# Patient Record
Sex: MALE | Race: WHITE | Employment: OTHER | URBAN - METROPOLITAN AREA
[De-identification: names, ages, dates, MRNs, and addresses within clinical notes are randomized per-mention and may not be internally consistent; named-entity substitution may affect disease eponyms.]

---

## 2020-08-25 ENCOUNTER — HOSPITAL ENCOUNTER (EMERGENCY)
Age: 75
Discharge: HOME | End: 2020-08-25
Attending: FAMILY MEDICINE
Payer: MEDICARE

## 2020-08-25 ENCOUNTER — APPOINTMENT (OUTPATIENT)
Dept: CT IMAGING | Age: 75
End: 2020-08-25
Payer: MEDICARE

## 2020-08-25 VITALS
DIASTOLIC BLOOD PRESSURE: 81 MMHG | BODY MASS INDEX: 29.77 KG/M2 | WEIGHT: 201 LBS | HEIGHT: 69 IN | TEMPERATURE: 98.4 F | HEART RATE: 62 BPM | RESPIRATION RATE: 18 BRPM | SYSTOLIC BLOOD PRESSURE: 159 MMHG | OXYGEN SATURATION: 96 %

## 2020-08-25 PROCEDURE — 71250 CT THORAX DX C-: CPT

## 2020-08-25 PROCEDURE — 99284 EMERGENCY DEPT VISIT MOD MDM: CPT

## 2020-08-25 PROCEDURE — 99283 EMERGENCY DEPT VISIT LOW MDM: CPT

## 2020-08-25 PROCEDURE — 93005 ELECTROCARDIOGRAM TRACING: CPT

## 2020-08-25 RX ORDER — DOXYCYCLINE HYCLATE 100 MG
100 TABLET ORAL 2 TIMES DAILY
Qty: 14 TABLET | Refills: 0 | Status: SHIPPED | OUTPATIENT
Start: 2020-08-25 | End: 2020-09-01

## 2020-08-25 RX ORDER — MECLIZINE HYDROCHLORIDE 25 MG/1
25 TABLET ORAL 3 TIMES DAILY PRN
Qty: 15 TABLET | Refills: 0 | Status: SHIPPED | OUTPATIENT
Start: 2020-08-25 | End: 2020-09-04

## 2020-08-25 ASSESSMENT — PAIN DESCRIPTION - DESCRIPTORS: DESCRIPTORS: DULL

## 2020-08-25 ASSESSMENT — PAIN DESCRIPTION - LOCATION: LOCATION: ABDOMEN;CHEST

## 2020-08-25 ASSESSMENT — PAIN SCALES - GENERAL: PAINLEVEL_OUTOF10: 4

## 2020-08-25 NOTE — ED PROVIDER NOTES
HPI:  8/25/20,   Time: 1:52 PM EDT         Zuly Acosta is a 70 y.o. male presenting to the ED for acute onset of vertiginous symptoms starting yesterday when he was getting off his lawn tractor. He started walking to the house and began having episodes of dizziness/the environment that spinning around him. It made him nauseous and he did have an emesis or 2. Today the symptoms are better. Received here for a second reason, few days ago he noticed swelling of the lower part of the of the chest wall, near where the ribs come into the sternum. Comes and goes. Talked to his physician today and he recommended getting a CAT scan. ROS:   Pertinent positives and negatives are stated within HPI, all other systems reviewed and are negative.  --------------------------------------------- PAST HISTORY ---------------------------------------------  Past Medical History:  has a past medical history of AAA (abdominal aortic aneurysm) ([facility] Aortic regurgitation, COPD (chronic obstructive pulmonary disease) ([facility] [address] Coronary atherosclerosis of native coronary artery, DJD (degenerative joint disease), HTN (hypertension), Hyperlipidemia, Mitral regurgitation, and TIA (transient ischemic attack). Past Surgical History:  has a past surgical history that includes shoulder surgery; hernia repair; back surgery; Appendectomy; Diagnostic Cardiac Cath Lab Procedure; Coronary artery bypass graft; Shoulder arthroscopy; Cardiac surgery; Cardiac catheterization (9/19/2013); Cholecystectomy; eye surgery (09/2017); and transesophageal echocardiogram (03/05/2018). Social History:  reports that he quit smoking about 17 years ago. His smoking use included cigarettes. He has a 40.00 pack-year smoking history. He has never used smokeless tobacco. He reports current alcohol use. He reports that he does not use drugs. Family History: family history includes Cancer in his father and mother.      The patients home medications have been reviewed. Allergies: Ranexa [ranolazine]    -------------------------------------------------- RESULTS -------------------------------------------------  All laboratory and radiology results have been personally reviewed by myself   LABS:  No results found for this visit on 08/25/20. RADIOLOGY:  Interpreted by Radiologist.  Ruslan Moreland   Final Result      1. 6 mm superficial (dermal-based) nodular density anterior to the   lower sternum, likely infectious/inflammatory in etiology. 2. The xiphoid process has a bifid appearance and protrudes   anteriorly. It is somewhat more prominent towards the left and might   account for the palpable abnormality. Clinical correlation is needed. 3. Ill-defined subsolid 1.3 cm right upper lobe opacity. Per the   recommendations of the Fleischner Society, follow-up CT of the chest   is recommended in 6-12 months. 4. Additional 4 mm right lower lobe nodule. Attention on follow-up   imaging. 5. Severe calcified coronary atherosclerosis. 6. Dilation of the ascending aorta to 4.3 cm.          ------------------------- NURSING NOTES AND VITALS REVIEWED ---------------------------   The nursing notes within the ED encounter and vital signs as below have been reviewed. BP (!) 159/81   Pulse 62   Temp 98.4 °F (36.9 °C)   Resp 18   Ht 5' 9\" (1.753 m)   Wt 201 lb (91.2 kg)   SpO2 96%   BMI 29.68 kg/m²   Oxygen Saturation Interpretation: Normal      ---------------------------------------------------PHYSICAL EXAM--------------------------------------    Constitutional/General: Alert and oriented x3, well appearing, non toxic in NAD  Head: NC/AT  Eyes: PERRL, EOMI  Mouth: Oropharynx clear, handling secretions, no trismus  Neck: Supple, full ROM, no meningeal signs  Pulmonary: Lungs clear to auscultation bilaterally, no wheezes, rales, or rhonchi. Not in respiratory distress  Cardiovascular:  Regular rate and rhythm, no murmurs, gallops, or rubs.  2+ distal pulses  Chest wall: There is mild that enlarged the area at the base of the sternum on the left side. It is nontender to palpation. There is no surrounding erythema or warmth to touch. Abdomen: Soft, non tender, non distended,   Extremities: Moves all extremities x 4. Warm and well perfused  Skin: warm and dry without rash  Neurologic: GCS 15,  Psych: Normal Affect      ------------------------------ ED COURSE/MEDICAL DECISION MAKING----------------------  Medications - No data to display      Medical Decision Making:    Straightforward  The patient has benign paroxysmal positional vertigo causing his sensation of dizziness and nausea. The etiology, pathophysiology and natural course of BPPV was discussed with the patient. The sensation with a large area in the chest wall, will be evaluated with a CT scan chest without contrast.  At the end of the shift, the CT scan results of chest were not available. Dr. Ha Hager will review the results and discussed them with the patient prior to discharge. Counseling: The emergency provider has spoken with the patient and discussed todays results, in addition to providing specific details for the plan of care and counseling regarding the diagnosis and prognosis. Questions are answered at this time and they are agreeable with the plan.      --------------------------------- IMPRESSION AND DISPOSITION ---------------------------------    IMPRESSION  1. Benign paroxysmal positional vertigo, unspecified laterality    2. Lung nodule    3.  Nodule of skin of abdomen        DISPOSITION  Disposition: Discharge to home  Patient condition is stable                 Iván Dunaway MD  08/26/20 0478

## 2020-08-27 ENCOUNTER — CARE COORDINATION (OUTPATIENT)
Dept: CARE COORDINATION | Age: 75
End: 2020-08-27

## 2020-08-28 LAB
EKG ATRIAL RATE: 64 BPM
EKG P AXIS: 8 DEGREES
EKG P-R INTERVAL: 224 MS
EKG Q-T INTERVAL: 432 MS
EKG QRS DURATION: 140 MS
EKG QTC CALCULATION (BAZETT): 445 MS
EKG R AXIS: -82 DEGREES
EKG T AXIS: 41 DEGREES
EKG VENTRICULAR RATE: 64 BPM

## 2020-09-03 ENCOUNTER — CARE COORDINATION (OUTPATIENT)
Dept: CARE COORDINATION | Age: 75
End: 2020-09-03

## 2020-09-03 NOTE — CARE COORDINATION
Patient contacted regarding COVID-19 risk and screening. Discussed COVID-19 related testing which was not done at this time. Test results were not done. Patient informed of results, if available? NA. Care Transition Nurse/ Ambulatory Care Manager contacted the patient by telephone to perform follow-up assessment. Verified name and  with patient as identifiers. Patient has following risk factors of: COPD. Symptoms reviewed with patient who verbalized the following symptoms: no new symptoms. Due to no new or worsening symptoms encounter was not routed to provider for escalation. Education provided regarding infection prevention, and signs and symptoms of COVID-19 and when to seek medical attention with patient who verbalized understanding. Discussed exposure protocols and quarantine from [address] you at higher risk for severe illness  and given an opportunity for questions and concerns. The patient agrees to contact the COVID-19 hotline [phone] or PCP office for questions related to their healthcare. CTN/ACM provided contact information for future reference. From CDC: Are you at higher risk for severe illness?  Wash your hands often.  Avoid close contact (6 feet, which is about two arm lengths) with people who are sick.  Put distance between yourself and other people if COVID-19 is spreading in your community.  Clean and disinfect frequently touched surfaces.  Avoid all cruise travel and non-essential air travel.  Call your healthcare professional if you have concerns about COVID-19 and your underlying condition or if you are sick. For more information on steps you can take to protect yourself, see CDC's How to [place] for follow-up call in 5-7 days based on severity of symptoms and risk factors.

## 2020-09-10 ENCOUNTER — CARE COORDINATION (OUTPATIENT)
Dept: CARE COORDINATION | Age: 75
End: 2020-09-10

## 2021-03-31 ENCOUNTER — APPOINTMENT (OUTPATIENT)
Dept: CT IMAGING | Age: 76
DRG: 246 | End: 2021-03-31
Payer: MEDICARE

## 2021-03-31 ENCOUNTER — HOSPITAL ENCOUNTER (INPATIENT)
Age: 76
LOS: 6 days | Discharge: HOME | DRG: 246 | End: 2021-04-06
Attending: EMERGENCY MEDICINE | Admitting: FAMILY MEDICINE
Payer: MEDICARE

## 2021-03-31 DIAGNOSIS — I25.10 ATHEROSCLEROSIS OF NATIVE CORONARY ARTERY OF NATIVE HEART WITHOUT ANGINA PECTORIS: ICD-10-CM

## 2021-03-31 DIAGNOSIS — R07.9 CHEST PAIN, UNSPECIFIED TYPE: Primary | ICD-10-CM

## 2021-03-31 DIAGNOSIS — I16.1 HYPERTENSIVE EMERGENCY: ICD-10-CM

## 2021-03-31 LAB
ALBUMIN SERPL-MCNC: 4.3 G/DL (ref 3.5–5.2)
ALP BLD-CCNC: 78 U/L (ref 40–129)
ALT SERPL-CCNC: 8 U/L (ref 0–40)
ANION GAP SERPL CALCULATED.3IONS-SCNC: 8 MMOL/L (ref 7–16)
AST SERPL-CCNC: 11 U/L (ref 0–39)
BASOPHILS ABSOLUTE: 0.08 E9/L (ref 0–0.2)
BASOPHILS RELATIVE PERCENT: 0.8 % (ref 0–2)
BILIRUB SERPL-MCNC: 0.2 MG/DL (ref 0–1.2)
BUN BLDV-MCNC: 17 MG/DL (ref 8–23)
CALCIUM SERPL-MCNC: 9.4 MG/DL (ref 8.6–10.2)
CHLORIDE BLD-SCNC: 105 MMOL/L (ref 98–107)
CO2: 27 MMOL/L (ref 22–29)
CREAT SERPL-MCNC: 1 MG/DL (ref 0.7–1.2)
EOSINOPHILS ABSOLUTE: 0.27 E9/L (ref 0.05–0.5)
EOSINOPHILS RELATIVE PERCENT: 2.8 % (ref 0–6)
GFR AFRICAN AMERICAN: >60
GFR NON-AFRICAN AMERICAN: >60 ML/MIN/1.73
GLUCOSE BLD-MCNC: 111 MG/DL (ref 74–99)
HCT VFR BLD CALC: 47.4 % (ref 37–54)
HEMOGLOBIN: 15.7 G/DL (ref 12.5–16.5)
IMMATURE GRANULOCYTES #: 0.03 E9/L
IMMATURE GRANULOCYTES %: 0.3 % (ref 0–5)
LYMPHOCYTES ABSOLUTE: 2.72 E9/L (ref 1.5–4)
LYMPHOCYTES RELATIVE PERCENT: 28.5 % (ref 20–42)
MAGNESIUM: 2 MG/DL (ref 1.6–2.6)
MCH RBC QN AUTO: 30.8 PG (ref 26–35)
MCHC RBC AUTO-ENTMCNC: 33.1 % (ref 32–34.5)
MCV RBC AUTO: 93.1 FL (ref 80–99.9)
MONOCYTES ABSOLUTE: 0.66 E9/L (ref 0.1–0.95)
MONOCYTES RELATIVE PERCENT: 6.9 % (ref 2–12)
NEUTROPHILS ABSOLUTE: 5.8 E9/L (ref 1.8–7.3)
NEUTROPHILS RELATIVE PERCENT: 60.7 % (ref 43–80)
PDW BLD-RTO: 13.7 FL (ref 11.5–15)
PLATELET # BLD: 274 E9/L (ref 130–450)
PMV BLD AUTO: 10.7 FL (ref 7–12)
POTASSIUM SERPL-SCNC: 4.7 MMOL/L (ref 3.5–5)
PRO-BNP: 441 PG/ML (ref 0–450)
RBC # BLD: 5.09 E12/L (ref 3.8–5.8)
SODIUM BLD-SCNC: 140 MMOL/L (ref 132–146)
TOTAL PROTEIN: 7.3 G/DL (ref 6.4–8.3)
TROPONIN: <0.01 NG/ML (ref 0–0.03)
WBC # BLD: 9.6 E9/L (ref 4.5–11.5)

## 2021-03-31 PROCEDURE — 74174 CTA ABD&PLVS W/CONTRAST: CPT

## 2021-03-31 PROCEDURE — 96372 THER/PROPH/DIAG INJ SC/IM: CPT

## 2021-03-31 PROCEDURE — 4A023N7 MEASUREMENT OF CARDIAC SAMPLING AND PRESSURE, LEFT HEART, PERCUTANEOUS APPROACH: ICD-10-PCS | Performed by: INTERNAL MEDICINE

## 2021-03-31 PROCEDURE — 85025 COMPLETE CBC W/AUTO DIFF WBC: CPT

## 2021-03-31 PROCEDURE — 027136Z DILATION OF CORONARY ARTERY, TWO ARTERIES WITH THREE DRUG-ELUTING INTRALUMINAL DEVICES, PERCUTANEOUS APPROACH: ICD-10-PCS | Performed by: INTERNAL MEDICINE

## 2021-03-31 PROCEDURE — B2131ZZ FLUOROSCOPY OF MULTIPLE CORONARY ARTERY BYPASS GRAFTS USING LOW OSMOLAR CONTRAST: ICD-10-PCS | Performed by: INTERNAL MEDICINE

## 2021-03-31 PROCEDURE — 6360000004 HC RX CONTRAST MEDICATION: Performed by: RADIOLOGY

## 2021-03-31 PROCEDURE — 96374 THER/PROPH/DIAG INJ IV PUSH: CPT

## 2021-03-31 PROCEDURE — 83735 ASSAY OF MAGNESIUM: CPT

## 2021-03-31 PROCEDURE — 93005 ELECTROCARDIOGRAM TRACING: CPT | Performed by: EMERGENCY MEDICINE

## 2021-03-31 PROCEDURE — 2060000000 HC ICU INTERMEDIATE R&B

## 2021-03-31 PROCEDURE — 80053 COMPREHEN METABOLIC PANEL: CPT

## 2021-03-31 PROCEDURE — 83880 ASSAY OF NATRIURETIC PEPTIDE: CPT

## 2021-03-31 PROCEDURE — B2111ZZ FLUOROSCOPY OF MULTIPLE CORONARY ARTERIES USING LOW OSMOLAR CONTRAST: ICD-10-PCS | Performed by: INTERNAL MEDICINE

## 2021-03-31 PROCEDURE — 36415 COLL VENOUS BLD VENIPUNCTURE: CPT

## 2021-03-31 PROCEDURE — 2500000003 HC RX 250 WO HCPCS: Performed by: EMERGENCY MEDICINE

## 2021-03-31 PROCEDURE — 6360000002 HC RX W HCPCS: Performed by: EMERGENCY MEDICINE

## 2021-03-31 PROCEDURE — 84484 ASSAY OF TROPONIN QUANT: CPT

## 2021-03-31 PROCEDURE — 71275 CT ANGIOGRAPHY CHEST: CPT

## 2021-03-31 PROCEDURE — 6370000000 HC RX 637 (ALT 250 FOR IP): Performed by: EMERGENCY MEDICINE

## 2021-03-31 PROCEDURE — 99285 EMERGENCY DEPT VISIT HI MDM: CPT

## 2021-03-31 RX ORDER — LABETALOL HYDROCHLORIDE 5 MG/ML
10 INJECTION, SOLUTION INTRAVENOUS ONCE
Status: COMPLETED | OUTPATIENT
Start: 2021-03-31 | End: 2021-03-31

## 2021-03-31 RX ORDER — NITROGLYCERIN 0.4 MG/1
0.4 TABLET SUBLINGUAL ONCE
Status: COMPLETED | OUTPATIENT
Start: 2021-03-31 | End: 2021-03-31

## 2021-03-31 RX ORDER — ASPIRIN 81 MG/1
243 TABLET, CHEWABLE ORAL ONCE
Status: COMPLETED | OUTPATIENT
Start: 2021-03-31 | End: 2021-03-31

## 2021-03-31 RX ADMIN — NITROGLYCERIN 0.4 MG: 0.4 TABLET, ORALLY DISINTEGRATING SUBLINGUAL at 18:36

## 2021-03-31 RX ADMIN — LABETALOL HYDROCHLORIDE 10 MG: 5 INJECTION INTRAVENOUS at 18:02

## 2021-03-31 RX ADMIN — IOPAMIDOL 100 ML: 755 INJECTION, SOLUTION INTRAVENOUS at 18:31

## 2021-03-31 RX ADMIN — ENOXAPARIN SODIUM 90 MG: 100 INJECTION SUBCUTANEOUS at 19:35

## 2021-03-31 RX ADMIN — NITROGLYCERIN 0.4 MG: 0.4 TABLET, ORALLY DISINTEGRATING SUBLINGUAL at 18:02

## 2021-03-31 RX ADMIN — ASPIRIN 81 MG CHEWABLE TABLET 243 MG: 81 TABLET CHEWABLE at 20:02

## 2021-03-31 ASSESSMENT — ENCOUNTER SYMPTOMS
SHORTNESS OF BREATH: 1
VOMITING: 0
TROUBLE SWALLOWING: 0
DIARRHEA: 0
RHINORRHEA: 0
BLOOD IN STOOL: 0
NAUSEA: 0
ABDOMINAL PAIN: 0
COLOR CHANGE: 0
COUGH: 0

## 2021-03-31 ASSESSMENT — PAIN SCALES - GENERAL
PAINLEVEL_OUTOF10: 0
PAINLEVEL_OUTOF10: 1

## 2021-03-31 ASSESSMENT — PAIN DESCRIPTION - LOCATION: LOCATION: CHEST;JAW

## 2021-03-31 ASSESSMENT — PAIN DESCRIPTION - ONSET: ONSET: SUDDEN

## 2021-03-31 ASSESSMENT — PAIN DESCRIPTION - ORIENTATION: ORIENTATION: LEFT;RIGHT

## 2021-03-31 ASSESSMENT — PAIN DESCRIPTION - FREQUENCY: FREQUENCY: CONTINUOUS

## 2021-03-31 ASSESSMENT — PAIN DESCRIPTION - PAIN TYPE: TYPE: ACUTE PAIN

## 2021-03-31 ASSESSMENT — PAIN DESCRIPTION - PROGRESSION: CLINICAL_PROGRESSION: NOT CHANGED

## 2021-03-31 NOTE — Clinical Note
Patient Class: Inpatient [101]   REQUIRED: Diagnosis: Chest pain [310451]   Estimated Length of Stay: Estimated stay of more than 2 midnights   Telemetry Bed Required?: Yes

## 2021-03-31 NOTE — ED PROVIDER NOTES
ED PROVIDER NOTE    Chief Complaint   Patient presents with    Chest Pain     began about 15-20 min agp, pain across entire chest and radiates to bilateral jaw area and left arm, some sweating per pt, sob also       HPI:  3/31/21,   Time: 5:49 PM EDT       Yasir Espinosa is a 78 y.o. male presenting to the ED for chest pain. Acute onset 20 minutes prior to arrival while walking to the car. Improved w/ rest. midsternal pressure radiating to bilateral jaws, no radiation to back. Associated shortness of breath and diaphoresis. No nausea/vomiting. No recent illness, fever, chills, cough. Hx of CAD s/p CABG, abdominal and thoracic aortic aneurysm. Compliant w/ meds, taking all antihypertensives. No drug/etoh use. Chart review: hx of HTN, HLD, CAD s/p CABG, aortic and mitral regurg, COPD, AAA, TAA    Review of Systems:     Review of Systems   Constitutional: Positive for diaphoresis. Negative for appetite change, chills and fever. HENT: Negative for congestion, rhinorrhea and trouble swallowing. Eyes: Negative for visual disturbance. Respiratory: Positive for shortness of breath. Negative for cough. Cardiovascular: Positive for chest pain. Negative for leg swelling. Gastrointestinal: Negative for abdominal pain, blood in stool, diarrhea, nausea and vomiting. Genitourinary: Negative for decreased urine volume, difficulty urinating, dysuria, frequency, hematuria and urgency. Musculoskeletal: Negative for myalgias, neck pain and neck stiffness. Skin: Negative for color change.    Neurological: Negative for dizziness, syncope, weakness, light-headedness, numbness and headaches.         --------------------------------------------- PAST HISTORY ---------------------------------------------  Past Medical History:   Past Medical History:   Diagnosis Date    AAA [facility]abdominal aortic aneurysm) ([facility])     4,5cm    Aortic regurgitation     mild to moderate    COPD (chronic obstructive pulmonary disease) ([facility])  Coronary atherosclerosis of native coronary artery     DJD (degenerative joint disease)     HTN (hypertension)     Hyperlipidemia     Mitral regurgitation     TIA (transient ischemic attack) 2013       Past Surgical History:   Past Surgical History:   Procedure Laterality Date    APPENDECTOMY      BACK SURGERY      CARDIAC CATHETERIZATION  2013    DR Magui Mcintosh CARDIAC SURGERY      stents    CHOLECYSTECTOMY      CORONARY ARTERY BYPASS GRAFT      DIAGNOSTIC CARDIAC CATH LAB PROCEDURE      EYE SURGERY  2017    HERNIA REPAIR      x4    SHOULDER ARTHROSCOPY      SHOULDER SURGERY      TRANSESOPHAGEAL ECHOCARDIOGRAM  2018    [facility] with Dr. Pam Jaimes       Social History:   Social History     Socioeconomic History    Marital status:      Spouse name: None    Number of children: None    Years of education: None    Highest education level: None   Occupational History    None   Social Needs    Financial resource strain: None    Food insecurity     Worry: None     Inability: None    Transportation needs     Medical: None     Non-medical: None   Tobacco Use    Smoking status: Former Smoker     Packs/day: 1.00     Years: 40.00     Pack years: 40.00     Types: Cigarettes     Quit date: 2002     Years since quittin.4    Smokeless tobacco: Never Used   Substance and Sexual Activity    Alcohol use: Yes     Comment: occassionally    Drug use: No    Sexual activity: None   Lifestyle    Physical activity     Days per week: None     Minutes per session: None    Stress: None   Relationships    Social connections     Talks on phone: None     Gets together: None     Attends Je[employer] witness service: None     Active member of club or organization: None     Attends meetings of clubs or organizations: None     Relationship status: None    Intimate partner violence     Fear of current or ex partner: None     Emotionally abused: None     Physically abused: None     Forced sexual activity: None   Other Topics Concern    None   Social History Narrative    None       Family History:   Family History   Problem Relation Age of Onset    Cancer Mother     Cancer Father        The patients home medications have been reviewed. Allergies: Allergies   Allergen Reactions    Ranexa [Ranolazine]      Shortness of breath, headache           ---------------------------------------------------PHYSICAL EXAM--------------------------------------    BP (!) 200/90   Pulse 66   Temp 97.7 °F (36.5 °C) (Temporal)   Resp 16   Ht 5' 9\" (1.753 m)   Wt 203 lb (92.1 kg)   SpO2 98%   BMI 29.98 kg/m²     Physical Exam  Vitals signs and nursing note reviewed. Constitutional:       General: He is not in acute distress. Appearance: He is not toxic-appearing. HENT:      Mouth/Throat:      Mouth: Mucous membranes are moist.   Eyes:      General: No scleral icterus. Extraocular Movements: Extraocular movements intact. Pupils: Pupils are equal, round, and reactive to light. Neck:      Musculoskeletal: Normal range of motion and neck supple. No neck rigidity or muscular tenderness. Comments: +JVD  Cardiovascular:      Rate and Rhythm: Normal rate and regular rhythm. Pulses: Normal pulses. Heart sounds: Normal heart sounds. No murmur. Pulmonary:      Effort: Pulmonary effort is normal. No respiratory distress. Breath sounds: Normal breath sounds. No wheezing or rales. Abdominal:      General: There is no distension. Palpations: Abdomen is soft. Tenderness: There is no abdominal tenderness. There is no guarding or rebound. Musculoskeletal: Normal range of motion. General: No swelling or tenderness. Comments: Radial, DP, and PT pulses 2+ bilaterally. Skin:     General: Skin is warm and dry. Neurological:      Mental Status: He is alert and oriented to person, place, and time.       Comments: Strength 5/5 and sensation grossly intact to light touch and equal bilaterally throughout all extremities            -------------------------------------------------- RESULTS -------------------------------------------------  I have personally reviewed all laboratory and imaging results for this patient. Results are listed below. LABS:  Labs Reviewed   COMPREHENSIVE METABOLIC PANEL - Abnormal; Notable for the following components:       Result Value    Glucose 111 (*)     All other components within normal limits   CBC WITH AUTO DIFFERENTIAL   MAGNESIUM   TROPONIN   BRAIN NATRIURETIC PEPTIDE   TROPONIN   TROPONIN       RADIOLOGY:  Interpreted personally and by Radiologist.  CTA CHEST W CONTRAST   Final Result   Aneurysmal dilatation of the ascending segment of the thoracic aorta   measuring approximately 4.5 cm, similar to the prior examination. No   evidence of aortic dissection. Cardiomegaly. Aorta iliac stent in place which is patent. No evidence of endoleak. Atherosclerotic plaque at the origin of the celiac axis and superior   mesenteric artery which remain patent. Sigmoid diverticulosis with no evidence of diverticulitis. Mildly enlarged prostate. The         CTA ABDOMEN PELVIS W CONTRAST   Final Result   Aneurysmal dilatation of the ascending segment of the thoracic aorta   measuring approximately 4.5 cm, similar to the prior examination. No   evidence of aortic dissection. Cardiomegaly. Aorta iliac stent in place which is patent. No evidence of endoleak. Atherosclerotic plaque at the origin of the celiac axis and superior   mesenteric artery which remain patent. Sigmoid diverticulosis with no evidence of diverticulitis. Mildly enlarged prostate. The             EKG:  This EKG is signed and interpreted by the EP.     Sinus bradycardia w/ 1st degree AV block, vent rate 56bpm, LBBB, no acute injury pattern, no clinically significant change compared w/ prior EKG      ------------------------- NURSING NOTES AND VITALS REVIEWED ---------------------------   The nursing notes within the ED encounter and vital signs as below have been reviewed by myself. BP (!) 200/90   Pulse 66   Temp 97.7 °F (36.5 °C) (Temporal)   Resp 16   Ht 5' 9\" (1.753 m)   Wt 203 lb (92.1 kg)   SpO2 98%   BMI 29.98 kg/m²   Oxygen Saturation Interpretation: Normal    The patients available past medical records and past encounters were reviewed. ------------------------------ ED COURSE/MEDICAL DECISION MAKING----------------------  Medications   nitroGLYCERIN (NITROSTAT) SL tablet 0.4 mg (0.4 mg Sublingual Given 3/31/21 1836)   labetalol (NORMODYNE;TRANDATE) injection 10 mg (10 mg Intravenous Given 3/31/21 1802)   iopamidol (ISOVUE-370) 76 % injection 100 mL (100 mLs Intravenous Given 3/31/21 1831)   enoxaparin (LOVENOX) injection 90 mg (90 mg Subcutaneous Given 3/31/21 1935)   aspirin chewable tablet 243 mg (243 mg Oral Given 3/31/21 2002)     Critical Care: Please note that the withdrawal or failure to initiate urgent interventions for this patient would likely result in a life threatening deterioration or permanent disability. Accordingly this patient received 30 minutes of critical care time, excluding separately billable procedures. Counseling: The emergency provider has spoken with the patient and discussed todays results, in addition to providing specific details for the plan of care and counseling regarding the diagnosis and prognosis. Questions are answered at this time and they are agreeable with the plan. ED Course/Medical Decision Makin y.o. male here with acute onset chest pain. On arrival markedly hypertensive w/ angina. CTA obtained emergently given hx of thoracic aortic aneurysm, negative for acute aortic pathology. EKG and initial troponin unrevealing. Chest pain improving w/ SL nitro, BP improving w/ IV labetalol.  Treating for ACS w/ lovenox, ASA, admitted in stable condition to [facility] intermediate care for further management. Discussed findings and expected course of care and patient/surrogate agreed with plan for admission for further evaluation and management.       --------------------------------- IMPRESSION AND DISPOSITION ---------------------------------    IMPRESSION  1. Chest pain, unspecified type    2. Hypertensive emergency        DISPOSITION  Disposition: Admit to telemetry  Patient condition is stable    NOTE: This report was transcribed using voice recognition software.  Every effort was made to ensure accuracy; however, inadvertent computerized transcription errors may be present    Jordon Llamas MD  Attending Emergency Physician          Jordon Llamas MD  03/31/21 6051

## 2021-03-31 NOTE — ED NOTES
Name: Yoshi Hernandez  : 1945  MRN: [id number]    Date: 3/31/2021    Benefits of immediately proceeding with Radiology exam outweigh the risks and therefore the following is being waived:      [] Pregnancy test    [] Protocol for Iodine allergy    [] MRI questionnaire    [x] BUN/Creatinine        Yuvonne Duane, MD Yuvonne Duane, MD  21 [phone]

## 2021-04-01 ENCOUNTER — APPOINTMENT (OUTPATIENT)
Dept: NUCLEAR MEDICINE | Age: 76
DRG: 246 | End: 2021-04-01
Payer: MEDICARE

## 2021-04-01 LAB
EKG ATRIAL RATE: 56 BPM
EKG P AXIS: 18 DEGREES
EKG P-R INTERVAL: 256 MS
EKG Q-T INTERVAL: 446 MS
EKG QRS DURATION: 140 MS
EKG QTC CALCULATION (BAZETT): 430 MS
EKG R AXIS: -69 DEGREES
EKG T AXIS: 109 DEGREES
EKG VENTRICULAR RATE: 56 BPM
LV EF: 50 %
LVEF MODALITY: NORMAL
TROPONIN: <0.01 NG/ML (ref 0–0.03)

## 2021-04-01 PROCEDURE — 6370000000 HC RX 637 (ALT 250 FOR IP): Performed by: FAMILY MEDICINE

## 2021-04-01 PROCEDURE — 99223 1ST HOSP IP/OBS HIGH 75: CPT | Performed by: INTERNAL MEDICINE

## 2021-04-01 PROCEDURE — 6360000002 HC RX W HCPCS: Performed by: NURSE PRACTITIONER

## 2021-04-01 PROCEDURE — APPSS45 APP SPLIT SHARED TIME 31-45 MINUTES: Performed by: NURSE PRACTITIONER

## 2021-04-01 PROCEDURE — 93017 CV STRESS TEST TRACING ONLY: CPT

## 2021-04-01 PROCEDURE — 6370000000 HC RX 637 (ALT 250 FOR IP): Performed by: NURSE PRACTITIONER

## 2021-04-01 PROCEDURE — 78452 HT MUSCLE IMAGE SPECT MULT: CPT | Performed by: INTERNAL MEDICINE

## 2021-04-01 PROCEDURE — 3430000000 HC RX DIAGNOSTIC RADIOPHARMACEUTICAL: Performed by: RADIOLOGY

## 2021-04-01 PROCEDURE — 2060000000 HC ICU INTERMEDIATE R&B

## 2021-04-01 PROCEDURE — 84484 ASSAY OF TROPONIN QUANT: CPT

## 2021-04-01 PROCEDURE — 93016 CV STRESS TEST SUPVJ ONLY: CPT | Performed by: INTERNAL MEDICINE

## 2021-04-01 PROCEDURE — A9500 TC99M SESTAMIBI: HCPCS | Performed by: RADIOLOGY

## 2021-04-01 PROCEDURE — 93018 CV STRESS TEST I&R ONLY: CPT | Performed by: INTERNAL MEDICINE

## 2021-04-01 PROCEDURE — 36415 COLL VENOUS BLD VENIPUNCTURE: CPT

## 2021-04-01 PROCEDURE — 78452 HT MUSCLE IMAGE SPECT MULT: CPT

## 2021-04-01 PROCEDURE — 6360000002 HC RX W HCPCS: Performed by: FAMILY MEDICINE

## 2021-04-01 RX ORDER — LANOLIN ALCOHOL/MO/W.PET/CERES
3 CREAM (GRAM) TOPICAL NIGHTLY PRN
Status: DISCONTINUED | OUTPATIENT
Start: 2021-04-01 | End: 2021-04-06 | Stop reason: HOSPADM

## 2021-04-01 RX ORDER — ISOSORBIDE MONONITRATE 30 MG/1
30 TABLET, EXTENDED RELEASE ORAL DAILY
Status: DISCONTINUED | OUTPATIENT
Start: 2021-04-01 | End: 2021-04-05

## 2021-04-01 RX ORDER — OMEPRAZOLE 20 MG/1
20 CAPSULE, DELAYED RELEASE ORAL EVERY MORNING
Status: DISCONTINUED | OUTPATIENT
Start: 2021-04-01 | End: 2021-04-01 | Stop reason: CLARIF

## 2021-04-01 RX ORDER — LISINOPRIL 5 MG/1
2.5 TABLET ORAL ONCE
Status: COMPLETED | OUTPATIENT
Start: 2021-04-01 | End: 2021-04-01

## 2021-04-01 RX ORDER — METOPROLOL SUCCINATE 25 MG/1
12.5 TABLET, EXTENDED RELEASE ORAL DAILY
Status: DISCONTINUED | OUTPATIENT
Start: 2021-04-01 | End: 2021-04-02

## 2021-04-01 RX ORDER — ASPIRIN 81 MG/1
81 TABLET ORAL DAILY
Status: DISCONTINUED | OUTPATIENT
Start: 2021-04-01 | End: 2021-04-06 | Stop reason: HOSPADM

## 2021-04-01 RX ORDER — ATORVASTATIN CALCIUM 10 MG/1
10 TABLET, FILM COATED ORAL DAILY
Status: DISCONTINUED | OUTPATIENT
Start: 2021-04-01 | End: 2021-04-06 | Stop reason: HOSPADM

## 2021-04-01 RX ORDER — LANOLIN ALCOHOL/MO/W.PET/CERES
500 CREAM (GRAM) TOPICAL NIGHTLY
Status: DISCONTINUED | OUTPATIENT
Start: 2021-04-01 | End: 2021-04-06 | Stop reason: HOSPADM

## 2021-04-01 RX ORDER — LISINOPRIL 5 MG/1
5 TABLET ORAL DAILY
Status: DISCONTINUED | OUTPATIENT
Start: 2021-04-02 | End: 2021-04-02

## 2021-04-01 RX ORDER — LISINOPRIL 5 MG/1
2.5 TABLET ORAL DAILY
Status: DISCONTINUED | OUTPATIENT
Start: 2021-04-01 | End: 2021-04-01

## 2021-04-01 RX ORDER — PANTOPRAZOLE SODIUM 40 MG/1
40 TABLET, DELAYED RELEASE ORAL
Status: DISCONTINUED | OUTPATIENT
Start: 2021-04-01 | End: 2021-04-06 | Stop reason: HOSPADM

## 2021-04-01 RX ADMIN — LISINOPRIL 2.5 MG: 5 TABLET ORAL at 14:01

## 2021-04-01 RX ADMIN — ASPIRIN 81 MG: 81 TABLET, COATED ORAL at 08:27

## 2021-04-01 RX ADMIN — Medication 35 MILLICURIE: at 14:52

## 2021-04-01 RX ADMIN — REGADENOSON 0.4 MG: 0.08 INJECTION, SOLUTION INTRAVENOUS at 14:50

## 2021-04-01 RX ADMIN — Medication 10 MILLICURIE: at 14:08

## 2021-04-01 RX ADMIN — ISOSORBIDE MONONITRATE 30 MG: 30 TABLET ORAL at 08:28

## 2021-04-01 RX ADMIN — Medication 3 MG: at 22:31

## 2021-04-01 RX ADMIN — ENOXAPARIN SODIUM 40 MG: 40 INJECTION SUBCUTANEOUS at 08:28

## 2021-04-01 RX ADMIN — PANTOPRAZOLE SODIUM 40 MG: 40 TABLET, DELAYED RELEASE ORAL at 08:27

## 2021-04-01 RX ADMIN — ATORVASTATIN CALCIUM 10 MG: 10 TABLET, FILM COATED ORAL at 08:27

## 2021-04-01 RX ADMIN — LISINOPRIL 2.5 MG: 5 TABLET ORAL at 08:27

## 2021-04-01 RX ADMIN — Medication 500 MG: at 20:15

## 2021-04-01 ASSESSMENT — PAIN SCALES - GENERAL: PAINLEVEL_OUTOF10: 0

## 2021-04-01 ASSESSMENT — ENCOUNTER SYMPTOMS
ABDOMINAL PAIN: 0
SHORTNESS OF BREATH: 0

## 2021-04-01 NOTE — H&P
HISTORY AND PHYSICAL             Date: 4/1/2021        Patient Name: Tennille Crain     YOB: 1945      Age:  78 y.o. Chief Complaint     Chief Complaint   Patient presents with    Chest Pain     began about 15-20 min agp, pain across entire chest and radiates to bilateral jaw area and left arm, some sweating per pt, sob also          History Obtained From   patient    History of Present Illness   Patient came to the ER with chest pain that radiated to his neck and shortness of breath with diaphoresis. He feels improved this morning. Past Medical History     Past Medical History:   Diagnosis Date    AAA (abdominal aortic aneurysm) ([facility])     4,5cm    Aortic regurgitation     mild to moderate    COPD (chronic obstructive pulmonary disease) ([facility])     Coronary atherosclerosis of native coronary artery     DJD (degenerative joint disease)     HTN (hypertension)     Hyperlipidemia     Mitral regurgitation     TIA (transient ischemic attack) 07/01/2013        Past Surgical History     Past Surgical History:   Procedure Laterality Date    APPENDECTOMY      BACK SURGERY      CARDIAC CATHETERIZATION  9/19/2013    [facility] CARDIAC SURGERY      stents    CHOLECYSTECTOMY      CORONARY ARTERY BYPASS GRAFT      DIAGNOSTIC CARDIAC CATH LAB PROCEDURE      EYE SURGERY  09/2017    HERNIA REPAIR      x4    SHOULDER ARTHROSCOPY      SHOULDER SURGERY      TRANSESOPHAGEAL ECHOCARDIOGRAM  03/05/2018    MADHURI with Dr. Michaud Pulling        Medications Prior to Admission     Prior to Admission medications    Medication Sig Start Date End Date Taking? Authorizing Provider   metoprolol succinate (TOPROL XL) 25 MG extended release tablet Take 0.5 tablets by mouth daily 2/27/18  Yes Yifan Gregorio, DO   lisinopril (PRINIVIL;ZESTRIL) 2.5 MG tablet Take 1 tablet by mouth daily. 10/14/13  Yes Yifan Gregorio, DO   aspirin 81 MG tablet Take 81 mg by mouth daily.    Yes Historical Provider, MD   Omeprazole Abdomen is flat. Bowel sounds are normal. There is no distension. Tenderness: There is no abdominal tenderness. Skin:     General: Skin is warm and dry. Capillary Refill: Capillary refill takes less than 2 seconds. Neurological:      General: No focal deficit present. Mental Status: He is alert and oriented to person, place, and time.    Psychiatric:         Mood and Affect: Mood normal.         Behavior: Behavior normal.         Labs      Recent Results (from the past 24 hour(s))   EKG 12 Lead    Collection Time: 03/31/21  5:18 PM   Result Value Ref Range    Ventricular Rate 56 BPM    Atrial Rate 56 BPM    P-R Interval 256 ms    QRS Duration 140 ms    Q-T Interval 446 ms    QTc Calculation (Bazett) 430 ms    P Axis 18 degrees    R Axis -69 degrees    T Axis 109 degrees   CBC Auto Differential    Collection Time: 03/31/21  5:49 PM   Result Value Ref Range    WBC 9.6 4.5 - 11.5 E9/L    RBC 5.09 3.80 - 5.80 E12/L    Hemoglobin 15.7 12.5 - 16.5 g/dL    Hematocrit 47.4 37.0 - 54.0 %    MCV 93.1 80.0 - 99.9 fL    MCH 30.8 26.0 - 35.0 pg    MCHC 33.1 32.0 - 34.5 %    RDW 13.7 11.5 - 15.0 fL    Platelets 332 903 - 166 E9/L    MPV 10.7 7.0 - 12.0 fL    Neutrophils % 60.7 43.0 - 80.0 %    Immature Granulocytes % 0.3 0.0 - 5.0 %    Lymphocytes % 28.5 20.0 - 42.0 %    Monocytes % 6.9 2.0 - 12.0 %    Eosinophils % 2.8 0.0 - 6.0 %    Basophils % 0.8 0.0 - 2.0 %    Neutrophils Absolute 5.80 1.80 - 7.30 E9/L    Immature Granulocytes # 0.03 E9/L    Lymphocytes Absolute 2.72 1.50 - 4.00 E9/L    Monocytes Absolute 0.66 0.10 - 0.95 E9/L    Eosinophils Absolute 0.27 0.05 - 0.50 E9/L    Basophils Absolute 0.08 0.00 - 0.20 E9/L   Comprehensive Metabolic Panel    Collection Time: 03/31/21  5:49 PM   Result Value Ref Range    Sodium 140 132 - 146 mmol/L    Potassium 4.7 3.5 - 5.0 mmol/L    Chloride 105 98 - 107 mmol/L    CO2 27 22 - 29 mmol/L    Anion Gap 8 7 - 16 mmol/L    Glucose 111 (H) 74 - 99 mg/dL    BUN 17 8 - 23 mg/dL    CREATININE 1.0 0.7 - 1.2 mg/dL    GFR Non-African American >60 >=60 mL/min/1.73    GFR African American >60     Calcium 9.4 8.6 - 10.2 mg/dL    Total Protein 7.3 6.4 - 8.3 g/dL    Albumin 4.3 3.5 - 5.2 g/dL    Total Bilirubin 0.2 0.0 - 1.2 mg/dL    Alkaline Phosphatase 78 40 - 129 U/L    ALT 8 0 - 40 U/L    AST 11 0 - 39 U/L   Magnesium    Collection Time: 03/31/21  5:49 PM   Result Value Ref Range    Magnesium 2.0 1.6 - 2.6 mg/dL   Troponin    Collection Time: 03/31/21  5:49 PM   Result Value Ref Range    Troponin <0.01 0.00 - 0.03 ng/mL   Brain Natriuretic Peptide    Collection Time: 03/31/21  5:49 PM   Result Value Ref Range    Pro- 0 - 450 pg/mL   Troponin    Collection Time: 04/01/21  2:14 AM   Result Value Ref Range    Troponin <0.01 0.00 - 0.03 ng/mL        Imaging/Diagnostics Last 24 Hours   Cta Chest W Contrast    Result Date: 3/31/2021  EXAMINATION: CTA OF THE CHEST; CTA OF THE ABDOMEN AND PELVIS WITH CONTRAST 3/31/2021 4:55 pm: TECHNIQUE: CTA of the chest was performed after the administration of intravenous contrast.  Multiplanar reformatted images are provided for review. MIP images are provided for review. Dose modulation, iterative reconstruction, and/or weight based adjustment of the mA/kV was utilized to reduce the radiation dose to as low as reasonably achievable.; CTA of the abdomen and pelvis was performed with the administration of intravenous contrast. Multiplanar reformatted images are provided for review. MIP images are provided for review. Dose modulation, iterative reconstruction, and/or weight based adjustment of the mA/kV was utilized to reduce the radiation dose to as low as reasonably achievable. COMPARISON: August 2020.  HISTORY: ORDERING SYSTEM PROVIDED HISTORY: r/o dissection TECHNOLOGIST PROVIDED HISTORY: Reason for exam:->r/o dissection Decision Support Exception->Emergency Medical Condition (MA) FINDINGS: CTA CHEST: Thoracic aorta and mediastinum: Aneurysmal dilatation of the ascending segment of the thoracic aorta which measures approximately 4.5 cm in diameter. This is similar to the prior examination. No evidence of dissection. Status post median sternotomy. No hilar or mediastinal adenopathy. No pericardial effusion. Cardiomegaly. Lungs/Pleura: The lungs are without acute process. No focal consolidation or pulmonary edema. No evidence of pleural effusion or pneumothorax. Images significantly degraded by respiratory motion artifact. Soft Tissues/Bones: No acute bone or soft tissue abnormality. CTA ABDOMEN: Abdominal aorta/Branches: There is an aorto iliac stent in place which is patent. There is no evidence of endoleak. No aortic dissection. Atherosclerotic plaque at the origin of the celiac axis and superior mesenteric artery which remain patent. Patent renal arteries bilaterally. Organs: Incidental hepatic cysts. The spleen, adrenals, pancreas are within normal limits. Status post cholecystectomy. Kidneys enhance homogeneously. GI/Bowel: No bowel obstruction or free intraperitoneal air. No evidence of colitis or diverticulitis. Sigmoid diverticulosis. Peritoneum/Retroperitoneum: No retroperitoneal adenopathy. Bones/Soft Tissues: No acute bony pathology. CTA PELVIS: Aorta/Iliacs: Patent aortoiliac stent. Patent internal and external iliac arteries bilaterally. Other: Urinary bladder within normal limits. Mildly enlarged prostate. Bones/Soft Tissues: No acute bony pathology. Aneurysmal dilatation of the ascending segment of the thoracic aorta measuring approximately 4.5 cm, similar to the prior examination. No evidence of aortic dissection. Cardiomegaly. Aorta iliac stent in place which is patent. No evidence of endoleak. Atherosclerotic plaque at the origin of the celiac axis and superior mesenteric artery which remain patent. Sigmoid diverticulosis with no evidence of diverticulitis. Mildly enlarged prostate.   The     Cta Abdomen Pelvis W

## 2021-04-01 NOTE — CONSULTS
Inpatient Cardiology Consultation      Reason for Consult: Chest pain    Consulting Physician: Dr. Carina Jo    Requesting Physician: Dr. Brandan Tse    Date of Consultation: 4/1/2021    HISTORY OF PRESENT ILLNESS:     This 72-year-old male is known to [facility] cardiology and is followed by Dr. Avinash Couch. He has not been seen by Dr. Carina Jo since December 2017. At his last visit with Dr. Avinash Couch, a cardiac MRI was done and a stress test. He was told both tests were good. He has a history of coronary artery disease with a CABG in 2007 and a PCI to the circumflex and LAD in 2008. He has moderate aortic regurgitation. He also has a history of aortoliac stents   And has not been following with his vascular surgeon, Dr. Tevin Felix. He continues to smoke cigars. He presented to the emergency room yesterday evening with chest pain. The pain occurred while he was walking out to help his wife carry in groceries around 5 PM.  It was a severe pain in the mid sternum that radiated to his jaw and it was the worst pain he has ever had. It is different from his usual indigestion type pain. His wife drove him to the emergency room. The pain lasted about 35 to 40 minutes. The pain was relieved in the emergency room when he was given nitroglycerin    Blood pressure was 200/90, 215/89 with a heart rate of 66 and he was afebrile with an O2 saturation of 98%. His bp was \"good\" at PCP one month ago. He has been taking all his meds. EKG was sinus bradycardia with first-degree AV block and a left bundle branch block.       Potassium was 4.7 with a BUN of 17 and a creatinine of 1 and a proBNP was 441, WBCs 9.6 with a hemoglobin of 15.7 and hematocrit of 47.4 and 2 troponins are negative    Chest x-ray was not done but a CTA of the chest with contrast showed an aneurysmal dilatation of the ascending segment of the thoracic aorta measuring 4.5 cm and no dissection in the aorta size with stable there was  aortic iliac stent in place which was patent and with no endoleak and atherosclerotic plaque at the origin of the celiac axis and superior mesenteric artery which were patent. He was treated with Therapeutic Lovenox and given aspirin and 10 mg of Trandate IV as well as sublingual nitroglycerin after the Trandate his heart rate dropped into the high 40s. His blood pressure remains elevated at 175/99. Second troponin is now negative    Past Medical History:    1. Coronary atherosclerosis of native coronary artery                                                 A.  Acute myocardial infarction (11/2/07). Cardiac catheterization (11/2/07). Distal left main 60-70%. LAD proximal 50-60%, mid 80-95%. Circumflex mid to distal 95% Ramus proximal 40-50% Right coronary artery PDA mid 95%. B.  CABG by Dr. Jim Dwyer (11/3/07). LIMA to the LAD. SVG to D2  SVG to OM1 SVG to PDA               C. Cardiac catheterization (7/22/08):  LAD 40%, mid 100%, RCA PDA occluded, distal 99% stenosis of bifurcation of PDA and posterolateral branch. SVG to PDA was patent but had no retrograde flow into the posterolateral branch. 4.0 x 16 mm Liberte stent to the distal right coronary artery to 0% residual stenosis. D. Cardiac catheterization, 8/22/08: 3.0 x 24 mm Vandergrift drug eluting stent to the mid to distal circumflex to 0% residual stenosis. Moderate (2+) aortic regurgitation     E. Left heart cath September 22, 2013 EF low normal     Left heart catheterization September 22, 2013, Dr. Carina Jo   1. Left main: 100% occlusion. 2.    Right coronary artery: Mid diffuse 30% stenosis. Dominant vessel. Distal stent widely patent. The PDA has an ostial 100% occlusion. The      right coronary artery is supplying collaterals to the circumflex      territory. 3.    SVG to the diagonal:  Widely patent with good distal runoff. There was      retrograde flow into the LAD, through an 80% diffuse stenosis.        4.    SVG to the PDA:  Widely patent with good distal runoff. 5.    SVG to the OM: Widely patent with good distal runoff. 6.    Subclavian:  Tortuous. The left carotid has an ostial 40% stenosis. 7.    LIMA to the LAD:  Very tortuous. Widely patent with good distal      runoff. After the anastomosis, the LAD is widely patent with good distal      runoff    2 VHD ( AR and MR)               Echo 2/22/2018 Dr. Evie Verma Moderately dilated left ventricle.  Normal left ventricle systolic function.  Mild to moderate mitral regurgitation.  Moderate aortic regurgitation. MADHURI March 5, 2018 torn chordae on mitral valve leaflet, EF 60% and mild mitral regurgitation and mild to moderate eccentric aortic regurgitation     3. HTN  4. COPD  5. DJD spine   6. HLD   7. AAA              A.  CT 3/4/2011 Ectatic but not aneurysmal ascending thoracic aorta measuring 38 mm in transverse dimension. Given the reproducibility of the technique this is essentially unchanged from the 40 mm measurement obtained on prior study of November 3, 2009. B. .MRA chest 9/320/2013  Aneurysm of the ascending thoracic aorta, the largest diameter is 3.8 cm. C.  Aorto iliac stent [facility] 2016 Dr. Walt Collet  8. TIA   9. Admission August 25, 2020 for benign paroxysmal positional vertigo   10. Shoulder surgery  11. Hernia repair  12. First degree AV block and nonspecific QRS widening        Medications Prior to admit:  Prior to Admission medications    Medication Sig Start Date End Date Taking? Authorizing Provider   metoprolol succinate (TOPROL XL) 25 MG extended release tablet Take 0.5 tablets by mouth daily 2/27/18  Yes Adalgisa Vazquez, DO   lisinopril (PRINIVIL;ZESTRIL) 2.5 MG tablet Take 1 tablet by mouth daily. 10/14/13  Yes Adalgisa Vazquez, DO   aspirin 81 MG tablet Take 81 mg by mouth daily. Yes Historical Provider, MD   Omeprazole (PRILOSEC PO) Take  by mouth daily as needed.    Yes Historical Provider, MD isosorbide mononitrate (IMDUR) 30 MG CR tablet Take 1 tablet by mouth daily. 9/19/13   Jazmin Madden,    niacin-simvastatin [facility]) 500-20 MG TB24 tablet Take 1 tablet by mouth daily. Historical Provider, MD       Current Medications:    Current Facility-Administered Medications: isosorbide mononitrate (IMDUR) extended release tablet 30 mg, 30 mg, Oral, Daily  aspirin EC tablet 81 mg, 81 mg, Oral, Daily  lisinopril (PRINIVIL;ZESTRIL) tablet 2.5 mg, 2.5 mg, Oral, Daily  metoprolol succinate (TOPROL XL) extended release tablet 12.5 mg, 12.5 mg, Oral, Daily  enoxaparin (LOVENOX) injection 40 mg, 40 mg, Subcutaneous, Daily  niacin extended release capsule 500 mg, 500 mg, Oral, Nightly **AND** atorvastatin (LIPITOR) tablet 10 mg, 10 mg, Oral, Daily  pantoprazole (PROTONIX) tablet 40 mg, 40 mg, Oral, QAM AC    Allergies:  Ranexa [ranolazine]    Social History: 40-pack-year history but quit smoking in 2002 but he continues to smoke cigars and denies alcohol or illicit drugs and is       Family History:   Family History   Problem Relation Age of Onset    Cancer Mother     Cancer Father        REVIEW OF SYSTEMS:     · Constitutional: Denies fatigue, fevers, chills or night sweats  · Eyes: Denies visual changes or drainage  · ENT: Denies headaches or hearing loss. No mouth sores or sore throat. No epistaxis   · Cardiovascular: Denies chest pain, pressure or palpitations. No lower extremity swelling. · Respiratory: Denies ALARCON, cough, orthopnea or PND. No hemoptysis   · Gastrointestinal: Denies hematemesis or anorexia. No hematochezia or melena    · Genitourinary: Denies urgency, dysuria or hematuria. · Musculoskeletal: Denies gait disturbance, weakness or joint complaints  · Integumentary: Denies rash, hives or pruritis   · Neurological: Denies dizziness, headaches or seizures. No numbness or tingling  · Psychiatric: Denies anxiety or depression. · Endocrine: Denies temperature intolerance.  No recent weight change. .  · Hematologic/Lymphatic: Denies abnormal bruising or bleeding. No swollen lymph nodes    PHYSICAL EXAM:   BP (!) 175/99   Pulse 52   Temp 98 °F (36.7 °C) (Temporal)   Resp 17   Ht 5' 9\" (1.753 m)   Wt 203 lb (92.1 kg)   SpO2 94%   BMI 29.98 kg/m²   CONST:  Well developed, well nourished who appears of stated age. Awake, alert and cooperative. No apparent distress. HEENT:   Head- Normocephalic, atraumatic   Eyes- Conjunctivae pink, anicteric  Throat- Oral mucosa pink and moist  Neck-  No stridor, trachea midline, no jugular venous distention. No carotid bruit. CHEST: Chest symmetrical and non-tender to palpation. No accessory muscle use or intercostal retractions  RESPIRATORY: Lung sounds - clear throughout fields   CARDIOVASCULAR:     Heart Inspection- shows no noted pulsations  Heart Palpation- no heaves or thrills; PMI is non-displaced   Heart Ausculation- Regular rate and rhythm, no murmur. No s3, s4 or rub   PV: No lower extremity edema. No varicosities. Pedal pulses palpable, no clubbing or cyanosis   ABDOMEN: Soft, non-tender to light palpation. Bowel sounds present. No palpable masses no organomegaly; no abdominal bruit  MS: Good muscle strength and tone. No atrophy or abnormal movements. : Deferred  SKIN: Warm and dry no statis dermatitis or ulcers   NEURO / PSYCH: Oriented to person, place and time. Speech clear and appropriate. Follows all commands. Pleasant affect     DATA:    ECG / Tele strips:NSR  Diagnostic:    No intake or output data in the 24 hours ending 04/01/21 0752    Labs:   CBC:   Recent Labs     03/31/21  1749   WBC 9.6   HGB 15.7   HCT 47.4        BMP:   Recent Labs     03/31/21  1749      K 4.7   CO2 27   BUN 17   CREATININE 1.0   LABGLOM >60   CALCIUM 9.4     Mag:   Recent Labs     03/31/21  1749   MG 2.0     Phos: No results for input(s): PHOS in the last 72 hours. TSH: No results for input(s): TSH in the last 72 hours.   HgA1c: No results found for: LABA1C  No results found for: EAG  proBNP:   Recent Labs     03/31/21  1749   PROBNP 441     PT/INR: No results for input(s): PROTIME, INR in the last 72 hours. APTT:No results for input(s): APTT in the last 72 hours. CARDIAC ENZYMES:  Recent Labs     03/31/21 1749 04/01/21  0214   TROPONINI <0.01 <0.01     FASTING LIPID PANEL:No results found for: CHOL, HDL, LDLDIRECT, LDLCALC, TRIG  LIVER PROFILE:  Recent Labs     03/31/21 1749   AST 11   ALT 8   LABALBU 4.3       Electronically signed by DANIELA Meeks CNP on 4/1/2021 at 7:52 AM      I have personally seen and evaluated the patient. I personally obtained the history and performed the physical exam.  I personally reviewed all of the above labs, history, review of systems, and data. All of the assessments and recommendations are from me. All of the above cardiac medical decisions are from me. Please see my additional contributions to the history, physical exam, assessment, and recommendations below. History of chief complaint:  He developed severe upper chest pain yesterday while walking to the car. This radiated up into his jaw. This lasted for 45 minutes until it was relieved with nitroglycerin. He has remained asymptomatic since. .    Review of systems:     Heart: as above   Lungs: as above   Eyes: denies changes in vision or discharge. Ears: denies changes in hearing or pain. Nose: denies epistaxis or masses   Throat: denies sore throat or trouble swallowing. Neuro: denies numbness, tingling, tremors. Skin: denies rashes or itching. : denies hematuria, dysuria   GI: denies vomiting, diarrhea   Psych: denies mood changed, anxiety, depression. Physical exam:  BP (!) 175/99   Pulse 52   Temp 98 °F (36.7 °C) (Temporal)   Resp 17   Ht 5' 9\" (1.753 m)   Wt 203 lb (92.1 kg)   SpO2 94%   BMI 29.98 kg/m²   Constitutional: A&O x3, communicates well, no acute distress. Eyes: extraocular muscles intact, PERRL. Normal lids & conjunctiva. No icterus. ENT: clear, no bleeding. No external masses. Lips normal formation. Neck: supple, full ROM, no JVD, no bruits, no lymphadenopathy. No masses. trachea midline. Heart: Since auscultation. Regular rate & rhythm, normal S1 & S2, I/VI (normal physiologic) systolic murmur, S4 gallop. No heave. Lungs: CTA. No accessory muscles. Mildly decreased air movement. Abd: soft, non-tender. Normal bowel sounds. Neuro: Full ROM X 4, EOMI, no tremors. EXT: No bilateral lower extremity edema  Skin: warm, dry, intact. Good turgor. Psych: A&O x 3, normal behavior, not anxious. Patient seen and examined. Chart, labs & data reviewed. A:  1. Concerning chest discomfort however, this persisted for 45 minutes with negative ECG and enzymes. 2. Coronary artery disease  3. Mild to moderate aortic regurgitation  4. Peripheral vascular disease  5. Hypertension  6. COPD  7. Hypercholesterolemia  8. TIA      Rec:  1. Lexiscan Cardiolite stress test.  2. His blood pressure is not well controlled this admission. I will increase the lisinopril to 5 mg. 3. Okay for discharge with cardiology if the stress test is negative. Electronically signed by Yayo Mabry DO on 4/1/2021 at 5:41 PM    Note: This report was completed using computerized voice recognition software. Every effort has been made to ensure accuracy, however; and invert and computerized transcription errors may be present.

## 2021-04-01 NOTE — PROCEDURES
Patient did not attend 1600 socialization group despite staff invitation to attend. Pharmacologic Nuclear Stress Test    Date: 4/1/2021    Indication: Chest pain, CAD s/p CABG    Description of procedure:    Protocol: Regadenoson stress SPECT myocardial perfusion imaging    Baseline EKG: SB 50 bpm, first degree AV block TX int 254 ms, LBBB QRSd 146 ms, left axis, prior anterior infarct  Baseline BP: 132/67 mmHg    0.4 mg of regadenoson was injected followed by radiotracer injection. Patient reported no chest pain. Stress EKG showed no evidence of ischemia, and no arrhythmias were noted. Impression:  1. No evidence of regadenoson induced ischemia on stress EKG. 2. Nuclear images to be reported separately.     Mercedes Meeks MD, [address] [facility]

## 2021-04-02 PROCEDURE — 2580000003 HC RX 258

## 2021-04-02 PROCEDURE — 6360000002 HC RX W HCPCS: Performed by: FAMILY MEDICINE

## 2021-04-02 PROCEDURE — 6370000000 HC RX 637 (ALT 250 FOR IP): Performed by: NURSE PRACTITIONER

## 2021-04-02 PROCEDURE — 2060000000 HC ICU INTERMEDIATE R&B

## 2021-04-02 PROCEDURE — 6370000000 HC RX 637 (ALT 250 FOR IP): Performed by: FAMILY MEDICINE

## 2021-04-02 RX ORDER — LISINOPRIL 10 MG/1
10 TABLET ORAL DAILY
Status: DISCONTINUED | OUTPATIENT
Start: 2021-04-03 | End: 2021-04-03

## 2021-04-02 RX ORDER — SODIUM CHLORIDE 0.9 % (FLUSH) 0.9 %
SYRINGE (ML) INJECTION
Status: COMPLETED
Start: 2021-04-02 | End: 2021-04-02

## 2021-04-02 RX ORDER — METOPROLOL SUCCINATE 25 MG/1
25 TABLET, EXTENDED RELEASE ORAL DAILY
Status: DISCONTINUED | OUTPATIENT
Start: 2021-04-03 | End: 2021-04-06 | Stop reason: HOSPADM

## 2021-04-02 RX ADMIN — ASPIRIN 81 MG: 81 TABLET, COATED ORAL at 08:39

## 2021-04-02 RX ADMIN — METOPROLOL SUCCINATE 12.5 MG: 25 TABLET, EXTENDED RELEASE ORAL at 08:40

## 2021-04-02 RX ADMIN — ISOSORBIDE MONONITRATE 30 MG: 30 TABLET ORAL at 08:39

## 2021-04-02 RX ADMIN — SODIUM CHLORIDE, PRESERVATIVE FREE 10 ML: 5 INJECTION INTRAVENOUS at 08:40

## 2021-04-02 RX ADMIN — ATORVASTATIN CALCIUM 10 MG: 10 TABLET, FILM COATED ORAL at 08:39

## 2021-04-02 RX ADMIN — PANTOPRAZOLE SODIUM 40 MG: 40 TABLET, DELAYED RELEASE ORAL at 05:07

## 2021-04-02 RX ADMIN — LISINOPRIL 5 MG: 5 TABLET ORAL at 08:39

## 2021-04-02 RX ADMIN — Medication 500 MG: at 20:54

## 2021-04-02 ASSESSMENT — ENCOUNTER SYMPTOMS
ABDOMINAL PAIN: 0
SHORTNESS OF BREATH: 0

## 2021-04-02 ASSESSMENT — PAIN SCALES - GENERAL
PAINLEVEL_OUTOF10: 0
PAINLEVEL_OUTOF10: 0

## 2021-04-02 NOTE — PROGRESS NOTES
Progress Note  Date:2021       [facility]:[id number]  Patient Name:Corky Ortiz     YOB: 1945     Age:76 y.o. Patient denies any further chest pain. Subjective    Subjective:  Symptoms:  Resolved. No shortness of breath or chest pain. Diet:  Adequate intake. Activity level: Normal.    Pain:  He reports no pain. Review of Systems   Constitutional: Negative for activity change and fever. HENT: Negative for congestion. Respiratory: Negative for shortness of breath. Cardiovascular: Negative for chest pain. Gastrointestinal: Negative for abdominal pain. Musculoskeletal: Negative for arthralgias. Neurological: Negative for dizziness. Hematological: Negative for adenopathy. Psychiatric/Behavioral: Negative for agitation. Objective         Vitals Last 24 Hours:  TEMPERATURE:  Temp  Av.7 °F (36.5 °C)  Min: 97.5 °F (36.4 °C)  Max: 98 °F (36.7 °C)  RESPIRATIONS RANGE: Resp  Av.7  Min: 17  Max: 18  PULSE OXIMETRY RANGE: SpO2  Av.3 %  Min: 94 %  Max: 95 %  PULSE RANGE: Pulse  Av  Min: 52  Max: 61  BLOOD PRESSURE RANGE: Systolic (09IZK), EPS:683 , Min:130 , CKE:417   ; Diastolic (93KPF), LAS:26, Min:62, Max:99    I/O (24Hr): Intake/Output Summary (Last 24 hours) at 2021 0736  Last data filed at 2021 1831  Gross per 24 hour   Intake 300 ml   Output 0 ml   Net 300 ml     Objective:  General Appearance:  Comfortable. Vital signs: (most recent): Blood pressure (!) 164/62, pulse 58, temperature 97.7 °F (36.5 °C), temperature source Temporal, resp. rate 18, height 5' 9\" (1.753 m), weight 203 lb (92.1 kg), SpO2 95 %. No fever. Lungs:  Normal effort and normal respiratory rate. Breath sounds clear to auscultation. Heart: Normal rate. Regular rhythm.   S1 normal and S2 normal.      Labs/Imaging/Diagnostics    Labs:  CBC:  Recent Labs     21  1749   WBC 9.6   RBC 5.09   HGB 15.7   HCT 47.4   MCV 93.1   RDW 13.7    significantly degraded by respiratory motion artifact. Soft Tissues/Bones: No acute bone or soft tissue abnormality. CTA ABDOMEN: Abdominal aorta/Branches: There is an aorto iliac stent in place which is patent. There is no evidence of endoleak. No aortic dissection. Atherosclerotic plaque at the origin of the celiac axis and superior mesenteric artery which remain patent. Patent renal arteries bilaterally. Organs: Incidental hepatic cysts. The spleen, adrenals, pancreas are within normal limits. Status post cholecystectomy. Kidneys enhance homogeneously. GI/Bowel: No bowel obstruction or free intraperitoneal air. No evidence of colitis or diverticulitis. Sigmoid diverticulosis. Peritoneum/Retroperitoneum: No retroperitoneal adenopathy. Bones/Soft Tissues: No acute bony pathology. CTA PELVIS: Aorta/Iliacs: Patent aortoiliac stent. Patent internal and external iliac arteries bilaterally. Other: Urinary bladder within normal limits. Mildly enlarged prostate. Bones/Soft Tissues: No acute bony pathology. Aneurysmal dilatation of the ascending segment of the thoracic aorta measuring approximately 4.5 cm, similar to the prior examination. No evidence of aortic dissection. Cardiomegaly. Aorta iliac stent in place which is patent. No evidence of endoleak. Atherosclerotic plaque at the origin of the celiac axis and superior mesenteric artery which remain patent. Sigmoid diverticulosis with no evidence of diverticulitis. Mildly enlarged prostate. The     Cta Abdomen Pelvis W Contrast    Result Date: 3/31/2021  EXAMINATION: CTA OF THE CHEST; CTA OF THE ABDOMEN AND PELVIS WITH CONTRAST 3/31/2021 4:55 pm: TECHNIQUE: CTA of the chest was performed after the administration of intravenous contrast.  Multiplanar reformatted images are provided for review. MIP images are provided for review.  Dose modulation, iterative reconstruction, and/or weight based adjustment of the mA/kV was utilized to reduce the radiation dose to as low as reasonably achievable.; CTA of the abdomen and pelvis was performed with the administration of intravenous contrast. Multiplanar reformatted images are provided for review. MIP images are provided for review. Dose modulation, iterative reconstruction, and/or weight based adjustment of the mA/kV was utilized to reduce the radiation dose to as low as reasonably achievable. COMPARISON: August 2020. HISTORY: ORDERING SYSTEM PROVIDED HISTORY: r/o dissection TECHNOLOGIST PROVIDED HISTORY: Reason for exam:->r/o dissection Decision Support Exception->Emergency Medical Condition (MA) FINDINGS: CTA CHEST: Thoracic aorta and mediastinum: Aneurysmal dilatation of the ascending segment of the thoracic aorta which measures approximately 4.5 cm in diameter. This is similar to the prior examination. No evidence of dissection. Status post median sternotomy. No hilar or mediastinal adenopathy. No pericardial effusion. Cardiomegaly. Lungs/Pleura: The lungs are without acute process. No focal consolidation or pulmonary edema. No evidence of pleural effusion or pneumothorax. Images significantly degraded by respiratory motion artifact. Soft Tissues/Bones: No acute bone or soft tissue abnormality. CTA ABDOMEN: Abdominal aorta/Branches: There is an aorto iliac stent in place which is patent. There is no evidence of endoleak. No aortic dissection. Atherosclerotic plaque at the origin of the celiac axis and superior mesenteric artery which remain patent. Patent renal arteries bilaterally. Organs: Incidental hepatic cysts. The spleen, adrenals, pancreas are within normal limits. Status post cholecystectomy. Kidneys enhance homogeneously. GI/Bowel: No bowel obstruction or free intraperitoneal air. No evidence of colitis or diverticulitis. Sigmoid diverticulosis. Peritoneum/Retroperitoneum: No retroperitoneal adenopathy. Bones/Soft Tissues: No acute bony pathology. CTA PELVIS: Aorta/Iliacs: Patent aortoiliac stent.   Patent internal and external iliac arteries bilaterally. Other: Urinary bladder within normal limits. Mildly enlarged prostate. Bones/Soft Tissues: No acute bony pathology. Aneurysmal dilatation of the ascending segment of the thoracic aorta measuring approximately 4.5 cm, similar to the prior examination. No evidence of aortic dissection. Cardiomegaly. Aorta iliac stent in place which is patent. No evidence of endoleak. Atherosclerotic plaque at the origin of the celiac axis and superior mesenteric artery which remain patent. Sigmoid diverticulosis with no evidence of diverticulitis. Mildly enlarged prostate. The     Nm Cardiac Stress Test Nuclear Imaging    Result Date: 4/1/2021  INDICATION: Chest pain, CAD with history of CABG PROTOCOL: Rest/stress single isotope SPECT myocardial perfusion imaging with pharmacologic stress and gated SPECT imaging utilizing regadenoson. Reconstruction and reorientation of SPECT images in the short axis, vertical and horizontal long axis. Gated SPECT wall motion study. Qualitative assessment of left ventricular ejection fraction. Low-dose computed tomography for attenuation correction. TECHNIQUE: Regadenoson dose: 0.4 mg Radiopharmaceutical stress dose: 35 mCi Tc-99m sestamibi Radiopharmaceutical rest dose: 10 mCi Tc-99m sestamibi FINDINGS: The technical quality of the study is adequate. Rotating planar images show mild vertical patient motion; no significant soft tissue attenuation. Low-dose CT images demonstrate prior sternotomy, and coronary artery and aortic arch calcifications. The heart appears enlarged on both rest and stress images. Post stress tomographic images demonstrate a moderate-sized area of mildly decreased uptake at the basal to mid inferior and mid inferolateral wall, which improves on the resting images. There is a small fixed defect at the apical lateral wall.  Gated images demonstrate abnormal septal motion consistent with patient's history of bypass surgery, as well as apical hypokinesis. Calculated left ventricular ejection fraction is 50%. End-diastolic volume 464 mL. TID ratio 0.99.     1. The myocardial perfusion is abnormal. 2. Reversible inferior and inferolateral defect suggestive of ischemia. 3. Small fixed defect at the apical lateral wall suggesting a small area of infarction. 4. Dilated ventricle with mildly reduced LV systolic function, EF 89% with apical hypokinesis and abnormal septal motion. 5. There is no transient ischemic dilation. 6. Intermediate risk myocardial perfusion study. Assessment//Plan           Hospital Problems           Last Modified POA    Chest pain 3/31/2021 Yes        Assessment:  (Chest pain 3/31/2021 Yes     HTN  Hyperlipidemia  AAA  COPD  Aortic regurgitation      ). Plan:   (Stress test abnormal  Await plans from Cardiology. ).        Electronically signed by Mary Ayers MD on 4/2/21 at 7:36 AM EDT

## 2021-04-03 LAB
ANION GAP SERPL CALCULATED.3IONS-SCNC: 12 MMOL/L (ref 7–16)
BASOPHILS ABSOLUTE: 0.06 E9/L (ref 0–0.2)
BASOPHILS RELATIVE PERCENT: 0.8 % (ref 0–2)
BUN BLDV-MCNC: 17 MG/DL (ref 8–23)
CALCIUM SERPL-MCNC: 9 MG/DL (ref 8.6–10.2)
CHLORIDE BLD-SCNC: 105 MMOL/L (ref 98–107)
CO2: 22 MMOL/L (ref 22–29)
CREAT SERPL-MCNC: 1 MG/DL (ref 0.7–1.2)
EOSINOPHILS ABSOLUTE: 0.22 E9/L (ref 0.05–0.5)
EOSINOPHILS RELATIVE PERCENT: 3 % (ref 0–6)
GFR AFRICAN AMERICAN: >60
GFR NON-AFRICAN AMERICAN: >60 ML/MIN/1.73
GLUCOSE BLD-MCNC: 106 MG/DL (ref 74–99)
HCT VFR BLD CALC: 44.1 % (ref 37–54)
HEMOGLOBIN: 14.1 G/DL (ref 12.5–16.5)
IMMATURE GRANULOCYTES #: 0.02 E9/L
IMMATURE GRANULOCYTES %: 0.3 % (ref 0–5)
LYMPHOCYTES ABSOLUTE: 1.84 E9/L (ref 1.5–4)
LYMPHOCYTES RELATIVE PERCENT: 25 % (ref 20–42)
MCH RBC QN AUTO: 30 PG (ref 26–35)
MCHC RBC AUTO-ENTMCNC: 32 % (ref 32–34.5)
MCV RBC AUTO: 93.8 FL (ref 80–99.9)
MONOCYTES ABSOLUTE: 0.63 E9/L (ref 0.1–0.95)
MONOCYTES RELATIVE PERCENT: 8.6 % (ref 2–12)
NEUTROPHILS ABSOLUTE: 4.58 E9/L (ref 1.8–7.3)
NEUTROPHILS RELATIVE PERCENT: 62.3 % (ref 43–80)
PDW BLD-RTO: 13.6 FL (ref 11.5–15)
PLATELET # BLD: 209 E9/L (ref 130–450)
PMV BLD AUTO: 10.9 FL (ref 7–12)
POTASSIUM REFLEX MAGNESIUM: 3.9 MMOL/L (ref 3.5–5)
RBC # BLD: 4.7 E12/L (ref 3.8–5.8)
SODIUM BLD-SCNC: 139 MMOL/L (ref 132–146)
WBC # BLD: 7.4 E9/L (ref 4.5–11.5)

## 2021-04-03 PROCEDURE — 80048 BASIC METABOLIC PNL TOTAL CA: CPT

## 2021-04-03 PROCEDURE — 6370000000 HC RX 637 (ALT 250 FOR IP): Performed by: INTERNAL MEDICINE

## 2021-04-03 PROCEDURE — 6370000000 HC RX 637 (ALT 250 FOR IP): Performed by: FAMILY MEDICINE

## 2021-04-03 PROCEDURE — 2060000000 HC ICU INTERMEDIATE R&B

## 2021-04-03 PROCEDURE — 85025 COMPLETE CBC W/AUTO DIFF WBC: CPT

## 2021-04-03 PROCEDURE — 36415 COLL VENOUS BLD VENIPUNCTURE: CPT

## 2021-04-03 RX ORDER — LISINOPRIL 20 MG/1
20 TABLET ORAL DAILY
Status: DISCONTINUED | OUTPATIENT
Start: 2021-04-04 | End: 2021-04-04

## 2021-04-03 RX ADMIN — ATORVASTATIN CALCIUM 10 MG: 10 TABLET, FILM COATED ORAL at 07:43

## 2021-04-03 RX ADMIN — ASPIRIN 81 MG: 81 TABLET, COATED ORAL at 07:21

## 2021-04-03 RX ADMIN — ISOSORBIDE MONONITRATE 30 MG: 30 TABLET ORAL at 07:43

## 2021-04-03 RX ADMIN — Medication 3 MG: at 00:51

## 2021-04-03 RX ADMIN — LISINOPRIL 10 MG: 10 TABLET ORAL at 07:43

## 2021-04-03 RX ADMIN — PANTOPRAZOLE SODIUM 40 MG: 40 TABLET, DELAYED RELEASE ORAL at 05:35

## 2021-04-03 RX ADMIN — Medication 500 MG: at 20:41

## 2021-04-03 ASSESSMENT — ENCOUNTER SYMPTOMS
SHORTNESS OF BREATH: 0
ABDOMINAL PAIN: 0

## 2021-04-03 NOTE — PROGRESS NOTES
93.1 93.8   RDW 13.7 13.6    209     CHEMISTRIES:  Recent Labs     03/31/21  1749 04/03/21  0529    139   K 4.7 3.9    105   CO2 27 22   BUN 17 17   CREATININE 1.0 1.0   GLUCOSE 111* 106*   MG 2.0  --      PT/INR:No results for input(s): PROTIME, INR in the last 72 hours. APTT:No results for input(s): APTT in the last 72 hours. LIVER PROFILE:  Recent Labs     03/31/21  1749   AST 11   ALT 8   BILITOT 0.2   ALKPHOS 78       Imaging Last 24 Hours:  Cta Chest W Contrast    Result Date: 3/31/2021  EXAMINATION: CTA OF THE CHEST; CTA OF THE ABDOMEN AND PELVIS WITH CONTRAST 3/31/2021 4:55 pm: TECHNIQUE: CTA of the chest was performed after the administration of intravenous contrast.  Multiplanar reformatted images are provided for review. MIP images are provided for review. Dose modulation, iterative reconstruction, and/or weight based adjustment of the mA/kV was utilized to reduce the radiation dose to as low as reasonably achievable.; CTA of the abdomen and pelvis was performed with the administration of intravenous contrast. Multiplanar reformatted images are provided for review. MIP images are provided for review. Dose modulation, iterative reconstruction, and/or weight based adjustment of the mA/kV was utilized to reduce the radiation dose to as low as reasonably achievable. COMPARISON: August 2020. HISTORY: ORDERING SYSTEM PROVIDED HISTORY: r/o dissection TECHNOLOGIST PROVIDED HISTORY: Reason for exam:->r/o dissection Decision Support Exception->Emergency Medical Condition (MA) FINDINGS: CTA CHEST: Thoracic aorta and mediastinum: Aneurysmal dilatation of the ascending segment of the thoracic aorta which measures approximately 4.5 cm in diameter. This is similar to the prior examination. No evidence of dissection. Status post median sternotomy. No hilar or mediastinal adenopathy. No pericardial effusion. Cardiomegaly. Lungs/Pleura: The lungs are without acute process.   No focal consolidation or pulmonary edema. No evidence of pleural effusion or pneumothorax. Images significantly degraded by respiratory motion artifact. Soft Tissues/Bones: No acute bone or soft tissue abnormality. CTA ABDOMEN: Abdominal aorta/Branches: There is an aorto iliac stent in place which is patent. There is no evidence of endoleak. No aortic dissection. Atherosclerotic plaque at the origin of the celiac axis and superior mesenteric artery which remain patent. Patent renal arteries bilaterally. Organs: Incidental hepatic cysts. The spleen, adrenals, pancreas are within normal limits. Status post cholecystectomy. Kidneys enhance homogeneously. GI/Bowel: No bowel obstruction or free intraperitoneal air. No evidence of colitis or diverticulitis. Sigmoid diverticulosis. Peritoneum/Retroperitoneum: No retroperitoneal adenopathy. Bones/Soft Tissues: No acute bony pathology. CTA PELVIS: Aorta/Iliacs: Patent aortoiliac stent. Patent internal and external iliac arteries bilaterally. Other: Urinary bladder within normal limits. Mildly enlarged prostate. Bones/Soft Tissues: No acute bony pathology. Aneurysmal dilatation of the ascending segment of the thoracic aorta measuring approximately 4.5 cm, similar to the prior examination. No evidence of aortic dissection. Cardiomegaly. Aorta iliac stent in place which is patent. No evidence of endoleak. Atherosclerotic plaque at the origin of the celiac axis and superior mesenteric artery which remain patent. Sigmoid diverticulosis with no evidence of diverticulitis. Mildly enlarged prostate. The     Cta Abdomen Pelvis W Contrast    Result Date: 3/31/2021  EXAMINATION: CTA OF THE CHEST; CTA OF THE ABDOMEN AND PELVIS WITH CONTRAST 3/31/2021 4:55 pm: TECHNIQUE: CTA of the chest was performed after the administration of intravenous contrast.  Multiplanar reformatted images are provided for review. MIP images are provided for review.  Dose modulation, iterative reconstruction, and/or weight bony pathology. CTA PELVIS: Aorta/Iliacs: Patent aortoiliac stent. Patent internal and external iliac arteries bilaterally. Other: Urinary bladder within normal limits. Mildly enlarged prostate. Bones/Soft Tissues: No acute bony pathology. Aneurysmal dilatation of the ascending segment of the thoracic aorta measuring approximately 4.5 cm, similar to the prior examination. No evidence of aortic dissection. Cardiomegaly. Aorta iliac stent in place which is patent. No evidence of endoleak. Atherosclerotic plaque at the origin of the celiac axis and superior mesenteric artery which remain patent. Sigmoid diverticulosis with no evidence of diverticulitis. Mildly enlarged prostate. The     Nm Cardiac Stress Test Nuclear Imaging    Result Date: 4/1/2021  INDICATION: Chest pain, CAD with history of CABG PROTOCOL: Rest/stress single isotope SPECT myocardial perfusion imaging with pharmacologic stress and gated SPECT imaging utilizing regadenoson. Reconstruction and reorientation of SPECT images in the short axis, vertical and horizontal long axis. Gated SPECT wall motion study. Qualitative assessment of left ventricular ejection fraction. Low-dose computed tomography for attenuation correction. TECHNIQUE: Regadenoson dose: 0.4 mg Radiopharmaceutical stress dose: 35 mCi Tc-99m sestamibi Radiopharmaceutical rest dose: 10 mCi Tc-99m sestamibi FINDINGS: The technical quality of the study is adequate. Rotating planar images show mild vertical patient motion; no significant soft tissue attenuation. Low-dose CT images demonstrate prior sternotomy, and coronary artery and aortic arch calcifications. The heart appears enlarged on both rest and stress images. Post stress tomographic images demonstrate a moderate-sized area of mildly decreased uptake at the basal to mid inferior and mid inferolateral wall, which improves on the resting images. There is a small fixed defect at the apical lateral wall.  Gated images demonstrate abnormal septal motion consistent with patient's history of bypass surgery, as well as apical hypokinesis. Calculated left ventricular ejection fraction is 50%. End-diastolic volume 989 mL. TID ratio 0.99.     1. The myocardial perfusion is abnormal. 2. Reversible inferior and inferolateral defect suggestive of ischemia. 3. Small fixed defect at the apical lateral wall suggesting a small area of infarction. 4. Dilated ventricle with mildly reduced LV systolic function, EF 73% with apical hypokinesis and abnormal septal motion. 5. There is no transient ischemic dilation. 6. Intermediate risk myocardial perfusion study. Assessment//Plan           Hospital Problems           Last Modified POA    Chest pain 3/31/2021 Yes        Assessment:  (Chest pain 3/31/2021 Yes     HTN  Hyperlipidemia  AAA  COPD  Aortic regurgitation      ). Plan:   (Stress test abnormal  Plan for cath today. Will follow. ).

## 2021-04-04 LAB
ANION GAP SERPL CALCULATED.3IONS-SCNC: 11 MMOL/L (ref 7–16)
BASOPHILS ABSOLUTE: 0.05 E9/L (ref 0–0.2)
BASOPHILS RELATIVE PERCENT: 0.7 % (ref 0–2)
BUN BLDV-MCNC: 19 MG/DL (ref 8–23)
CALCIUM SERPL-MCNC: 8.5 MG/DL (ref 8.6–10.2)
CHLORIDE BLD-SCNC: 107 MMOL/L (ref 98–107)
CO2: 21 MMOL/L (ref 22–29)
CREAT SERPL-MCNC: 0.9 MG/DL (ref 0.7–1.2)
EOSINOPHILS ABSOLUTE: 0.21 E9/L (ref 0.05–0.5)
EOSINOPHILS RELATIVE PERCENT: 2.9 % (ref 0–6)
GFR AFRICAN AMERICAN: >60
GFR NON-AFRICAN AMERICAN: >60 ML/MIN/1.73
GLUCOSE BLD-MCNC: 105 MG/DL (ref 74–99)
HCT VFR BLD CALC: 42.2 % (ref 37–54)
HEMOGLOBIN: 13.9 G/DL (ref 12.5–16.5)
IMMATURE GRANULOCYTES #: 0.03 E9/L
IMMATURE GRANULOCYTES %: 0.4 % (ref 0–5)
LYMPHOCYTES ABSOLUTE: 1.94 E9/L (ref 1.5–4)
LYMPHOCYTES RELATIVE PERCENT: 26.8 % (ref 20–42)
MCH RBC QN AUTO: 30.6 PG (ref 26–35)
MCHC RBC AUTO-ENTMCNC: 32.9 % (ref 32–34.5)
MCV RBC AUTO: 93 FL (ref 80–99.9)
MONOCYTES ABSOLUTE: 0.59 E9/L (ref 0.1–0.95)
MONOCYTES RELATIVE PERCENT: 8.1 % (ref 2–12)
NEUTROPHILS ABSOLUTE: 4.43 E9/L (ref 1.8–7.3)
NEUTROPHILS RELATIVE PERCENT: 61.1 % (ref 43–80)
PDW BLD-RTO: 13.4 FL (ref 11.5–15)
PLATELET # BLD: 195 E9/L (ref 130–450)
PMV BLD AUTO: 10.6 FL (ref 7–12)
POTASSIUM REFLEX MAGNESIUM: 3.8 MMOL/L (ref 3.5–5)
RBC # BLD: 4.54 E12/L (ref 3.8–5.8)
SODIUM BLD-SCNC: 139 MMOL/L (ref 132–146)
WBC # BLD: 7.3 E9/L (ref 4.5–11.5)

## 2021-04-04 PROCEDURE — 2060000000 HC ICU INTERMEDIATE R&B

## 2021-04-04 PROCEDURE — 36415 COLL VENOUS BLD VENIPUNCTURE: CPT

## 2021-04-04 PROCEDURE — 80048 BASIC METABOLIC PNL TOTAL CA: CPT

## 2021-04-04 PROCEDURE — 6370000000 HC RX 637 (ALT 250 FOR IP): Performed by: FAMILY MEDICINE

## 2021-04-04 PROCEDURE — 6370000000 HC RX 637 (ALT 250 FOR IP): Performed by: INTERNAL MEDICINE

## 2021-04-04 PROCEDURE — 85025 COMPLETE CBC W/AUTO DIFF WBC: CPT

## 2021-04-04 RX ORDER — LISINOPRIL 20 MG/1
20 TABLET ORAL 2 TIMES DAILY
Status: DISCONTINUED | OUTPATIENT
Start: 2021-04-04 | End: 2021-04-06 | Stop reason: HOSPADM

## 2021-04-04 RX ADMIN — Medication 3 MG: at 00:27

## 2021-04-04 RX ADMIN — ISOSORBIDE MONONITRATE 30 MG: 30 TABLET ORAL at 07:55

## 2021-04-04 RX ADMIN — ATORVASTATIN CALCIUM 10 MG: 10 TABLET, FILM COATED ORAL at 07:55

## 2021-04-04 RX ADMIN — ASPIRIN 81 MG: 81 TABLET, COATED ORAL at 07:55

## 2021-04-04 RX ADMIN — Medication 500 MG: at 20:36

## 2021-04-04 RX ADMIN — LISINOPRIL 20 MG: 20 TABLET ORAL at 07:55

## 2021-04-04 RX ADMIN — PANTOPRAZOLE SODIUM 40 MG: 40 TABLET, DELAYED RELEASE ORAL at 05:00

## 2021-04-04 RX ADMIN — LISINOPRIL 20 MG: 20 TABLET ORAL at 20:36

## 2021-04-04 ASSESSMENT — ENCOUNTER SYMPTOMS
ABDOMINAL PAIN: 0
SHORTNESS OF BREATH: 0

## 2021-04-04 ASSESSMENT — PAIN SCALES - GENERAL: PAINLEVEL_OUTOF10: 0

## 2021-04-04 NOTE — PROGRESS NOTES
Progress Note  Date:2021       BCZV:[id number]  Patient Name:Corky Ortiz     YOB: 1945     Age:76 y.o. Patient denies any chest pain at this time. Subjective    Subjective:  Symptoms:  Resolved. No shortness of breath or chest pain. Diet:  Adequate intake. Activity level: Normal.    Pain:  He reports no pain. Review of Systems   Constitutional: Negative for activity change and fever. HENT: Negative for congestion. Respiratory: Negative for shortness of breath. Cardiovascular: Negative for chest pain. Gastrointestinal: Negative for abdominal pain. Musculoskeletal: Negative for arthralgias. Neurological: Negative for dizziness. Hematological: Negative for adenopathy. Psychiatric/Behavioral: Negative for agitation. Objective         Vitals Last 24 Hours:  TEMPERATURE:  Temp  Av.7 °F (36.5 °C)  Min: 97.6 °F (36.4 °C)  Max: 97.7 °F (36.5 °C)  RESPIRATIONS RANGE: Resp  Avg: 15  Min: 14  Max: 16  PULSE OXIMETRY RANGE: SpO2  Av %  Min: 93 %  Max: 95 %  PULSE RANGE: Pulse  Av.5  Min: 52  Max: 59  BLOOD PRESSURE RANGE: Systolic (69CGA), EPY:040 , Min:156 , PVD:728   ; Diastolic (45YWI), SWX:89, Min:68, Max:74    I/O (24Hr): Intake/Output Summary (Last 24 hours) at 2021 0719  Last data filed at 2021 0527  Gross per 24 hour   Intake 750 ml   Output --   Net 750 ml     Objective:  General Appearance:  Comfortable. Vital signs: (most recent): Blood pressure (!) 157/74, pulse 52, temperature 97.7 °F (36.5 °C), temperature source Temporal, resp. rate 14, height 5' 9\" (1.753 m), weight 203 lb (92.1 kg), SpO2 95 %. No fever. Lungs:  Normal effort and normal respiratory rate. Breath sounds clear to auscultation. Heart: Normal rate. Regular rhythm.   S1 normal and S2 normal.      Labs/Imaging/Diagnostics    Labs:  CBC:  Recent Labs     21  0529 21  0446   WBC 7.4 7.3   RBC 4.70 4.54   HGB 14.1 13.9   HCT 44.1 42.2   MCV 93.8 93.0   RDW 13.6 13.4    195     CHEMISTRIES:  Recent Labs     04/03/21  0529 04/04/21  0446    139   K 3.9 3.8    107   CO2 22 21*   BUN 17 19   CREATININE 1.0 0.9   GLUCOSE 106* 105*     PT/INR:No results for input(s): PROTIME, INR in the last 72 hours. APTT:No results for input(s): APTT in the last 72 hours. LIVER PROFILE:  No results for input(s): AST, ALT, BILIDIR, BILITOT, ALKPHOS in the last 72 hours. Imaging Last 24 Hours:  Cta Chest W Contrast    Result Date: 3/31/2021  EXAMINATION: CTA OF THE CHEST; CTA OF THE ABDOMEN AND PELVIS WITH CONTRAST 3/31/2021 4:55 pm: TECHNIQUE: CTA of the chest was performed after the administration of intravenous contrast.  Multiplanar reformatted images are provided for review. MIP images are provided for review. Dose modulation, iterative reconstruction, and/or weight based adjustment of the mA/kV was utilized to reduce the radiation dose to as low as reasonably achievable.; CTA of the abdomen and pelvis was performed with the administration of intravenous contrast. Multiplanar reformatted images are provided for review. MIP images are provided for review. Dose modulation, iterative reconstruction, and/or weight based adjustment of the mA/kV was utilized to reduce the radiation dose to as low as reasonably achievable. COMPARISON: August 2020. HISTORY: ORDERING SYSTEM PROVIDED HISTORY: r/o dissection TECHNOLOGIST PROVIDED HISTORY: Reason for exam:->r/o dissection Decision Support Exception->Emergency Medical Condition (MA) FINDINGS: CTA CHEST: Thoracic aorta and mediastinum: Aneurysmal dilatation of the ascending segment of the thoracic aorta which measures approximately 4.5 cm in diameter. This is similar to the prior examination. No evidence of dissection. Status post median sternotomy. No hilar or mediastinal adenopathy. No pericardial effusion. Cardiomegaly. Lungs/Pleura: The lungs are without acute process.   No focal consolidation or pulmonary edema. No evidence of pleural effusion or pneumothorax. Images significantly degraded by respiratory motion artifact. Soft Tissues/Bones: No acute bone or soft tissue abnormality. CTA ABDOMEN: Abdominal aorta/Branches: There is an aorto iliac stent in place which is patent. There is no evidence of endoleak. No aortic dissection. Atherosclerotic plaque at the origin of the celiac axis and superior mesenteric artery which remain patent. Patent renal arteries bilaterally. Organs: Incidental hepatic cysts. The spleen, adrenals, pancreas are within normal limits. Status post cholecystectomy. Kidneys enhance homogeneously. GI/Bowel: No bowel obstruction or free intraperitoneal air. No evidence of colitis or diverticulitis. Sigmoid diverticulosis. Peritoneum/Retroperitoneum: No retroperitoneal adenopathy. Bones/Soft Tissues: No acute bony pathology. CTA PELVIS: Aorta/Iliacs: Patent aortoiliac stent. Patent internal and external iliac arteries bilaterally. Other: Urinary bladder within normal limits. Mildly enlarged prostate. Bones/Soft Tissues: No acute bony pathology. Aneurysmal dilatation of the ascending segment of the thoracic aorta measuring approximately 4.5 cm, similar to the prior examination. No evidence of aortic dissection. Cardiomegaly. Aorta iliac stent in place which is patent. No evidence of endoleak. Atherosclerotic plaque at the origin of the celiac axis and superior mesenteric artery which remain patent. Sigmoid diverticulosis with no evidence of diverticulitis. Mildly enlarged prostate. The     Cta Abdomen Pelvis W Contrast    Result Date: 3/31/2021  EXAMINATION: CTA OF THE CHEST; CTA OF THE ABDOMEN AND PELVIS WITH CONTRAST 3/31/2021 4:55 pm: TECHNIQUE: CTA of the chest was performed after the administration of intravenous contrast.  Multiplanar reformatted images are provided for review. MIP images are provided for review.  Dose modulation, iterative reconstruction, and/or weight based adjustment of the mA/kV was utilized to reduce the radiation dose to as low as reasonably achievable.; CTA of the abdomen and pelvis was performed with the administration of intravenous contrast. Multiplanar reformatted images are provided for review. MIP images are provided for review. Dose modulation, iterative reconstruction, and/or weight based adjustment of the mA/kV was utilized to reduce the radiation dose to as low as reasonably achievable. COMPARISON: August 2020. HISTORY: ORDERING SYSTEM PROVIDED HISTORY: r/o dissection TECHNOLOGIST PROVIDED HISTORY: Reason for exam:->r/o dissection Decision Support Exception->Emergency Medical Condition (MA) FINDINGS: CTA CHEST: Thoracic aorta and mediastinum: Aneurysmal dilatation of the ascending segment of the thoracic aorta which measures approximately 4.5 cm in diameter. This is similar to the prior examination. No evidence of dissection. Status post median sternotomy. No hilar or mediastinal adenopathy. No pericardial effusion. Cardiomegaly. Lungs/Pleura: The lungs are without acute process. No focal consolidation or pulmonary edema. No evidence of pleural effusion or pneumothorax. Images significantly degraded by respiratory motion artifact. Soft Tissues/Bones: No acute bone or soft tissue abnormality. CTA ABDOMEN: Abdominal aorta/Branches: There is an aorto iliac stent in place which is patent. There is no evidence of endoleak. No aortic dissection. Atherosclerotic plaque at the origin of the celiac axis and superior mesenteric artery which remain patent. Patent renal arteries bilaterally. Organs: Incidental hepatic cysts. The spleen, adrenals, pancreas are within normal limits. Status post cholecystectomy. Kidneys enhance homogeneously. GI/Bowel: No bowel obstruction or free intraperitoneal air. No evidence of colitis or diverticulitis. Sigmoid diverticulosis. Peritoneum/Retroperitoneum: No retroperitoneal adenopathy.  Bones/Soft Tissues: No acute bony pathology. CTA PELVIS: Aorta/Iliacs: Patent aortoiliac stent. Patent internal and external iliac arteries bilaterally. Other: Urinary bladder within normal limits. Mildly enlarged prostate. Bones/Soft Tissues: No acute bony pathology. Aneurysmal dilatation of the ascending segment of the thoracic aorta measuring approximately 4.5 cm, similar to the prior examination. No evidence of aortic dissection. Cardiomegaly. Aorta iliac stent in place which is patent. No evidence of endoleak. Atherosclerotic plaque at the origin of the celiac axis and superior mesenteric artery which remain patent. Sigmoid diverticulosis with no evidence of diverticulitis. Mildly enlarged prostate. The     Nm Cardiac Stress Test Nuclear Imaging    Result Date: 4/1/2021  INDICATION: Chest pain, CAD with history of CABG PROTOCOL: Rest/stress single isotope SPECT myocardial perfusion imaging with pharmacologic stress and gated SPECT imaging utilizing regadenoson. Reconstruction and reorientation of SPECT images in the short axis, vertical and horizontal long axis. Gated SPECT wall motion study. Qualitative assessment of left ventricular ejection fraction. Low-dose computed tomography for attenuation correction. TECHNIQUE: Regadenoson dose: 0.4 mg Radiopharmaceutical stress dose: 35 mCi Tc-99m sestamibi Radiopharmaceutical rest dose: 10 mCi Tc-99m sestamibi FINDINGS: The technical quality of the study is adequate. Rotating planar images show mild vertical patient motion; no significant soft tissue attenuation. Low-dose CT images demonstrate prior sternotomy, and coronary artery and aortic arch calcifications. The heart appears enlarged on both rest and stress images. Post stress tomographic images demonstrate a moderate-sized area of mildly decreased uptake at the basal to mid inferior and mid inferolateral wall, which improves on the resting images. There is a small fixed defect at the apical lateral wall.  Gated images demonstrate

## 2021-04-05 LAB
ABO/RH: NORMAL
ANION GAP SERPL CALCULATED.3IONS-SCNC: 4 MMOL/L (ref 7–16)
ANTIBODY SCREEN: NORMAL
BASOPHILS ABSOLUTE: 0.06 E9/L (ref 0–0.2)
BASOPHILS RELATIVE PERCENT: 0.8 % (ref 0–2)
BUN BLDV-MCNC: 20 MG/DL (ref 8–23)
CALCIUM SERPL-MCNC: 8.9 MG/DL (ref 8.6–10.2)
CHLORIDE BLD-SCNC: 105 MMOL/L (ref 98–107)
CO2: 23 MMOL/L (ref 22–29)
CREAT SERPL-MCNC: 0.9 MG/DL (ref 0.7–1.2)
EOSINOPHILS ABSOLUTE: 0.21 E9/L (ref 0.05–0.5)
EOSINOPHILS RELATIVE PERCENT: 2.9 % (ref 0–6)
GFR AFRICAN AMERICAN: >60
GFR NON-AFRICAN AMERICAN: >60 ML/MIN/1.73
GLUCOSE BLD-MCNC: 103 MG/DL (ref 74–99)
HCT VFR BLD CALC: 44.9 % (ref 37–54)
HEMOGLOBIN: 14.7 G/DL (ref 12.5–16.5)
IMMATURE GRANULOCYTES #: 0.03 E9/L
IMMATURE GRANULOCYTES %: 0.4 % (ref 0–5)
LYMPHOCYTES ABSOLUTE: 1.94 E9/L (ref 1.5–4)
LYMPHOCYTES RELATIVE PERCENT: 26.4 % (ref 20–42)
MCH RBC QN AUTO: 30.7 PG (ref 26–35)
MCHC RBC AUTO-ENTMCNC: 32.7 % (ref 32–34.5)
MCV RBC AUTO: 93.7 FL (ref 80–99.9)
MONOCYTES ABSOLUTE: 0.62 E9/L (ref 0.1–0.95)
MONOCYTES RELATIVE PERCENT: 8.4 % (ref 2–12)
NEUTROPHILS ABSOLUTE: 4.5 E9/L (ref 1.8–7.3)
NEUTROPHILS RELATIVE PERCENT: 61.1 % (ref 43–80)
PDW BLD-RTO: 13.4 FL (ref 11.5–15)
PLATELET # BLD: 198 E9/L (ref 130–450)
PMV BLD AUTO: 11.2 FL (ref 7–12)
POC ACT LR: 168 SECONDS
POC ACT LR: 255 SECONDS
POC ACT LR: 273 SECONDS
POC ACT LR: >400 SECONDS
POTASSIUM REFLEX MAGNESIUM: 3.8 MMOL/L (ref 3.5–5)
RBC # BLD: 4.79 E12/L (ref 3.8–5.8)
SODIUM BLD-SCNC: 132 MMOL/L (ref 132–146)
WBC # BLD: 7.4 E9/L (ref 4.5–11.5)

## 2021-04-05 PROCEDURE — 93455 CORONARY ART/GRFT ANGIO S&I: CPT

## 2021-04-05 PROCEDURE — 86922 COMPATIBILITY TEST ANTIGLOB: CPT

## 2021-04-05 PROCEDURE — 6370000000 HC RX 637 (ALT 250 FOR IP): Performed by: INTERNAL MEDICINE

## 2021-04-05 PROCEDURE — 6370000000 HC RX 637 (ALT 250 FOR IP): Performed by: FAMILY MEDICINE

## 2021-04-05 PROCEDURE — 36415 COLL VENOUS BLD VENIPUNCTURE: CPT

## 2021-04-05 PROCEDURE — 86850 RBC ANTIBODY SCREEN: CPT

## 2021-04-05 PROCEDURE — C1769 GUIDE WIRE: HCPCS

## 2021-04-05 PROCEDURE — 86901 BLOOD TYPING SEROLOGIC RH(D): CPT

## 2021-04-05 PROCEDURE — C1874 STENT, COATED/COV W/DEL SYS: HCPCS

## 2021-04-05 PROCEDURE — C1725 CATH, TRANSLUMIN NON-LASER: HCPCS

## 2021-04-05 PROCEDURE — 86900 BLOOD TYPING SEROLOGIC ABO: CPT

## 2021-04-05 PROCEDURE — 2500000003 HC RX 250 WO HCPCS

## 2021-04-05 PROCEDURE — C1887 CATHETER, GUIDING: HCPCS

## 2021-04-05 PROCEDURE — 86902 BLOOD TYPE ANTIGEN DONOR EA: CPT

## 2021-04-05 PROCEDURE — 93005 ELECTROCARDIOGRAM TRACING: CPT | Performed by: INTERNAL MEDICINE

## 2021-04-05 PROCEDURE — C1894 INTRO/SHEATH, NON-LASER: HCPCS

## 2021-04-05 PROCEDURE — C9604 PERC D-E COR REVASC T CABG S: HCPCS

## 2021-04-05 PROCEDURE — 2709999900 HC NON-CHARGEABLE SUPPLY

## 2021-04-05 PROCEDURE — 85025 COMPLETE CBC W/AUTO DIFF WBC: CPT

## 2021-04-05 PROCEDURE — 80048 BASIC METABOLIC PNL TOTAL CA: CPT

## 2021-04-05 PROCEDURE — 6360000002 HC RX W HCPCS

## 2021-04-05 PROCEDURE — 2140000000 HC CCU INTERMEDIATE R&B

## 2021-04-05 PROCEDURE — 93455 CORONARY ART/GRFT ANGIO S&I: CPT | Performed by: INTERNAL MEDICINE

## 2021-04-05 PROCEDURE — 6370000000 HC RX 637 (ALT 250 FOR IP)

## 2021-04-05 PROCEDURE — 92937 PRQ TRLUML REVSC CAB GRF 1: CPT | Performed by: INTERNAL MEDICINE

## 2021-04-05 PROCEDURE — 85347 COAGULATION TIME ACTIVATED: CPT

## 2021-04-05 PROCEDURE — 2580000003 HC RX 258: Performed by: INTERNAL MEDICINE

## 2021-04-05 RX ORDER — SODIUM CHLORIDE 0.9 % (FLUSH) 0.9 %
10 SYRINGE (ML) INJECTION EVERY 12 HOURS SCHEDULED
Status: DISCONTINUED | OUTPATIENT
Start: 2021-04-05 | End: 2021-04-06 | Stop reason: HOSPADM

## 2021-04-05 RX ORDER — ACETAMINOPHEN 325 MG/1
650 TABLET ORAL EVERY 4 HOURS PRN
Status: DISCONTINUED | OUTPATIENT
Start: 2021-04-05 | End: 2021-04-06 | Stop reason: HOSPADM

## 2021-04-05 RX ORDER — SODIUM CHLORIDE 0.9 % (FLUSH) 0.9 %
10 SYRINGE (ML) INJECTION PRN
Status: DISCONTINUED | OUTPATIENT
Start: 2021-04-05 | End: 2021-04-06 | Stop reason: HOSPADM

## 2021-04-05 RX ORDER — ASPIRIN 325 MG
325 TABLET ORAL ONCE
Status: COMPLETED | OUTPATIENT
Start: 2021-04-05 | End: 2021-04-05

## 2021-04-05 RX ORDER — SODIUM CHLORIDE 9 MG/ML
INJECTION, SOLUTION INTRAVENOUS CONTINUOUS
Status: ACTIVE | OUTPATIENT
Start: 2021-04-05 | End: 2021-04-06

## 2021-04-05 RX ADMIN — LISINOPRIL 20 MG: 20 TABLET ORAL at 22:45

## 2021-04-05 RX ADMIN — ATORVASTATIN CALCIUM 10 MG: 10 TABLET, FILM COATED ORAL at 22:44

## 2021-04-05 RX ADMIN — Medication 3 MG: at 00:13

## 2021-04-05 RX ADMIN — PANTOPRAZOLE SODIUM 40 MG: 40 TABLET, DELAYED RELEASE ORAL at 05:44

## 2021-04-05 RX ADMIN — METOPROLOL SUCCINATE 25 MG: 25 TABLET, EXTENDED RELEASE ORAL at 18:46

## 2021-04-05 RX ADMIN — Medication 500 MG: at 22:45

## 2021-04-05 RX ADMIN — ASPIRIN 325 MG: 325 TABLET ORAL at 07:40

## 2021-04-05 RX ADMIN — TICAGRELOR 90 MG: 90 TABLET ORAL at 23:04

## 2021-04-05 RX ADMIN — ACETAMINOPHEN 650 MG: 325 TABLET ORAL at 19:58

## 2021-04-05 RX ADMIN — SODIUM CHLORIDE, PRESERVATIVE FREE 10 ML: 5 INJECTION INTRAVENOUS at 22:45

## 2021-04-05 ASSESSMENT — ENCOUNTER SYMPTOMS
ABDOMINAL PAIN: 0
SHORTNESS OF BREATH: 0

## 2021-04-05 ASSESSMENT — PAIN DESCRIPTION - PAIN TYPE: TYPE: ACUTE PAIN

## 2021-04-05 ASSESSMENT — PAIN DESCRIPTION - ORIENTATION: ORIENTATION: ANTERIOR

## 2021-04-05 ASSESSMENT — PAIN SCALES - GENERAL
PAINLEVEL_OUTOF10: 0
PAINLEVEL_OUTOF10: 2
PAINLEVEL_OUTOF10: 0
PAINLEVEL_OUTOF10: 0

## 2021-04-05 ASSESSMENT — PAIN DESCRIPTION - LOCATION: LOCATION: HEAD

## 2021-04-05 ASSESSMENT — PAIN - FUNCTIONAL ASSESSMENT: PAIN_FUNCTIONAL_ASSESSMENT: ACTIVITIES ARE NOT PREVENTED

## 2021-04-05 NOTE — PROGRESS NOTES
14.7   HCT 44.1 42.2 44.9   MCV 93.8 93.0 93.7   RDW 13.6 13.4 13.4    195 198     CHEMISTRIES:  Recent Labs     04/03/21  0529 04/04/21  0446    139   K 3.9 3.8    107   CO2 22 21*   BUN 17 19   CREATININE 1.0 0.9   GLUCOSE 106* 105*     PT/INR:No results for input(s): PROTIME, INR in the last 72 hours. APTT:No results for input(s): APTT in the last 72 hours. LIVER PROFILE:  No results for input(s): AST, ALT, BILIDIR, BILITOT, ALKPHOS in the last 72 hours. Imaging Last 24 Hours:  Cta Chest W Contrast    Result Date: 3/31/2021  EXAMINATION: CTA OF THE CHEST; CTA OF THE ABDOMEN AND PELVIS WITH CONTRAST 3/31/2021 4:55 pm: TECHNIQUE: CTA of the chest was performed after the administration of intravenous contrast.  Multiplanar reformatted images are provided for review. MIP images are provided for review. Dose modulation, iterative reconstruction, and/or weight based adjustment of the mA/kV was utilized to reduce the radiation dose to as low as reasonably achievable.; CTA of the abdomen and pelvis was performed with the administration of intravenous contrast. Multiplanar reformatted images are provided for review. MIP images are provided for review. Dose modulation, iterative reconstruction, and/or weight based adjustment of the mA/kV was utilized to reduce the radiation dose to as low as reasonably achievable. COMPARISON: August 2020. HISTORY: ORDERING SYSTEM PROVIDED HISTORY: r/o dissection TECHNOLOGIST PROVIDED HISTORY: Reason for exam:->r/o dissection Decision Support Exception->Emergency Medical Condition (MA) FINDINGS: CTA CHEST: Thoracic aorta and mediastinum: Aneurysmal dilatation of the ascending segment of the thoracic aorta which measures approximately 4.5 cm in diameter. This is similar to the prior examination. No evidence of dissection. Status post median sternotomy. No hilar or mediastinal adenopathy. No pericardial effusion. Cardiomegaly. Lungs/Pleura:  The lungs are without acute process. No focal consolidation or pulmonary edema. No evidence of pleural effusion or pneumothorax. Images significantly degraded by respiratory motion artifact. Soft Tissues/Bones: No acute bone or soft tissue abnormality. CTA ABDOMEN: Abdominal aorta/Branches: There is an aorto iliac stent in place which is patent. There is no evidence of endoleak. No aortic dissection. Atherosclerotic plaque at the origin of the celiac axis and superior mesenteric artery which remain patent. Patent renal arteries bilaterally. Organs: Incidental hepatic cysts. The spleen, adrenals, pancreas are within normal limits. Status post cholecystectomy. Kidneys enhance homogeneously. GI/Bowel: No bowel obstruction or free intraperitoneal air. No evidence of colitis or diverticulitis. Sigmoid diverticulosis. Peritoneum/Retroperitoneum: No retroperitoneal adenopathy. Bones/Soft Tissues: No acute bony pathology. CTA PELVIS: Aorta/Iliacs: Patent aortoiliac stent. Patent internal and external iliac arteries bilaterally. Other: Urinary bladder within normal limits. Mildly enlarged prostate. Bones/Soft Tissues: No acute bony pathology. Aneurysmal dilatation of the ascending segment of the thoracic aorta measuring approximately 4.5 cm, similar to the prior examination. No evidence of aortic dissection. Cardiomegaly. Aorta iliac stent in place which is patent. No evidence of endoleak. Atherosclerotic plaque at the origin of the celiac axis and superior mesenteric artery which remain patent. Sigmoid diverticulosis with no evidence of diverticulitis. Mildly enlarged prostate. The     Cta Abdomen Pelvis W Contrast    Result Date: 3/31/2021  EXAMINATION: CTA OF THE CHEST; CTA OF THE ABDOMEN AND PELVIS WITH CONTRAST 3/31/2021 4:55 pm: TECHNIQUE: CTA of the chest was performed after the administration of intravenous contrast.  Multiplanar reformatted images are provided for review. MIP images are provided for review.  Dose modulation, iterative reconstruction, and/or weight based adjustment of the mA/kV was utilized to reduce the radiation dose to as low as reasonably achievable.; CTA of the abdomen and pelvis was performed with the administration of intravenous contrast. Multiplanar reformatted images are provided for review. MIP images are provided for review. Dose modulation, iterative reconstruction, and/or weight based adjustment of the mA/kV was utilized to reduce the radiation dose to as low as reasonably achievable. COMPARISON: August 2020. HISTORY: ORDERING SYSTEM PROVIDED HISTORY: r/o dissection TECHNOLOGIST PROVIDED HISTORY: Reason for exam:->r/o dissection Decision Support Exception->Emergency Medical Condition (MA) FINDINGS: CTA CHEST: Thoracic aorta and mediastinum: Aneurysmal dilatation of the ascending segment of the thoracic aorta which measures approximately 4.5 cm in diameter. This is similar to the prior examination. No evidence of dissection. Status post median sternotomy. No hilar or mediastinal adenopathy. No pericardial effusion. Cardiomegaly. Lungs/Pleura: The lungs are without acute process. No focal consolidation or pulmonary edema. No evidence of pleural effusion or pneumothorax. Images significantly degraded by respiratory motion artifact. Soft Tissues/Bones: No acute bone or soft tissue abnormality. CTA ABDOMEN: Abdominal aorta/Branches: There is an aorto iliac stent in place which is patent. There is no evidence of endoleak. No aortic dissection. Atherosclerotic plaque at the origin of the celiac axis and superior mesenteric artery which remain patent. Patent renal arteries bilaterally. Organs: Incidental hepatic cysts. The spleen, adrenals, pancreas are within normal limits. Status post cholecystectomy. Kidneys enhance homogeneously. GI/Bowel: No bowel obstruction or free intraperitoneal air. No evidence of colitis or diverticulitis. Sigmoid diverticulosis.  Peritoneum/Retroperitoneum: No retroperitoneal adenopathy. Bones/Soft Tissues: No acute bony pathology. CTA PELVIS: Aorta/Iliacs: Patent aortoiliac stent. Patent internal and external iliac arteries bilaterally. Other: Urinary bladder within normal limits. Mildly enlarged prostate. Bones/Soft Tissues: No acute bony pathology. Aneurysmal dilatation of the ascending segment of the thoracic aorta measuring approximately 4.5 cm, similar to the prior examination. No evidence of aortic dissection. Cardiomegaly. Aorta iliac stent in place which is patent. No evidence of endoleak. Atherosclerotic plaque at the origin of the celiac axis and superior mesenteric artery which remain patent. Sigmoid diverticulosis with no evidence of diverticulitis. Mildly enlarged prostate. The     Nm Cardiac Stress Test Nuclear Imaging    Result Date: 4/1/2021  INDICATION: Chest pain, CAD with history of CABG PROTOCOL: Rest/stress single isotope SPECT myocardial perfusion imaging with pharmacologic stress and gated SPECT imaging utilizing regadenoson. Reconstruction and reorientation of SPECT images in the short axis, vertical and horizontal long axis. Gated SPECT wall motion study. Qualitative assessment of left ventricular ejection fraction. Low-dose computed tomography for attenuation correction. TECHNIQUE: Regadenoson dose: 0.4 mg Radiopharmaceutical stress dose: 35 mCi Tc-99m sestamibi Radiopharmaceutical rest dose: 10 mCi Tc-99m sestamibi FINDINGS: The technical quality of the study is adequate. Rotating planar images show mild vertical patient motion; no significant soft tissue attenuation. Low-dose CT images demonstrate prior sternotomy, and coronary artery and aortic arch calcifications. The heart appears enlarged on both rest and stress images. Post stress tomographic images demonstrate a moderate-sized area of mildly decreased uptake at the basal to mid inferior and mid inferolateral wall, which improves on the resting images.  There is a small fixed defect at the apical lateral wall. Gated images demonstrate abnormal septal motion consistent with patient's history of bypass surgery, as well as apical hypokinesis. Calculated left ventricular ejection fraction is 50%. End-diastolic volume 852 mL. TID ratio 0.99.     1. The myocardial perfusion is abnormal. 2. Reversible inferior and inferolateral defect suggestive of ischemia. 3. Small fixed defect at the apical lateral wall suggesting a small area of infarction. 4. Dilated ventricle with mildly reduced LV systolic function, EF 73% with apical hypokinesis and abnormal septal motion. 5. There is no transient ischemic dilation. 6. Intermediate risk myocardial perfusion study. Assessment//Plan           Hospital Problems           Last Modified POA    Chest pain 3/31/2021 Yes        Assessment:  (Chest pain 3/31/2021 Yes     HTN  Hyperlipidemia  AAA  COPD  Aortic regurgitation      ). Plan:   (Stress test abnormal  Plan for cath today. Will follow. ).

## 2021-04-05 NOTE — PROGRESS NOTES
Dr Mercedes Richter and dr Frandy Lehman notified that patient is having visual problems with left eye.notified that he can see bilaterally but vision is spotty on left side,can only see certain things. ct head ordered.

## 2021-04-05 NOTE — PROCEDURES
[address]                  [address] [facility],  [facility]                            CARDIAC CATHETERIZATION    PATIENT NAME: Brian Choi                    :        1945  MED REC NO:   [id number]                            ROOM:       [id number]  ACCOUNT NO:   [de-identified]                           ADMIT DATE: 2021  PROVIDER:     Kesha Diggs MD    DATE OF PROCEDURE:  2021    LEFT HEART CATHETERIZATION AND PCI    INDICATION:  Chest pain, history of CABG with inferolateral ischemia on  Lexiscan. PROCEDURE NOTE:  The patient was brought to the cardiac cath lab in his  usual fasting state. Under sterile condition and under local anesthetic  and using conscious sedation, a 6-Macedonian sheath was inserted into the  right common femoral artery using a micropuncture needle. Then, I could  not advance a J-tip wire into the iliac arteries due to bifurcating  iliac stents. Then, a Wholey wire was used and was able to be advanced  to the ascending aorta. Then, a 5-Macedonian JL4, then JL5 diagnostic  catheters could not engage the left main coronary artery. A  nonselective injection into the left coronary cusp revealed occluded  left main coronary artery. This catheter was exchanged over a guidewire  to a 5-Macedonian JR4 diagnostic catheter which was able to engage the SVG  to the PDA and an angiogram was done. Then, this catheter engaged the  right coronary artery and an angiogram was done. The 5-Macedonian JR4  diagnostic catheter then was advanced into the left subclavian artery. However, I could not advance a J-tip wire or a Wholey wire over the  5-Macedonian JR4 diagnostic catheter or the 5-Macedonian LIMA catheter due to  very angulated left subclavian artery. After multiple attempts and  using a 6-Macedonian LIMA catheter, I was able to engage the LIMA and an angiogram  was done.   This catheter was exchanged over a guidewire to a 5-Macedonian  AL1 diagnostic catheter which was able to engage the SVG attached to the  OM branch and an angiogram was done. The same catheter was used to  engage the SVG attached to the diagonal branch and an angiogram was  done. FINDINGS:  HEMODYNAMICS:  Aortic pressure 172/72 mmHg. CORONARY ANATOMY:  LEFT MAIN:  It is chronically occluded. RCA:  It is a large, dominant artery giving rise to a conus branch, an  RV marginal branch, then a bifurcating PLV branch. There was 60% to 70%  proximal RCA stenosis. The RCA was heavily calcified in the mid  segment. There was 95% to 99% [id number] stenosis with patent stent  distally and MENDEL-2 flow to the PLV branch. LIMA to LAD was patent and was very angulated and tortuous at the mid  segment. There was good distal runoff and good anastomosis. SVG to the large OM revealed 80% discrete proximal stenosis and 60% -  70% eccentric discrete mid stenosis with MENDEL-3 flow distally. SVG to diagonal branch was patent. SVG to the PDA revealed 90% hazy proximal ring with MENDEL-3 flow  distally. CORONARY INTERVENTION NOTE:  A 6-Yemeni AR2 guide catheter was used to  engage the SVG attached to the PDA. The patient was already given a  bolus of heparin of 6000. Then, a BMW wire was advanced to the distal  SVG without difficulty. Then, 2.5 x 12 [place] Monorail balloon was  inflated at 12 atmospheres at the site of the stenosis. Then, a 2.5 x  12 Resolute [place] stent was deployed at 12 atmospheres at the site of the  stenosis with 0% residual stenosis and MENDEL-3 flow distally. This  catheter failed to engage the SVG attached to OM branch. It was  exchanged over a guidewire to a 6-Yemeni AL1, then 6-Yemeni AL2, then  6-Yemeni EBU 3.5 guide catheter which all failed to engage the SVG to  the OM. Then, another attempt using the 6-Yemeni AL1 was able to engage  the SVG non-coaxially and the BMW wire was able to be advanced to the  distal segment with little difficulty.   Then, a 2.5 x 12 [place]

## 2021-04-06 ENCOUNTER — APPOINTMENT (OUTPATIENT)
Dept: CT IMAGING | Age: 76
DRG: 246 | End: 2021-04-06
Payer: MEDICARE

## 2021-04-06 ENCOUNTER — APPOINTMENT (OUTPATIENT)
Dept: ULTRASOUND IMAGING | Age: 76
DRG: 246 | End: 2021-04-06
Payer: MEDICARE

## 2021-04-06 VITALS
HEIGHT: 69 IN | HEART RATE: 61 BPM | OXYGEN SATURATION: 94 % | DIASTOLIC BLOOD PRESSURE: 62 MMHG | RESPIRATION RATE: 16 BRPM | BODY MASS INDEX: 30.07 KG/M2 | WEIGHT: 203 LBS | TEMPERATURE: 98.7 F | SYSTOLIC BLOOD PRESSURE: 136 MMHG

## 2021-04-06 LAB
ANION GAP SERPL CALCULATED.3IONS-SCNC: 9 MMOL/L (ref 7–16)
BASOPHILS ABSOLUTE: 0.05 E9/L (ref 0–0.2)
BASOPHILS RELATIVE PERCENT: 0.5 % (ref 0–2)
BUN BLDV-MCNC: 16 MG/DL (ref 8–23)
CALCIUM SERPL-MCNC: 8.8 MG/DL (ref 8.6–10.2)
CHLORIDE BLD-SCNC: 105 MMOL/L (ref 98–107)
CO2: 23 MMOL/L (ref 22–29)
CREAT SERPL-MCNC: 0.9 MG/DL (ref 0.7–1.2)
EKG ATRIAL RATE: 62 BPM
EKG P-R INTERVAL: 176 MS
EKG Q-T INTERVAL: 444 MS
EKG QRS DURATION: 134 MS
EKG QTC CALCULATION (BAZETT): 450 MS
EKG R AXIS: -76 DEGREES
EKG T AXIS: -3 DEGREES
EKG VENTRICULAR RATE: 62 BPM
EOSINOPHILS ABSOLUTE: 0.14 E9/L (ref 0.05–0.5)
EOSINOPHILS RELATIVE PERCENT: 1.3 % (ref 0–6)
GFR AFRICAN AMERICAN: >60
GFR NON-AFRICAN AMERICAN: >60 ML/MIN/1.73
GLUCOSE BLD-MCNC: 113 MG/DL (ref 74–99)
HCT VFR BLD CALC: 41.9 % (ref 37–54)
HEMOGLOBIN: 13.7 G/DL (ref 12.5–16.5)
IMMATURE GRANULOCYTES #: 0.06 E9/L
IMMATURE GRANULOCYTES %: 0.5 % (ref 0–5)
LYMPHOCYTES ABSOLUTE: 1.31 E9/L (ref 1.5–4)
LYMPHOCYTES RELATIVE PERCENT: 12 % (ref 20–42)
MAGNESIUM: 2 MG/DL (ref 1.6–2.6)
MCH RBC QN AUTO: 30.3 PG (ref 26–35)
MCHC RBC AUTO-ENTMCNC: 32.7 % (ref 32–34.5)
MCV RBC AUTO: 92.7 FL (ref 80–99.9)
MONOCYTES ABSOLUTE: 0.95 E9/L (ref 0.1–0.95)
MONOCYTES RELATIVE PERCENT: 8.7 % (ref 2–12)
NEUTROPHILS ABSOLUTE: 8.41 E9/L (ref 1.8–7.3)
NEUTROPHILS RELATIVE PERCENT: 77 % (ref 43–80)
PDW BLD-RTO: 13.6 FL (ref 11.5–15)
PLATELET # BLD: 229 E9/L (ref 130–450)
PMV BLD AUTO: 11 FL (ref 7–12)
POTASSIUM REFLEX MAGNESIUM: 3.9 MMOL/L (ref 3.5–5)
POTASSIUM SERPL-SCNC: 3.9 MMOL/L (ref 3.5–5)
RBC # BLD: 4.52 E12/L (ref 3.8–5.8)
SODIUM BLD-SCNC: 137 MMOL/L (ref 132–146)
WBC # BLD: 10.9 E9/L (ref 4.5–11.5)

## 2021-04-06 PROCEDURE — 6370000000 HC RX 637 (ALT 250 FOR IP): Performed by: INTERNAL MEDICINE

## 2021-04-06 PROCEDURE — 6370000000 HC RX 637 (ALT 250 FOR IP): Performed by: FAMILY MEDICINE

## 2021-04-06 PROCEDURE — 70450 CT HEAD/BRAIN W/O DYE: CPT

## 2021-04-06 PROCEDURE — 80048 BASIC METABOLIC PNL TOTAL CA: CPT

## 2021-04-06 PROCEDURE — 2580000003 HC RX 258: Performed by: INTERNAL MEDICINE

## 2021-04-06 PROCEDURE — 36415 COLL VENOUS BLD VENIPUNCTURE: CPT

## 2021-04-06 PROCEDURE — 2700000000 HC OXYGEN THERAPY PER DAY

## 2021-04-06 PROCEDURE — 93880 EXTRACRANIAL BILAT STUDY: CPT

## 2021-04-06 PROCEDURE — 83735 ASSAY OF MAGNESIUM: CPT

## 2021-04-06 PROCEDURE — 99221 1ST HOSP IP/OBS SF/LOW 40: CPT | Performed by: PSYCHIATRY & NEUROLOGY

## 2021-04-06 PROCEDURE — 85025 COMPLETE CBC W/AUTO DIFF WBC: CPT

## 2021-04-06 PROCEDURE — 93880 EXTRACRANIAL BILAT STUDY: CPT | Performed by: RADIOLOGY

## 2021-04-06 PROCEDURE — 99233 SBSQ HOSP IP/OBS HIGH 50: CPT | Performed by: INTERNAL MEDICINE

## 2021-04-06 RX ORDER — CLOPIDOGREL BISULFATE 75 MG/1
300 TABLET ORAL ONCE
Status: DISCONTINUED | OUTPATIENT
Start: 2021-04-06 | End: 2021-04-06

## 2021-04-06 RX ORDER — PANTOPRAZOLE SODIUM 40 MG/1
40 TABLET, DELAYED RELEASE ORAL
Qty: 30 TABLET | Refills: 3 | Status: SHIPPED | OUTPATIENT
Start: 2021-04-07

## 2021-04-06 RX ORDER — ISOSORBIDE MONONITRATE 60 MG/1
60 TABLET, EXTENDED RELEASE ORAL DAILY
Status: DISCONTINUED | OUTPATIENT
Start: 2021-04-06 | End: 2021-04-06 | Stop reason: SDUPTHER

## 2021-04-06 RX ORDER — ISOSORBIDE MONONITRATE 60 MG/1
60 TABLET, EXTENDED RELEASE ORAL DAILY
Status: DISCONTINUED | OUTPATIENT
Start: 2021-04-06 | End: 2021-04-06 | Stop reason: HOSPADM

## 2021-04-06 RX ORDER — CLOPIDOGREL BISULFATE 75 MG/1
75 TABLET ORAL DAILY
Status: DISCONTINUED | OUTPATIENT
Start: 2021-04-07 | End: 2021-04-06 | Stop reason: HOSPADM

## 2021-04-06 RX ORDER — CLOPIDOGREL BISULFATE 75 MG/1
75 TABLET ORAL DAILY
Qty: 30 TABLET | Refills: 3 | Status: SHIPPED | OUTPATIENT
Start: 2021-04-07

## 2021-04-06 RX ORDER — LISINOPRIL 20 MG/1
20 TABLET ORAL 2 TIMES DAILY
Qty: 30 TABLET | Refills: 3 | Status: SHIPPED | OUTPATIENT
Start: 2021-04-06

## 2021-04-06 RX ORDER — CLOPIDOGREL BISULFATE 75 MG/1
75 TABLET ORAL DAILY
Status: DISCONTINUED | OUTPATIENT
Start: 2021-04-06 | End: 2021-04-06 | Stop reason: SDUPTHER

## 2021-04-06 RX ORDER — CLOPIDOGREL BISULFATE 75 MG/1
150 TABLET ORAL ONCE
Status: COMPLETED | OUTPATIENT
Start: 2021-04-06 | End: 2021-04-06

## 2021-04-06 RX ORDER — ISOSORBIDE MONONITRATE 60 MG/1
60 TABLET, EXTENDED RELEASE ORAL DAILY
Qty: 30 TABLET | Refills: 3 | Status: SHIPPED | OUTPATIENT
Start: 2021-04-07 | End: 2022-02-16

## 2021-04-06 RX ADMIN — SODIUM CHLORIDE, PRESERVATIVE FREE 10 ML: 5 INJECTION INTRAVENOUS at 10:47

## 2021-04-06 RX ADMIN — PANTOPRAZOLE SODIUM 40 MG: 40 TABLET, DELAYED RELEASE ORAL at 10:46

## 2021-04-06 RX ADMIN — ACETAMINOPHEN 650 MG: 325 TABLET ORAL at 04:34

## 2021-04-06 RX ADMIN — METOPROLOL SUCCINATE 25 MG: 25 TABLET, EXTENDED RELEASE ORAL at 10:46

## 2021-04-06 RX ADMIN — CLOPIDOGREL BISULFATE 150 MG: 75 TABLET ORAL at 10:46

## 2021-04-06 RX ADMIN — ASPIRIN 81 MG: 81 TABLET, COATED ORAL at 10:47

## 2021-04-06 RX ADMIN — ISOSORBIDE MONONITRATE 60 MG: 60 TABLET, EXTENDED RELEASE ORAL at 10:47

## 2021-04-06 RX ADMIN — LISINOPRIL 20 MG: 20 TABLET ORAL at 10:46

## 2021-04-06 ASSESSMENT — PAIN DESCRIPTION - PAIN TYPE: TYPE: ACUTE PAIN

## 2021-04-06 ASSESSMENT — ENCOUNTER SYMPTOMS
ABDOMINAL PAIN: 0
SHORTNESS OF BREATH: 0

## 2021-04-06 ASSESSMENT — PAIN SCALES - GENERAL
PAINLEVEL_OUTOF10: 4
PAINLEVEL_OUTOF10: 0

## 2021-04-06 NOTE — PATIENT CARE CONFERENCE
[facility] Quality Flow/Interdisciplinary Rounds Progress Note        Quality Flow Rounds held on April 6, 2021    Disciplines Attending:  Bedside Nurse, ,  and Nursing Unit Leadership    Alessandro Neumann was admitted on 3/31/2021  5:26 PM    Anticipated Discharge Date:  Expected Discharge Date: 04/02/21    Disposition:    Willard Score:  Willard Scale Score: 23    Readmission Risk              Risk of Unplanned Readmission:        10           Discussed patient goal for the day, patient clinical progression, and barriers to discharge.   The following Goal(s) of the Day/Commitment(s) have been identified:  Diagnostics - Report Results         Valorie Magallon  April 6, 2021

## 2021-04-06 NOTE — DISCHARGE SUMMARY
Discharge Summary    Date: 4/6/2021  Patient Name: Charla Del Valle YOB: 1945 Age: 78 y.o. Admit Date: 3/31/2021  Discharge Date: 4/6/2021  Discharge Condition: Stable    Admission Diagnosis  Chest pain (R07.9)     Discharge Diagnosis  Active Problems: Chest painResolved Problems: * No resolved hospital problems. [facility] 67 22 Stay  Narrative of Hospital Course:  Patient admitted for chest pain. Had multiple stents placed and cardiology will do further intervention, see notes, in a few weeks. Patient also had some blurred vision which resolved. CT head negative. Consultants:  IP CONSULT TO INTERNAL MEDICINEIP CONSULT TO CARDIOLOGYIP CONSULT TO CARDIAC REHABIP CONSULT TO NEUROLOGY    Surgeries/procedures Performed:       Treatments:           Discharge Plan/Disposition:  Home    Hospital/Incidental Findings Requiring Follow Up:    Patient Instructions:    Diet: Cardiac Diet    Activity:Activity as Tolerated  For number of days (if applicable): Other Instructions:    Provider Follow-Up:   No follow-ups on file. Significant Diagnostic Studies:    Recent Labs:  Admission on 03/31/2021No results displayed because visit has over 200 results. ------------    Radiology last 7 days:  Ct Head Wo ContrastResult Date: 4/6/20211. Edema in the right occipital lobe concerning for an acute/subacute infarction. 2. No mass effect or hemorrhage. Cta Chest W ContrastResult Date: 3/31/2021Aneurysmal dilatation of the ascending segment of the thoracic aorta measuring approximately 4.5 cm, similar to the prior examination. No evidence of aortic dissection. Cardiomegaly. Aorta iliac stent in place which is patent. No evidence of endoleak. Atherosclerotic plaque at the origin of the celiac axis and superior mesenteric artery which remain patent. Sigmoid diverticulosis with no evidence of diverticulitis. Mildly enlarged prostate.   The Cta Abdomen Pelvis W ContrastResult Date: 3/31/2021Aneurysmal dilatation of the ascending segment of the thoracic aorta measuring approximately 4.5 cm, similar to the prior examination. No evidence of aortic dissection. Cardiomegaly. Aorta iliac stent in place which is patent. No evidence of endoleak. Atherosclerotic plaque at the origin of the celiac axis and superior mesenteric artery which remain patent. Sigmoid diverticulosis with no evidence of diverticulitis. Mildly enlarged prostate. The Nm Cardiac Stress Test Nuclear ImagingResult Date: 4/1/20211. The myocardial perfusion is abnormal. 2. Reversible inferior and inferolateral defect suggestive of ischemia. 3. Small fixed defect at the apical lateral wall suggesting a small area of infarction. 4. Dilated ventricle with mildly reduced LV systolic function, EF 13% with apical hypokinesis and abnormal septal motion. 5. There is no transient ischemic dilation. 6. Intermediate risk myocardial perfusion study. Us Carotid Artery BilateralResult Date: 4/6/2021Atherosclerotic disease. No hemodynamically significant stenosis is identified Estimated stenosis by NASCET criteria in the proximal right carotid artery is between 0% and 49%. Estimated stenosis by NASCET criteria in the proximal left carotid artery is between 0% and 49%.       Pending Labs   Order Current Status  PREPARE RBC (CROSSMATCH) Preliminary result      Discharge Medications    Current Discharge Medication ListSTART taking these medicationsclopidogrel (PLAVIX) 75 MG tabletTake 1 tablet by mouth dailyQty: 30 tablet Refills: 3pantoprazole (PROTONIX) 40 MG tabletTake 1 tablet by mouth every morning (before breakfast)Qty: 30 tablet Refills: 3    Current Discharge Medication ListCONTINUE these medications which have CHANGEDisosorbide mononitrate (IMDUR) 60 MG extended release tabletTake 1 tablet by mouth dailyQty: 30 tablet Refills: 3lisinopril (PRINIVIL;ZESTRIL) 20 MG tabletTake 1 tablet by mouth 2 times dailyQty: 30 tablet Refills: 3    Current Discharge Medication ListCONTINUE

## 2021-04-06 NOTE — CARE COORDINATION
4/6/2021 - S/P PCI to SVG attached to PDA and SVG attached to OM with 3 stents. Cardiology following. Neurology consult for partial loss of vision post PCI. CT head and US carotid arteries done. Plan is for staged PCI of RCA after cleared by neurology. Discharge plan is to return home when medically stable. Wife will provide transportation home. SW/CM will follow.

## 2021-04-06 NOTE — CONSULTS
Met with patient and discussed that their physician has ordered a referral to our outpatient Phase II Cardiac Rehabilitation program. Reviewed the benefits of cardiac rehabilitation based on their diagnosis and personal risk factors. Patient demonstrates strong interest in Cardiac Rehabilitation at this time. Cardiac Rehabilitation brochure provided to patient/family. The Cardiac Rehabilitation Program has been provided the patient's referral information and pertinent patient details and history. [facility] [phone] for additional information or questions. Contact information for [address] [place], [place] [address] and other choices close to the patient's residence have been provided in the discharge instructions so that the patient may call and schedule an appointment when cleared by their physician.  Thank you for the referral.

## 2021-04-06 NOTE — PROGRESS NOTES
Reason for follow up: PCI to SVG attached to PDA and SVG attached to OM branch. Subjective: Patient had partial loss of his vision yesterday after his procedure. He was restless at night and felt short of breath after receiving Brilinta. He denies chest discomfort or dyspnea. Objective:    No distress. Scheduled Meds:   clopidogrel  300 mg Oral Once    [START ON 4/7/2021] clopidogrel  75 mg Oral Daily    isosorbide mononitrate  60 mg Oral Daily    isosorbide mononitrate  60 mg Oral Daily    clopidogrel  75 mg Oral Daily    sodium chloride flush  10 mL Intravenous 2 times per day    lisinopril  20 mg Oral BID    metoprolol succinate  25 mg Oral Daily    aspirin  81 mg Oral Daily    enoxaparin  40 mg Subcutaneous Daily    niacin  500 mg Oral Nightly    And    atorvastatin  10 mg Oral Daily    pantoprazole  40 mg Oral QAM AC       Continuous Infusions:   sodium chloride 50 mL/hr at 04/05/21 1930           Intake/Output Summary (Last 24 hours) at 4/6/2021 0818  Last data filed at 4/6/2021 0550  Gross per 24 hour   Intake 1016 ml   Output 950 ml   Net 66 ml       No data found. PE:   BP (!) 149/68   Pulse 53   Temp 97.6 °F (36.4 °C) (Temporal)   Resp 16   Ht 5' 9\" (1.753 m)   Wt 203 lb (92.1 kg)   SpO2 98%   BMI 29.98 kg/m²   CONST: Middle-age male who appears of stated age. Awake, alert, cooperative, no apparent distress. Throat: Oral mucosa pink and moist.  HEENT: Head- normocephalic, atraumatic. Neck:  no jugular venous distention. Left carotid bruit noted. LUNGS: Fair. CARDIOVASCULAR: Regular and bradycardic, no murmur, s3, s4 or rub noted. PV: No lower extremity edema. No bruising or hematoma over right groin. Pedal pulses palpable. ABDOMEN: Soft, non-tender to light palpation. Bowel sounds present. No palpable masses no hepatosplenomegaly or splenomegaly; no abdominal bruit / pulsation  MS: Good muscle strength and tone.  Not atrophy or abnormal movements. : deferred. SKIN: Warm and dry . NEURO / PSYCH: Oriented to person, place and time. Speech clear and appropriate. Follows all commands. Pleasant affect. Monitor: Sinus bradycardia at 55 bpm.      Lab Review       Recent Labs     04/04/21 0446 04/05/21 0458 04/06/21 0518   WBC 7.3 7.4 10.9   HGB 13.9 14.7 13.7   HCT 42.2 44.9 41.9    198 229       Recent Labs     04/04/21 0446 04/05/21 0458 04/06/21 0518    132 137   K 3.8 3.8 3.9  3.9    105 105   CO2 21* 23 23   BUN 19 20 16   CREATININE 0.9 0.9 0.9         Last 3 Troponin:    Lab Results   Component Value Date    TROPONINI <0.01 04/01/2021    TROPONINI <0.01 04/01/2021    TROPONINI <0.01 04/01/2021             Assessment:  -CAD: Status post PCI to SVG attached to PDA and SVG attached to OM using 3 drug-eluting stents. He has calcified and severe stenosis of his mid RCA. Stable with no angina.  -Partial loss of vision: Probably due to embolic event due to complex left heart catheterization and PCI. -Left carotid bruit. -Hypertension: Mildly elevated.  -Abdominal aortic aneurysm: Status post aortoiliac stents.  -History of TIA. -Hyperlipidemia.  -History of COPD. Plan:  -Switch Brilinta to Plavix. -Follow-up head CT scan of the brain.  -Obtain neurology consultation.  -Discontinue IV nitroglycerin and start Imdur 60 mg p.o. daily.  -Obtain a carotid ultrasound to rule out significant carotid stenosis. -Will need staged PCI of the RCA using atherectomy and stenting when cleared by neurology. Electronically signed by John Burgos MD on 4/6/2021 at 8:18 AM  [id number] [facility].

## 2021-04-06 NOTE — CONSULTS
Teri Coburn is a 78 y.o. male       Chief Complaint   Patient presents with    Chest Pain     began about 15-20 min agp, pain across entire chest and radiates to bilateral jaw area and left arm, some sweating per pt, sob also       HPI:  62-year-old man with history of hypertension hyperlipidemia presented with chest pain. He underwent cardiac cath with 3 stents placed. He was noted loss of vision in the left hemifield. CT scan was performed which showed area of early stroke of the right occipital lobe in the PCA distribution. Patient's vision is improved significantly has not quite represent back to normal.  He is also having some shortness of breath which was thought to be related to Brilinta and this was switched to aspirin. HPI  Prior to Visit Medications    Medication Sig Taking? Authorizing Provider   metoprolol succinate (TOPROL XL) 25 MG extended release tablet Take 0.5 tablets by mouth daily Yes Adalgisa Vazquez DO   lisinopril (PRINIVIL;ZESTRIL) 2.5 MG tablet Take 1 tablet by mouth daily. Yes Adalgisa Vazquez DO   aspirin 81 MG tablet Take 81 mg by mouth daily. Yes Historical Provider, MD   Omeprazole (PRILOSEC PO) Take  by mouth daily as needed. Yes Historical Provider, MD   isosorbide mononitrate (IMDUR) 30 MG CR tablet Take 1 tablet by mouth daily. Adalgisa Vazquez DO   niacin-simvastatin [facility]) 500-20 MG TB24 tablet Take 1 tablet by mouth daily.   Historical Provider, MD     Social History     Tobacco Use    Smoking status: Former Smoker     Packs/day: 1.00     Years: 40.00     Pack years: 40.00     Types: Cigarettes     Quit date: 2002     Years since quittin.4    Smokeless tobacco: Never Used   Substance Use Topics    Alcohol use: Yes     Comment: occassionally    Drug use: No     Family History   Problem Relation Age of Onset    Cancer Mother     Cancer Father      Past Surgical History:   Procedure Laterality Date    AORTA SURGERY      [facility] 2016    APPENDECTOMY      BACK SURGERY      CARDIAC CATHETERIZATION  09/19/2013    DR Rod Lopez CARDIAC SURGERY      stents    CHOLECYSTECTOMY      CORONARY ANGIOPLASTY WITH STENT PLACEMENT  04/05/2021    Dr. Junaid Carballo (3) stents. Nadyne Claudio Nadyne Claudio Resolute Ludwig-- SVG to PDA, SVG to prox. OM, SVG to mid SVG     CORONARY ARTERY BYPASS GRAFT      DIAGNOSTIC CARDIAC CATH LAB PROCEDURE      EYE SURGERY  09/2017    HERNIA REPAIR      x4    SHOULDER ARTHROSCOPY      SHOULDER SURGERY      TRANSESOPHAGEAL ECHOCARDIOGRAM  03/05/2018    [facility] with Dr. Isaura Martinez     Past Medical History:   Diagnosis Date    AAA (abdominal aortic aneurysm) (HCC)     4,5cm    Aortic regurgitation     mild to moderate    COPD (chronic obstructive pulmonary disease) (HCC)     Coronary atherosclerosis of native coronary artery     DJD (degenerative joint disease)     HTN (hypertension)     Hyperlipidemia     Mitral regurgitation     TIA (transient ischemic attack) 07/01/2013     Review of Systems  As stated above. Specifically no further chest pain. Objective:   BP (!) 179/77   Pulse 62   Temp 97.8 °F (36.6 °C)   Resp 16   Ht 5' 9\" (1.753 m)   Wt 203 lb (92.1 kg)   SpO2 95%   BMI 29.98 kg/m²     Physical Exam  HENT:      Head: Normocephalic and atraumatic. Mouth/Throat:      Mouth: Mucous membranes are dry. Eyes:      General: Lids are normal.      Extraocular Movements: Extraocular movements intact. Conjunctiva/sclera: Conjunctivae normal.      Pupils: Pupils are equal, round, and reactive to light. Neck:      Musculoskeletal: Neck supple. Cardiovascular:      Rate and Rhythm: Regular rhythm. Pulmonary:      Effort: Pulmonary effort is normal.   Abdominal:      Palpations: Abdomen is soft. Skin:     General: Skin is warm. Neurological:      General: No focal deficit present. Mental Status: He is alert.       Deep Tendon Reflexes: Strength normal.      Reflex Scores:       Tricep reflexes are 1+ on the normal.      Laboratory/Radiology:     CBC with Differential:    Lab Results   Component Value Date    WBC 10.9 04/06/2021    RBC 4.52 04/06/2021    HGB 13.7 04/06/2021    HCT 41.9 04/06/2021     04/06/2021    MCV 92.7 04/06/2021    MCH 30.3 04/06/2021    MCHC 32.7 04/06/2021    RDW 13.6 04/06/2021    LYMPHOPCT 12.0 04/06/2021    MONOPCT 8.7 04/06/2021    BASOPCT 0.5 04/06/2021    MONOSABS 0.95 04/06/2021    LYMPHSABS 1.31 04/06/2021    EOSABS 0.14 04/06/2021    BASOSABS 0.05 04/06/2021     CMP:    Lab Results   Component Value Date     04/06/2021    K 3.9 04/06/2021    K 3.9 04/06/2021     04/06/2021    CO2 23 04/06/2021    BUN 16 04/06/2021    CREATININE 0.9 04/06/2021    GFRAA >60 04/06/2021    LABGLOM >60 04/06/2021    GLUCOSE 113 04/06/2021    GLUCOSE 101 02/20/2011    PROT 7.3 03/31/2021    LABALBU 4.3 03/31/2021    LABALBU 4.5 02/20/2011    CALCIUM 8.8 04/06/2021    BILITOT 0.2 03/31/2021    ALKPHOS 78 03/31/2021    AST 11 03/31/2021    ALT 8 03/31/2021     Warfarin PT/INR:  No components found for: PTPATWAR, PTINRWAR    CT head:     Impression   1. Edema in the right occipital lobe concerning for an acute/subacute   infarction. 2. No mass effect or hemorrhage.             I independently reviewed the labs and imaging studies at today's appointment. 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 Ideally I would wait 1 to 2 weeks before additional angiograms/PCI. Okay for dual antiplatelet therapy.          Plan:         Kelli Veloz  11:52 AM  4/6/2021

## 2021-04-06 NOTE — PROGRESS NOTES
Progress Note  Date:2021       SW:[id number]  Patient Name:Corky Ortiz     YOB: 1945     Age:76 y.o. Patient denies any chest pain at this time. He had some blurred vision after his cath yesterday but says it has improved today. Subjective    Subjective:  Symptoms:  Resolved. No shortness of breath or chest pain. Diet:  Adequate intake. Activity level: Normal.    Pain:  He reports no pain. Review of Systems   Constitutional: Negative for activity change and fever. HENT: Negative for congestion. Respiratory: Negative for shortness of breath. Cardiovascular: Negative for chest pain. Gastrointestinal: Negative for abdominal pain. Musculoskeletal: Negative for arthralgias. Neurological: Negative for dizziness. Hematological: Negative for adenopathy. Psychiatric/Behavioral: Negative for agitation. Objective         Vitals Last 24 Hours:  TEMPERATURE:  Temp  Av.3 °F (36.3 °C)  Min: 96.6 °F (35.9 °C)  Max: 97.8 °F (36.6 °C)  RESPIRATIONS RANGE: Resp  Av  Min: 16  Max: 16  PULSE OXIMETRY RANGE: SpO2  Av %  Min: 96 %  Max: 98 %  PULSE RANGE: Pulse  Av.5  Min: 53  Max: 70  BLOOD PRESSURE RANGE: Systolic (10XYH), GPM:456 , Min:138 , LAURA:083   ; Diastolic (51TNQ), KGF:45, Min:68, Max:115    I/O (24Hr): Intake/Output Summary (Last 24 hours) at 2021 0738  Last data filed at 2021 0550  Gross per 24 hour   Intake 1016 ml   Output 950 ml   Net 66 ml     Objective:  General Appearance:  Comfortable. Vital signs: (most recent): Blood pressure (!) 149/68, pulse 53, temperature 97.6 °F (36.4 °C), temperature source Temporal, resp. rate 16, height 5' 9\" (1.753 m), weight 203 lb (92.1 kg), SpO2 98 %. No fever. Lungs:  Normal effort and normal respiratory rate. Breath sounds clear to auscultation. Heart: Normal rate. Regular rhythm.   S1 normal and S2 normal.      Labs/Imaging/Diagnostics    Labs:  CBC:  Recent Labs 04/04/21 0446 04/05/21 0458 04/06/21 0518   WBC 7.3 7.4 10.9   RBC 4.54 4.79 4.52   HGB 13.9 14.7 13.7   HCT 42.2 44.9 41.9   MCV 93.0 93.7 92.7   RDW 13.4 13.4 13.6    198 229     CHEMISTRIES:  Recent Labs     04/04/21 0446 04/05/21 0458 04/06/21 0518    132 137   K 3.8 3.8 3.9  3.9    105 105   CO2 21* 23 23   BUN 19 20 16   CREATININE 0.9 0.9 0.9   GLUCOSE 105* 103* 113*   MG  --   --  2.0     PT/INR:No results for input(s): PROTIME, INR in the last 72 hours. APTT:No results for input(s): APTT in the last 72 hours. LIVER PROFILE:  No results for input(s): AST, ALT, BILIDIR, BILITOT, ALKPHOS in the last 72 hours. Imaging Last 24 Hours:  Cta Chest W Contrast    Result Date: 3/31/2021  EXAMINATION: CTA OF THE CHEST; CTA OF THE ABDOMEN AND PELVIS WITH CONTRAST 3/31/2021 4:55 pm: TECHNIQUE: CTA of the chest was performed after the administration of intravenous contrast.  Multiplanar reformatted images are provided for review. MIP images are provided for review. Dose modulation, iterative reconstruction, and/or weight based adjustment of the mA/kV was utilized to reduce the radiation dose to as low as reasonably achievable.; CTA of the abdomen and pelvis was performed with the administration of intravenous contrast. Multiplanar reformatted images are provided for review. MIP images are provided for review. Dose modulation, iterative reconstruction, and/or weight based adjustment of the mA/kV was utilized to reduce the radiation dose to as low as reasonably achievable. COMPARISON: August 2020. HISTORY: ORDERING SYSTEM PROVIDED HISTORY: r/o dissection TECHNOLOGIST PROVIDED HISTORY: Reason for exam:->r/o dissection Decision Support Exception->Emergency Medical Condition (MA) FINDINGS: CTA CHEST: Thoracic aorta and mediastinum: Aneurysmal dilatation of the ascending segment of the thoracic aorta which measures approximately 4.5 cm in diameter. This is similar to the prior examination.   No evidence of dissection. Status post median sternotomy. No hilar or mediastinal adenopathy. No pericardial effusion. Cardiomegaly. Lungs/Pleura: The lungs are without acute process. No focal consolidation or pulmonary edema. No evidence of pleural effusion or pneumothorax. Images significantly degraded by respiratory motion artifact. Soft Tissues/Bones: No acute bone or soft tissue abnormality. CTA ABDOMEN: Abdominal aorta/Branches: There is an aorto iliac stent in place which is patent. There is no evidence of endoleak. No aortic dissection. Atherosclerotic plaque at the origin of the celiac axis and superior mesenteric artery which remain patent. Patent renal arteries bilaterally. Organs: Incidental hepatic cysts. The spleen, adrenals, pancreas are within normal limits. Status post cholecystectomy. Kidneys enhance homogeneously. GI/Bowel: No bowel obstruction or free intraperitoneal air. No evidence of colitis or diverticulitis. Sigmoid diverticulosis. Peritoneum/Retroperitoneum: No retroperitoneal adenopathy. Bones/Soft Tissues: No acute bony pathology. CTA PELVIS: Aorta/Iliacs: Patent aortoiliac stent. Patent internal and external iliac arteries bilaterally. Other: Urinary bladder within normal limits. Mildly enlarged prostate. Bones/Soft Tissues: No acute bony pathology. Aneurysmal dilatation of the ascending segment of the thoracic aorta measuring approximately 4.5 cm, similar to the prior examination. No evidence of aortic dissection. Cardiomegaly. Aorta iliac stent in place which is patent. No evidence of endoleak. Atherosclerotic plaque at the origin of the celiac axis and superior mesenteric artery which remain patent. Sigmoid diverticulosis with no evidence of diverticulitis. Mildly enlarged prostate.   The     Cta Abdomen Pelvis W Contrast    Result Date: 3/31/2021  EXAMINATION: CTA OF THE CHEST; CTA OF THE ABDOMEN AND PELVIS WITH CONTRAST 3/31/2021 4:55 pm: TECHNIQUE: CTA of the chest was performed after the administration of intravenous contrast.  Multiplanar reformatted images are provided for review. MIP images are provided for review. Dose modulation, iterative reconstruction, and/or weight based adjustment of the mA/kV was utilized to reduce the radiation dose to as low as reasonably achievable.; CTA of the abdomen and pelvis was performed with the administration of intravenous contrast. Multiplanar reformatted images are provided for review. MIP images are provided for review. Dose modulation, iterative reconstruction, and/or weight based adjustment of the mA/kV was utilized to reduce the radiation dose to as low as reasonably achievable. COMPARISON: August 2020. HISTORY: ORDERING SYSTEM PROVIDED HISTORY: r/o dissection TECHNOLOGIST PROVIDED HISTORY: Reason for exam:->r/o dissection Decision Support Exception->Emergency Medical Condition (MA) FINDINGS: CTA CHEST: Thoracic aorta and mediastinum: Aneurysmal dilatation of the ascending segment of the thoracic aorta which measures approximately 4.5 cm in diameter. This is similar to the prior examination. No evidence of dissection. Status post median sternotomy. No hilar or mediastinal adenopathy. No pericardial effusion. Cardiomegaly. Lungs/Pleura: The lungs are without acute process. No focal consolidation or pulmonary edema. No evidence of pleural effusion or pneumothorax. Images significantly degraded by respiratory motion artifact. Soft Tissues/Bones: No acute bone or soft tissue abnormality. CTA ABDOMEN: Abdominal aorta/Branches: There is an aorto iliac stent in place which is patent. There is no evidence of endoleak. No aortic dissection. Atherosclerotic plaque at the origin of the celiac axis and superior mesenteric artery which remain patent. Patent renal arteries bilaterally. Organs: Incidental hepatic cysts. The spleen, adrenals, pancreas are within normal limits. Status post cholecystectomy. Kidneys enhance homogeneously.

## 2021-04-09 LAB
BLOOD BANK DISPENSE STATUS: NORMAL
BLOOD BANK DISPENSE STATUS: NORMAL
BLOOD BANK PRODUCT CODE: NORMAL
BLOOD BANK PRODUCT CODE: NORMAL
BPU ID: NORMAL
BPU ID: NORMAL
DESCRIPTION BLOOD BANK: NORMAL
DESCRIPTION BLOOD BANK: NORMAL

## 2021-04-13 ENCOUNTER — TELEPHONE (OUTPATIENT)
Dept: CARDIAC CATH/INVASIVE PROCEDURES | Age: 76
End: 2021-04-13

## 2022-02-16 ENCOUNTER — OFFICE VISIT (OUTPATIENT)
Dept: CARDIOLOGY CLINIC | Age: 77
End: 2022-02-16
Payer: COMMERCIAL

## 2022-02-16 VITALS
BODY MASS INDEX: 34.13 KG/M2 | OXYGEN SATURATION: 94 % | HEIGHT: 69 IN | SYSTOLIC BLOOD PRESSURE: 134 MMHG | DIASTOLIC BLOOD PRESSURE: 66 MMHG | HEART RATE: 50 BPM | WEIGHT: 230.4 LBS | RESPIRATION RATE: 16 BRPM

## 2022-02-16 DIAGNOSIS — I25.10 CORONARY ARTERY DISEASE, UNSPECIFIED VESSEL OR LESION TYPE, UNSPECIFIED WHETHER ANGINA PRESENT, UNSPECIFIED WHETHER NATIVE OR TRANSPLANTED HEART: Primary | ICD-10-CM

## 2022-02-16 PROCEDURE — 99204 OFFICE O/P NEW MOD 45 MIN: CPT | Performed by: INTERNAL MEDICINE

## 2022-02-16 PROCEDURE — 93000 ELECTROCARDIOGRAM COMPLETE: CPT | Performed by: INTERNAL MEDICINE

## 2022-02-16 RX ORDER — ASPIRIN 81 MG
100 TABLET, DELAYED RELEASE (ENTERIC COATED) ORAL PRN
COMMUNITY

## 2022-02-16 RX ORDER — NITROGLYCERIN 0.4 MG/1
TABLET SUBLINGUAL
COMMUNITY
Start: 2021-12-15 | End: 2022-02-16

## 2022-02-16 RX ORDER — ROSUVASTATIN CALCIUM 40 MG/1
40 TABLET, COATED ORAL DAILY
COMMUNITY
Start: 2021-12-17

## 2022-02-16 RX ORDER — ISOSORBIDE MONONITRATE 30 MG/1
30 TABLET, EXTENDED RELEASE ORAL DAILY
COMMUNITY
Start: 2021-12-17

## 2022-02-16 RX ORDER — RANOLAZINE 500 MG/1
500 TABLET, EXTENDED RELEASE ORAL 2 TIMES DAILY
COMMUNITY
Start: 2022-02-07

## 2022-02-16 NOTE — PROGRESS NOTES
CHIEF COMPLAINT: CAD-CABG    HISTORY OF PRESENT ILLNESS: Patient is a 78 y.o. male seen at the request of Yordan Reynolds MD.      Presents to establish. No CP or SOB.      Past Medical History:    1. Coronary atherosclerosis of native coronary artery  A.  Acute myocardial infarction (11/2/07). Cardiac catheterization (11/2/07). Distal left main 60-70%. LAD proximal 50-60%, mid 80-95%. Circumflex mid to distal 95% Ramus proximal 40-50% Right coronary artery PDA mid 95%. B.  CABG by Dr. Viki Pryor (11/3/07). LIMA to the LAD. SVG to D2 SVG to OM1 SVG to PDA   C. Cardiac catheterization (7/22/08):  LAD 40%, mid 100%, RCA PDA occluded, distal 99% stenosis of bifurcation of PDA and posterolateral branch. SVG to PDA was patent but had no retrograde flow into the posterolateral branch. 4.0 x 16 mm Liberte stent to the distal right coronary artery to 0% residual stenosis. D. Cardiac catheterization, 8/22/08: 3.0 x 24 mm [place] drug eluting stent to the mid to distal circumflex to 0% residual stenosis. E.  Left heart cath September 22, 2013 EF low normal Left heart catheterization September 22, 2013, Dr. Pam Jaimes  1.  Left main: 100% occlusion  2.    Right coronary artery: Mid diffuse 30% stenosis. Dominant vessel. Distal stent widely patent. The PDA has an ostial 100% occlusion. The right coronary artery is supplying collaterals to the circumflex territory. 3.    SVG to the diagonal:  Widely patent with good distal runoff.  There was retrograde flow into the LAD, through an 80% diffuse stenosis. 4.    SVG to the PDA:  Widely patent with good distal runoff. 5.    SVG to the OM: Widely patent with good distal runoff. 6.    Subclavian: [facility] left carotid has an ostial 40% stenosis. 7.    LIMA to the LAD:  Very tortuous.  Widely patent with good distal runoff.  After the anastomosis, the LAD is widely patent with good distal runoff  2.  VHD ( AR and MR) Echo 2/22/2018 Dr. Pam Jaimes Moderately dilated left ventricle.  Normal left ventricle systolic function.  Mild to moderate mitral regurgitation.  Moderate aortic regurgitation. MADHURI March 5, 2018 torn chordae on mitral valve leaflet, EF 60% and mild mitral regurgitation and mild to moderate eccentric aortic regurgitation   3. HTN  4. COPD  5. DJD spine   6. HLD   7.  AAA  A.  CT 3/4/2011 Ectatic but not aneurysmal ascending thoracic aorta measuring 38 mm in transverse dimension. Given the reproducibility of the technique this is essentially unchanged from the 40 mm measurement obtained on prior study of November 3, 2009. B. . MRA chest 9/320/2013  Aneurysm of the ascending thoracic aorta, the largest diameter is 3.8 cm. C.  Aorto iliac stent [facility] 2016 Dr. Shanice Cline  8. TIA   9. Admission August 25, 2020 for benign paroxysmal positional vertigo   10. Shoulder surgery  11. Hernia repair  12.  First degree AV block and nonspecific QRS widening     Past Medical History:   Diagnosis Date    AAA (abdominal aortic aneurysm) (HCC)     4,5cm    Aortic regurgitation     mild to moderate    COPD (chronic obstructive pulmonary disease) (HCC)     Coronary atherosclerosis of native coronary artery     DJD (degenerative joint disease)     HTN (hypertension)     Hyperlipidemia     Mitral regurgitation     TIA (transient ischemic attack) 07/01/2013       Patient Active Problem List   Diagnosis    Coronary atherosclerosis of native coronary artery    COPD (chronic obstructive pulmonary disease) (HCC)    DJD spine    Trace mitral regurgitation    Aortic regurgitation    HTN (hypertension)    Chest pain       Allergies   Allergen Reactions    Ranexa [Ranolazine]      Shortness of breath, headache       Current Outpatient Medications   Medication Sig Dispense Refill    ranolazine (RANEXA) 500 MG extended release tablet Take 500 mg by mouth in the morning and at bedtime      metoprolol tartrate (LOPRESSOR) 25 MG tablet Take 25 mg by mouth daily      isosorbide mononitrate (IMDUR) 30 MG extended release tablet Take 30 mg by mouth daily      rosuvastatin (CRESTOR) 40 MG tablet Take 40 mg by mouth daily      Docusate Sodium 100 MG TABS Take 100 mg by mouth as needed       lisinopril (PRINIVIL;ZESTRIL) 20 MG tablet Take 1 tablet by mouth 2 times daily 30 tablet 3    clopidogrel (PLAVIX) 75 MG tablet Take 1 tablet by mouth daily 30 tablet 3    pantoprazole (PROTONIX) 40 MG tablet Take 1 tablet by mouth every morning (before breakfast) 30 tablet 3    aspirin 81 MG tablet Take 81 mg by mouth daily. No current facility-administered medications for this visit. Social History     Socioeconomic History    Marital status:      Spouse name: Not on file    Number of children: Not on file    Years of education: Not on file    Highest education level: Not on file   Occupational History    Not on file   Tobacco Use    Smoking status: Former Smoker     Packs/day: 1.00     Years: 40.00     Pack years: 40.00     Types: Cigarettes     Quit date: 2002     Years since quittin.3    Smokeless tobacco: Never Used   Vaping Use    Vaping Use: Never used   Substance and Sexual Activity    Alcohol use: Yes     Comment: occassionally    Drug use: No    Sexual activity: Not on file   Other Topics Concern    Not on file   Social History Narrative    Not on file     Social Determinants of Health     Financial Resource Strain:     Difficulty of Paying Living Expenses: Not on file   Food Insecurity:     Worried About [address] in the Last Year: Not on file    [address] in the Last Year: Not on file   Transportation Needs:     Lack of Transportation (Medical): Not on file    Lack of Transportation (Non-Medical):  Not on file   Physical Activity:     Days of Exercise per Week: Not on file    Minutes of Exercise per Session: Not on file   Stress:     Feeling of Stress : Not on file   Social Connections:     Frequency of Communication with Friends and Family: Not on file    Frequency of Social Gatherings with Friends and Family: Not on file    Attends Taoism Services: Not on file    Active Member of Clubs or Organizations: Not on file    Attends Club or Organization Meetings: Not on file    Marital Status: Not on file   Intimate Partner Violence:     Fear of Current or Ex-Partner: Not on file    Emotionally Abused: Not on file    Physically Abused: Not on file    Sexually Abused: Not on file   Housing Stability:     Unable to Pay for Housing in the Last Year: Not on file    Number of [place] in the Last Year: Not on file    Unstable Housing in the Last Year: Not on file       Family History   Problem Relation Age of Onset    Cancer Mother     Cancer Father        Review of Systems:  Heart: as above   Lungs: as above   Eyes: denies changes in vision or discharge. Ears: denies changes in hearing or pain. Nose: denies epistaxis or masses   Throat: denies sore throat or trouble swallowing. Neuro: denies numbness, tingling, tremors. Skin: denies rashes or itching. : denies hematuria, dysuria   GI: denies vomiting, diarrhea   Psych: denies mood changed, anxiety, depression. All other systems negative. Physical Exam   /66   Pulse 50   Resp 16   Ht 5' 9\" (1.753 m)   Wt 230 lb 6.4 oz (104.5 kg)   SpO2 94%   BMI 34.02 kg/m²   Constitutional: Oriented to person, place, and time. Well-developed and well-nourished. No distress. Head: Normocephalic and atraumatic. Eyes: EOM are normal. Pupils are equal, round, and reactive to light. Neck: Normal range of motion. Neck supple. No hepatojugular reflux and no JVD present. Carotid bruit is not present. No tracheal deviation present. No thyromegaly present. Cardiovascular: Normal rate, regular rhythm, normal heart sounds and intact distal pulses. Exam reveals no gallop and no friction rub. No murmur heard.   Pulmonary/Chest: Effort normal and moderate sized, moderate intensity reversible inferolateral defect. ASSESSMENT AND PLAN:  Patient Active Problem List   Diagnosis    Coronary atherosclerosis of native coronary artery    COPD (chronic obstructive pulmonary disease) (HCC)    DJD spine    Trace mitral regurgitation    Aortic regurgitation    HTN (hypertension)    Chest pain     1. CAD-CABG: CABG by Dr. Kd Mckeon (11/3/07). LIMA to the LAD. SVG to D2 SVG to OM1 SVG to PDA     Cath 9/22/2013 LM: 100% occlusion, RCA: Mid diffuse 30% stenosis. Dominant vessel. Distal stent widely patent. The PDA has an ostial 100% occlusion. RCA supplies collaterals to the circumflex territory. SVG to the diagonal:  Widely patent with good distal runoff.  There was retrograde flow into the LAD, through an 80% diffuse stenosis. SVG to the PDA:  Widely patent with good distal runoff. SVG to the OM: Widely patent with good distal runoff.  LIMA to the LAD:  Very tortuous.  Widely patent with good distal runoff.  After the anastomosis, the LAD is widely patent with good distal runoff. Stable symptomatically. ASA/statin/BB/imdur/ranexa. 2. VHD: Mild to mod MR/mod AI. 3. HTN: Observe. 4. COPD    5. Lipids: Statin. 6. AAA: Per vascular. Aorto iliac stent [facility] 2016 Dr. Nisha Cook    7. TIA: ASA/statin. Brandon Zavaleta D.O.   Cardiologist  Cardiology, [address] [facility]

## 2022-08-11 ENCOUNTER — OFFICE VISIT (OUTPATIENT)
Dept: CARDIOLOGY CLINIC | Age: 77
End: 2022-08-11
Payer: COMMERCIAL

## 2022-08-11 VITALS
BODY MASS INDEX: 34.6 KG/M2 | SYSTOLIC BLOOD PRESSURE: 110 MMHG | RESPIRATION RATE: 14 BRPM | WEIGHT: 233.6 LBS | HEIGHT: 69 IN | HEART RATE: 48 BPM | DIASTOLIC BLOOD PRESSURE: 80 MMHG

## 2022-08-11 DIAGNOSIS — I25.10 ATHEROSCLEROSIS OF NATIVE CORONARY ARTERY OF NATIVE HEART WITHOUT ANGINA PECTORIS: Primary | ICD-10-CM

## 2022-08-11 PROCEDURE — 99214 OFFICE O/P EST MOD 30 MIN: CPT | Performed by: INTERNAL MEDICINE

## 2022-08-11 PROCEDURE — 1123F ACP DISCUSS/DSCN MKR DOCD: CPT | Performed by: INTERNAL MEDICINE

## 2022-08-11 PROCEDURE — 93000 ELECTROCARDIOGRAM COMPLETE: CPT | Performed by: INTERNAL MEDICINE

## 2022-08-11 NOTE — PROGRESS NOTES
CHIEF COMPLAINT: CAD-CABG    HISTORY OF PRESENT ILLNESS: Patient is a 78 y.o. male seen at the request of Dick Lira MD.      No CP or SOB. Past Medical History:    1. Coronary atherosclerosis of native coronary artery  A. Acute myocardial infarction (11/2/07). Cardiac catheterization (11/2/07). Distal left main 60-70%. LAD proximal 50-60%, mid 80-95%. Circumflex mid to distal 95% Ramus proximal 40-50% Right coronary artery PDA mid 95%. B.  CABG by Dr. Cathie Edwards (11/3/07). LIMA to the LAD. SVG to D2 SVG to OM1 SVG to PDA   C. Cardiac catheterization (7/22/08):  LAD 40%, mid 100%, RCA PDA occluded, distal 99% stenosis of bifurcation of PDA and posterolateral branch. SVG to PDA was patent but had no retrograde flow into the posterolateral branch. 4.0 x 16 mm [place] stent to the distal right coronary artery to 0% residual stenosis. D. Cardiac catheterization, 8/22/08: 3.0 x 24 mm [place] drug eluting stent to the mid to distal circumflex to 0% residual stenosis. E.  Left heart cath September 22, 2013 EF low normal Left heart catheterization September 22, 2013, Dr. Devika Ruby  1. Left main: 100% occlusion  2. Right coronary artery: Mid diffuse 30% stenosis. Dominant vessel. Distal stent widely patent. The PDA has an ostial 100% occlusion. The right coronary artery is supplying collaterals to the circumflex territory. 3.    SVG to the diagonal:  Widely patent with good distal runoff. There was retrograde flow into the LAD, through an 80% diffuse stenosis. 4.    SVG to the PDA:  Widely patent with good distal runoff. 5.    SVG to the OM: Widely patent with good distal runoff. 6.    Subclavian:  Tortuous. The left carotid has an ostial 40% stenosis. 7.    LIMA to the LAD:  Very tortuous. Widely patent with good distal runoff. After the anastomosis, the LAD is widely patent with good distal runoff  2. VHD ( AR and MR) Echo 2/22/2018 Dr. Devika Ruby Moderately dilated left ventricle.   Normal left release tablet Take 30 mg by mouth daily      rosuvastatin (CRESTOR) 40 MG tablet Take 40 mg by mouth daily      Docusate Sodium 100 MG TABS Take 100 mg by mouth as needed       lisinopril (PRINIVIL;ZESTRIL) 20 MG tablet Take 1 tablet by mouth 2 times daily 30 tablet 3    clopidogrel (PLAVIX) 75 MG tablet Take 1 tablet by mouth daily 30 tablet 3    pantoprazole (PROTONIX) 40 MG tablet Take 1 tablet by mouth every morning (before breakfast) 30 tablet 3    aspirin 81 MG tablet Take 81 mg by mouth daily. No current facility-administered medications for this visit. Social History     Socioeconomic History    Marital status:      Spouse name: Not on file    Number of children: Not on file    Years of education: Not on file    Highest education level: Not on file   Occupational History    Not on file   Tobacco Use    Smoking status: Former     Packs/day: 1.00     Years: 40.00     Pack years: 40.00     Types: Cigarettes     Quit date: 2002     Years since quittin.7    Smokeless tobacco: Never   Vaping Use    Vaping Use: Never used   Substance and Sexual Activity    Alcohol use: Yes     Comment: occassionally    Drug use: No    Sexual activity: Not on file   Other Topics Concern    Not on file   Social History Narrative    Not on file     Social Determinants of Health     Financial Resource Strain: Not on file   Food Insecurity: Not on file   Transportation Needs: Not on file   Physical Activity: Not on file   Stress: Not on file   Social Connections: Not on file   Intimate Partner Violence: Not on file   Housing Stability: Not on file       Family History   Problem Relation Age of Onset    Cancer Mother     Cancer Father        Review of Systems:  Heart: as above   Lungs: as above   Eyes: denies changes in vision or discharge. Ears: denies changes in hearing or pain. Nose: denies epistaxis or masses   Throat: denies sore throat or trouble swallowing.    Neuro: denies numbness, tingling, tremors. Skin: denies rashes or itching. : denies hematuria, dysuria   GI: denies vomiting, diarrhea   Psych: denies mood changed, anxiety, depression. All other systems negative. Physical Exam   /80   Pulse (!) 48   Resp 14   Ht 5' 9\" (1.753 m)   Wt 233 lb 9.6 oz (106 kg)   BMI 34.50 kg/m²   Constitutional: Oriented to person, place, and time. Well-developed and well-nourished. No distress. Head: Normocephalic and atraumatic. Eyes: EOM are normal. Pupils are equal, round, and reactive to light. Neck: Normal range of motion. Neck supple. No hepatojugular reflux and no JVD present. Carotid bruit is not present. No tracheal deviation present. No thyromegaly present. Cardiovascular: Normal rate, regular rhythm, normal heart sounds and intact distal pulses. Exam reveals no gallop and no friction rub. No murmur heard. Pulmonary/Chest: Effort normal and breath sounds normal. No respiratory distress. No wheezes. No rales. No tenderness. Abdominal: Soft. Bowel sounds are normal. No distension and no mass. No tenderness. No rebound and no guarding. Musculoskeletal: Normal range of motion. No edema and no tenderness. Lymphadenopathy:   No cervical adenopathy. No groin adenopathy. Neurological: Alert and oriented to person, place, and time. Skin: Skin is warm and dry. No rash noted. Not diaphoretic. No erythema. Psychiatric: Normal mood and affect. Behavior is normal.     EKG personally reviewed 08/11/22:  normal sinus rhythm, nonspecific ST and T waves changes, 1st degree AV block, IVCD. CATH SUMMARY 4/5/2021:  CORONARY ANATOMY:  LEFT MAIN:  It is chronically occluded. RCA:  It is a large, dominant artery giving rise to a conus branch, an RV marginal branch, then a bifurcating PLV branch. There was 60% to 70% proximal RCA stenosis. The RCA was heavily calcified in the mid  segment.   There was 95% to 99% [id number] stenosis with patent stent distally and MENDEL-2 flow to the PLV branch. LIMA to LAD was patent and was very angulated and tortuous at the mid segment. There was good distal runoff and good anastomosis. SVG to the large OM revealed 80% discrete proximal stenosis and 60%-70% eccentric discrete mid stenosis with MENDEL-3 flow distally. SVG to diagonal branch was patent. SVG to the PDA revealed 90% hazy proximal ring with MENDEL-3 flow distally. IMPRESSION:  1. Severe triple-vessel CAD including  of the left main and 95% mid RCA. 2.  Severe SVG to OM, status post successful PCI using two drug-eluting stent. 3.  Severe SVG to PDA, status post successful PCI using drug-eluting stent. 4.  Patent LIMA to LAD. 5.  Patent SVG to diagonal branch. RECOMMENDATIONS:  1. DAPT. 2.  Aggressive medical therapy. 3.  Control blood pressure. 4.  Staged PCI of the mid RCA stenosis using atherectomy and stenting. Stress 6/25/2021 with a moderate sized, moderate intensity reversible inferolateral defect. ASSESSMENT AND PLAN:  Patient Active Problem List   Diagnosis    Coronary atherosclerosis of native coronary artery    COPD (chronic obstructive pulmonary disease) (HCC)    DJD spine    Trace mitral regurgitation    Aortic regurgitation    HTN (hypertension)    Chest pain     1. CAD-CABG: CABG by Dr. Royal Beckwith (11/3/07). LIMA to the LAD. SVG to D2 SVG to OM1 SVG to PDA     Cath 9/22/2013 LM: 100% occlusion, RCA: Mid diffuse 30% stenosis. Dominant vessel. Distal stent widely patent. The PDA has an ostial 100% occlusion. RCA supplies collaterals to the circumflex territory. SVG to the diagonal:  Widely patent with good distal runoff. There was retrograde flow into the LAD, through an 80% diffuse stenosis. SVG to the PDA:  Widely patent with good distal runoff. SVG to the OM: Widely patent with good distal runoff. LIMA to the LAD:  Very tortuous. Widely patent with good distal runoff. After the anastomosis, the LAD is widely patent with good distal runoff.     Stable symptomatically. ASA/statin/BB/imdur/ranexa. 2. VHD: Mild to mod MR/mod AI. 3. HTN: Observe. 4. COPD    5. Lipids: Statin. 6. AAA: Per vascular. Aorto iliac stent [facility] 2016 Dr. Ashly Fernandez    7. TIA: ASA/statin. Philip Ronquillo D.O.   Cardiologist  Cardiology, [address] [facility]

## 2022-08-15 ENCOUNTER — TELEPHONE (OUTPATIENT)
Dept: CARDIOLOGY CLINIC | Age: 77
End: 2022-08-15

## 2022-08-15 NOTE — TELEPHONE ENCOUNTER
Patient needs a cardiac clearance for cataract surgery to be scheduled with , patient was seen in office last week, please advise

## 2022-08-15 NOTE — TELEPHONE ENCOUNTER
Patient is an acceptable risk to proceed with cataract surgery. Jarrell Fontaine D.O.   Cardiologist  Cardiology, [facility]

## 2023-02-08 ENCOUNTER — OFFICE VISIT (OUTPATIENT)
Dept: CARDIOLOGY CLINIC | Age: 78
End: 2023-02-08
Payer: COMMERCIAL

## 2023-02-08 VITALS
WEIGHT: 231 LBS | SYSTOLIC BLOOD PRESSURE: 138 MMHG | HEART RATE: 47 BPM | BODY MASS INDEX: 34.21 KG/M2 | HEIGHT: 69 IN | DIASTOLIC BLOOD PRESSURE: 62 MMHG | RESPIRATION RATE: 12 BRPM

## 2023-02-08 DIAGNOSIS — I25.10 ATHEROSCLEROSIS OF NATIVE CORONARY ARTERY OF NATIVE HEART WITHOUT ANGINA PECTORIS: Primary | ICD-10-CM

## 2023-02-08 PROCEDURE — 1036F TOBACCO NON-USER: CPT | Performed by: INTERNAL MEDICINE

## 2023-02-08 PROCEDURE — 3075F SYST BP GE 130 - 139MM HG: CPT | Performed by: INTERNAL MEDICINE

## 2023-02-08 PROCEDURE — 99214 OFFICE O/P EST MOD 30 MIN: CPT | Performed by: INTERNAL MEDICINE

## 2023-02-08 PROCEDURE — G8417 CALC BMI ABV UP PARAM F/U: HCPCS | Performed by: INTERNAL MEDICINE

## 2023-02-08 PROCEDURE — 3017F COLORECTAL CA SCREEN DOC REV: CPT | Performed by: INTERNAL MEDICINE

## 2023-02-08 PROCEDURE — G8427 DOCREV CUR MEDS BY ELIG CLIN: HCPCS | Performed by: INTERNAL MEDICINE

## 2023-02-08 PROCEDURE — 93000 ELECTROCARDIOGRAM COMPLETE: CPT | Performed by: INTERNAL MEDICINE

## 2023-02-08 PROCEDURE — 1123F ACP DISCUSS/DSCN MKR DOCD: CPT | Performed by: INTERNAL MEDICINE

## 2023-02-08 PROCEDURE — 3078F DIAST BP <80 MM HG: CPT | Performed by: INTERNAL MEDICINE

## 2023-02-08 PROCEDURE — G8484 FLU IMMUNIZE NO ADMIN: HCPCS | Performed by: INTERNAL MEDICINE

## 2023-02-08 RX ORDER — NITROGLYCERIN 0.4 MG/1
TABLET SUBLINGUAL
COMMUNITY
Start: 2022-11-27

## 2023-02-08 NOTE — PATIENT INSTRUCTIONS
Reduce metoprolol to 12.5 mg twice a day. Call if BP running consistently over 140 mmHg on top number.

## 2023-02-08 NOTE — PROGRESS NOTES
CHIEF COMPLAINT: CAD-CABG    HISTORY OF PRESENT ILLNESS: Patient is a 78 y.o. male seen at the request of Mehdi Gong MD.      No CP or SOB. Past Medical History:    1. Coronary atherosclerosis of native coronary artery  A. Acute myocardial infarction (11/2/07). Cardiac catheterization (11/2/07). Distal left main 60-70%. LAD proximal 50-60%, mid 80-95%. Circumflex mid to distal 95% Ramus proximal 40-50% Right coronary artery PDA mid 95%. B.  CABG by Dr. Portia Dodson (11/3/07). LIMA to the LAD. SVG to D2 SVG to OM1 SVG to PDA   C. Cardiac catheterization (7/22/08):  LAD 40%, mid 100%, RCA PDA occluded, distal 99% stenosis of bifurcation of PDA and posterolateral branch. SVG to PDA was patent but had no retrograde flow into the posterolateral branch. 4.0 x 16 mm Liberte stent to the distal right coronary artery to 0% residual stenosis. D. Cardiac catheterization, 8/22/08: 3.0 x 24 mm [place] drug eluting stent to the mid to distal circumflex to 0% residual stenosis. E.  Left heart cath September 22, 2013 EF low normal Left heart catheterization September 22, 2013, Dr. Rogerio Pritchett  1. Left main: 100% occlusion  2. Right coronary artery: Mid diffuse 30% stenosis. Dominant vessel. Distal stent widely patent. The PDA has an ostial 100% occlusion. The right coronary artery is supplying collaterals to the circumflex territory. 3.    SVG to the diagonal:  Widely patent with good distal runoff. There was retrograde flow into the LAD, through an 80% diffuse stenosis. 4.    SVG to the PDA:  Widely patent with good distal runoff. 5.    SVG to the OM: Widely patent with good distal runoff. 6.    Subclavian:  Tortuous. The left carotid has an ostial 40% stenosis. 7.    LIMA to the LAD:  Very tortuous. Widely patent with good distal runoff. After the anastomosis, the LAD is widely patent with good distal runoff  2. VHD ( AR and MR) Echo 2/22/2018 Dr. Rogerio Pritchett Moderately dilated left ventricle.   Normal left ventricle systolic function. Mild to moderate mitral regurgitation. Moderate aortic regurgitation. MADHURI March 5, 2018 torn chordae on mitral valve leaflet, EF 60% and mild mitral regurgitation and mild to moderate eccentric aortic regurgitation   3. HTN  4. COPD  5. DJD spine   6. HLD   7. AAA  A.  CT 3/4/2011 Ectatic but not aneurysmal ascending thoracic aorta measuring 38 mm in transverse dimension. Given the reproducibility of the technique this is essentially unchanged from the 40 mm measurement obtained on prior study of November 3, 2009. B. . MRA chest 9/320/2013  Aneurysm of the ascending thoracic aorta, the largest diameter is 3.8 cm. C.  Aorto iliac stent [facility] 2016 Dr. Yesica Sawyer  8. TIA   9. Admission August 25, 2020 for benign paroxysmal positional vertigo   10. Shoulder surgery  11. Hernia repair  12.  First degree AV block and nonspecific QRS widening     Past Medical History:   Diagnosis Date    AAA (abdominal aortic aneurysm)     4,5cm    Aortic regurgitation     mild to moderate    COPD (chronic obstructive pulmonary disease) (HCC)     Coronary atherosclerosis of native coronary artery     DJD (degenerative joint disease)     HTN (hypertension)     Hyperlipidemia     Mitral regurgitation     TIA (transient ischemic attack) 07/01/2013       Patient Active Problem List   Diagnosis    Coronary atherosclerosis of native coronary artery    COPD (chronic obstructive pulmonary disease) (HCC)    DJD spine    Trace mitral regurgitation    Aortic regurgitation    HTN (hypertension)    Chest pain       Allergies   Allergen Reactions    Ranexa [Ranolazine]      Shortness of breath, headache       Current Outpatient Medications   Medication Sig Dispense Refill    nitroGLYCERIN (NITROSTAT) 0.4 MG SL tablet DISSOLVE 1 TABLET UNDER THE TONGUE AS NEEDED      metoprolol tartrate (LOPRESSOR) 25 MG tablet Take 25 mg by mouth daily      isosorbide mononitrate (IMDUR) 30 MG extended release tablet Take 30 mg by mouth daily      rosuvastatin (CRESTOR) 40 MG tablet Take 40 mg by mouth daily      Docusate Sodium 100 MG TABS Take 100 mg by mouth as needed       lisinopril (PRINIVIL;ZESTRIL) 20 MG tablet Take 1 tablet by mouth 2 times daily 30 tablet 3    clopidogrel (PLAVIX) 75 MG tablet Take 1 tablet by mouth daily 30 tablet 3    pantoprazole (PROTONIX) 40 MG tablet Take 1 tablet by mouth every morning (before breakfast) 30 tablet 3    aspirin 81 MG tablet Take 81 mg by mouth daily. ranolazine (RANEXA) 500 MG extended release tablet Take 500 mg by mouth in the morning and at bedtime       No current facility-administered medications for this visit. Social History     Socioeconomic History    Marital status:      Spouse name: Not on file    Number of children: Not on file    Years of education: Not on file    Highest education level: Not on file   Occupational History    Not on file   Tobacco Use    Smoking status: Former     Packs/day: 1.00     Years: 40.00     Pack years: 40.00     Types: Cigarettes     Quit date: 2002     Years since quittin.2    Smokeless tobacco: Never   Vaping Use    Vaping Use: Never used   Substance and Sexual Activity    Alcohol use: Yes     Comment: occassionally    Drug use: No    Sexual activity: Not on file   Other Topics Concern    Not on file   Social History Narrative    Not on file     Social Determinants of Health     Financial Resource Strain: Not on file   Food Insecurity: Not on file   Transportation Needs: Not on file   Physical Activity: Not on file   Stress: Not on file   Social Connections: Not on file   Intimate Partner Violence: Not on file   Housing Stability: Not on file       Family History   Problem Relation Age of Onset    Cancer Mother     Cancer Father      Review of Systems:  Heart: as above   Lungs: as above   Eyes: denies changes in vision or discharge. Ears: denies changes in hearing or pain.    Nose: denies epistaxis or masses Throat: denies sore throat or trouble swallowing. Neuro: denies numbness, tingling, tremors. Skin: denies rashes or itching. : denies hematuria, dysuria   GI: denies vomiting, diarrhea   Psych: denies mood changed, anxiety, depression. All other systems negative. Physical Exam   BP (!) 148/62   Pulse (!) 47   Resp 12   Ht 5' 9\" (1.753 m)   Wt 231 lb (104.8 kg)   BMI 34.11 kg/m²   Constitutional: Oriented to person, place, and time. Well-developed and well-nourished. No distress. Head: Normocephalic and atraumatic. Eyes: EOM are normal. Pupils are equal, round, and reactive to light. Neck: Normal range of motion. Neck supple. No hepatojugular reflux and no JVD present. Carotid bruit is not present. No tracheal deviation present. No thyromegaly present. Cardiovascular: Normal rate, regular rhythm, normal heart sounds and intact distal pulses. Exam reveals no gallop and no friction rub. No murmur heard. Pulmonary/Chest: Effort normal and breath sounds normal. No respiratory distress. No wheezes. No rales. No tenderness. Abdominal: Soft. Bowel sounds are normal. No distension and no mass. No tenderness. No rebound and no guarding. Musculoskeletal: Normal range of motion. No edema and no tenderness. Lymphadenopathy:   No cervical adenopathy. No groin adenopathy. Neurological: Alert and oriented to person, place, and time. Skin: Skin is warm and dry. No rash noted. Not diaphoretic. No erythema. Psychiatric: Normal mood and affect. Behavior is normal.     EKG personally reviewed 02/08/23: sinus bradycardia, nonspecific ST and T waves changes, 1st degree AV block, IVCD. Cath 9/22/2013 LM: 100% occlusion, RCA: Mid diffuse 30% stenosis. Dominant vessel. Distal stent widely patent. The PDA has an ostial 100% occlusion. RCA supplies collaterals to the circumflex territory. SVG to the diagonal:  Widely patent with good distal runoff.   There was retrograde flow into the LAD, through an 80% diffuse stenosis. SVG to the PDA:  Widely patent with good distal runoff. SVG to the OM: Widely patent with good distal runoff. LIMA to the LAD:  Very tortuous. Widely patent with good distal runoff. After the anastomosis, the LAD is widely patent with good distal runoff. MADHURI Summary 3/6/2018:   Ejection fraction is visually estimated at 60%. Normal left ventricle systolic function. Mild mitral regurgitation is present. Mild to moderate eccentric aortic     CATH SUMMARY 4/5/2021:  CORONARY ANATOMY:  LEFT MAIN:  It is chronically occluded. RCA:  It is a large, dominant artery giving rise to a conus branch, an RV marginal branch, then a bifurcating PLV branch. There was 60% to 70% proximal RCA stenosis. The RCA was heavily calcified in the mid  segment. There was 95% to 99% abv-cs-hahrey stenosis with patent stent distally and MENDEL-2 flow to the PLV branch. LIMA to LAD was patent and was very angulated and tortuous at the mid segment. There was good distal runoff and good anastomosis. SVG to the large OM revealed 80% discrete proximal stenosis and 60%-70% eccentric discrete mid stenosis with MENDEL-3 flow distally. SVG to diagonal branch was patent. SVG to the PDA revealed 90% hazy proximal ring with MENDEL-3 flow distally. IMPRESSION:  1. Severe triple-vessel CAD including  of the left main and 95% mid RCA. 2.  Severe SVG to OM, status post successful PCI using two drug-eluting stent. 3.  Severe SVG to PDA, status post successful PCI using drug-eluting stent. 4.  Patent LIMA to LAD. 5.  Patent SVG to diagonal branch. RECOMMENDATIONS:  1. DAPT. 2.  Aggressive medical therapy. 3.  Control blood pressure. 4.  Staged PCI of the mid RCA stenosis using atherectomy and stenting. Stress 6/25/2021 with a moderate sized, moderate intensity reversible inferolateral defect.      ASSESSMENT AND PLAN:  Patient Active Problem List   Diagnosis    Coronary atherosclerosis of native coronary artery COPD (chronic obstructive pulmonary disease) (HCC)    DJD spine    Trace mitral regurgitation    Aortic regurgitation    HTN (hypertension)    Chest pain     1. CAD-CABG:     CABG by Dr. Carmen Harmon (11/3/07). FRANC[place] to the LAD. SVG to D2 SVG to OM1 SVG to PDA     Cath from 4/5/2021 as above. Stable symptomatically. ASA/statin/BB(reduce due to bradycardia)/imdur. 2. VHD: MADHURI 3/6/2018 with mild to mod AI/mild MR. Observe. 3. HTN: Observe. 4. COPD    5. Lipids: Statin. 6. AAA: Per vascular. Aorto-iliac stent [facility] 2016 Dr. Serge Brown    7. TIA: ASA/statin. Ventura Dang D.O.   Cardiologist  Cardiology, [facility]

## 2023-02-10 ENCOUNTER — TELEPHONE (OUTPATIENT)
Dept: CARDIOLOGY CLINIC | Age: 78
End: 2023-02-10

## 2023-02-10 NOTE — TELEPHONE ENCOUNTER
Patient was seen on 2/8 and metoprolol was reduced to 12.5mg BID, patient called today stating that he has been taking 12.5mg daily for years, please advise

## 2023-05-17 ENCOUNTER — APPOINTMENT (OUTPATIENT)
Dept: CT IMAGING | Age: 78
DRG: 660 | End: 2023-05-17
Payer: MEDICARE

## 2023-05-17 ENCOUNTER — HOSPITAL ENCOUNTER (INPATIENT)
Age: 78
LOS: 4 days | Discharge: HOME | DRG: 660 | End: 2023-05-23
Attending: STUDENT IN AN ORGANIZED HEALTH CARE EDUCATION/TRAINING PROGRAM | Admitting: FAMILY MEDICINE
Payer: MEDICARE

## 2023-05-17 DIAGNOSIS — R52 PAIN: ICD-10-CM

## 2023-05-17 DIAGNOSIS — N20.1 URETERAL STONE: ICD-10-CM

## 2023-05-17 DIAGNOSIS — N20.1 LEFT URETERAL STONE: ICD-10-CM

## 2023-05-17 DIAGNOSIS — R10.9 LEFT FLANK PAIN: ICD-10-CM

## 2023-05-17 DIAGNOSIS — R07.9 ACUTE CHEST PAIN: Primary | ICD-10-CM

## 2023-05-17 LAB
ALBUMIN SERPL-MCNC: 4.2 G/DL (ref 3.5–5.2)
ALP SERPL-CCNC: 77 U/L (ref 40–129)
ALT SERPL-CCNC: 10 U/L (ref 0–40)
ANION GAP SERPL CALCULATED.3IONS-SCNC: 11 MMOL/L (ref 7–16)
AST SERPL-CCNC: 11 U/L (ref 0–39)
BACTERIA URNS QL MICRO: ABNORMAL /HPF
BASOPHILS # BLD: 0.06 E9/L (ref 0–0.2)
BASOPHILS NFR BLD: 0.7 % (ref 0–2)
BILIRUB SERPL-MCNC: 0.3 MG/DL (ref 0–1.2)
BILIRUB UR QL STRIP: NEGATIVE
BNP BLD-MCNC: 137 PG/ML (ref 0–450)
BUN SERPL-MCNC: 13 MG/DL (ref 6–23)
CALCIUM SERPL-MCNC: 9.4 MG/DL (ref 8.6–10.2)
CHLORIDE SERPL-SCNC: 106 MMOL/L (ref 98–107)
CLARITY UR: CLEAR
CO2 SERPL-SCNC: 24 MMOL/L (ref 22–29)
COLOR UR: YELLOW
CREAT SERPL-MCNC: 1.1 MG/DL (ref 0.7–1.2)
EKG ATRIAL RATE: 56 BPM
EKG P AXIS: 67 DEGREES
EKG P-R INTERVAL: 282 MS
EKG Q-T INTERVAL: 448 MS
EKG QRS DURATION: 146 MS
EKG QTC CALCULATION (BAZETT): 432 MS
EKG R AXIS: -77 DEGREES
EKG T AXIS: 42 DEGREES
EKG VENTRICULAR RATE: 56 BPM
EOSINOPHIL # BLD: 0.16 E9/L (ref 0.05–0.5)
EOSINOPHIL NFR BLD: 1.8 % (ref 0–6)
EPI CELLS #/AREA URNS HPF: ABNORMAL /HPF
ERYTHROCYTE [DISTWIDTH] IN BLOOD BY AUTOMATED COUNT: 14.1 FL (ref 11.5–15)
GLUCOSE SERPL-MCNC: 152 MG/DL (ref 74–99)
GLUCOSE UR STRIP-MCNC: NEGATIVE MG/DL
HCT VFR BLD AUTO: 43.1 % (ref 37–54)
HGB BLD-MCNC: 14.4 G/DL (ref 12.5–16.5)
HGB UR QL STRIP: ABNORMAL
IMM GRANULOCYTES # BLD: 0.03 E9/L
IMM GRANULOCYTES NFR BLD: 0.3 % (ref 0–5)
KETONES UR STRIP-MCNC: NEGATIVE MG/DL
LACTATE BLDV-SCNC: 1.7 MMOL/L (ref 0.5–2.2)
LEUKOCYTE ESTERASE UR QL STRIP: NEGATIVE
LIPASE: 25 U/L (ref 13–60)
LYMPHOCYTES # BLD: 1.18 E9/L (ref 1.5–4)
LYMPHOCYTES NFR BLD: 13.2 % (ref 20–42)
MAGNESIUM SERPL-MCNC: 2 MG/DL (ref 1.6–2.6)
MCH RBC QN AUTO: 30.8 PG (ref 26–35)
MCHC RBC AUTO-ENTMCNC: 33.4 % (ref 32–34.5)
MCV RBC AUTO: 92.3 FL (ref 80–99.9)
MONOCYTES # BLD: 0.7 E9/L (ref 0.1–0.95)
MONOCYTES NFR BLD: 7.8 % (ref 2–12)
NEUTROPHILS # BLD: 6.81 E9/L (ref 1.8–7.3)
NEUTS SEG NFR BLD: 76.2 % (ref 43–80)
NITRITE UR QL STRIP: NEGATIVE
PH UR STRIP: 5 [PH] (ref 5–9)
PLATELET # BLD AUTO: 234 E9/L (ref 130–450)
PMV BLD AUTO: 10.1 FL (ref 7–12)
POTASSIUM SERPL-SCNC: 4 MMOL/L (ref 3.5–5)
PROT SERPL-MCNC: 7.4 G/DL (ref 6.4–8.3)
PROT UR STRIP-MCNC: NEGATIVE MG/DL
RBC # BLD AUTO: 4.67 E12/L (ref 3.8–5.8)
RBC #/AREA URNS HPF: >20 /HPF (ref 0–2)
SODIUM SERPL-SCNC: 141 MMOL/L (ref 132–146)
SP GR UR STRIP: <=1.005 (ref 1–1.03)
TROPONIN, HIGH SENSITIVITY: 9 NG/L (ref 0–11)
TROPONIN, HIGH SENSITIVITY: 9 NG/L (ref 0–11)
UROBILINOGEN UR STRIP-ACNC: 0.2 E.U./DL
WBC # BLD: 8.9 E9/L (ref 4.5–11.5)
WBC #/AREA URNS HPF: ABNORMAL /HPF (ref 0–5)

## 2023-05-17 PROCEDURE — 74174 CTA ABD&PLVS W/CONTRAST: CPT

## 2023-05-17 PROCEDURE — 96361 HYDRATE IV INFUSION ADD-ON: CPT

## 2023-05-17 PROCEDURE — G0378 HOSPITAL OBSERVATION PER HR: HCPCS

## 2023-05-17 PROCEDURE — 81001 URINALYSIS AUTO W/SCOPE: CPT

## 2023-05-17 PROCEDURE — 85025 COMPLETE CBC W/AUTO DIFF WBC: CPT

## 2023-05-17 PROCEDURE — 93010 ELECTROCARDIOGRAM REPORT: CPT | Performed by: INTERNAL MEDICINE

## 2023-05-17 PROCEDURE — 99285 EMERGENCY DEPT VISIT HI MDM: CPT

## 2023-05-17 PROCEDURE — 6370000000 HC RX 637 (ALT 250 FOR IP): Performed by: STUDENT IN AN ORGANIZED HEALTH CARE EDUCATION/TRAINING PROGRAM

## 2023-05-17 PROCEDURE — 6360000004 HC RX CONTRAST MEDICATION: Performed by: RADIOLOGY

## 2023-05-17 PROCEDURE — 84484 ASSAY OF TROPONIN QUANT: CPT

## 2023-05-17 PROCEDURE — 6370000000 HC RX 637 (ALT 250 FOR IP)

## 2023-05-17 PROCEDURE — 83690 ASSAY OF LIPASE: CPT

## 2023-05-17 PROCEDURE — 80053 COMPREHEN METABOLIC PANEL: CPT

## 2023-05-17 PROCEDURE — 36415 COLL VENOUS BLD VENIPUNCTURE: CPT

## 2023-05-17 PROCEDURE — 6360000002 HC RX W HCPCS: Performed by: INTERNAL MEDICINE

## 2023-05-17 PROCEDURE — 71275 CT ANGIOGRAPHY CHEST: CPT

## 2023-05-17 PROCEDURE — 83605 ASSAY OF LACTIC ACID: CPT

## 2023-05-17 PROCEDURE — 93005 ELECTROCARDIOGRAM TRACING: CPT | Performed by: STUDENT IN AN ORGANIZED HEALTH CARE EDUCATION/TRAINING PROGRAM

## 2023-05-17 PROCEDURE — 2580000003 HC RX 258: Performed by: STUDENT IN AN ORGANIZED HEALTH CARE EDUCATION/TRAINING PROGRAM

## 2023-05-17 PROCEDURE — 96375 TX/PRO/DX INJ NEW DRUG ADDON: CPT

## 2023-05-17 PROCEDURE — 83880 ASSAY OF NATRIURETIC PEPTIDE: CPT

## 2023-05-17 PROCEDURE — 83735 ASSAY OF MAGNESIUM: CPT

## 2023-05-17 PROCEDURE — 2580000003 HC RX 258: Performed by: INTERNAL MEDICINE

## 2023-05-17 PROCEDURE — 96374 THER/PROPH/DIAG INJ IV PUSH: CPT

## 2023-05-17 PROCEDURE — 6360000002 HC RX W HCPCS: Performed by: STUDENT IN AN ORGANIZED HEALTH CARE EDUCATION/TRAINING PROGRAM

## 2023-05-17 PROCEDURE — 96372 THER/PROPH/DIAG INJ SC/IM: CPT

## 2023-05-17 PROCEDURE — 6370000000 HC RX 637 (ALT 250 FOR IP): Performed by: INTERNAL MEDICINE

## 2023-05-17 RX ORDER — LISINOPRIL 20 MG/1
20 TABLET ORAL 2 TIMES DAILY
Status: DISCONTINUED | OUTPATIENT
Start: 2023-05-17 | End: 2023-05-23 | Stop reason: HOSPADM

## 2023-05-17 RX ORDER — SODIUM CHLORIDE 0.9 % (FLUSH) 0.9 %
10 SYRINGE (ML) INJECTION PRN
Status: DISCONTINUED | OUTPATIENT
Start: 2023-05-17 | End: 2023-05-23 | Stop reason: HOSPADM

## 2023-05-17 RX ORDER — ASPIRIN 81 MG/1
81 TABLET, CHEWABLE ORAL DAILY
Status: DISCONTINUED | OUTPATIENT
Start: 2023-05-18 | End: 2023-05-23 | Stop reason: HOSPADM

## 2023-05-17 RX ORDER — SENNA PLUS 8.6 MG/1
1 TABLET ORAL DAILY PRN
Status: DISCONTINUED | OUTPATIENT
Start: 2023-05-17 | End: 2023-05-23 | Stop reason: HOSPADM

## 2023-05-17 RX ORDER — ONDANSETRON 2 MG/ML
4 INJECTION INTRAMUSCULAR; INTRAVENOUS ONCE
Status: COMPLETED | OUTPATIENT
Start: 2023-05-17 | End: 2023-05-17

## 2023-05-17 RX ORDER — HYDROCODONE BITARTRATE AND ACETAMINOPHEN 5; 325 MG/1; MG/1
1 TABLET ORAL ONCE
Status: COMPLETED | OUTPATIENT
Start: 2023-05-17 | End: 2023-05-17

## 2023-05-17 RX ORDER — SODIUM CHLORIDE 9 MG/ML
INJECTION, SOLUTION INTRAVENOUS PRN
Status: DISCONTINUED | OUTPATIENT
Start: 2023-05-17 | End: 2023-05-23 | Stop reason: HOSPADM

## 2023-05-17 RX ORDER — ACETAMINOPHEN 325 MG/1
650 TABLET ORAL EVERY 6 HOURS PRN
Status: DISCONTINUED | OUTPATIENT
Start: 2023-05-17 | End: 2023-05-23 | Stop reason: HOSPADM

## 2023-05-17 RX ORDER — POTASSIUM CHLORIDE 20 MEQ/1
40 TABLET, EXTENDED RELEASE ORAL PRN
Status: DISCONTINUED | OUTPATIENT
Start: 2023-05-17 | End: 2023-05-23 | Stop reason: HOSPADM

## 2023-05-17 RX ORDER — SODIUM CHLORIDE 0.9 % (FLUSH) 0.9 %
10 SYRINGE (ML) INJECTION EVERY 12 HOURS SCHEDULED
Status: DISCONTINUED | OUTPATIENT
Start: 2023-05-17 | End: 2023-05-23 | Stop reason: HOSPADM

## 2023-05-17 RX ORDER — ASPIRIN 81 MG/1
243 TABLET, CHEWABLE ORAL ONCE
Status: COMPLETED | OUTPATIENT
Start: 2023-05-17 | End: 2023-05-17

## 2023-05-17 RX ORDER — ISOSORBIDE MONONITRATE 30 MG/1
30 TABLET, EXTENDED RELEASE ORAL DAILY
Status: DISCONTINUED | OUTPATIENT
Start: 2023-05-18 | End: 2023-05-19

## 2023-05-17 RX ORDER — ACETAMINOPHEN 650 MG/1
650 SUPPOSITORY RECTAL EVERY 6 HOURS PRN
Status: DISCONTINUED | OUTPATIENT
Start: 2023-05-17 | End: 2023-05-23 | Stop reason: HOSPADM

## 2023-05-17 RX ORDER — POTASSIUM CHLORIDE 7.45 MG/ML
10 INJECTION INTRAVENOUS PRN
Status: DISCONTINUED | OUTPATIENT
Start: 2023-05-17 | End: 2023-05-23 | Stop reason: HOSPADM

## 2023-05-17 RX ORDER — NITROGLYCERIN 0.4 MG/1
0.4 TABLET SUBLINGUAL ONCE
Status: COMPLETED | OUTPATIENT
Start: 2023-05-17 | End: 2023-05-17

## 2023-05-17 RX ORDER — PANTOPRAZOLE SODIUM 40 MG/1
40 TABLET, DELAYED RELEASE ORAL
Status: DISCONTINUED | OUTPATIENT
Start: 2023-05-18 | End: 2023-05-23 | Stop reason: HOSPADM

## 2023-05-17 RX ORDER — TAMSULOSIN HYDROCHLORIDE 0.4 MG/1
0.4 CAPSULE ORAL DAILY
Status: DISCONTINUED | OUTPATIENT
Start: 2023-05-17 | End: 2023-05-23 | Stop reason: HOSPADM

## 2023-05-17 RX ORDER — CLOPIDOGREL BISULFATE 75 MG/1
75 TABLET ORAL DAILY
Status: DISCONTINUED | OUTPATIENT
Start: 2023-05-18 | End: 2023-05-23 | Stop reason: HOSPADM

## 2023-05-17 RX ORDER — MORPHINE SULFATE 4 MG/ML
4 INJECTION, SOLUTION INTRAMUSCULAR; INTRAVENOUS ONCE
Status: COMPLETED | OUTPATIENT
Start: 2023-05-17 | End: 2023-05-17

## 2023-05-17 RX ORDER — ASPIRIN 81 MG/1
TABLET, CHEWABLE ORAL
Status: COMPLETED
Start: 2023-05-17 | End: 2023-05-17

## 2023-05-17 RX ORDER — 0.9 % SODIUM CHLORIDE 0.9 %
1000 INTRAVENOUS SOLUTION INTRAVENOUS ONCE
Status: COMPLETED | OUTPATIENT
Start: 2023-05-17 | End: 2023-05-17

## 2023-05-17 RX ORDER — ROSUVASTATIN CALCIUM 20 MG/1
40 TABLET, COATED ORAL DAILY
Status: DISCONTINUED | OUTPATIENT
Start: 2023-05-17 | End: 2023-05-23 | Stop reason: HOSPADM

## 2023-05-17 RX ORDER — FENTANYL CITRATE 50 UG/ML
50 INJECTION, SOLUTION INTRAMUSCULAR; INTRAVENOUS ONCE
Status: COMPLETED | OUTPATIENT
Start: 2023-05-17 | End: 2023-05-17

## 2023-05-17 RX ORDER — ENOXAPARIN SODIUM 100 MG/ML
30 INJECTION SUBCUTANEOUS 2 TIMES DAILY
Status: DISCONTINUED | OUTPATIENT
Start: 2023-05-17 | End: 2023-05-23 | Stop reason: HOSPADM

## 2023-05-17 RX ADMIN — SODIUM CHLORIDE, PRESERVATIVE FREE 10 ML: 5 INJECTION INTRAVENOUS at 23:28

## 2023-05-17 RX ADMIN — SODIUM CHLORIDE 1000 ML: 9 INJECTION, SOLUTION INTRAVENOUS at 11:32

## 2023-05-17 RX ADMIN — ROSUVASTATIN CALCIUM 40 MG: 20 TABLET, FILM COATED ORAL at 20:21

## 2023-05-17 RX ADMIN — TAMSULOSIN HYDROCHLORIDE 0.4 MG: 0.4 CAPSULE ORAL at 16:43

## 2023-05-17 RX ADMIN — NITROGLYCERIN 0.4 MG: 0.4 TABLET, ORALLY DISINTEGRATING SUBLINGUAL at 13:11

## 2023-05-17 RX ADMIN — LISINOPRIL 20 MG: 20 TABLET ORAL at 20:21

## 2023-05-17 RX ADMIN — ASPIRIN 81 MG CHEWABLE TABLET 81 MG: 81 TABLET CHEWABLE at 18:05

## 2023-05-17 RX ADMIN — ENOXAPARIN SODIUM 30 MG: 100 INJECTION SUBCUTANEOUS at 23:28

## 2023-05-17 RX ADMIN — IOPAMIDOL 100 ML: 755 INJECTION, SOLUTION INTRAVENOUS at 12:33

## 2023-05-17 RX ADMIN — ASPIRIN 81 MG 243 MG: 81 TABLET ORAL at 13:10

## 2023-05-17 RX ADMIN — ONDANSETRON 4 MG: 2 INJECTION INTRAMUSCULAR; INTRAVENOUS at 11:32

## 2023-05-17 RX ADMIN — HYDROCODONE BITARTRATE AND ACETAMINOPHEN 1 TABLET: 5; 325 TABLET ORAL at 16:43

## 2023-05-17 RX ADMIN — FENTANYL CITRATE 50 MCG: 50 INJECTION, SOLUTION INTRAMUSCULAR; INTRAVENOUS at 12:38

## 2023-05-17 RX ADMIN — MORPHINE SULFATE 4 MG: 4 INJECTION, SOLUTION INTRAMUSCULAR; INTRAVENOUS at 18:14

## 2023-05-17 ASSESSMENT — PAIN SCALES - GENERAL
PAINLEVEL_OUTOF10: 7
PAINLEVEL_OUTOF10: 8
PAINLEVEL_OUTOF10: 3
PAINLEVEL_OUTOF10: 10
PAINLEVEL_OUTOF10: 0
PAINLEVEL_OUTOF10: 5
PAINLEVEL_OUTOF10: 6

## 2023-05-17 ASSESSMENT — PAIN DESCRIPTION - LOCATION: LOCATION: FLANK

## 2023-05-17 ASSESSMENT — PAIN DESCRIPTION - PAIN TYPE: TYPE: ACUTE PAIN

## 2023-05-17 ASSESSMENT — PAIN DESCRIPTION - DESCRIPTORS: DESCRIPTORS: PATIENT UNABLE TO DESCRIBE

## 2023-05-17 ASSESSMENT — ENCOUNTER SYMPTOMS
ABDOMINAL PAIN: 0
DIARRHEA: 0
SHORTNESS OF BREATH: 0
CONSTIPATION: 0
COUGH: 0
NAUSEA: 1
VOMITING: 0

## 2023-05-17 ASSESSMENT — PAIN - FUNCTIONAL ASSESSMENT: PAIN_FUNCTIONAL_ASSESSMENT: 0-10

## 2023-05-17 ASSESSMENT — PAIN DESCRIPTION - FREQUENCY: FREQUENCY: CONTINUOUS

## 2023-05-17 ASSESSMENT — PAIN DESCRIPTION - ONSET: ONSET: SUDDEN

## 2023-05-18 ENCOUNTER — ANESTHESIA EVENT (OUTPATIENT)
Dept: ONCOLOGY | Age: 78
DRG: 660 | End: 2023-05-18
Payer: MEDICARE

## 2023-05-18 ENCOUNTER — ANESTHESIA (OUTPATIENT)
Dept: ONCOLOGY | Age: 78
DRG: 660 | End: 2023-05-18
Payer: MEDICARE

## 2023-05-18 LAB
ALBUMIN SERPL-MCNC: 3.6 G/DL (ref 3.5–5.2)
ALP SERPL-CCNC: 65 U/L (ref 40–129)
ALT SERPL-CCNC: 9 U/L (ref 0–40)
ANION GAP SERPL CALCULATED.3IONS-SCNC: 14 MMOL/L (ref 7–16)
AST SERPL-CCNC: 13 U/L (ref 0–39)
BASOPHILS # BLD: 0.06 E9/L (ref 0–0.2)
BASOPHILS NFR BLD: 0.7 % (ref 0–2)
BILIRUB SERPL-MCNC: 0.5 MG/DL (ref 0–1.2)
BUN SERPL-MCNC: 13 MG/DL (ref 6–23)
CALCIUM SERPL-MCNC: 8.6 MG/DL (ref 8.6–10.2)
CHLORIDE SERPL-SCNC: 104 MMOL/L (ref 98–107)
CHOLESTEROL, TOTAL: 100 MG/DL (ref 0–199)
CO2 SERPL-SCNC: 21 MMOL/L (ref 22–29)
CREAT SERPL-MCNC: 1 MG/DL (ref 0.7–1.2)
EOSINOPHIL # BLD: 0.14 E9/L (ref 0.05–0.5)
EOSINOPHIL NFR BLD: 1.5 % (ref 0–6)
ERYTHROCYTE [DISTWIDTH] IN BLOOD BY AUTOMATED COUNT: 14 FL (ref 11.5–15)
GLUCOSE SERPL-MCNC: 115 MG/DL (ref 74–99)
HBA1C MFR BLD: 6 % (ref 4–5.6)
HCT VFR BLD AUTO: 42.5 % (ref 37–54)
HDLC SERPL-MCNC: 30 MG/DL
HGB BLD-MCNC: 13.9 G/DL (ref 12.5–16.5)
IMM GRANULOCYTES # BLD: 0.05 E9/L
IMM GRANULOCYTES NFR BLD: 0.6 % (ref 0–5)
LDLC SERPL CALC-MCNC: 44 MG/DL (ref 0–99)
LYMPHOCYTES # BLD: 1.16 E9/L (ref 1.5–4)
LYMPHOCYTES NFR BLD: 12.8 % (ref 20–42)
MCH RBC QN AUTO: 30.8 PG (ref 26–35)
MCHC RBC AUTO-ENTMCNC: 32.7 % (ref 32–34.5)
MCV RBC AUTO: 94.2 FL (ref 80–99.9)
MONOCYTES # BLD: 0.88 E9/L (ref 0.1–0.95)
MONOCYTES NFR BLD: 9.7 % (ref 2–12)
NEUTROPHILS # BLD: 6.76 E9/L (ref 1.8–7.3)
NEUTS SEG NFR BLD: 74.7 % (ref 43–80)
PLATELET # BLD AUTO: 192 E9/L (ref 130–450)
PMV BLD AUTO: 10.4 FL (ref 7–12)
POTASSIUM SERPL-SCNC: 3.8 MMOL/L (ref 3.5–5)
PROT SERPL-MCNC: 6.6 G/DL (ref 6.4–8.3)
RBC # BLD AUTO: 4.51 E12/L (ref 3.8–5.8)
SODIUM SERPL-SCNC: 139 MMOL/L (ref 132–146)
TRIGL SERPL-MCNC: 132 MG/DL (ref 0–149)
VLDLC SERPL CALC-MCNC: 26 MG/DL
WBC # BLD: 9.1 E9/L (ref 4.5–11.5)

## 2023-05-18 PROCEDURE — 85025 COMPLETE CBC W/AUTO DIFF WBC: CPT

## 2023-05-18 PROCEDURE — 80061 LIPID PANEL: CPT

## 2023-05-18 PROCEDURE — 36415 COLL VENOUS BLD VENIPUNCTURE: CPT

## 2023-05-18 PROCEDURE — 6370000000 HC RX 637 (ALT 250 FOR IP): Performed by: INTERNAL MEDICINE

## 2023-05-18 PROCEDURE — 2580000003 HC RX 258: Performed by: INTERNAL MEDICINE

## 2023-05-18 PROCEDURE — 80053 COMPREHEN METABOLIC PANEL: CPT

## 2023-05-18 PROCEDURE — 83036 HEMOGLOBIN GLYCOSYLATED A1C: CPT

## 2023-05-18 PROCEDURE — 6360000002 HC RX W HCPCS: Performed by: INTERNAL MEDICINE

## 2023-05-18 PROCEDURE — 2500000003 HC RX 250 WO HCPCS: Performed by: INTERNAL MEDICINE

## 2023-05-18 PROCEDURE — 6370000000 HC RX 637 (ALT 250 FOR IP): Performed by: STUDENT IN AN ORGANIZED HEALTH CARE EDUCATION/TRAINING PROGRAM

## 2023-05-18 PROCEDURE — 99222 1ST HOSP IP/OBS MODERATE 55: CPT | Performed by: INTERNAL MEDICINE

## 2023-05-18 PROCEDURE — 96376 TX/PRO/DX INJ SAME DRUG ADON: CPT

## 2023-05-18 PROCEDURE — APPSS60 APP SPLIT SHARED TIME 46-60 MINUTES: Performed by: NURSE PRACTITIONER

## 2023-05-18 PROCEDURE — G0378 HOSPITAL OBSERVATION PER HR: HCPCS

## 2023-05-18 PROCEDURE — 96372 THER/PROPH/DIAG INJ SC/IM: CPT

## 2023-05-18 PROCEDURE — 96375 TX/PRO/DX INJ NEW DRUG ADDON: CPT

## 2023-05-18 RX ORDER — SODIUM CHLORIDE 9 MG/ML
INJECTION, SOLUTION INTRAVENOUS CONTINUOUS
Status: DISCONTINUED | OUTPATIENT
Start: 2023-05-18 | End: 2023-05-23 | Stop reason: HOSPADM

## 2023-05-18 RX ORDER — MORPHINE SULFATE 4 MG/ML
4 INJECTION, SOLUTION INTRAMUSCULAR; INTRAVENOUS EVERY 4 HOURS PRN
Status: DISCONTINUED | OUTPATIENT
Start: 2023-05-18 | End: 2023-05-23 | Stop reason: HOSPADM

## 2023-05-18 RX ORDER — HYDROMORPHONE HYDROCHLORIDE 1 MG/ML
0.5 INJECTION, SOLUTION INTRAMUSCULAR; INTRAVENOUS; SUBCUTANEOUS ONCE
Status: COMPLETED | OUTPATIENT
Start: 2023-05-18 | End: 2023-05-18

## 2023-05-18 RX ORDER — MORPHINE SULFATE 2 MG/ML
1 INJECTION, SOLUTION INTRAMUSCULAR; INTRAVENOUS EVERY 4 HOURS PRN
Status: DISCONTINUED | OUTPATIENT
Start: 2023-05-18 | End: 2023-05-18

## 2023-05-18 RX ADMIN — ENOXAPARIN SODIUM 30 MG: 100 INJECTION SUBCUTANEOUS at 08:56

## 2023-05-18 RX ADMIN — SODIUM CHLORIDE, PRESERVATIVE FREE 10 ML: 5 INJECTION INTRAVENOUS at 20:27

## 2023-05-18 RX ADMIN — MORPHINE SULFATE 1 MG: 2 INJECTION, SOLUTION INTRAMUSCULAR; INTRAVENOUS at 03:31

## 2023-05-18 RX ADMIN — TAMSULOSIN HYDROCHLORIDE 0.4 MG: 0.4 CAPSULE ORAL at 08:56

## 2023-05-18 RX ADMIN — PANTOPRAZOLE SODIUM 40 MG: 40 TABLET, DELAYED RELEASE ORAL at 05:48

## 2023-05-18 RX ADMIN — MORPHINE SULFATE 4 MG: 4 INJECTION, SOLUTION INTRAMUSCULAR; INTRAVENOUS at 20:34

## 2023-05-18 RX ADMIN — LISINOPRIL 20 MG: 20 TABLET ORAL at 08:56

## 2023-05-18 RX ADMIN — ISOSORBIDE MONONITRATE 30 MG: 30 TABLET, EXTENDED RELEASE ORAL at 08:56

## 2023-05-18 RX ADMIN — ENOXAPARIN SODIUM 30 MG: 100 INJECTION SUBCUTANEOUS at 20:27

## 2023-05-18 RX ADMIN — SODIUM CHLORIDE: 9 INJECTION, SOLUTION INTRAVENOUS at 20:38

## 2023-05-18 RX ADMIN — METOPROLOL TARTRATE 12.5 MG: 25 TABLET, FILM COATED ORAL at 08:56

## 2023-05-18 RX ADMIN — MORPHINE SULFATE 4 MG: 4 INJECTION, SOLUTION INTRAMUSCULAR; INTRAVENOUS at 16:25

## 2023-05-18 RX ADMIN — ASPIRIN 81 MG CHEWABLE TABLET 81 MG: 81 TABLET CHEWABLE at 08:55

## 2023-05-18 RX ADMIN — SODIUM CHLORIDE: 9 INJECTION, SOLUTION INTRAVENOUS at 04:43

## 2023-05-18 RX ADMIN — HYDROMORPHONE HYDROCHLORIDE 0.5 MG: 1 INJECTION, SOLUTION INTRAMUSCULAR; INTRAVENOUS; SUBCUTANEOUS at 22:45

## 2023-05-18 RX ADMIN — CLOPIDOGREL BISULFATE 75 MG: 75 TABLET ORAL at 08:56

## 2023-05-18 RX ADMIN — MORPHINE SULFATE 4 MG: 4 INJECTION, SOLUTION INTRAMUSCULAR; INTRAVENOUS at 04:52

## 2023-05-18 RX ADMIN — LISINOPRIL 20 MG: 20 TABLET ORAL at 20:25

## 2023-05-18 RX ADMIN — ROSUVASTATIN CALCIUM 40 MG: 20 TABLET, FILM COATED ORAL at 20:25

## 2023-05-18 ASSESSMENT — ENCOUNTER SYMPTOMS
BACK PAIN: 1
CHEST TIGHTNESS: 0
NAUSEA: 0
DIARRHEA: 0
SHORTNESS OF BREATH: 0
WHEEZING: 0
ABDOMINAL PAIN: 0
PHOTOPHOBIA: 0
CONSTIPATION: 0
VOMITING: 0
BLOOD IN STOOL: 0
COUGH: 0

## 2023-05-18 ASSESSMENT — PAIN DESCRIPTION - ORIENTATION
ORIENTATION: LEFT
ORIENTATION: RIGHT

## 2023-05-18 ASSESSMENT — PAIN - FUNCTIONAL ASSESSMENT
PAIN_FUNCTIONAL_ASSESSMENT: PREVENTS OR INTERFERES SOME ACTIVE ACTIVITIES AND ADLS
PAIN_FUNCTIONAL_ASSESSMENT: PREVENTS OR INTERFERES SOME ACTIVE ACTIVITIES AND ADLS

## 2023-05-18 ASSESSMENT — PAIN DESCRIPTION - DESCRIPTORS
DESCRIPTORS: SHARP
DESCRIPTORS: SHARP;STABBING
DESCRIPTORS: BURNING;DISCOMFORT
DESCRIPTORS: BURNING;DISCOMFORT;SHARP;SORE

## 2023-05-18 ASSESSMENT — PAIN DESCRIPTION - LOCATION
LOCATION: BACK
LOCATION: FLANK

## 2023-05-18 ASSESSMENT — PAIN SCALES - GENERAL
PAINLEVEL_OUTOF10: 9
PAINLEVEL_OUTOF10: 8
PAINLEVEL_OUTOF10: 8
PAINLEVEL_OUTOF10: 2
PAINLEVEL_OUTOF10: 10
PAINLEVEL_OUTOF10: 9
PAINLEVEL_OUTOF10: 5

## 2023-05-18 ASSESSMENT — PAIN DESCRIPTION - PAIN TYPE: TYPE: ACUTE PAIN

## 2023-05-18 ASSESSMENT — PAIN DESCRIPTION - ONSET: ONSET: SUDDEN

## 2023-05-18 ASSESSMENT — PAIN SCALES - WONG BAKER: WONGBAKER_NUMERICALRESPONSE: 0

## 2023-05-18 ASSESSMENT — PAIN DESCRIPTION - FREQUENCY: FREQUENCY: CONTINUOUS

## 2023-05-19 ENCOUNTER — APPOINTMENT (OUTPATIENT)
Dept: NON INVASIVE DIAGNOSTICS | Age: 78
DRG: 660 | End: 2023-05-19
Payer: MEDICARE

## 2023-05-19 ENCOUNTER — APPOINTMENT (OUTPATIENT)
Dept: NUCLEAR MEDICINE | Age: 78
DRG: 660 | End: 2023-05-19
Payer: MEDICARE

## 2023-05-19 PROBLEM — R07.9 CHEST PAIN: Status: ACTIVE | Noted: 2023-05-19

## 2023-05-19 LAB
ALBUMIN SERPL-MCNC: 3.6 G/DL (ref 3.5–5.2)
ALP SERPL-CCNC: 68 U/L (ref 40–129)
ALT SERPL-CCNC: 8 U/L (ref 0–40)
ANION GAP SERPL CALCULATED.3IONS-SCNC: 15 MMOL/L (ref 7–16)
AST SERPL-CCNC: 12 U/L (ref 0–39)
BASOPHILS # BLD: 0.03 E9/L (ref 0–0.2)
BASOPHILS NFR BLD: 0.3 % (ref 0–2)
BILIRUB SERPL-MCNC: 0.4 MG/DL (ref 0–1.2)
BUN SERPL-MCNC: 14 MG/DL (ref 6–23)
CALCIUM SERPL-MCNC: 8.5 MG/DL (ref 8.6–10.2)
CHLORIDE SERPL-SCNC: 106 MMOL/L (ref 98–107)
CO2 SERPL-SCNC: 19 MMOL/L (ref 22–29)
CREAT SERPL-MCNC: 1.3 MG/DL (ref 0.7–1.2)
EOSINOPHIL # BLD: 0.11 E9/L (ref 0.05–0.5)
EOSINOPHIL NFR BLD: 1 % (ref 0–6)
ERYTHROCYTE [DISTWIDTH] IN BLOOD BY AUTOMATED COUNT: 13.8 FL (ref 11.5–15)
GLUCOSE SERPL-MCNC: 140 MG/DL (ref 74–99)
HCT VFR BLD AUTO: 41.7 % (ref 37–54)
HGB BLD-MCNC: 13.2 G/DL (ref 12.5–16.5)
IMM GRANULOCYTES # BLD: 0.04 E9/L
IMM GRANULOCYTES NFR BLD: 0.4 % (ref 0–5)
LV EF: 56 %
LV EF: 58 %
LVEF MODALITY: NORMAL
LVEF MODALITY: NORMAL
LYMPHOCYTES # BLD: 0.97 E9/L (ref 1.5–4)
LYMPHOCYTES NFR BLD: 8.6 % (ref 20–42)
MCH RBC QN AUTO: 30.1 PG (ref 26–35)
MCHC RBC AUTO-ENTMCNC: 31.7 % (ref 32–34.5)
MCV RBC AUTO: 95 FL (ref 80–99.9)
MONOCYTES # BLD: 1.03 E9/L (ref 0.1–0.95)
MONOCYTES NFR BLD: 9.2 % (ref 2–12)
NEUTROPHILS # BLD: 9.06 E9/L (ref 1.8–7.3)
NEUTS SEG NFR BLD: 80.5 % (ref 43–80)
PLATELET # BLD AUTO: 190 E9/L (ref 130–450)
PMV BLD AUTO: 10.4 FL (ref 7–12)
POTASSIUM SERPL-SCNC: 4.1 MMOL/L (ref 3.5–5)
PROT SERPL-MCNC: 6.9 G/DL (ref 6.4–8.3)
RBC # BLD AUTO: 4.39 E12/L (ref 3.8–5.8)
SODIUM SERPL-SCNC: 140 MMOL/L (ref 132–146)
TSH SERPL-MCNC: 3.32 UIU/ML (ref 0.27–4.2)
WBC # BLD: 11.2 E9/L (ref 4.5–11.5)

## 2023-05-19 PROCEDURE — 96376 TX/PRO/DX INJ SAME DRUG ADON: CPT

## 2023-05-19 PROCEDURE — 93306 TTE W/DOPPLER COMPLETE: CPT

## 2023-05-19 PROCEDURE — 6370000000 HC RX 637 (ALT 250 FOR IP): Performed by: INTERNAL MEDICINE

## 2023-05-19 PROCEDURE — 6360000002 HC RX W HCPCS: Performed by: NURSE PRACTITIONER

## 2023-05-19 PROCEDURE — 2500000003 HC RX 250 WO HCPCS: Performed by: INTERNAL MEDICINE

## 2023-05-19 PROCEDURE — 78452 HT MUSCLE IMAGE SPECT MULT: CPT

## 2023-05-19 PROCEDURE — 93017 CV STRESS TEST TRACING ONLY: CPT

## 2023-05-19 PROCEDURE — 6360000002 HC RX W HCPCS: Performed by: INTERNAL MEDICINE

## 2023-05-19 PROCEDURE — 6370000000 HC RX 637 (ALT 250 FOR IP): Performed by: NURSE PRACTITIONER

## 2023-05-19 PROCEDURE — 3430000000 HC RX DIAGNOSTIC RADIOPHARMACEUTICAL: Performed by: RADIOLOGY

## 2023-05-19 PROCEDURE — 85025 COMPLETE CBC W/AUTO DIFF WBC: CPT

## 2023-05-19 PROCEDURE — 2580000003 HC RX 258: Performed by: INTERNAL MEDICINE

## 2023-05-19 PROCEDURE — 99233 SBSQ HOSP IP/OBS HIGH 50: CPT | Performed by: INTERNAL MEDICINE

## 2023-05-19 PROCEDURE — 36415 COLL VENOUS BLD VENIPUNCTURE: CPT

## 2023-05-19 PROCEDURE — 80053 COMPREHEN METABOLIC PANEL: CPT

## 2023-05-19 PROCEDURE — 78452 HT MUSCLE IMAGE SPECT MULT: CPT | Performed by: INTERNAL MEDICINE

## 2023-05-19 PROCEDURE — A9500 TC99M SESTAMIBI: HCPCS | Performed by: RADIOLOGY

## 2023-05-19 PROCEDURE — 84443 ASSAY THYROID STIM HORMONE: CPT

## 2023-05-19 PROCEDURE — 6370000000 HC RX 637 (ALT 250 FOR IP): Performed by: STUDENT IN AN ORGANIZED HEALTH CARE EDUCATION/TRAINING PROGRAM

## 2023-05-19 PROCEDURE — 1200000000 HC SEMI PRIVATE

## 2023-05-19 PROCEDURE — 93018 CV STRESS TEST I&R ONLY: CPT | Performed by: INTERNAL MEDICINE

## 2023-05-19 PROCEDURE — 96372 THER/PROPH/DIAG INJ SC/IM: CPT

## 2023-05-19 PROCEDURE — 93016 CV STRESS TEST SUPVJ ONLY: CPT | Performed by: INTERNAL MEDICINE

## 2023-05-19 RX ORDER — HYDROMORPHONE HYDROCHLORIDE 1 MG/ML
0.5 INJECTION, SOLUTION INTRAMUSCULAR; INTRAVENOUS; SUBCUTANEOUS ONCE
Status: COMPLETED | OUTPATIENT
Start: 2023-05-19 | End: 2023-05-19

## 2023-05-19 RX ORDER — OXYCODONE HYDROCHLORIDE AND ACETAMINOPHEN 5; 325 MG/1; MG/1
1 TABLET ORAL EVERY 6 HOURS PRN
Status: DISCONTINUED | OUTPATIENT
Start: 2023-05-19 | End: 2023-05-23 | Stop reason: HOSPADM

## 2023-05-19 RX ORDER — ISOSORBIDE MONONITRATE 30 MG/1
60 TABLET, EXTENDED RELEASE ORAL DAILY
Status: DISCONTINUED | OUTPATIENT
Start: 2023-05-20 | End: 2023-05-23 | Stop reason: HOSPADM

## 2023-05-19 RX ORDER — TETRAKIS(2-METHOXYISOBUTYLISOCYANIDE)COPPER(I) TETRAFLUOROBORATE 1 MG/ML
30 INJECTION, POWDER, LYOPHILIZED, FOR SOLUTION INTRAVENOUS
Status: COMPLETED | OUTPATIENT
Start: 2023-05-19 | End: 2023-05-19

## 2023-05-19 RX ORDER — AMINOPHYLLINE DIHYDRATE 25 MG/ML
50 INJECTION, SOLUTION INTRAVENOUS ONCE
Status: COMPLETED | OUTPATIENT
Start: 2023-05-19 | End: 2023-05-19

## 2023-05-19 RX ORDER — HYDROMORPHONE HYDROCHLORIDE 1 MG/ML
1 INJECTION, SOLUTION INTRAMUSCULAR; INTRAVENOUS; SUBCUTANEOUS
Status: DISCONTINUED | OUTPATIENT
Start: 2023-05-19 | End: 2023-05-23 | Stop reason: HOSPADM

## 2023-05-19 RX ORDER — ONDANSETRON 2 MG/ML
4 INJECTION INTRAMUSCULAR; INTRAVENOUS ONCE
Status: COMPLETED | OUTPATIENT
Start: 2023-05-19 | End: 2023-05-19

## 2023-05-19 RX ORDER — TETRAKIS(2-METHOXYISOBUTYLISOCYANIDE)COPPER(I) TETRAFLUOROBORATE 1 MG/ML
11 INJECTION, POWDER, LYOPHILIZED, FOR SOLUTION INTRAVENOUS
Status: COMPLETED | OUTPATIENT
Start: 2023-05-19 | End: 2023-05-19

## 2023-05-19 RX ADMIN — CLOPIDOGREL BISULFATE 75 MG: 75 TABLET ORAL at 08:26

## 2023-05-19 RX ADMIN — PANTOPRAZOLE SODIUM 40 MG: 40 TABLET, DELAYED RELEASE ORAL at 05:18

## 2023-05-19 RX ADMIN — HYDROMORPHONE HYDROCHLORIDE 1 MG: 1 INJECTION, SOLUTION INTRAMUSCULAR; INTRAVENOUS; SUBCUTANEOUS at 10:29

## 2023-05-19 RX ADMIN — ASPIRIN 81 MG CHEWABLE TABLET 81 MG: 81 TABLET CHEWABLE at 08:25

## 2023-05-19 RX ADMIN — LISINOPRIL 20 MG: 20 TABLET ORAL at 08:25

## 2023-05-19 RX ADMIN — Medication 35 MILLICURIE: at 13:17

## 2023-05-19 RX ADMIN — REGADENOSON 0.4 MG: 0.08 INJECTION, SOLUTION INTRAVENOUS at 12:35

## 2023-05-19 RX ADMIN — ISOSORBIDE MONONITRATE 30 MG: 30 TABLET, EXTENDED RELEASE ORAL at 08:26

## 2023-05-19 RX ADMIN — MORPHINE SULFATE 4 MG: 4 INJECTION, SOLUTION INTRAMUSCULAR; INTRAVENOUS at 02:35

## 2023-05-19 RX ADMIN — MORPHINE SULFATE 4 MG: 4 INJECTION, SOLUTION INTRAMUSCULAR; INTRAVENOUS at 21:11

## 2023-05-19 RX ADMIN — HYDROMORPHONE HYDROCHLORIDE 0.5 MG: 1 INJECTION, SOLUTION INTRAMUSCULAR; INTRAVENOUS; SUBCUTANEOUS at 05:18

## 2023-05-19 RX ADMIN — ROSUVASTATIN CALCIUM 40 MG: 20 TABLET, FILM COATED ORAL at 20:57

## 2023-05-19 RX ADMIN — MORPHINE SULFATE 4 MG: 4 INJECTION, SOLUTION INTRAMUSCULAR; INTRAVENOUS at 06:41

## 2023-05-19 RX ADMIN — SODIUM CHLORIDE, PRESERVATIVE FREE 10 ML: 5 INJECTION INTRAVENOUS at 08:27

## 2023-05-19 RX ADMIN — MORPHINE SULFATE 4 MG: 4 INJECTION, SOLUTION INTRAMUSCULAR; INTRAVENOUS at 15:08

## 2023-05-19 RX ADMIN — ENOXAPARIN SODIUM 30 MG: 100 INJECTION SUBCUTANEOUS at 08:25

## 2023-05-19 RX ADMIN — ONDANSETRON 4 MG: 2 INJECTION INTRAMUSCULAR; INTRAVENOUS at 11:49

## 2023-05-19 RX ADMIN — Medication 11 MILLICURIE: at 10:48

## 2023-05-19 RX ADMIN — TAMSULOSIN HYDROCHLORIDE 0.4 MG: 0.4 CAPSULE ORAL at 08:26

## 2023-05-19 RX ADMIN — AMINOPHYLLINE 50 MG: 25 INJECTION, SOLUTION INTRAVENOUS at 12:37

## 2023-05-19 RX ADMIN — OXYCODONE HYDROCHLORIDE AND ACETAMINOPHEN 1 TABLET: 5; 325 TABLET ORAL at 17:52

## 2023-05-19 RX ADMIN — SODIUM CHLORIDE, PRESERVATIVE FREE 10 ML: 5 INJECTION INTRAVENOUS at 20:57

## 2023-05-19 RX ADMIN — METOPROLOL TARTRATE 25 MG: 25 TABLET, FILM COATED ORAL at 20:57

## 2023-05-19 ASSESSMENT — ENCOUNTER SYMPTOMS
NAUSEA: 0
VOMITING: 0
DIARRHEA: 0
SHORTNESS OF BREATH: 1
COUGH: 0

## 2023-05-19 ASSESSMENT — PAIN SCALES - GENERAL
PAINLEVEL_OUTOF10: 5
PAINLEVEL_OUTOF10: 10
PAINLEVEL_OUTOF10: 5
PAINLEVEL_OUTOF10: 10
PAINLEVEL_OUTOF10: 10
PAINLEVEL_OUTOF10: 7
PAINLEVEL_OUTOF10: 7
PAINLEVEL_OUTOF10: 10
PAINLEVEL_OUTOF10: 3
PAINLEVEL_OUTOF10: 10
PAINLEVEL_OUTOF10: 7

## 2023-05-19 ASSESSMENT — PAIN DESCRIPTION - DESCRIPTORS
DESCRIPTORS: SHARP;SORE;SHOOTING
DESCRIPTORS: SHARP;SORE;SHOOTING
DESCRIPTORS: BURNING;ACHING;DISCOMFORT
DESCRIPTORS: STABBING;SORE;SHARP
DESCRIPTORS: SHARP;POUNDING;PENETRATING

## 2023-05-19 ASSESSMENT — PAIN - FUNCTIONAL ASSESSMENT
PAIN_FUNCTIONAL_ASSESSMENT: PREVENTS OR INTERFERES SOME ACTIVE ACTIVITIES AND ADLS

## 2023-05-19 ASSESSMENT — PAIN DESCRIPTION - LOCATION
LOCATION: ABDOMEN;FLANK
LOCATION: FLANK
LOCATION: BACK
LOCATION: BACK

## 2023-05-19 ASSESSMENT — PAIN DESCRIPTION - ORIENTATION
ORIENTATION: LEFT

## 2023-05-19 ASSESSMENT — PAIN DESCRIPTION - PAIN TYPE: TYPE: ACUTE PAIN

## 2023-05-20 LAB
ALBUMIN SERPL-MCNC: 3.5 G/DL (ref 3.5–5.2)
ALP SERPL-CCNC: 62 U/L (ref 40–129)
ALT SERPL-CCNC: 7 U/L (ref 0–40)
ANION GAP SERPL CALCULATED.3IONS-SCNC: 10 MMOL/L (ref 7–16)
AST SERPL-CCNC: 10 U/L (ref 0–39)
BASOPHILS # BLD: 0.04 E9/L (ref 0–0.2)
BASOPHILS NFR BLD: 0.3 % (ref 0–2)
BILIRUB SERPL-MCNC: 0.7 MG/DL (ref 0–1.2)
BUN SERPL-MCNC: 14 MG/DL (ref 6–23)
CALCIUM SERPL-MCNC: 8.5 MG/DL (ref 8.6–10.2)
CHLORIDE SERPL-SCNC: 106 MMOL/L (ref 98–107)
CO2 SERPL-SCNC: 22 MMOL/L (ref 22–29)
CREAT SERPL-MCNC: 1.3 MG/DL (ref 0.7–1.2)
EOSINOPHIL # BLD: 0.17 E9/L (ref 0.05–0.5)
EOSINOPHIL NFR BLD: 1.3 % (ref 0–6)
ERYTHROCYTE [DISTWIDTH] IN BLOOD BY AUTOMATED COUNT: 13.9 FL (ref 11.5–15)
GLUCOSE SERPL-MCNC: 128 MG/DL (ref 74–99)
HCT VFR BLD AUTO: 38.3 % (ref 37–54)
HGB BLD-MCNC: 12.5 G/DL (ref 12.5–16.5)
IMM GRANULOCYTES # BLD: 0.06 E9/L
IMM GRANULOCYTES NFR BLD: 0.5 % (ref 0–5)
LYMPHOCYTES # BLD: 1.19 E9/L (ref 1.5–4)
LYMPHOCYTES NFR BLD: 9.2 % (ref 20–42)
MCH RBC QN AUTO: 30.6 PG (ref 26–35)
MCHC RBC AUTO-ENTMCNC: 32.6 % (ref 32–34.5)
MCV RBC AUTO: 93.9 FL (ref 80–99.9)
MONOCYTES # BLD: 1.37 E9/L (ref 0.1–0.95)
MONOCYTES NFR BLD: 10.6 % (ref 2–12)
NEUTROPHILS # BLD: 10.13 E9/L (ref 1.8–7.3)
NEUTS SEG NFR BLD: 78.1 % (ref 43–80)
PLATELET # BLD AUTO: 186 E9/L (ref 130–450)
PMV BLD AUTO: 10.5 FL (ref 7–12)
POTASSIUM SERPL-SCNC: 3.9 MMOL/L (ref 3.5–5)
PROT SERPL-MCNC: 6.6 G/DL (ref 6.4–8.3)
RBC # BLD AUTO: 4.08 E12/L (ref 3.8–5.8)
SODIUM SERPL-SCNC: 138 MMOL/L (ref 132–146)
WBC # BLD: 13 E9/L (ref 4.5–11.5)

## 2023-05-20 PROCEDURE — 85025 COMPLETE CBC W/AUTO DIFF WBC: CPT

## 2023-05-20 PROCEDURE — 6370000000 HC RX 637 (ALT 250 FOR IP): Performed by: INTERNAL MEDICINE

## 2023-05-20 PROCEDURE — 80053 COMPREHEN METABOLIC PANEL: CPT

## 2023-05-20 PROCEDURE — 6360000002 HC RX W HCPCS: Performed by: INTERNAL MEDICINE

## 2023-05-20 PROCEDURE — 36415 COLL VENOUS BLD VENIPUNCTURE: CPT

## 2023-05-20 PROCEDURE — 2580000003 HC RX 258: Performed by: INTERNAL MEDICINE

## 2023-05-20 PROCEDURE — 99233 SBSQ HOSP IP/OBS HIGH 50: CPT | Performed by: INTERNAL MEDICINE

## 2023-05-20 PROCEDURE — 1200000000 HC SEMI PRIVATE

## 2023-05-20 PROCEDURE — 6370000000 HC RX 637 (ALT 250 FOR IP): Performed by: STUDENT IN AN ORGANIZED HEALTH CARE EDUCATION/TRAINING PROGRAM

## 2023-05-20 RX ADMIN — MORPHINE SULFATE 4 MG: 4 INJECTION, SOLUTION INTRAMUSCULAR; INTRAVENOUS at 02:20

## 2023-05-20 RX ADMIN — ENOXAPARIN SODIUM 30 MG: 100 INJECTION SUBCUTANEOUS at 08:32

## 2023-05-20 RX ADMIN — MORPHINE SULFATE 4 MG: 4 INJECTION, SOLUTION INTRAMUSCULAR; INTRAVENOUS at 11:10

## 2023-05-20 RX ADMIN — ROSUVASTATIN CALCIUM 40 MG: 20 TABLET, FILM COATED ORAL at 21:16

## 2023-05-20 RX ADMIN — PANTOPRAZOLE SODIUM 40 MG: 40 TABLET, DELAYED RELEASE ORAL at 06:32

## 2023-05-20 RX ADMIN — METOPROLOL TARTRATE 25 MG: 25 TABLET, FILM COATED ORAL at 08:33

## 2023-05-20 RX ADMIN — TAMSULOSIN HYDROCHLORIDE 0.4 MG: 0.4 CAPSULE ORAL at 08:32

## 2023-05-20 RX ADMIN — METOPROLOL TARTRATE 25 MG: 25 TABLET, FILM COATED ORAL at 21:16

## 2023-05-20 RX ADMIN — OXYCODONE HYDROCHLORIDE AND ACETAMINOPHEN 1 TABLET: 5; 325 TABLET ORAL at 17:58

## 2023-05-20 RX ADMIN — LISINOPRIL 20 MG: 20 TABLET ORAL at 08:32

## 2023-05-20 RX ADMIN — OXYCODONE HYDROCHLORIDE AND ACETAMINOPHEN 1 TABLET: 5; 325 TABLET ORAL at 00:04

## 2023-05-20 RX ADMIN — LISINOPRIL 20 MG: 20 TABLET ORAL at 21:17

## 2023-05-20 RX ADMIN — SODIUM CHLORIDE, PRESERVATIVE FREE 10 ML: 5 INJECTION INTRAVENOUS at 21:17

## 2023-05-20 RX ADMIN — ISOSORBIDE MONONITRATE 60 MG: 30 TABLET, EXTENDED RELEASE ORAL at 08:33

## 2023-05-20 RX ADMIN — MORPHINE SULFATE 4 MG: 4 INJECTION, SOLUTION INTRAMUSCULAR; INTRAVENOUS at 18:57

## 2023-05-20 RX ADMIN — SODIUM CHLORIDE, PRESERVATIVE FREE 10 ML: 5 INJECTION INTRAVENOUS at 08:33

## 2023-05-20 RX ADMIN — CLOPIDOGREL BISULFATE 75 MG: 75 TABLET ORAL at 08:33

## 2023-05-20 RX ADMIN — OXYCODONE HYDROCHLORIDE AND ACETAMINOPHEN 1 TABLET: 5; 325 TABLET ORAL at 06:32

## 2023-05-20 RX ADMIN — ASPIRIN 81 MG CHEWABLE TABLET 81 MG: 81 TABLET CHEWABLE at 08:33

## 2023-05-20 ASSESSMENT — PAIN DESCRIPTION - LOCATION
LOCATION: FLANK
LOCATION: FLANK
LOCATION: BACK
LOCATION: FLANK

## 2023-05-20 ASSESSMENT — PAIN DESCRIPTION - DESCRIPTORS
DESCRIPTORS: SHARP;PRESSURE;SHOOTING
DESCRIPTORS: SQUEEZING;STABBING;SHARP
DESCRIPTORS: BURNING;ACHING;SHARP
DESCRIPTORS: ACHING;BURNING;STABBING
DESCRIPTORS: BURNING;ACHING;SHARP

## 2023-05-20 ASSESSMENT — PAIN DESCRIPTION - FREQUENCY
FREQUENCY: INTERMITTENT
FREQUENCY: INTERMITTENT

## 2023-05-20 ASSESSMENT — ENCOUNTER SYMPTOMS
SHORTNESS OF BREATH: 1
COUGH: 0
NAUSEA: 0
DIARRHEA: 0
VOMITING: 0

## 2023-05-20 ASSESSMENT — PAIN SCALES - GENERAL
PAINLEVEL_OUTOF10: 7
PAINLEVEL_OUTOF10: 0
PAINLEVEL_OUTOF10: 1
PAINLEVEL_OUTOF10: 8
PAINLEVEL_OUTOF10: 7
PAINLEVEL_OUTOF10: 2
PAINLEVEL_OUTOF10: 7
PAINLEVEL_OUTOF10: 9
PAINLEVEL_OUTOF10: 7

## 2023-05-20 ASSESSMENT — PAIN DESCRIPTION - ORIENTATION
ORIENTATION: LEFT

## 2023-05-20 ASSESSMENT — PAIN DESCRIPTION - PAIN TYPE
TYPE: ACUTE PAIN
TYPE: ACUTE PAIN

## 2023-05-20 ASSESSMENT — PAIN - FUNCTIONAL ASSESSMENT
PAIN_FUNCTIONAL_ASSESSMENT: PREVENTS OR INTERFERES SOME ACTIVE ACTIVITIES AND ADLS

## 2023-05-21 ENCOUNTER — PREP FOR PROCEDURE (OUTPATIENT)
Dept: UROLOGY | Age: 78
End: 2023-05-21

## 2023-05-21 ENCOUNTER — ANESTHESIA EVENT (OUTPATIENT)
Dept: OPERATING ROOM | Age: 78
DRG: 660 | End: 2023-05-21
Payer: MEDICARE

## 2023-05-21 PROCEDURE — 2580000003 HC RX 258: Performed by: INTERNAL MEDICINE

## 2023-05-21 PROCEDURE — 6370000000 HC RX 637 (ALT 250 FOR IP): Performed by: INTERNAL MEDICINE

## 2023-05-21 PROCEDURE — 6360000002 HC RX W HCPCS: Performed by: INTERNAL MEDICINE

## 2023-05-21 PROCEDURE — 1200000000 HC SEMI PRIVATE

## 2023-05-21 PROCEDURE — 99233 SBSQ HOSP IP/OBS HIGH 50: CPT | Performed by: INTERNAL MEDICINE

## 2023-05-21 PROCEDURE — 6370000000 HC RX 637 (ALT 250 FOR IP): Performed by: STUDENT IN AN ORGANIZED HEALTH CARE EDUCATION/TRAINING PROGRAM

## 2023-05-21 RX ORDER — MECLIZINE HCL 12.5 MG/1
12.5 TABLET ORAL 3 TIMES DAILY PRN
Status: DISCONTINUED | OUTPATIENT
Start: 2023-05-21 | End: 2023-05-23 | Stop reason: HOSPADM

## 2023-05-21 RX ORDER — ONDANSETRON 2 MG/ML
4 INJECTION INTRAMUSCULAR; INTRAVENOUS EVERY 6 HOURS PRN
Status: DISCONTINUED | OUTPATIENT
Start: 2023-05-21 | End: 2023-05-23 | Stop reason: HOSPADM

## 2023-05-21 RX ADMIN — TAMSULOSIN HYDROCHLORIDE 0.4 MG: 0.4 CAPSULE ORAL at 08:54

## 2023-05-21 RX ADMIN — SODIUM CHLORIDE, PRESERVATIVE FREE 10 ML: 5 INJECTION INTRAVENOUS at 15:21

## 2023-05-21 RX ADMIN — METOPROLOL TARTRATE 25 MG: 25 TABLET, FILM COATED ORAL at 21:13

## 2023-05-21 RX ADMIN — ASPIRIN 81 MG CHEWABLE TABLET 81 MG: 81 TABLET CHEWABLE at 08:54

## 2023-05-21 RX ADMIN — OXYCODONE HYDROCHLORIDE AND ACETAMINOPHEN 1 TABLET: 5; 325 TABLET ORAL at 01:46

## 2023-05-21 RX ADMIN — ROSUVASTATIN CALCIUM 40 MG: 20 TABLET, FILM COATED ORAL at 21:13

## 2023-05-21 RX ADMIN — CLOPIDOGREL BISULFATE 75 MG: 75 TABLET ORAL at 08:54

## 2023-05-21 RX ADMIN — ENOXAPARIN SODIUM 30 MG: 100 INJECTION SUBCUTANEOUS at 08:53

## 2023-05-21 RX ADMIN — LISINOPRIL 20 MG: 20 TABLET ORAL at 21:13

## 2023-05-21 RX ADMIN — CEFTRIAXONE SODIUM 1000 MG: 1 INJECTION, POWDER, FOR SOLUTION INTRAMUSCULAR; INTRAVENOUS at 06:59

## 2023-05-21 RX ADMIN — OXYCODONE HYDROCHLORIDE AND ACETAMINOPHEN 1 TABLET: 5; 325 TABLET ORAL at 21:23

## 2023-05-21 RX ADMIN — OXYCODONE HYDROCHLORIDE AND ACETAMINOPHEN 1 TABLET: 5; 325 TABLET ORAL at 09:04

## 2023-05-21 RX ADMIN — MORPHINE SULFATE 4 MG: 4 INJECTION, SOLUTION INTRAMUSCULAR; INTRAVENOUS at 00:22

## 2023-05-21 RX ADMIN — ISOSORBIDE MONONITRATE 60 MG: 30 TABLET, EXTENDED RELEASE ORAL at 08:54

## 2023-05-21 RX ADMIN — LISINOPRIL 20 MG: 20 TABLET ORAL at 08:54

## 2023-05-21 RX ADMIN — PANTOPRAZOLE SODIUM 40 MG: 40 TABLET, DELAYED RELEASE ORAL at 05:50

## 2023-05-21 RX ADMIN — MORPHINE SULFATE 4 MG: 4 INJECTION, SOLUTION INTRAMUSCULAR; INTRAVENOUS at 05:50

## 2023-05-21 RX ADMIN — METOPROLOL TARTRATE 25 MG: 25 TABLET, FILM COATED ORAL at 08:54

## 2023-05-21 RX ADMIN — OXYCODONE HYDROCHLORIDE AND ACETAMINOPHEN 1 TABLET: 5; 325 TABLET ORAL at 15:20

## 2023-05-21 ASSESSMENT — PAIN SCALES - GENERAL
PAINLEVEL_OUTOF10: 7
PAINLEVEL_OUTOF10: 7
PAINLEVEL_OUTOF10: 2
PAINLEVEL_OUTOF10: 2
PAINLEVEL_OUTOF10: 6
PAINLEVEL_OUTOF10: 7

## 2023-05-21 ASSESSMENT — ENCOUNTER SYMPTOMS
DIARRHEA: 0
VOMITING: 1
SHORTNESS OF BREATH: 0
COUGH: 0
NAUSEA: 1

## 2023-05-21 ASSESSMENT — PAIN DESCRIPTION - LOCATION
LOCATION: FLANK
LOCATION: BACK
LOCATION: FLANK
LOCATION: BACK
LOCATION: FLANK

## 2023-05-21 ASSESSMENT — PAIN DESCRIPTION - FREQUENCY
FREQUENCY: CONTINUOUS
FREQUENCY: CONTINUOUS

## 2023-05-21 ASSESSMENT — PAIN DESCRIPTION - DESCRIPTORS
DESCRIPTORS: BURNING;ACHING;STABBING
DESCRIPTORS: ACHING;BURNING;STABBING
DESCRIPTORS: BURNING;ACHING;DISCOMFORT
DESCRIPTORS: SHARP;SHOOTING;SORE

## 2023-05-21 ASSESSMENT — PAIN DESCRIPTION - ORIENTATION
ORIENTATION: LEFT

## 2023-05-21 ASSESSMENT — PAIN DESCRIPTION - ONSET
ONSET: ON-GOING
ONSET: ON-GOING

## 2023-05-21 ASSESSMENT — PAIN DESCRIPTION - PAIN TYPE: TYPE: ACUTE PAIN

## 2023-05-22 ENCOUNTER — APPOINTMENT (OUTPATIENT)
Dept: GENERAL RADIOLOGY | Age: 78
DRG: 660 | End: 2023-05-22
Payer: MEDICARE

## 2023-05-22 ENCOUNTER — ANESTHESIA (OUTPATIENT)
Dept: OPERATING ROOM | Age: 78
DRG: 660 | End: 2023-05-22
Payer: MEDICARE

## 2023-05-22 PROCEDURE — C1769 GUIDE WIRE: HCPCS | Performed by: UROLOGY

## 2023-05-22 PROCEDURE — 6370000000 HC RX 637 (ALT 250 FOR IP): Performed by: ANESTHESIOLOGY

## 2023-05-22 PROCEDURE — C1758 CATHETER, URETERAL: HCPCS | Performed by: UROLOGY

## 2023-05-22 PROCEDURE — 6370000000 HC RX 637 (ALT 250 FOR IP): Performed by: INTERNAL MEDICINE

## 2023-05-22 PROCEDURE — 6370000000 HC RX 637 (ALT 250 FOR IP): Performed by: UROLOGY

## 2023-05-22 PROCEDURE — 6360000002 HC RX W HCPCS

## 2023-05-22 PROCEDURE — 87088 URINE BACTERIA CULTURE: CPT

## 2023-05-22 PROCEDURE — 99233 SBSQ HOSP IP/OBS HIGH 50: CPT | Performed by: INTERNAL MEDICINE

## 2023-05-22 PROCEDURE — 3600000013 HC SURGERY LEVEL 3 ADDTL 15MIN: Performed by: UROLOGY

## 2023-05-22 PROCEDURE — 0TC78ZZ EXTIRPATION OF MATTER FROM LEFT URETER, VIA NATURAL OR ARTIFICIAL OPENING ENDOSCOPIC: ICD-10-PCS | Performed by: UROLOGY

## 2023-05-22 PROCEDURE — 7100000000 HC PACU RECOVERY - FIRST 15 MIN: Performed by: UROLOGY

## 2023-05-22 PROCEDURE — 3700000001 HC ADD 15 MINUTES (ANESTHESIA): Performed by: UROLOGY

## 2023-05-22 PROCEDURE — 3600000003 HC SURGERY LEVEL 3 BASE: Performed by: UROLOGY

## 2023-05-22 PROCEDURE — 74420 UROGRAPHY RTRGR +-KUB: CPT

## 2023-05-22 PROCEDURE — 6370000000 HC RX 637 (ALT 250 FOR IP)

## 2023-05-22 PROCEDURE — 2580000003 HC RX 258

## 2023-05-22 PROCEDURE — C2617 STENT, NON-COR, TEM W/O DEL: HCPCS | Performed by: UROLOGY

## 2023-05-22 PROCEDURE — 2580000003 HC RX 258: Performed by: INTERNAL MEDICINE

## 2023-05-22 PROCEDURE — 6360000002 HC RX W HCPCS: Performed by: INTERNAL MEDICINE

## 2023-05-22 PROCEDURE — 2709999900 HC NON-CHARGEABLE SUPPLY: Performed by: UROLOGY

## 2023-05-22 PROCEDURE — 7100000001 HC PACU RECOVERY - ADDTL 15 MIN: Performed by: UROLOGY

## 2023-05-22 PROCEDURE — 2720000010 HC SURG SUPPLY STERILE: Performed by: UROLOGY

## 2023-05-22 PROCEDURE — 6360000002 HC RX W HCPCS: Performed by: UROLOGY

## 2023-05-22 PROCEDURE — BT1F1ZZ FLUOROSCOPY OF LEFT KIDNEY, URETER AND BLADDER USING LOW OSMOLAR CONTRAST: ICD-10-PCS | Performed by: UROLOGY

## 2023-05-22 PROCEDURE — 1200000000 HC SEMI PRIVATE

## 2023-05-22 PROCEDURE — 94664 DEMO&/EVAL PT USE INHALER: CPT

## 2023-05-22 PROCEDURE — 3700000000 HC ANESTHESIA ATTENDED CARE: Performed by: UROLOGY

## 2023-05-22 PROCEDURE — 2580000003 HC RX 258: Performed by: UROLOGY

## 2023-05-22 PROCEDURE — 0T778DZ DILATION OF LEFT URETER WITH INTRALUMINAL DEVICE, VIA NATURAL OR ARTIFICIAL OPENING ENDOSCOPIC: ICD-10-PCS | Performed by: UROLOGY

## 2023-05-22 PROCEDURE — 6370000000 HC RX 637 (ALT 250 FOR IP): Performed by: STUDENT IN AN ORGANIZED HEALTH CARE EDUCATION/TRAINING PROGRAM

## 2023-05-22 DEVICE — URETERAL STENT
Type: IMPLANTABLE DEVICE | Site: URETER | Status: FUNCTIONAL
Brand: PERCUFLEX™

## 2023-05-22 RX ORDER — PROPOFOL 10 MG/ML
INJECTION, EMULSION INTRAVENOUS PRN
Status: DISCONTINUED | OUTPATIENT
Start: 2023-05-22 | End: 2023-05-22 | Stop reason: SDUPTHER

## 2023-05-22 RX ORDER — SODIUM CHLORIDE, SODIUM LACTATE, POTASSIUM CHLORIDE, CALCIUM CHLORIDE 600; 310; 30; 20 MG/100ML; MG/100ML; MG/100ML; MG/100ML
INJECTION, SOLUTION INTRAVENOUS CONTINUOUS
Status: DISCONTINUED | OUTPATIENT
Start: 2023-05-22 | End: 2023-05-23 | Stop reason: HOSPADM

## 2023-05-22 RX ORDER — LIDOCAINE HYDROCHLORIDE 20 MG/ML
INJECTION, SOLUTION INTRAVENOUS PRN
Status: DISCONTINUED | OUTPATIENT
Start: 2023-05-22 | End: 2023-05-22 | Stop reason: SDUPTHER

## 2023-05-22 RX ORDER — FENTANYL CITRATE 50 UG/ML
INJECTION, SOLUTION INTRAMUSCULAR; INTRAVENOUS PRN
Status: DISCONTINUED | OUTPATIENT
Start: 2023-05-22 | End: 2023-05-22 | Stop reason: SDUPTHER

## 2023-05-22 RX ORDER — ALBUTEROL SULFATE 90 UG/1
AEROSOL, METERED RESPIRATORY (INHALATION) PRN
Status: DISCONTINUED | OUTPATIENT
Start: 2023-05-22 | End: 2023-05-22 | Stop reason: SDUPTHER

## 2023-05-22 RX ORDER — SODIUM CHLORIDE 9 MG/ML
INJECTION, SOLUTION INTRAVENOUS CONTINUOUS PRN
Status: DISCONTINUED | OUTPATIENT
Start: 2023-05-22 | End: 2023-05-22 | Stop reason: SDUPTHER

## 2023-05-22 RX ORDER — IPRATROPIUM BROMIDE AND ALBUTEROL SULFATE 2.5; .5 MG/3ML; MG/3ML
1 SOLUTION RESPIRATORY (INHALATION) EVERY 4 HOURS PRN
Status: DISCONTINUED | OUTPATIENT
Start: 2023-05-22 | End: 2023-05-23 | Stop reason: HOSPADM

## 2023-05-22 RX ADMIN — SODIUM CHLORIDE, POTASSIUM CHLORIDE, SODIUM LACTATE AND CALCIUM CHLORIDE: 600; 310; 30; 20 INJECTION, SOLUTION INTRAVENOUS at 13:55

## 2023-05-22 RX ADMIN — PANTOPRAZOLE SODIUM 40 MG: 40 TABLET, DELAYED RELEASE ORAL at 05:48

## 2023-05-22 RX ADMIN — CEFTRIAXONE SODIUM 1000 MG: 1 INJECTION, POWDER, FOR SOLUTION INTRAMUSCULAR; INTRAVENOUS at 05:48

## 2023-05-22 RX ADMIN — ALBUTEROL SULFATE 3 PUFF: 90 AEROSOL, METERED RESPIRATORY (INHALATION) at 11:08

## 2023-05-22 RX ADMIN — LISINOPRIL 20 MG: 20 TABLET ORAL at 08:55

## 2023-05-22 RX ADMIN — METOPROLOL TARTRATE 25 MG: 25 TABLET, FILM COATED ORAL at 08:55

## 2023-05-22 RX ADMIN — OXYCODONE HYDROCHLORIDE AND ACETAMINOPHEN 1 TABLET: 5; 325 TABLET ORAL at 03:50

## 2023-05-22 RX ADMIN — LIDOCAINE HYDROCHLORIDE 20 MG: 20 INJECTION, SOLUTION INTRAVENOUS at 11:11

## 2023-05-22 RX ADMIN — LISINOPRIL 20 MG: 20 TABLET ORAL at 20:23

## 2023-05-22 RX ADMIN — ROSUVASTATIN CALCIUM 40 MG: 20 TABLET, FILM COATED ORAL at 20:23

## 2023-05-22 RX ADMIN — FENTANYL CITRATE 50 MCG: 50 INJECTION, SOLUTION INTRAMUSCULAR; INTRAVENOUS at 11:32

## 2023-05-22 RX ADMIN — TAMSULOSIN HYDROCHLORIDE 0.4 MG: 0.4 CAPSULE ORAL at 08:47

## 2023-05-22 RX ADMIN — ASPIRIN 81 MG CHEWABLE TABLET 81 MG: 81 TABLET CHEWABLE at 08:48

## 2023-05-22 RX ADMIN — ISOSORBIDE MONONITRATE 60 MG: 30 TABLET, EXTENDED RELEASE ORAL at 08:48

## 2023-05-22 RX ADMIN — PROPOFOL 80 MG: 10 INJECTION, EMULSION INTRAVENOUS at 11:11

## 2023-05-22 RX ADMIN — METOPROLOL TARTRATE 25 MG: 25 TABLET, FILM COATED ORAL at 20:23

## 2023-05-22 RX ADMIN — FENTANYL CITRATE 50 MCG: 50 INJECTION, SOLUTION INTRAMUSCULAR; INTRAVENOUS at 11:43

## 2023-05-22 RX ADMIN — PROPOFOL 50 MCG/KG/MIN: 10 INJECTION, EMULSION INTRAVENOUS at 11:12

## 2023-05-22 RX ADMIN — ENOXAPARIN SODIUM 30 MG: 100 INJECTION SUBCUTANEOUS at 20:23

## 2023-05-22 RX ADMIN — IPRATROPIUM BROMIDE AND ALBUTEROL SULFATE 1 AMPULE: .5; 3 SOLUTION RESPIRATORY (INHALATION) at 12:10

## 2023-05-22 RX ADMIN — SODIUM CHLORIDE: 9 INJECTION, SOLUTION INTRAVENOUS at 11:06

## 2023-05-22 ASSESSMENT — PAIN DESCRIPTION - LOCATION: LOCATION: FLANK

## 2023-05-22 ASSESSMENT — COPD QUESTIONNAIRES: CAT_SEVERITY: MODERATE

## 2023-05-22 ASSESSMENT — ENCOUNTER SYMPTOMS
ABDOMINAL PAIN: 0
SHORTNESS OF BREATH: 0

## 2023-05-22 ASSESSMENT — PAIN DESCRIPTION - DESCRIPTORS: DESCRIPTORS: BURNING;ACHING;STABBING

## 2023-05-22 ASSESSMENT — PAIN SCALES - GENERAL: PAINLEVEL_OUTOF10: 7

## 2023-05-22 NOTE — ANESTHESIA PRE PROCEDURE
Department of Anesthesiology  Preprocedure Note       Name:  Liam Howe   Age:  70 y.o.  :  1945                                          MRN:  [id number]         Date:  2023      Surgeon: Campos Mckinney):  Glenys Link MD    Procedure: Procedure(s):  CYSTOSCOPY RETROGRADE PYELOGRAM WITH LEFT STENT INSERTION    Medications prior to admission:   Prior to Admission medications    Medication Sig Start Date End Date Taking?  Authorizing Provider   nitroGLYCERIN (NITROSTAT) 0.4 MG SL tablet DISSOLVE 1 TABLET UNDER THE TONGUE AS NEEDED 22   Historical Provider, MD   metoprolol tartrate (LOPRESSOR) 25 MG tablet Take 0.5 tablets by mouth daily 23   Sherry Kang DO   isosorbide mononitrate (IMDUR) 30 MG extended release tablet Take 1 tablet by mouth daily 21   Historical Provider, MD   rosuvastatin (CRESTOR) 40 MG tablet Take 1 tablet by mouth daily 21   Historical Provider, MD   lisinopril (PRINIVIL;ZESTRIL) 20 MG tablet Take 1 tablet by mouth 2 times daily 21   Mukul Veliz MD   clopidogrel (PLAVIX) 75 MG tablet Take 1 tablet by mouth daily 21   Mukul Veliz MD   pantoprazole (PROTONIX) 40 MG tablet Take 1 tablet by mouth every morning (before breakfast) 21   Mukul Veliz MD   aspirin 81 MG tablet Take 1 tablet by mouth daily    Historical Provider, MD       Current medications:    Current Facility-Administered Medications   Medication Dose Route Frequency Provider Last Rate Last Admin    ipratropium 0.5 mg-albuterol 2.5 mg (DUONEB) nebulizer solution 1 ampule  1 ampule Inhalation Q4H PRN Maryanne Saucedo DO        lactated ringers IV soln infusion   IntraVENous Continuous Glenys Link MD        [facility]ron [facility] injection 4 mg  4 mg IntraVENous Q6H PRN Renetta Wodos DO        meclizine (ANTIVERT) tablet 12.5 mg  12.5 mg Oral TID PRN Renetta Woods, DO        cefTRIAXone (ROCEPHIN) 1,000 mg in sterile water 10 mL IV

## 2023-05-22 NOTE — BRIEF OP NOTE
Brief Postoperative Note      Patient: Letty Covert  YOB: 1945  MRN: [id number]    Date of Procedure: 5/22/2023    Pre-Op Diagnosis Codes:     * Ureteral stone [N20.1]    Post-Op Diagnosis: Same p;us bph       Procedure(s):  CYSTOSCOPY RETROGRADE PYELOGRAM WITH LEFT STENT INSERTION ureteroscopy laser litho way placement    Surgeon(s):  Hakan Roberts MD    Assistant:      Anesthesia: Monitor Anesthesia Care    Estimated Blood Loss (mL): Minimal    Complications: None    Specimens:   ID Type Source Tests Collected by Time Destination   1 : URINE FOR CULTURE Urine Urine, Cystoscopic CULTURE, URINE Hakan Roberts MD 5/22/2023 1122        Implants:  Implant Name Type Inv.  Item Serial No.  Lot No. LRB No. Used Action   [place] 6FR L24CM PERCFLX FIRM DUROMETER [facility] [employer] [id number]  [place] [place] 6FR L24CM PERCFLX FIRM DUROMETER [facility]- [id number] Left 1 Implanted         Drains:   Ureteral Drain/Stent 05/22/23 Left Ureter (Active)       Urinary Catheter 05/22/23 2 Way (Active)       Findings:       Electronically signed by Hakan Roberts MD on 5/22/2023 at 11:59 AM

## 2023-05-22 NOTE — CARE COORDINATION
05/22/23 Update CM Note: Patient npo for cysto/stents today after receiving cardiac clearance on Friday. He is on iv rocephin qd. Meclizine was added. Will follow.  Electronically signed by Carmencita Parisi RN CM on 5/22/2023 at 10:36 AM

## 2023-05-22 NOTE — ANESTHESIA POSTPROCEDURE EVALUATION
Department of Anesthesiology  Postprocedure Note    Patient: Jarred Mosley  MRN: [id number]  YOB: 1945  Date of evaluation: 5/22/2023      Procedure Summary     Date: 05/22/23 Room / Location: [place] [address] / [facility]    Anesthesia Start: 1106 Anesthesia Stop:     Procedure: CYSTOSCOPY RETROGRADE PYELOGRAM WITH LEFT STENT INSERTION (Left: Ureter) Diagnosis:       Ureteral stone      (left proximal obstructing ureteral stone)    Surgeons: Armon Landau, MD Responsible Provider: Giancarlo Rojas DO    Anesthesia Type: Not recorded ASA Status: Not recorded          Anesthesia Type: No value filed.     Horace Phase I:      Horace Phase II:        Anesthesia Post Evaluation    Patient location during evaluation: PACU  Patient participation: complete - patient participated  Level of consciousness: sleepy but conscious  Pain score: 0  Airway patency: patent  Nausea & Vomiting: no vomiting and no nausea  Complications: no  Cardiovascular status: hemodynamically stable  Respiratory status: acceptable  Hydration status: stable

## 2023-05-22 NOTE — ANESTHESIA PRE PROCEDURE
Department of Anesthesiology  Preprocedure Note       Name:  Rosalia Chavez   Age:  70 y.o.  :  1945                                          MRN:  [id number]         Date:  2023      Surgeon: Sharad Gómez):  Elda Ji MD    Procedure: Procedure(s):  CYSTOSCOPY RETROGRADE PYELOGRAM, LASER LITHOTRIPSY WITH LEFT STENT INSERTION AND GUADARRAMA PLACEMENT    Medications prior to admission:   Prior to Admission medications    Medication Sig Start Date End Date Taking?  Authorizing Provider   nitroGLYCERIN (NITROSTAT) 0.4 MG SL tablet DISSOLVE 1 TABLET UNDER THE TONGUE AS NEEDED 22   Historical Provider, MD   metoprolol tartrate (LOPRESSOR) 25 MG tablet Take 0.5 tablets by mouth daily 23   Candace Solomon DO   isosorbide mononitrate (IMDUR) 30 MG extended release tablet Take 1 tablet by mouth daily 21   Historical Provider, MD   rosuvastatin (CRESTOR) 40 MG tablet Take 1 tablet by mouth daily 21   Historical Provider, MD   lisinopril (PRINIVIL;ZESTRIL) 20 MG tablet Take 1 tablet by mouth 2 times daily 21   Hannah Cisneros MD   clopidogrel (PLAVIX) 75 MG tablet Take 1 tablet by mouth daily 21   Hannah Cisneros MD   pantoprazole (PROTONIX) 40 MG tablet Take 1 tablet by mouth every morning (before breakfast) 21   Hannah Cisneros MD   aspirin 81 MG tablet Take 1 tablet by mouth daily    Historical Provider, MD       Current medications:    Current Facility-Administered Medications   Medication Dose Route Frequency Provider Last Rate Last Admin    ipratropium 0.5 mg-albuterol 2.5 mg (DUONEB) nebulizer solution 1 ampule  1 ampule Inhalation Q4H PRN Jhony Mandujano,         lactated ringers IV soln infusion   IntraVENous Continuous Elda Ji MD        [facility]ansetron [facility] injection 4 mg  4 mg IntraVENous Q6H PRN Renetta Woods, DO        meclizine (ANTIVERT) tablet 12.5 mg  12.5 mg Oral TID PRN Renettaning Woods, DO        cefTRIAXone

## 2023-05-23 VITALS
RESPIRATION RATE: 16 BRPM | SYSTOLIC BLOOD PRESSURE: 152 MMHG | HEIGHT: 69 IN | WEIGHT: 225 LBS | OXYGEN SATURATION: 95 % | DIASTOLIC BLOOD PRESSURE: 52 MMHG | BODY MASS INDEX: 33.33 KG/M2 | HEART RATE: 60 BPM | TEMPERATURE: 97.7 F

## 2023-05-23 LAB
ANION GAP SERPL CALCULATED.3IONS-SCNC: 13 MMOL/L (ref 7–16)
BASOPHILS # BLD: 0.04 E9/L (ref 0–0.2)
BASOPHILS NFR BLD: 0.4 % (ref 0–2)
BUN SERPL-MCNC: 10 MG/DL (ref 6–23)
CALCIUM SERPL-MCNC: 8.6 MG/DL (ref 8.6–10.2)
CHLORIDE SERPL-SCNC: 101 MMOL/L (ref 98–107)
CO2 SERPL-SCNC: 20 MMOL/L (ref 22–29)
CREAT SERPL-MCNC: 1 MG/DL (ref 0.7–1.2)
EOSINOPHIL # BLD: 0.23 E9/L (ref 0.05–0.5)
EOSINOPHIL NFR BLD: 2.5 % (ref 0–6)
ERYTHROCYTE [DISTWIDTH] IN BLOOD BY AUTOMATED COUNT: 13.7 FL (ref 11.5–15)
GLUCOSE SERPL-MCNC: 166 MG/DL (ref 74–99)
HCT VFR BLD AUTO: 37.8 % (ref 37–54)
HGB BLD-MCNC: 12.3 G/DL (ref 12.5–16.5)
IMM GRANULOCYTES # BLD: 0.06 E9/L
IMM GRANULOCYTES NFR BLD: 0.7 % (ref 0–5)
LYMPHOCYTES # BLD: 1.09 E9/L (ref 1.5–4)
LYMPHOCYTES NFR BLD: 11.9 % (ref 20–42)
MAGNESIUM SERPL-MCNC: 1.8 MG/DL (ref 1.6–2.6)
MCH RBC QN AUTO: 30.1 PG (ref 26–35)
MCHC RBC AUTO-ENTMCNC: 32.5 % (ref 32–34.5)
MCV RBC AUTO: 92.6 FL (ref 80–99.9)
MONOCYTES # BLD: 0.86 E9/L (ref 0.1–0.95)
MONOCYTES NFR BLD: 9.4 % (ref 2–12)
NEUTROPHILS # BLD: 6.89 E9/L (ref 1.8–7.3)
NEUTS SEG NFR BLD: 75.1 % (ref 43–80)
PLATELET # BLD AUTO: 182 E9/L (ref 130–450)
PMV BLD AUTO: 11.1 FL (ref 7–12)
POTASSIUM SERPL-SCNC: 3.3 MMOL/L (ref 3.5–5)
RBC # BLD AUTO: 4.08 E12/L (ref 3.8–5.8)
SODIUM SERPL-SCNC: 134 MMOL/L (ref 132–146)
WBC # BLD: 9.2 E9/L (ref 4.5–11.5)

## 2023-05-23 PROCEDURE — 2580000003 HC RX 258: Performed by: UROLOGY

## 2023-05-23 PROCEDURE — 6370000000 HC RX 637 (ALT 250 FOR IP): Performed by: UROLOGY

## 2023-05-23 PROCEDURE — 6360000002 HC RX W HCPCS: Performed by: UROLOGY

## 2023-05-23 PROCEDURE — 83735 ASSAY OF MAGNESIUM: CPT

## 2023-05-23 PROCEDURE — 80048 BASIC METABOLIC PNL TOTAL CA: CPT

## 2023-05-23 PROCEDURE — 85025 COMPLETE CBC W/AUTO DIFF WBC: CPT

## 2023-05-23 PROCEDURE — 36415 COLL VENOUS BLD VENIPUNCTURE: CPT

## 2023-05-23 RX ORDER — SENNA PLUS 8.6 MG/1
1 TABLET ORAL DAILY PRN
Qty: 30 TABLET | Refills: 0 | Status: SHIPPED | OUTPATIENT
Start: 2023-05-23 | End: 2023-06-22

## 2023-05-23 RX ORDER — MECLIZINE HCL 12.5 MG/1
12.5 TABLET ORAL 3 TIMES DAILY PRN
Qty: 60 TABLET | Refills: 0 | Status: SHIPPED | OUTPATIENT
Start: 2023-05-23 | End: 2023-06-02

## 2023-05-23 RX ORDER — ISOSORBIDE MONONITRATE 60 MG/1
60 TABLET, EXTENDED RELEASE ORAL DAILY
Qty: 30 TABLET | Refills: 3 | Status: SHIPPED | OUTPATIENT
Start: 2023-05-23

## 2023-05-23 RX ORDER — TAMSULOSIN HYDROCHLORIDE 0.4 MG/1
0.4 CAPSULE ORAL DAILY
Qty: 30 CAPSULE | Refills: 3 | Status: SHIPPED | OUTPATIENT
Start: 2023-05-23

## 2023-05-23 RX ADMIN — CLOPIDOGREL BISULFATE 75 MG: 75 TABLET ORAL at 09:04

## 2023-05-23 RX ADMIN — ASPIRIN 81 MG CHEWABLE TABLET 81 MG: 81 TABLET CHEWABLE at 09:04

## 2023-05-23 RX ADMIN — TAMSULOSIN HYDROCHLORIDE 0.4 MG: 0.4 CAPSULE ORAL at 09:05

## 2023-05-23 RX ADMIN — CEFTRIAXONE SODIUM 1000 MG: 1 INJECTION, POWDER, FOR SOLUTION INTRAMUSCULAR; INTRAVENOUS at 05:39

## 2023-05-23 RX ADMIN — SODIUM CHLORIDE, PRESERVATIVE FREE 10 ML: 5 INJECTION INTRAVENOUS at 09:05

## 2023-05-23 RX ADMIN — PANTOPRAZOLE SODIUM 40 MG: 40 TABLET, DELAYED RELEASE ORAL at 05:39

## 2023-05-23 RX ADMIN — ISOSORBIDE MONONITRATE 60 MG: 30 TABLET, EXTENDED RELEASE ORAL at 09:04

## 2023-05-23 RX ADMIN — LISINOPRIL 20 MG: 20 TABLET ORAL at 09:05

## 2023-05-23 NOTE — PROGRESS NOTES
CLINICAL PHARMACY NOTE: MEDS TO BEDS    Total # of Prescriptions Filled: 5   The following medications were delivered to the patient:  Tamsulosin 0.4 mg  Metoprolol tartrate 25 mg  Isosorbide mononitrate 60 mg  Meclizine 12.5 mg  Senna 8.6  mg    Additional Documentation:   Delivered to pt
[facility] Cardiology Inpatient Progress Note    Patient is a 70 y.o. male of Joe Petty MD seen in hospital follow up. Chief complaint: Chest pain/CAD    HPI: No CP or SOB. Past Medical and Surgical History:    CAD  Acute myocardial infarction (11/2/07). Cardiac catheterization (11/2/07). Distal left main 60-70%. LAD proximal 50-60%, mid 80-95%. Circumflex mid to distal 95% Ramus proximal 40-50% Right coronary artery PDA mid 95%. CABG by Dr. Son Chang (11/3/07). LIMA to the LAD. SVG to D2 SVG to OM1 SVG to PDA   Cardiac catheterization (7/22/08):  LAD 40%, mid 100%, RCA PDA occluded, distal 99% stenosis of bifurcation of PDA and posterolateral branch. SVG to PDA was patent but had no retrograde flow into the posterolateral branch. 4.0 x 16 mm [place] stent to the distal right coronary artery to 0% residual stenosis. Cardiac catheterization, 8/22/08: 3.0 x 24 mm [place] drug eluting stent to the mid to distal circumflex to 0% residual stenosis. Left heart cath September 22, 2013 EF low normal Left heart catheterization September 22, 2013, Dr. Drew Duane. Left main: 100% occlusion. Right coronary artery: Mid diffuse 30% stenosis. Dominant vessel. Distal stent widely patent. The PDA has an ostial 100% occlusion. The right coronary artery is supplying collaterals to the circumflex territory. SVG to the diagonal:  Widely patent with good distal runoff. There was retrograde flow into the LAD, through an 80% diffuse stenosis. SVG to the PDA:  Widely patent with good distal runoff. SVG to the OM: Widely patent with good distal runoff. Subclavian:  Tortuous. The left carotid has an ostial 40% stenosis. LIMA to the LAD:  Very tortuous. Widely patent with good distal runoff. After the anastomosis, the LAD is widely patent with good distal runoff  Echo 2/22/2018 Dr. Drew Duane Moderately dilated left ventricle. Normal left ventricle systolic function. Mild to moderate mitral regurgitation.   Moderate aortic
Discharge instructions given to patient. All questions and concerns answered to patient's satisfaction. Belongings packed and sent with patient. Pt has leg bag and was educated. All questions and concerns answered.
Floor telepack/leads remained on patient while in line room
Patient wants to go home. Dr. Makayla Kim called. Urine bloody.    Irrigated without difficulty for a few small clots  Dr. Makayla Kim will see patient later and consider discharge
Progress Note  Date:2023       Room:OR POOL /NONE  Patient Name:Corky Krishnan     YOB: 1945     Age:78 y.o. Patient says he feels well today. Subjective    Subjective:  Symptoms:  Stable. No shortness of breath or chest pain. Diet:  NPO. Activity level: Normal.    Pain:  He reports no pain. Review of Systems   Constitutional:  Negative for activity change and fever. HENT:  Negative for congestion. Respiratory:  Negative for shortness of breath. Cardiovascular:  Negative for chest pain. Gastrointestinal:  Negative for abdominal pain. Neurological:  Negative for dizziness. Objective         Vitals Last 24 Hours:  TEMPERATURE:  Temp  Av.1 °F (36.7 °C)  Min: 97.5 °F (36.4 °C)  Max: 98.4 °F (36.9 °C)  RESPIRATIONS RANGE: Resp  Avg: 15  Min: 12  Max: 18  PULSE OXIMETRY RANGE: SpO2  Av.2 %  Min: 93 %  Max: 100 %  PULSE RANGE: Pulse  Av.9  Min: 57  Max: 85  BLOOD PRESSURE RANGE: Systolic (43RHW), WLL:848 , Min:136 , OVH:154   ; Diastolic (92QAW), KXH:27, Min:53, Max:79    I/O (24Hr): No intake or output data in the 24 hours ending 23 1042  Objective:  General Appearance:  Comfortable. Vital signs: (most recent): Blood pressure (!) 184/76, pulse 60, temperature 98.4 °F (36.9 °C), temperature source Oral, resp. rate 12, height 5' 9\" (1.753 m), weight 225 lb (102.1 kg), SpO2 100 %. No fever. Lungs:  Normal effort and normal respiratory rate. Breath sounds clear to auscultation. Heart: Normal rate. Regular rhythm. S1 normal and S2 normal.    Abdomen: There is generalized tenderness. Labs/Imaging/Diagnostics    Labs:  CBC:  Recent Labs     23  0552   WBC 13.0*   RBC 4.08   HGB 12.5   HCT 38.3   MCV 93.9   RDW 13.9        CHEMISTRIES:  Recent Labs     23  0552      K 3.9      CO2 22   BUN 14   CREATININE 1.3*   GLUCOSE 128*     PT/INR:No results for input(s): PROTIME, INR in the last 72 hours.   APTT:No results
lisinopril  20 mg Oral BID    pantoprazole  40 mg Oral QAM AC    rosuvastatin  40 mg Oral Daily     Continuous Infusions:   lactated ringers IV soln 125 mL/hr at 05/22/23 1355    sodium chloride 100 mL/hr at 05/18/23 2038    sodium chloride       PRN Meds:.ipratropium 0.5 mg-albuterol 2.5 mg, ondansetron, meclizine, HYDROmorphone, oxyCODONE-acetaminophen, morphine, perflutren lipid microspheres, sodium chloride flush, sodium chloride, potassium chloride **OR** potassium alternative oral replacement **OR** potassium chloride, senna, acetaminophen **OR** acetaminophen    ASSESSMENT:      Patient Active Problem List   Diagnosis    Coronary atherosclerosis of native coronary artery    COPD (chronic obstructive pulmonary disease) (HCC)    DJD spine    Trace mitral regurgitation    Aortic regurgitation    HTN (hypertension)    Acute chest pain    Chest pain       PLAN:  Home today with way  Will see in office tomorrow   Will need way and stent removal      Devyn Mukherjee MD, M.D.  5/23/2023  7:00 AM

## 2023-05-23 NOTE — CARE COORDINATION
05/23/23 Update CM Note: Patient is POD 1 cysto and cleared for discharge per urology for today. Dr Mark Sterling notified via perfect for discharge orders.  Electronically signed by Ana Rossi RN CM on 5/23/2023 at 10:24 AM

## 2023-05-24 LAB — BACTERIA UR CULT: NORMAL

## 2023-06-28 ENCOUNTER — OFFICE VISIT (OUTPATIENT)
Dept: CARDIOLOGY CLINIC | Age: 78
End: 2023-06-28
Payer: MEDICARE

## 2023-06-28 VITALS
WEIGHT: 227 LBS | BODY MASS INDEX: 33.62 KG/M2 | RESPIRATION RATE: 16 BRPM | SYSTOLIC BLOOD PRESSURE: 124 MMHG | DIASTOLIC BLOOD PRESSURE: 60 MMHG | HEART RATE: 60 BPM | HEIGHT: 69 IN

## 2023-06-28 DIAGNOSIS — I25.10 ATHEROSCLEROSIS OF NATIVE CORONARY ARTERY OF NATIVE HEART WITHOUT ANGINA PECTORIS: Primary | ICD-10-CM

## 2023-06-28 PROCEDURE — G8427 DOCREV CUR MEDS BY ELIG CLIN: HCPCS | Performed by: INTERNAL MEDICINE

## 2023-06-28 PROCEDURE — 1123F ACP DISCUSS/DSCN MKR DOCD: CPT | Performed by: INTERNAL MEDICINE

## 2023-06-28 PROCEDURE — 3078F DIAST BP <80 MM HG: CPT | Performed by: INTERNAL MEDICINE

## 2023-06-28 PROCEDURE — 1036F TOBACCO NON-USER: CPT | Performed by: INTERNAL MEDICINE

## 2023-06-28 PROCEDURE — G8417 CALC BMI ABV UP PARAM F/U: HCPCS | Performed by: INTERNAL MEDICINE

## 2023-06-28 PROCEDURE — 99214 OFFICE O/P EST MOD 30 MIN: CPT | Performed by: INTERNAL MEDICINE

## 2023-06-28 PROCEDURE — 3074F SYST BP LT 130 MM HG: CPT | Performed by: INTERNAL MEDICINE

## 2023-06-28 PROCEDURE — 93000 ELECTROCARDIOGRAM COMPLETE: CPT | Performed by: INTERNAL MEDICINE

## 2024-01-05 ENCOUNTER — OFFICE VISIT (OUTPATIENT)
Dept: CARDIOLOGY CLINIC | Age: 79
End: 2024-01-05
Payer: MEDICARE

## 2024-01-05 VITALS
HEART RATE: 52 BPM | HEIGHT: 69 IN | DIASTOLIC BLOOD PRESSURE: 70 MMHG | BODY MASS INDEX: 34.75 KG/M2 | SYSTOLIC BLOOD PRESSURE: 128 MMHG | RESPIRATION RATE: 18 BRPM | WEIGHT: 234.6 LBS

## 2024-01-05 DIAGNOSIS — I25.119 CORONARY ARTERY DISEASE WITH ANGINA PECTORIS, UNSPECIFIED VESSEL OR LESION TYPE, UNSPECIFIED WHETHER NATIVE OR TRANSPLANTED HEART (HCC): Primary | ICD-10-CM

## 2024-01-05 PROCEDURE — 1123F ACP DISCUSS/DSCN MKR DOCD: CPT | Performed by: INTERNAL MEDICINE

## 2024-01-05 PROCEDURE — G8427 DOCREV CUR MEDS BY ELIG CLIN: HCPCS | Performed by: INTERNAL MEDICINE

## 2024-01-05 PROCEDURE — G8417 CALC BMI ABV UP PARAM F/U: HCPCS | Performed by: INTERNAL MEDICINE

## 2024-01-05 PROCEDURE — 3074F SYST BP LT 130 MM HG: CPT | Performed by: INTERNAL MEDICINE

## 2024-01-05 PROCEDURE — 3078F DIAST BP <80 MM HG: CPT | Performed by: INTERNAL MEDICINE

## 2024-01-05 PROCEDURE — 1036F TOBACCO NON-USER: CPT | Performed by: INTERNAL MEDICINE

## 2024-01-05 PROCEDURE — G8484 FLU IMMUNIZE NO ADMIN: HCPCS | Performed by: INTERNAL MEDICINE

## 2024-01-05 PROCEDURE — 93000 ELECTROCARDIOGRAM COMPLETE: CPT | Performed by: INTERNAL MEDICINE

## 2024-01-05 PROCEDURE — 99214 OFFICE O/P EST MOD 30 MIN: CPT | Performed by: INTERNAL MEDICINE

## 2024-01-05 NOTE — PROGRESS NOTES
current facility-administered medications for this visit.     Review of systems:   Heart: as above   Lungs: as above   Eyes: denies changes in vision or discharge.   Ears: denies changes in hearing or pain.   Nose: denies epistaxis or masses   Throat: denies sore throat or trouble swallowing.   Neuro: denies numbness, tingling, tremors.   Skin: denies rashes or itching.   : denies hematuria, dysuria   GI: denies vomiting, diarrhea   Psych: denies mood changed, anxiety, depression.    Physical Exam   /70   Pulse 52   Resp 18   Ht 1.753 m (5' 9\")   Wt 106.4 kg (234 lb 9.6 oz)   BMI 34.64 kg/m²   Constitutional: Oriented to person, place, and time. No distress.  Well developed.  Head: Normocephalic and atraumatic.    Neck: Neck supple. No hepatojugular reflux. No JVD present. Carotid bruit is not present. No tracheal deviation present. No thyromegaly present.   Cardiovascular: Normal rate, regular rhythm, normal heart sounds.  intact distal pulses.  No gallop and no friction rub.  No murmur heard.  Pulmonary: Breath sounds normal. No respiratory distress. No wheezes. No rales.   Chest: Effort normal. No tenderness.  Abdominal: Soft. Bowel sounds are normal. No distension or mass. No tenderness, rebound or guarding.   Musculoskeletal: . No tenderness. No clubbing or cyanosis.  Extremitites: Intact distal pulses. No edema  Neurological: Alert and oriented to person, place, and time.   Skin: Skin is warm and dry. No rash noted. Not diaphoretic. No erythema.   Psychiatric: Normal mood and affect. Behavior is normal.   Lymphadenopathy: No cervical adenopathy. No groin adenopathy.    EKG: Sinus Bradycardia -First degree A-V block -occasional PAC    -Right sided conduction defect and right axis -possible right ventricular hypertrophy or posterior fascicular block -Nonspecific QRS widening. -Old anterior infarct.    Cardiac catheterization, 8/22/08: 3.0 x 24 mm Lulu drug eluting stent to the mid to distal

## 2024-10-02 ENCOUNTER — OFFICE VISIT (OUTPATIENT)
Dept: CARDIOLOGY CLINIC | Age: 79
End: 2024-10-02
Payer: MEDICARE

## 2024-10-02 VITALS
RESPIRATION RATE: 18 BRPM | HEART RATE: 53 BPM | SYSTOLIC BLOOD PRESSURE: 114 MMHG | DIASTOLIC BLOOD PRESSURE: 60 MMHG | BODY MASS INDEX: 34.07 KG/M2 | HEIGHT: 69 IN | WEIGHT: 230 LBS

## 2024-10-02 DIAGNOSIS — I25.119 CORONARY ARTERY DISEASE WITH ANGINA PECTORIS, UNSPECIFIED VESSEL OR LESION TYPE, UNSPECIFIED WHETHER NATIVE OR TRANSPLANTED HEART (HCC): Primary | ICD-10-CM

## 2024-10-02 PROCEDURE — 93000 ELECTROCARDIOGRAM COMPLETE: CPT | Performed by: INTERNAL MEDICINE

## 2024-10-02 PROCEDURE — 99214 OFFICE O/P EST MOD 30 MIN: CPT | Performed by: INTERNAL MEDICINE

## 2024-10-02 PROCEDURE — 1123F ACP DISCUSS/DSCN MKR DOCD: CPT | Performed by: INTERNAL MEDICINE

## 2024-10-02 PROCEDURE — 3074F SYST BP LT 130 MM HG: CPT | Performed by: INTERNAL MEDICINE

## 2024-10-02 PROCEDURE — 3078F DIAST BP <80 MM HG: CPT | Performed by: INTERNAL MEDICINE

## 2024-10-02 RX ORDER — RANOLAZINE 500 MG/1
500 TABLET, EXTENDED RELEASE ORAL 2 TIMES DAILY
COMMUNITY
Start: 2024-08-01

## 2024-10-02 NOTE — PROGRESS NOTES
4. COPD: Per primary service.     5. Lipids: Statin.      6. AAA: Per vascular. Aorto-iliac stent [facility] 2016 Dr. Ayala     7. Hx of TIA/CVA: ASA/plavix/statin.     8. Nephrolithiasis: Per .     Available external charts reviewed.   Available imaging and evaluations independently reviewed.   Interviewed and discussed patient with available family.    Silas Sewell D.O.  Cardiologist  Cardiology, [facility]

## 2025-01-01 ENCOUNTER — RESULTS FOLLOW-UP (OUTPATIENT)
Dept: NEUROLOGY | Age: 80
End: 2025-01-01

## 2025-04-04 ENCOUNTER — OFFICE VISIT (OUTPATIENT)
Dept: CARDIOLOGY CLINIC | Age: 80
End: 2025-04-04
Payer: MEDICARE

## 2025-04-04 VITALS
SYSTOLIC BLOOD PRESSURE: 126 MMHG | RESPIRATION RATE: 18 BRPM | HEIGHT: 69 IN | HEART RATE: 81 BPM | DIASTOLIC BLOOD PRESSURE: 72 MMHG | TEMPERATURE: 97.3 F | OXYGEN SATURATION: 93 % | BODY MASS INDEX: 32.88 KG/M2 | WEIGHT: 222 LBS

## 2025-04-04 DIAGNOSIS — I25.119 CORONARY ARTERY DISEASE WITH ANGINA PECTORIS, UNSPECIFIED VESSEL OR LESION TYPE, UNSPECIFIED WHETHER NATIVE OR TRANSPLANTED HEART: Primary | ICD-10-CM

## 2025-04-04 PROCEDURE — 93000 ELECTROCARDIOGRAM COMPLETE: CPT | Performed by: INTERNAL MEDICINE

## 2025-04-04 PROCEDURE — 1123F ACP DISCUSS/DSCN MKR DOCD: CPT | Performed by: INTERNAL MEDICINE

## 2025-04-04 PROCEDURE — G8417 CALC BMI ABV UP PARAM F/U: HCPCS | Performed by: INTERNAL MEDICINE

## 2025-04-04 PROCEDURE — 99214 OFFICE O/P EST MOD 30 MIN: CPT | Performed by: INTERNAL MEDICINE

## 2025-04-04 PROCEDURE — 1036F TOBACCO NON-USER: CPT | Performed by: INTERNAL MEDICINE

## 2025-04-04 PROCEDURE — 1159F MED LIST DOCD IN RCRD: CPT | Performed by: INTERNAL MEDICINE

## 2025-04-04 PROCEDURE — 3074F SYST BP LT 130 MM HG: CPT | Performed by: INTERNAL MEDICINE

## 2025-04-04 PROCEDURE — G8427 DOCREV CUR MEDS BY ELIG CLIN: HCPCS | Performed by: INTERNAL MEDICINE

## 2025-04-04 PROCEDURE — 3078F DIAST BP <80 MM HG: CPT | Performed by: INTERNAL MEDICINE

## 2025-04-04 NOTE — PROGRESS NOTES
March 5, 2018 torn chordae on mitral valve leaflet, EF 60% and mild mitral regurgitation and mild to moderate eccentric aortic regurgitation   Lexiscan MPS 04/01/2021: The myocardial perfusion is abnormal. Reversible inferior and inferolateral defect suggestive of ischemia. Small fixed defect at the apical lateral wall suggesting a small area of infarction. Dilated ventricle with mildly reduced LV systolic function, EF 50% with apical hypokinesis and abnormal septal motion. There is no transient ischemic dilation. Intermediate risk myocardial perfusion study.  Cardiac Catheterization 04/05/2021 (Dr. Del Rio): Severe triple-vessel CAD including  of the left main and 95% mid RCA. Severe SVG to OM, status post successful PCI using two drug-eluting stent. Severe SVG to PDA, status post successful PCI using drug-eluting stent. Patent LIMA to LAD. Patent SVG to diagonal branch--> staged PCI of the mid RCA using atherectomy and stenting recommended.  CVA s/p PCI 04/05/2021 (right occipital lobe in the PCA distribution)--> delayed staged PCI  CT Head WO contrast 04/06/2021:FINDINGS:   BRAIN/VENTRICLES: There is hypodensity in the right occipital lobe concerning for an acute/subacute infarction. No hemorrhage, mass-effect or midline shift is seen.  The remainder of the brain is notable for minimal volume loss   and mild chronic microvascular ischemic changes, both of which are age-appropriate.  No hydrocephalus or extra-axial fluid is seen. ORBITS: The visualized portion of the orbits demonstrate no acute abnormality. SINUSES: The visualized paranasal sinuses and mastoid air cells demonstrate   no acute abnormality. SOFT TISSUES/SKULL:  No acute abnormality of the visualized skull or soft  tissues.   Lexiscan MPS 06/25/2021 (Dr. Soliz): Stress images show a small to moderate-sized area of moderate photopenia inferior laterally.  Resting images show moderate resolution of this defect.  Gated analysis demonstrates mild

## 2025-04-17 ENCOUNTER — APPOINTMENT (OUTPATIENT)
Dept: GENERAL RADIOLOGY | Age: 80
DRG: 181 | End: 2025-04-17
Payer: MEDICARE

## 2025-04-17 ENCOUNTER — APPOINTMENT (OUTPATIENT)
Dept: CT IMAGING | Age: 80
DRG: 181 | End: 2025-04-17
Payer: MEDICARE

## 2025-04-17 ENCOUNTER — HOSPITAL ENCOUNTER (INPATIENT)
Age: 80
LOS: 7 days | Discharge: HOME | DRG: 181 | End: 2025-04-24
Attending: EMERGENCY MEDICINE | Admitting: STUDENT IN AN ORGANIZED HEALTH CARE EDUCATION/TRAINING PROGRAM
Payer: MEDICARE

## 2025-04-17 DIAGNOSIS — R93.89 ABNORMAL CT OF THE CHEST: ICD-10-CM

## 2025-04-17 DIAGNOSIS — I63.9 ACUTE ISCHEMIC STROKE (HCC): ICD-10-CM

## 2025-04-17 DIAGNOSIS — M89.8X9 LYTIC LESION OF BONE ON X-RAY: Primary | ICD-10-CM

## 2025-04-17 DIAGNOSIS — M54.42 ACUTE MIDLINE LOW BACK PAIN WITH LEFT-SIDED SCIATICA: ICD-10-CM

## 2025-04-17 DIAGNOSIS — R93.7 ABNORMAL COMPUTED TOMOGRAPHY OF LUMBAR SPINE: ICD-10-CM

## 2025-04-17 DIAGNOSIS — M84.48XA PATHOLOGICAL FRACTURE OF LUMBAR VERTEBRA, INITIAL ENCOUNTER: ICD-10-CM

## 2025-04-17 DIAGNOSIS — R91.8 LUNG MASS: ICD-10-CM

## 2025-04-17 DIAGNOSIS — I63.442 CEREBROVASCULAR ACCIDENT (CVA) DUE TO EMBOLISM OF LEFT CEREBELLAR ARTERY (HCC): ICD-10-CM

## 2025-04-17 DIAGNOSIS — I63.9 CEREBROVASCULAR ACCIDENT (CVA), UNSPECIFIED MECHANISM (HCC): ICD-10-CM

## 2025-04-17 DIAGNOSIS — R93.5 ABNORMAL CT OF THE ABDOMEN: ICD-10-CM

## 2025-04-17 LAB
ALBUMIN SERPL-MCNC: 4.3 G/DL (ref 3.5–5.2)
ALP SERPL-CCNC: 79 U/L (ref 40–129)
ALT SERPL-CCNC: 15 U/L (ref 0–40)
ANION GAP SERPL CALCULATED.3IONS-SCNC: 11 MMOL/L (ref 7–16)
AST SERPL-CCNC: 16 U/L (ref 0–39)
BASOPHILS # BLD: 0.04 K/UL (ref 0–0.2)
BASOPHILS NFR BLD: 0 % (ref 0–2)
BILIRUB SERPL-MCNC: 0.5 MG/DL (ref 0–1.2)
BUN SERPL-MCNC: 15 MG/DL (ref 6–23)
CALCIUM SERPL-MCNC: 9.6 MG/DL (ref 8.6–10.2)
CHLORIDE SERPL-SCNC: 103 MMOL/L (ref 98–107)
CO2 SERPL-SCNC: 24 MMOL/L (ref 22–29)
CREAT SERPL-MCNC: 0.7 MG/DL (ref 0.7–1.2)
EOSINOPHIL # BLD: 0.04 K/UL (ref 0.05–0.5)
EOSINOPHILS RELATIVE PERCENT: 0 % (ref 0–6)
ERYTHROCYTE [DISTWIDTH] IN BLOOD BY AUTOMATED COUNT: 15.3 % (ref 11.5–15)
GFR, ESTIMATED: >90 ML/MIN/1.73M2
GLUCOSE SERPL-MCNC: 144 MG/DL (ref 74–99)
HCT VFR BLD AUTO: 45.7 % (ref 37–54)
HGB BLD-MCNC: 15.2 G/DL (ref 12.5–16.5)
IMM GRANULOCYTES # BLD AUTO: 0.05 K/UL (ref 0–0.58)
IMM GRANULOCYTES NFR BLD: 1 % (ref 0–5)
LYMPHOCYTES NFR BLD: 1.47 K/UL (ref 1.5–4)
LYMPHOCYTES RELATIVE PERCENT: 14 % (ref 20–42)
MCH RBC QN AUTO: 29.3 PG (ref 26–35)
MCHC RBC AUTO-ENTMCNC: 33.3 G/DL (ref 32–34.5)
MCV RBC AUTO: 88.1 FL (ref 80–99.9)
MONOCYTES NFR BLD: 0.69 K/UL (ref 0.1–0.95)
MONOCYTES NFR BLD: 6 % (ref 2–12)
NEUTROPHILS NFR BLD: 79 % (ref 43–80)
NEUTS SEG NFR BLD: 8.44 K/UL (ref 1.8–7.3)
PLATELET # BLD AUTO: 270 K/UL (ref 130–450)
PMV BLD AUTO: 9.9 FL (ref 7–12)
POTASSIUM SERPL-SCNC: 4 MMOL/L (ref 3.5–5)
PROT SERPL-MCNC: 8 G/DL (ref 6.4–8.3)
RBC # BLD AUTO: 5.19 M/UL (ref 3.8–5.8)
SODIUM SERPL-SCNC: 138 MMOL/L (ref 132–146)
TROPONIN I SERPL HS-MCNC: <6 NG/L (ref 0–22)
WBC OTHER # BLD: 10.7 K/UL (ref 4.5–11.5)

## 2025-04-17 PROCEDURE — 80053 COMPREHEN METABOLIC PANEL: CPT

## 2025-04-17 PROCEDURE — 6360000002 HC RX W HCPCS: Performed by: EMERGENCY MEDICINE

## 2025-04-17 PROCEDURE — 6360000004 HC RX CONTRAST MEDICATION: Performed by: RADIOLOGY

## 2025-04-17 PROCEDURE — 72131 CT LUMBAR SPINE W/O DYE: CPT

## 2025-04-17 PROCEDURE — 6360000002 HC RX W HCPCS: Performed by: STUDENT IN AN ORGANIZED HEALTH CARE EDUCATION/TRAINING PROGRAM

## 2025-04-17 PROCEDURE — 71275 CT ANGIOGRAPHY CHEST: CPT

## 2025-04-17 PROCEDURE — 72128 CT CHEST SPINE W/O DYE: CPT

## 2025-04-17 PROCEDURE — 2140000000 HC CCU INTERMEDIATE R&B

## 2025-04-17 PROCEDURE — 2500000003 HC RX 250 WO HCPCS: Performed by: STUDENT IN AN ORGANIZED HEALTH CARE EDUCATION/TRAINING PROGRAM

## 2025-04-17 PROCEDURE — 6370000000 HC RX 637 (ALT 250 FOR IP): Performed by: STUDENT IN AN ORGANIZED HEALTH CARE EDUCATION/TRAINING PROGRAM

## 2025-04-17 PROCEDURE — 96376 TX/PRO/DX INJ SAME DRUG ADON: CPT

## 2025-04-17 PROCEDURE — 93005 ELECTROCARDIOGRAM TRACING: CPT | Performed by: NURSE PRACTITIONER

## 2025-04-17 PROCEDURE — 85025 COMPLETE CBC W/AUTO DIFF WBC: CPT

## 2025-04-17 PROCEDURE — 74176 CT ABD & PELVIS W/O CONTRAST: CPT

## 2025-04-17 PROCEDURE — 71046 X-RAY EXAM CHEST 2 VIEWS: CPT

## 2025-04-17 PROCEDURE — 96374 THER/PROPH/DIAG INJ IV PUSH: CPT

## 2025-04-17 PROCEDURE — 96375 TX/PRO/DX INJ NEW DRUG ADDON: CPT

## 2025-04-17 PROCEDURE — 6370000000 HC RX 637 (ALT 250 FOR IP): Performed by: EMERGENCY MEDICINE

## 2025-04-17 PROCEDURE — 99285 EMERGENCY DEPT VISIT HI MDM: CPT

## 2025-04-17 PROCEDURE — 84484 ASSAY OF TROPONIN QUANT: CPT

## 2025-04-17 RX ORDER — MORPHINE SULFATE 2 MG/ML
2 INJECTION, SOLUTION INTRAMUSCULAR; INTRAVENOUS EVERY 4 HOURS PRN
Refills: 0 | Status: DISCONTINUED | OUTPATIENT
Start: 2025-04-17 | End: 2025-04-22

## 2025-04-17 RX ORDER — SODIUM CHLORIDE 0.9 % (FLUSH) 0.9 %
5-40 SYRINGE (ML) INJECTION PRN
Status: DISCONTINUED | OUTPATIENT
Start: 2025-04-17 | End: 2025-04-24 | Stop reason: HOSPADM

## 2025-04-17 RX ORDER — ONDANSETRON 2 MG/ML
4 INJECTION INTRAMUSCULAR; INTRAVENOUS EVERY 6 HOURS PRN
Status: DISCONTINUED | OUTPATIENT
Start: 2025-04-17 | End: 2025-04-24 | Stop reason: HOSPADM

## 2025-04-17 RX ORDER — ORPHENADRINE CITRATE 30 MG/ML
60 INJECTION INTRAMUSCULAR; INTRAVENOUS ONCE
Status: COMPLETED | OUTPATIENT
Start: 2025-04-17 | End: 2025-04-17

## 2025-04-17 RX ORDER — ASPIRIN 81 MG/1
81 TABLET, CHEWABLE ORAL DAILY
Status: DISCONTINUED | OUTPATIENT
Start: 2025-04-18 | End: 2025-04-24 | Stop reason: HOSPADM

## 2025-04-17 RX ORDER — CLOPIDOGREL BISULFATE 75 MG/1
75 TABLET ORAL DAILY
Status: DISCONTINUED | OUTPATIENT
Start: 2025-04-18 | End: 2025-04-24 | Stop reason: HOSPADM

## 2025-04-17 RX ORDER — RANOLAZINE 500 MG/1
500 TABLET, EXTENDED RELEASE ORAL 2 TIMES DAILY
Status: DISCONTINUED | OUTPATIENT
Start: 2025-04-17 | End: 2025-04-24 | Stop reason: HOSPADM

## 2025-04-17 RX ORDER — POLYETHYLENE GLYCOL 3350 17 G/17G
17 POWDER, FOR SOLUTION ORAL DAILY PRN
Status: DISCONTINUED | OUTPATIENT
Start: 2025-04-17 | End: 2025-04-18

## 2025-04-17 RX ORDER — ISOSORBIDE MONONITRATE 30 MG/1
60 TABLET, EXTENDED RELEASE ORAL DAILY
Status: DISCONTINUED | OUTPATIENT
Start: 2025-04-18 | End: 2025-04-24 | Stop reason: HOSPADM

## 2025-04-17 RX ORDER — LISINOPRIL 20 MG/1
20 TABLET ORAL 2 TIMES DAILY
Status: DISCONTINUED | OUTPATIENT
Start: 2025-04-17 | End: 2025-04-24 | Stop reason: HOSPADM

## 2025-04-17 RX ORDER — OXYCODONE AND ACETAMINOPHEN 5; 325 MG/1; MG/1
1 TABLET ORAL
Refills: 0 | Status: COMPLETED | OUTPATIENT
Start: 2025-04-17 | End: 2025-04-17

## 2025-04-17 RX ORDER — MAGNESIUM SULFATE IN WATER 40 MG/ML
2000 INJECTION, SOLUTION INTRAVENOUS PRN
Status: DISCONTINUED | OUTPATIENT
Start: 2025-04-17 | End: 2025-04-24 | Stop reason: HOSPADM

## 2025-04-17 RX ORDER — ACETAMINOPHEN 325 MG/1
650 TABLET ORAL EVERY 6 HOURS PRN
Status: DISCONTINUED | OUTPATIENT
Start: 2025-04-17 | End: 2025-04-24 | Stop reason: HOSPADM

## 2025-04-17 RX ORDER — ENOXAPARIN SODIUM 100 MG/ML
40 INJECTION SUBCUTANEOUS DAILY
Status: DISCONTINUED | OUTPATIENT
Start: 2025-04-17 | End: 2025-04-24 | Stop reason: HOSPADM

## 2025-04-17 RX ORDER — POTASSIUM CHLORIDE 7.45 MG/ML
10 INJECTION INTRAVENOUS PRN
Status: DISCONTINUED | OUTPATIENT
Start: 2025-04-17 | End: 2025-04-24 | Stop reason: HOSPADM

## 2025-04-17 RX ORDER — IOPAMIDOL 755 MG/ML
75 INJECTION, SOLUTION INTRAVASCULAR
Status: COMPLETED | OUTPATIENT
Start: 2025-04-17 | End: 2025-04-17

## 2025-04-17 RX ORDER — ONDANSETRON 4 MG/1
4 TABLET, ORALLY DISINTEGRATING ORAL EVERY 8 HOURS PRN
Status: DISCONTINUED | OUTPATIENT
Start: 2025-04-17 | End: 2025-04-17

## 2025-04-17 RX ORDER — ONDANSETRON 2 MG/ML
4 INJECTION INTRAMUSCULAR; INTRAVENOUS EVERY 6 HOURS PRN
Status: DISCONTINUED | OUTPATIENT
Start: 2025-04-17 | End: 2025-04-17

## 2025-04-17 RX ORDER — SODIUM CHLORIDE 0.9 % (FLUSH) 0.9 %
5-40 SYRINGE (ML) INJECTION EVERY 12 HOURS SCHEDULED
Status: DISCONTINUED | OUTPATIENT
Start: 2025-04-17 | End: 2025-04-24 | Stop reason: HOSPADM

## 2025-04-17 RX ORDER — FENTANYL CITRATE 50 UG/ML
50 INJECTION, SOLUTION INTRAMUSCULAR; INTRAVENOUS ONCE
Status: COMPLETED | OUTPATIENT
Start: 2025-04-17 | End: 2025-04-17

## 2025-04-17 RX ORDER — SODIUM CHLORIDE 9 MG/ML
INJECTION, SOLUTION INTRAVENOUS PRN
Status: DISCONTINUED | OUTPATIENT
Start: 2025-04-17 | End: 2025-04-24 | Stop reason: HOSPADM

## 2025-04-17 RX ORDER — FENTANYL CITRATE 50 UG/ML
50 INJECTION, SOLUTION INTRAMUSCULAR; INTRAVENOUS
Status: COMPLETED | OUTPATIENT
Start: 2025-04-17 | End: 2025-04-18

## 2025-04-17 RX ORDER — ONDANSETRON 2 MG/ML
4 INJECTION INTRAMUSCULAR; INTRAVENOUS ONCE
Status: COMPLETED | OUTPATIENT
Start: 2025-04-17 | End: 2025-04-17

## 2025-04-17 RX ORDER — POTASSIUM CHLORIDE 1500 MG/1
40 TABLET, EXTENDED RELEASE ORAL PRN
Status: DISCONTINUED | OUTPATIENT
Start: 2025-04-17 | End: 2025-04-24 | Stop reason: HOSPADM

## 2025-04-17 RX ORDER — ACETAMINOPHEN 650 MG/1
650 SUPPOSITORY RECTAL EVERY 6 HOURS PRN
Status: DISCONTINUED | OUTPATIENT
Start: 2025-04-17 | End: 2025-04-24 | Stop reason: HOSPADM

## 2025-04-17 RX ORDER — KETOROLAC TROMETHAMINE 30 MG/ML
15 INJECTION, SOLUTION INTRAMUSCULAR; INTRAVENOUS ONCE
Status: COMPLETED | OUTPATIENT
Start: 2025-04-17 | End: 2025-04-17

## 2025-04-17 RX ORDER — ROSUVASTATIN CALCIUM 20 MG/1
40 TABLET, COATED ORAL DAILY
Status: DISCONTINUED | OUTPATIENT
Start: 2025-04-18 | End: 2025-04-24 | Stop reason: HOSPADM

## 2025-04-17 RX ORDER — PANTOPRAZOLE SODIUM 40 MG/1
40 TABLET, DELAYED RELEASE ORAL
Status: DISCONTINUED | OUTPATIENT
Start: 2025-04-18 | End: 2025-04-24 | Stop reason: HOSPADM

## 2025-04-17 RX ADMIN — SODIUM CHLORIDE, PRESERVATIVE FREE 10 ML: 5 INJECTION INTRAVENOUS at 22:39

## 2025-04-17 RX ADMIN — FENTANYL CITRATE 50 MCG: 50 INJECTION INTRAMUSCULAR; INTRAVENOUS at 19:34

## 2025-04-17 RX ADMIN — OXYCODONE AND ACETAMINOPHEN 1 TABLET: 5; 325 TABLET ORAL at 16:30

## 2025-04-17 RX ADMIN — ORPHENADRINE CITRATE 60 MG: 60 INJECTION INTRAMUSCULAR; INTRAVENOUS at 16:30

## 2025-04-17 RX ADMIN — ONDANSETRON 4 MG: 2 INJECTION, SOLUTION INTRAMUSCULAR; INTRAVENOUS at 16:30

## 2025-04-17 RX ADMIN — RANOLAZINE 500 MG: 500 TABLET, FILM COATED, EXTENDED RELEASE ORAL at 22:37

## 2025-04-17 RX ADMIN — KETOROLAC TROMETHAMINE 15 MG: 30 INJECTION, SOLUTION INTRAMUSCULAR at 16:30

## 2025-04-17 RX ADMIN — IOPAMIDOL 75 ML: 755 INJECTION, SOLUTION INTRAVENOUS at 16:16

## 2025-04-17 RX ADMIN — FENTANYL CITRATE 50 MCG: 50 INJECTION INTRAMUSCULAR; INTRAVENOUS at 17:30

## 2025-04-17 RX ADMIN — LISINOPRIL 20 MG: 20 TABLET ORAL at 22:37

## 2025-04-17 RX ADMIN — MORPHINE SULFATE 2 MG: 2 INJECTION, SOLUTION INTRAMUSCULAR; INTRAVENOUS at 22:37

## 2025-04-17 RX ADMIN — FENTANYL CITRATE 50 MCG: 50 INJECTION INTRAMUSCULAR; INTRAVENOUS at 21:07

## 2025-04-17 ASSESSMENT — PAIN SCALES - GENERAL
PAINLEVEL_OUTOF10: 7
PAINLEVEL_OUTOF10: 10
PAINLEVEL_OUTOF10: 9
PAINLEVEL_OUTOF10: 10
PAINLEVEL_OUTOF10: 10
PAINLEVEL_OUTOF10: 7
PAINLEVEL_OUTOF10: 10
PAINLEVEL_OUTOF10: 6

## 2025-04-17 ASSESSMENT — PAIN DESCRIPTION - LOCATION
LOCATION: BACK;LEG
LOCATION: BACK;LEG
LOCATION: BACK
LOCATION: LEG

## 2025-04-17 ASSESSMENT — PAIN - FUNCTIONAL ASSESSMENT
PAIN_FUNCTIONAL_ASSESSMENT: ACTIVITIES ARE NOT PREVENTED
PAIN_FUNCTIONAL_ASSESSMENT: 0-10

## 2025-04-17 ASSESSMENT — PAIN DESCRIPTION - ORIENTATION
ORIENTATION: LEFT

## 2025-04-17 ASSESSMENT — PAIN DESCRIPTION - DESCRIPTORS
DESCRIPTORS: ACHING;SHOOTING;SHARP
DESCRIPTORS: ACHING

## 2025-04-17 ASSESSMENT — PAIN DESCRIPTION - ONSET: ONSET: ON-GOING

## 2025-04-17 ASSESSMENT — PAIN DESCRIPTION - PAIN TYPE: TYPE: ACUTE PAIN

## 2025-04-17 ASSESSMENT — PAIN DESCRIPTION - FREQUENCY: FREQUENCY: CONTINUOUS

## 2025-04-17 NOTE — CARE COORDINATION
Internal Medicine On-call Care Coordination Note    I was called by the ED physician because they recommended admission for this patient and we cover their PCP.  The history as I understand it after discussion with the ED physician is as follows:    Had concerns of cough. Saw PCP who placed him on abx. Having cough still and after coughing had back pain. Due to this presented to [place] ED  CTA Pulm: There is a new masslike opacity with irregular margins located in the right upper lobe extending to level of the right hilum.  This lesion measures approximately 6.9 x 5.9 cm  CT Lumbar: New lytic lesion is seen involving the body of L2 (counting from lumbar spine cephalad.  This lesion is at level of L1 when counting from the thoracic spine caudally). Heterogeneous mass is associated with the deep anterior right hip musculature measuring 3.7 x 3.4 cm which may indicate a soft tissue hematoma   Fentanyl improved his back pain  Decision for transfer to [facility] and admission    I placed admission orders.  Including:    General admission orders  Home meds ordered  Pulmonary consulted  NGSY consulted  Heme/Onc consulted  PRN pain control    Either Dr. Smith, Dr. Mcbride, or our coverage will see the patient tomorrow for H&P.    DVT prophylaxis: Lovenox  Code Status: FULL    Electronically signed by Vinayak Smith MD on 4/17/2025 at 6:17 PM

## 2025-04-17 NOTE — DISCHARGE INSTRUCTIONS
CTA CHEST W CONTRAST   Final Result   1. New mass is seen in the medial right upper lobe extending to right hilum   and encasing right hilar vasculature and bronchi suggestive of neoplasm   measuring approximately 6.9 x 5.9 cm.   2. Enlarged mediastinal lymph nodes are present notable in right paratracheal   location.   3. Partial visualization of a lytic lesion involving the visualized body of   L1.   4. No evidence of pulmonary arterial embolism.         CT LUMBAR SPINE WO CONTRAST   Final Result   1. No acute fracture or malalignment.   2. Moderate multilevel degenerative changes.         CT THORACIC SPINE WO CONTRAST   Final Result   1. No acute fracture or subluxation.   2. Moderate multilevel degenerative changes.   3. 5.5 cm x 2.5 cm x 6.5 cm spiculated right upper lobe mass.  Findings   highly worrisome for malignancy.         CT ABDOMEN PELVIS WO CONTRAST Additional Contrast? None   Final Result   1. New lytic lesion is seen involving the body of L2 (counting from lumbar   spine cephalad.  This lesion is at level of L1 when counting from the   thoracic spine caudally).   2. Stable fusiform infrarenal abdominal aortic 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   3. Diverticulosis.   4. Nonobstructing 1 mm left renal calculus.   5. Heterogeneous mass is associated with the deep anterior right hip   musculature measuring 3.7 x 3.4 cm which may indicate a soft tissue hematoma   (axial image 185, series: 2).  Follow-up recommended.         XR CHEST (2 VW)   Final Result   Right suprahilar opacity may represent pneumonia in the appropriate clinical   setting. Follow-up to resolution is recommended.

## 2025-04-17 NOTE — ED PROVIDER NOTES
[facility] EMERGENCY DEPARTMENT  EMERGENCY DEPARTMENT ENCOUNTER        Pt Name: Corky Lawler  MRN: [id number]  Birthdate 1945  Date of evaluation: 4/17/2025  Provider: Timmy Reynolds DO  PCP: Aurelio Hamilton MD  Note Started: 4:09 PM EDT 4/17/25    CHIEF COMPLAINT       Chief Complaint   Patient presents with    Back Pain     Approx one week ago was coughing and when coughed experienced pain low back pain with radiation to anterior thigh; cough resolved but now pain getting worse, unable to sleep       HISTORY OF PRESENT ILLNESS: 1 or more Elements   History From: Patient, EMR     Limitations to history : None    Corky Lawler is a 80 y.o. male who presents back pain left leg pain.  He reports about a week ago he had episode of coughing, did see his PCP, was put on antibiotics as well as steroids which he has recently completed.  Reports cough is improved however during this coughing episode he had sudden onset of lower thoracic upper lumbar back pain.  States occasionally feels pain rating to the upper left thigh.  Denies bowel bladder continence saddle paresthesias denies any chest pain or shortness of breath.  Has been taking Tylenol ibuprofen without relief.  Did follow-up with his PCP, was given tramadol which he states made him feel very nauseous.  Reports last Tylenol intake was about 7 hours ago    Nursing Notes were all reviewed and agreed with or any disagreements were addressed in the HPI.      REVIEW OF EXTERNAL NOTE :       PCP office note from today patient called and complaining of vomiting with tramadol and back pain    PCP office note from 4/16/2025, was seen for acute left-sided low back pain with left-sided sciatica.    PCP office note from 4/10/2025, was seen in follow-up for pneumonia.      Review of recent fill record shows Medrol pack started on 4/3/2025, all TRAM started on 4/16/2025, Augmentin was started on 4/3/2025  REVIEW OF SYSTEMS :        reviewed and agreed with or any disagreements were addressed in the HPI.      REVIEW OF EXTERNAL NOTE :      PCP office note from today patient called and complaining of vomiting with tramadol and back pain    PCP office note from 4/16/2025, was seen for acute left-sided low back pain with left-sided sciatica.    PCP office note from 4/10/2025, was seen in follow-up for pneumonia.      Review of recent fill record shows Medrol pack started on 4/3/2025, all TRAM started on 4/16/2025, Augmentin was started on 4/3/2025    The patient was placed on the cardiac monitor for continuous monitoring of rhythm and vitals. CBC was ordered as part of my assessment for possible infection, anemia or thrombocytopenia. CMP to assess electrolytes, kidney function, liver function or any metabolic derangements. Urinalysis to evaluate for a UTI. Troponin as a marker for myocardial ischemia or heart strain. CT abdomen for, but without limitation to, ureterolithiasis, nephrolithiasis, constipation, hollow organ perforation, small bowel obstruction, bowel ischemia, pneumoperitoneum, diverticulitis, cholecystitis, appendicitis, intra-abdominal abscess, or malignancy. CT chest for, but without limitation to, pulmonary embolism, effusions, pneumonia, dissection or acute bony fractures. CT thoracic and lumbar spine for any fractures or displacements due to history of presenting symptoms.      Pain poorly controlled after first round of medications, after fentanyl he stated improvement, abnormalities concerning for newly found neoplastic disease with lytic lesions.  Given radicular symptoms offered and patient did elect for admission.  Spoke with medicine patient will be admitted.    Amount and/or Complexity of Data Reviewed  Independent Historian: spouse  Labs: ordered. Decision-making details documented in ED Course.  Radiology: ordered. Decision-making details documented in ED Course.    Risk  Prescription drug management.

## 2025-04-18 PROBLEM — M84.48XA PATHOLOGIC LUMBAR VERTEBRAL FRACTURE: Status: ACTIVE | Noted: 2025-04-18

## 2025-04-18 PROBLEM — R91.8 LUNG MASS: Status: ACTIVE | Noted: 2025-04-18

## 2025-04-18 PROBLEM — M89.8X9 LYTIC LESION OF BONE ON X-RAY: Status: ACTIVE | Noted: 2025-04-18

## 2025-04-18 PROCEDURE — 99233 SBSQ HOSP IP/OBS HIGH 50: CPT | Performed by: INTERNAL MEDICINE

## 2025-04-18 PROCEDURE — 6360000002 HC RX W HCPCS: Performed by: STUDENT IN AN ORGANIZED HEALTH CARE EDUCATION/TRAINING PROGRAM

## 2025-04-18 PROCEDURE — 2500000003 HC RX 250 WO HCPCS: Performed by: STUDENT IN AN ORGANIZED HEALTH CARE EDUCATION/TRAINING PROGRAM

## 2025-04-18 PROCEDURE — 6360000002 HC RX W HCPCS: Performed by: EMERGENCY MEDICINE

## 2025-04-18 PROCEDURE — 99222 1ST HOSP IP/OBS MODERATE 55: CPT | Performed by: NEUROLOGICAL SURGERY

## 2025-04-18 PROCEDURE — 6370000000 HC RX 637 (ALT 250 FOR IP): Performed by: STUDENT IN AN ORGANIZED HEALTH CARE EDUCATION/TRAINING PROGRAM

## 2025-04-18 PROCEDURE — 2140000000 HC CCU INTERMEDIATE R&B

## 2025-04-18 RX ORDER — MORPHINE SULFATE 2 MG/ML
2 INJECTION, SOLUTION INTRAMUSCULAR; INTRAVENOUS EVERY 4 HOURS PRN
Status: DISCONTINUED | OUTPATIENT
Start: 2025-04-18 | End: 2025-04-18 | Stop reason: SDUPTHER

## 2025-04-18 RX ORDER — HYDROCODONE BITARTRATE AND ACETAMINOPHEN 5; 325 MG/1; MG/1
1 TABLET ORAL EVERY 6 HOURS PRN
Status: DISCONTINUED | OUTPATIENT
Start: 2025-04-18 | End: 2025-04-21

## 2025-04-18 RX ORDER — POLYETHYLENE GLYCOL 3350 17 G/17G
17 POWDER, FOR SOLUTION ORAL DAILY
Status: DISCONTINUED | OUTPATIENT
Start: 2025-04-18 | End: 2025-04-24 | Stop reason: HOSPADM

## 2025-04-18 RX ADMIN — LISINOPRIL 20 MG: 20 TABLET ORAL at 20:44

## 2025-04-18 RX ADMIN — HYDROCODONE BITARTRATE AND ACETAMINOPHEN 1 TABLET: 5; 325 TABLET ORAL at 10:58

## 2025-04-18 RX ADMIN — FENTANYL CITRATE 50 MCG: 50 INJECTION INTRAMUSCULAR; INTRAVENOUS at 01:30

## 2025-04-18 RX ADMIN — FENTANYL CITRATE 50 MCG: 50 INJECTION INTRAMUSCULAR; INTRAVENOUS at 00:24

## 2025-04-18 RX ADMIN — ASPIRIN 81 MG CHEWABLE TABLET 81 MG: 81 TABLET CHEWABLE at 09:03

## 2025-04-18 RX ADMIN — POLYETHYLENE GLYCOL 3350 17 G: 17 POWDER, FOR SOLUTION ORAL at 10:59

## 2025-04-18 RX ADMIN — SODIUM CHLORIDE, PRESERVATIVE FREE 10 ML: 5 INJECTION INTRAVENOUS at 09:03

## 2025-04-18 RX ADMIN — MORPHINE SULFATE 2 MG: 2 INJECTION, SOLUTION INTRAMUSCULAR; INTRAVENOUS at 05:35

## 2025-04-18 RX ADMIN — ROSUVASTATIN CALCIUM 40 MG: 20 TABLET, FILM COATED ORAL at 09:03

## 2025-04-18 RX ADMIN — ONDANSETRON 4 MG: 2 INJECTION, SOLUTION INTRAMUSCULAR; INTRAVENOUS at 12:35

## 2025-04-18 RX ADMIN — MORPHINE SULFATE 2 MG: 2 INJECTION, SOLUTION INTRAMUSCULAR; INTRAVENOUS at 22:30

## 2025-04-18 RX ADMIN — RANOLAZINE 500 MG: 500 TABLET, FILM COATED, EXTENDED RELEASE ORAL at 09:03

## 2025-04-18 RX ADMIN — CLOPIDOGREL BISULFATE 75 MG: 75 TABLET, FILM COATED ORAL at 09:03

## 2025-04-18 RX ADMIN — ISOSORBIDE MONONITRATE 60 MG: 30 TABLET, EXTENDED RELEASE ORAL at 09:03

## 2025-04-18 RX ADMIN — PANTOPRAZOLE SODIUM 40 MG: 40 TABLET, DELAYED RELEASE ORAL at 05:35

## 2025-04-18 RX ADMIN — ENOXAPARIN SODIUM 40 MG: 100 INJECTION SUBCUTANEOUS at 09:03

## 2025-04-18 RX ADMIN — RANOLAZINE 500 MG: 500 TABLET, FILM COATED, EXTENDED RELEASE ORAL at 20:44

## 2025-04-18 RX ADMIN — SODIUM CHLORIDE, PRESERVATIVE FREE 10 ML: 5 INJECTION INTRAVENOUS at 20:46

## 2025-04-18 RX ADMIN — LISINOPRIL 20 MG: 20 TABLET ORAL at 09:03

## 2025-04-18 ASSESSMENT — PAIN DESCRIPTION - LOCATION
LOCATION: LEG
LOCATION: LEG
LOCATION: BACK;LEG
LOCATION: LEG
LOCATION: ABDOMEN;BACK

## 2025-04-18 ASSESSMENT — PAIN SCALES - GENERAL
PAINLEVEL_OUTOF10: 4
PAINLEVEL_OUTOF10: 0
PAINLEVEL_OUTOF10: 0
PAINLEVEL_OUTOF10: 4
PAINLEVEL_OUTOF10: 4
PAINLEVEL_OUTOF10: 8
PAINLEVEL_OUTOF10: 6
PAINLEVEL_OUTOF10: 4
PAINLEVEL_OUTOF10: 4
PAINLEVEL_OUTOF10: 6
PAINLEVEL_OUTOF10: 6
PAINLEVEL_OUTOF10: 7
PAINLEVEL_OUTOF10: 4
PAINLEVEL_OUTOF10: 6

## 2025-04-18 ASSESSMENT — PAIN DESCRIPTION - ORIENTATION
ORIENTATION: LEFT
ORIENTATION: LEFT
ORIENTATION: LEFT;LOWER
ORIENTATION: RIGHT;LEFT

## 2025-04-18 ASSESSMENT — PAIN DESCRIPTION - DESCRIPTORS
DESCRIPTORS: TIGHTNESS;SHARP;THROBBING
DESCRIPTORS: ACHING;JABBING;STABBING
DESCRIPTORS: ACHING;DISCOMFORT;DULL;SORE

## 2025-04-18 ASSESSMENT — PAIN - FUNCTIONAL ASSESSMENT: PAIN_FUNCTIONAL_ASSESSMENT: ACTIVITIES ARE NOT PREVENTED

## 2025-04-18 NOTE — PROGRESS NOTES
[employer] contacted via telephone for Brace order. All necessary documents faxed.        Deloris Cantu RN

## 2025-04-18 NOTE — PROGRESS NOTES
Physical Therapy  Physical Therapy Missed Visit    Name: Corky Lawler  : 1945  MRN: [id number]  Room #: 6416/6416-B    Date of Service: 2025    Attempted PT evaluation. Await pt's TLSO brace to attempt OOB activity per NS instruction. Will attempt again once brace arrives, schedule permitting.    Obdulio Snyder, PT, DPT  [id number]

## 2025-04-18 NOTE — H&P
Internal Medicine History & Physical     Name: Corky Lawler  : 1945  Chief Complaint: Back Pain (Approx one week ago was coughing and when coughed experienced pain low back pain with radiation to anterior thigh; cough resolved but now pain getting worse, unable to sleep)  Primary Care Physician: Aurelio Hamilton MD  Admission date: 2025  Date of service: 2025     History of Present Illness  Corky is a 80 y.o. year old male with a PMH of who presented with a chief complaint of back pain and Had concerns of cough. Saw PCP who placed him on abx. Having cough still and after coughing had back pain.     Due to this presented to [place] ED  CTA Pulm: There is a new masslike opacity with irregular margins located in the right upper lobe extending to level of the right hilum.  This lesion measures approximately 6.9 x 5.9 cm  CT Lumbar: New lytic lesion is seen involving the body of L2 (counting from lumbar spine cephalad.  This lesion is at level of L1 when counting from the thoracic spine caudally). Heterogeneous mass is associated with the deep anterior right hip musculature measuring 3.7 x 3.4 cm which may indicate a soft tissue hematoma      Due to these concerns patient admitted for further management          Past Medical History:   Diagnosis Date    AAA (abdominal aortic aneurysm)     4,5cm    Aortic regurgitation     mild to moderate    COPD (chronic obstructive pulmonary disease) (HCC)     Coronary atherosclerosis of native coronary artery     DJD (degenerative joint disease)     HTN (hypertension)     Hyperlipidemia     Mitral regurgitation     TIA (transient ischemic attack) 2013       Past Surgical History:   Procedure Laterality Date    AORTA SURGERY      Aort[facility]iliac stent at [facility] 2016    APPENDECTOMY      BACK SURGERY      BLADDER SURGERY Left 2023    CYSTOSCOPY RETROGRADE PYELOGRAM, LASER LITHOTRIPSY WITH LEFT STENT INSERTION AND GUADARRAMA PLACEMENT performed by  posterolateral branch. SVG to PDA was patent but had no retrograde flow into the posterolateral branch.  4.0 x 16 mm Liberte stent to the distal right coronary artery to 0% residual stenosis.   Cardiac catheterization, 8/22/08: 3.0 x 24 mm Mcclellan drug eluting stent to the mid to distal circumflex to 0% residual stenosis.    Moderate (2+) aortic regurgitation      COPD (chronic obstructive pulmonary disease) (HCC)     DJD spine     Trace mitral regurgitation        Plan  Left thigh pain and back pain  CTA Pulm: There is a new masslike opacity with irregular margins located in the right upper lobe extending to level of the right hilum.  This lesion measures approximately 6.9 x 5.9 cm  CT Lumbar: New lytic lesion is seen involving the body of L2 (counting from lumbar spine cephalad.  This lesion is at level of L1 when counting from the thoracic spine caudally). Heterogeneous mass is associated with the deep anterior right hip musculature measuring 3.7 x 3.4 cm which may indicate a soft tissue hematoma   NGSY consulted  MRI thoracic and lumbar spine with and without contrast  NM bone scan  Soft TLSO brace  Heme/Onc consulted  PRN pain control    Right Lung mass  CTA Pulm: There is a new masslike opacity with irregular margins located in the right upper lobe extending to level of the right hilum.  This lesion measures approximately 6.9 x 5.9 cm  Pulmonary on board  Plan for EBUS on 4/23/25  Heme/Onc consulted    History of CAD s/p CABG by Dr. Luna on 11/3/2007-Ranexa 500 mg twice daily, Imdur 60 mg daily, Crestor 40 mg daily, Plavix 75 mg daily, ASA 81 mg daily    History of HTN-lisinopril 20 mg twice daily  History of GERD-Protonix 40 mg daily    PT/OT  Consults neurosurgery, pulmonary, heme-onc  DVT prophylaxis Lovenox  Code Status full  Discharge plan: TBD pending clinical improvement     Vinayak Smith MD  Internal medicine   4/18/2025  9:30 AM    I can be reached through [employer].    NOTE:  This report was

## 2025-04-18 NOTE — ACP (ADVANCE CARE PLANNING)
Advance Care Planning   Healthcare Decision Maker:    Primary Decision Maker: Mirlande Lawler - Spouse - [phone]    Secondary Decision Maker: Guillermo Lawler P - Child - [phone]    Click here to complete Healthcare Decision Makers including selection of the Healthcare Decision Maker Relationship (ie \"Primary\").  Today we documented Decision Maker(s) consistent with Legal Next of Kin hierarchy.       Electronically signed by Viviana Morales RN on 4/18/2025 at 12:46 PM

## 2025-04-18 NOTE — CONSULTS
[facility]  Department of Internal Medicine  Division of Pulmonary, Critical Care and Sleep Medicine  Consult Note      Ermias Zhou, APRN-CNP  Blanche Dunham, APRN-CNP        Patient: Corky Lawler  MRN: [id number]  : 1945    Encounter Time: 8:37 AM     Date of Admission: 2025  3:14 PM    Primary Care Physician: Aurelio Hamilton MD    Reason for Consultation: New spiculated mass in RUL 6.9 X 5.9 cm concerning for CA     HISTORY OF PRESENT ILLNESS : Corky Lawler 80 y.o. male was seen in consultation regarding the above chief complaint with past medical history noted for COPD, CAD s/p CABG, AAA s/p aorto-iliac stent, HTN, HLD presents to the ER on 2025 for persistent cough with back pain.  In the ER, CTA chest was negative for PE but revealed a new RUL mass measuring 6.9 x 5.9 cm.  CT scan of lumbar spine showed an L2 pathologic lesion.  Neurosurgery was consulted.       Patient seen and examined.  He was seen on room air, in no acute distress.  He is currently without acute respiratory complaints.  His brother is present at bedside.  He reports a persistent cough for the last few weeks.  He was treated with antibiotics and medrol dose bartolome as outpatient with some improvement.  He was also prescribed tramadol for the back pain for which he did not tolerate causing nausea and vomiting.  Cough is somewhat improved but reports persistent back pain that radiates to left upper thigh.  He reports a history of COPD but does not follow with pulmonary and is not on current inhaled regimen.  Remote smoking history with exposure to asbestos, served in the  during WWII.  He has had a 20lb weight loss over the last month but he states he was dieting.  He denies current chest pain, tightness, night sweats or hemoptysis.  perform the procedure  DuoNebs as needed  Incentive spirometry  Further evaluation as per neurosurgery and oncology  Continue pain management  Thank you for the consult we will continue to follow    Joyce Rae DO     1:09 PM

## 2025-04-18 NOTE — DISCHARGE INSTR - COC
Continuity of Care Form    Patient Name: Corky Lawler   :  1945  MRN:  [id number]    Admit date:  2025  Discharge date:  2025    Code Status Order: Full Code   Advance Directives:     Admitting Physician:  No admitting provider for patient encounter.  PCP: Aurelio Hamilton MD    Discharging Nurse: Irma Lange MSN, RN  Discharging Hospital Unit/Room#:   Discharging Unit Phone Number: [phone]    Emergency Contact:   Extended Emergency Contact Information  Primary Emergency Contact: Mirlande Lawler  Address: [address] [place] [address] [id number] [place] [address]  Home Phone: [phone]  Mobile Phone: [phone]  Relation: Spouse  Preferred language: English   needed? No  Secondary Emergency Contact: LawlerGuillermo baker  Address: [address] [place], [place] [id number] [facility]  Home Phone: [phone]  Relation: Child    Past Surgical History:  Past Surgical History:   Procedure Laterality Date    AORTA SURGERY      Aort[facility]iliac stent at [facility] 2016    APPENDECTOMY      BACK SURGERY      BLADDER SURGERY Left 2023    CYSTOSCOPY RETROGRADE PYELOGRAM, LASER LITHOTRIPSY WITH LEFT STENT INSERTION AND GUADARRAMA PLACEMENT performed by Corky Giang MD at [facility] OR    CARDIAC CATHETERIZATION  2013    DR Hernandez    CARDIAC SURGERY      stents    CHOLECYSTECTOMY      CORONARY ANGIOPLASTY WITH STENT PLACEMENT  2021    Dr. Del Rio (3) stents...Resolute Ludwig-- SVG to PDA, SVG to prox.OM, SVG to mid SVG     CORONARY ARTERY BYPASS GRAFT      DIAGNOSTIC CARDIAC CATH LAB PROCEDURE      EYE SURGERY  2017    HERNIA REPAIR      x4    SHOULDER ARTHROSCOPY      SHOULDER SURGERY      TRANSESOPHAGEAL ECHOCARDIOGRAM  2018    MADHURI with Dr. Hernandez       Immunization History:     There is no immunization history on file for this patient.    Active Problems:  Patient Active Problem List   Diagnosis Code    Coronary atherosclerosis of

## 2025-04-18 NOTE — PLAN OF CARE
Problem: Discharge Planning  Goal: Discharge to home or other facility with appropriate resources  Outcome: Progressing  Flowsheets  Taken 4/17/2025 2215  Discharge to home or other facility with appropriate resources:   Identify barriers to discharge with patient and caregiver   Arrange for needed discharge resources and transportation as appropriate   Identify discharge learning needs (meds, wound care, etc)  Taken 4/17/2025 2212  Discharge to home or other facility with appropriate resources:   Identify barriers to discharge with patient and caregiver   Arrange for needed discharge resources and transportation as appropriate   Identify discharge learning needs (meds, wound care, etc)     Problem: Pain  Goal: Verbalizes/displays adequate comfort level or baseline comfort level  Outcome: Progressing

## 2025-04-18 NOTE — PLAN OF CARE
Problem: Discharge Planning  Goal: Discharge to home or other facility with appropriate resources  4/18/2025 1324 by Deloris Cantu RN  Outcome: Progressing  4/17/2025 2325 by Deyanira Grant RN  Outcome: Progressing  Flowsheets  Taken 4/17/2025 2215  Discharge to home or other facility with appropriate resources:   Identify barriers to discharge with patient and caregiver   Arrange for needed discharge resources and transportation as appropriate   Identify discharge learning needs (meds, wound care, etc)  Taken 4/17/2025 2212  Discharge to home or other facility with appropriate resources:   Identify barriers to discharge with patient and caregiver   Arrange for needed discharge resources and transportation as appropriate   Identify discharge learning needs (meds, wound care, etc)     Problem: Pain  Goal: Verbalizes/displays adequate comfort level or baseline comfort level  4/18/2025 1324 by Deloris Cantu RN  Outcome: Progressing  4/17/2025 2325 by Deyanira Grant RN  Outcome: Progressing  Flowsheets (Taken 4/17/2025 2237)  Verbalizes/displays adequate comfort level or baseline comfort level:   Encourage patient to monitor pain and request assistance   Assess pain using appropriate pain scale     Problem: Safety - Adult  Goal: Free from fall injury  Outcome: Progressing

## 2025-04-18 NOTE — CARE COORDINATION
4/18:  Transition of care:  Pt presented to the ER for left thigh pain from home.  Pt is on room air at 96%, IV Morphine PRN & SQ Lovenox.  Pulmonary, NS & Oncology are consulted.  TLSO brace ordered.  MRI of thoracic & Lumbar needed.  Cm spoke with pt bedside to discuss CM role & dc planning.  Pt's PCP is Dr Hamilton & uses [facility] in [place].  Pt lives with his wife in a house with 2 steps to enter.  The bed/bathroom are on the 2nd floor with 13 steps to enter.  PTA pt was independent with no DME.  Pt has no SNF.  Pt has hx of HHC but can't recall the name.  Pt's dc plan is home & his family/wife can transport.  Pt declined any HHC needs.  Sw/CM will continue to follow.  Electronically signed by Viviana Morales RN on 4/18/2025 at 12:43 PM    Case Management Assessment  Initial Evaluation    Date/Time of Evaluation: 4/18/2025 12:45 PM  Assessment Completed by: Viviana Morales RN    If patient is discharged prior to next notation, then this note serves as note for discharge by case management.    Patient Name: Corky Lawler                   YOB: 1945  Diagnosis: Lung mass [R91.8]  Abnormal CT of the chest [R93.89]  Abnormal CT of the abdomen [R93.5]  Lytic lesion of bone on x-ray [M89.8X9]  Abnormal computed tomography of lumbar spine [R93.7]  Acute midline low back pain with left-sided sciatica [M54.42]                   Date / Time: 4/17/2025  3:14 PM    Patient Admission Status: Inpatient   Readmission Risk (Low < 19, Mod (19-27), High > 27): Readmission Risk Score: 6.4    Current PCP: Aurelio Hamilton MD  PCP verified by CM? Yes    Chart Reviewed: Yes      History Provided by: Patient  Patient Orientation: Alert and Oriented, Person, Place, Situation, Self    Patient Cognition: Alert    Hospitalization in the last 30 days (Readmission):  No    If yes, Readmission Assessment in CM Navigator will be completed.    Advance Directives:      Code Status: Full Code   Patient's Primary Decision  Maker is:        Discharge Planning:    Patient lives with: Spouse/Significant Other, Children Type of Home: House  Primary Care Giver: Self  Patient Support Systems include: Spouse/Significant Other, Children   Current Financial resources:    Current community resources:    Current services prior to admission: None            Current DME:              Type of Home Care services:  None    ADLS  Prior functional level: Independent in ADLs/IADLs  Current functional level: Independent in ADLs/IADLs    PT AM-PAC:   /24  OT AM-PAC:   /24    Family can provide assistance at DC: Yes  Would you like Case Management to discuss the discharge plan with any other family members/significant others, and if so, who? No  Plans to Return to Present Housing: Yes  Other Identified Issues/Barriers to RETURNING to current housing:   Potential Assistance needed at discharge: N/A            Potential DME:    Patient expects to discharge to: House  Plan for transportation at discharge:      Financial    Payor: [facility] MEDICARE / Plan: [facility] MEDICARE COMPLETE / Product Type: *No Product type* /     Does insurance require precert for SNF: Yes    Potential assistance Purchasing Medications:    Meds-to-Beds request:        [id number] - [place], [place] - [address] [facility] [phone] - F [phone]  [address] [place] [address]  Phone: [phone] Fax: [phone]    [facility] [place] [id number] [address] [phone] - F [phone]  [address] [address]  Phone: [phone] Fax: [phone]      Notes:    Factors facilitating achievement of predicted outcomes: Family support    Barriers to discharge:     Additional Case Management Notes:     The Plan for Transition of Care is related to the following treatment goals of Lung mass [R91.8]  Abnormal CT of the chest [R93.89]  Abnormal CT of the abdomen [R93.5]  Lytic lesion of bone on x-ray

## 2025-04-18 NOTE — PROGRESS NOTES
Comprehensive Nutrition Assessment    Type and Reason for Visit:  Positive nutrition screen    Nutrition Recommendations/Plan:   Continue current diet  Start ONS to promote oral intake  Will monitor     Malnutrition Assessment:  Malnutrition Status:  At risk for malnutrition (04/18/25 1446)    Context:  Acute Illness     Findings of the 6 clinical characteristics of malnutrition:  Energy Intake:  Mild decrease in energy intake  Weight Loss:  Unable to assess (d/t lack of wt hx per EMR)     Body Fat Loss:  Unable to assess     Muscle Mass Loss:  Unable to assess    Fluid Accumulation:  No fluid accumulation     Strength:  Not Performed    Nutrition Assessment:    pt adm d/t back/L-thigh pain w/ lung mass & likely mets to spine; PMhx of COPD, CAD, HTN, TIA; will start ONS to promote oral intake; will monitor.    Nutrition Related Findings:    A&Ox4; poor dentition; abd WNL; no edema; MANOJ I/O Wound Type: None       Current Nutrition Intake & Therapies:    Average Meal Intake: Unable to assess (d/t no intakes documented per EMR)  Average Supplements Intake: None Ordered  ADULT DIET; Regular  ADULT ORAL NUTRITION SUPPLEMENT; Breakfast, Lunch; [place] High Calorie/High Protein Oral Supplement    Anthropometric Measures:  Height: 175.3 cm (5' 9.02\")  Ideal Body Weight (IBW): 160 lbs (73 kg)    Admission Body Weight: 99.2 kg (218 lb 11.1 oz) (4/18-BS)  Current Body Weight: 99.2 kg (218 lb 11.1 oz) (4/18-BS), 136.7 % IBW. Weight Source: Bed scale  Current BMI (kg/m2): 32.3  Usual Body Weight: 102 kg (224 lb 13.9 oz) (3/3/25-office visit wt; lack of other wt hx to assess per EMR)     % Weight Change (Calculated): -2.7  Weight Adjustment For: No Adjustment                 BMI Categories: Obese Class 1 (BMI 30.0-34.9)    Estimated Daily Nutrient Needs:  Energy Requirements Based On: Formula  Weight Used for Energy Requirements: Current  Energy (kcal/day): MSJ x 1.2-1.3 SF= 7346-2738  Weight Used for Protein Requirements:  Ideal  Protein (g/day): 1.5-1.8g/ahjBRD=833-445h  Method Used for Fluid Requirements: 1 ml/kcal  Fluid (ml/day): 2741-1864    Nutrition Diagnosis:   Inadequate oral intake related to inadequate protein-energy intake as evidenced by poor intake prior to admission, weight loss    Nutrition Interventions:   Food and/or Nutrient Delivery: Continue Current Diet, Start Oral Nutrition Supplement (Ensure BID)  Nutrition Education/Counseling: Education/Counseling not indicated  Coordination of Nutrition Care: Continue to monitor while inpatient       Goals:  Goals: PO intake 75% or greater  Type of Goal: New goal  Previous Goal Met: New Goal    Nutrition Monitoring and Evaluation:   Behavioral-Environmental Outcomes: None Identified  Food/Nutrient Intake Outcomes: Food and Nutrient Intake, Supplement Intake  Physical Signs/Symptoms Outcomes: Biochemical Data, Nutrition Focused Physical Findings, Skin, Chewing or Swallowing, Weight, GI Status, Fluid Status or Edema    Discharge Planning:    Too soon to determine     Prema Kearney RD, LD  Contact: [phone]

## 2025-04-18 NOTE — CONSULTS
[facility]              [address] [place] [address]                              CONSULTATION      PATIENT NAME: BINA TANG              : 1945  MED REC NO: [id number]                        ROOM: 6416  ACCOUNT NO: [id number]                       ADMIT DATE: 2025  PROVIDER: Valeria Cottrell MD      CONSULT DATE: 2025    REASON FOR CONSULT:  L2 pathologic fracture.    HISTORY OF PRESENT ILLNESS:  The patient is a 80-year-old gentleman who presented to the emergency room yesterday with approximately 2-week history of left thigh pain.  The pain got so bad over the last 2 days that he was unable to ambulate.  He stated that the pain was described as a burning pain, rated as a 9/10.  It was made worse with activity and better with rest.  The pain has subsided some and is currently about 7/10.  He denies any new numbness, tingling, or weakness or loss of control of bowel or bladder function.  Part of his workup included CT scan of his lumbar spine that showed an L2 pathologic lesion and he also was found to have a lung mass.  Neurosurgery Service consulted for the pathologic lesion in the spine.    PAST MEDICAL HISTORY:  Positive for abdominal aortic aneurysm, aortic regurgitation, COPD, coronary artery disease, hypertension, hyperlipidemia, mitral regurgitation, and TIA.    PAST SURGICAL HISTORY:  Positive for aortoiliac stent placement, appendectomy, back surgery, bladder surgery, cardiac catheterization, cardiac stent placement, cholecystectomy, hernia repair, and shoulder arthroscopy.    FAMILY HISTORY:  Positive for cancer in his mother and father.    SOCIAL HISTORY:  He is an ex-smoker and does consume alcoholic beverages socially.    ALLERGIES:  TRAMADOL.      MEDICATIONS:  Home medications include aspirin and Plavix.    REVIEW OF SYSTEMS:  HEENT:  Negative for headache, double vision, or blurry vision.  CARDIOVASCULAR:  Negative for

## 2025-04-18 NOTE — PROGRESS NOTES
Occupational Therapy  Received OT order, Reviewed Chart.  NS note indicated Pt has an L2 pathologic fracture.  Recommended a Bone Scan and an MRI of the thoracic and lumbar spine.  Ordered a soft TLSO brace.  No Brace at b/s at this time.  Will hold OT assessment for pt's safety.  Thank you for this referral. Simran Crenshaw, [facility], OTR/L  # [id number]

## 2025-04-18 NOTE — PROGRESS NOTES
4 Eyes Skin Assessment     NAME:  Corky Lawler  YOB: 1945  MEDICAL RECORD NUMBER:  [id number]    The patient is being assessed for  Admission    I agree that at least one RN has performed a thorough Head to Toe Skin Assessment on the patient. ALL assessment sites listed below have been assessed.      Areas assessed by both nurses:    Head, Face, Ears, Shoulders, Back, Chest, Arms, Elbows, Hands, Sacrum. Buttock, Coccyx, Ischium, and Legs. Feet and Heels        Does the Patient have a Wound? No noted wound(s)       Willard Prevention initiated by RN: Yes  Wound Care Orders initiated by RN: No    Pressure Injury (Stage 3,4, Unstageable, DTI, NWPT, and Complex wounds) if present, place Wound referral order by RN under : No    New Ostomies, if present place, Ostomy referral order under : No     Nurse 1 eSignature: Electronically signed by Deyanira Grant RN on 4/17/25 at 11:27 PM EDT    **SHARE this note so that the co-signing nurse can place an eSignature**    Nurse 2 eSignature: Electronically signed by Abeba Garcia RN on 4/17/25 at 11:28 PM EDT

## 2025-04-19 LAB
ANION GAP SERPL CALCULATED.3IONS-SCNC: 11 MMOL/L (ref 7–16)
BASOPHILS # BLD: 0.05 K/UL (ref 0–0.2)
BASOPHILS NFR BLD: 1 % (ref 0–2)
BUN SERPL-MCNC: 24 MG/DL (ref 8–23)
CALCIUM SERPL-MCNC: 9.1 MG/DL (ref 8.8–10.2)
CHLORIDE SERPL-SCNC: 103 MMOL/L (ref 98–107)
CO2 SERPL-SCNC: 22 MMOL/L (ref 22–29)
CREAT SERPL-MCNC: 0.9 MG/DL (ref 0.7–1.2)
EOSINOPHIL # BLD: 0.22 K/UL (ref 0.05–0.5)
EOSINOPHILS RELATIVE PERCENT: 2 % (ref 0–6)
ERYTHROCYTE [DISTWIDTH] IN BLOOD BY AUTOMATED COUNT: 15.4 % (ref 11.5–15)
GFR, ESTIMATED: 89 ML/MIN/1.73M2
GLUCOSE SERPL-MCNC: 137 MG/DL (ref 74–99)
HCT VFR BLD AUTO: 38.8 % (ref 37–54)
HGB BLD-MCNC: 12.9 G/DL (ref 12.5–16.5)
IMM GRANULOCYTES # BLD AUTO: 0.03 K/UL (ref 0–0.58)
IMM GRANULOCYTES NFR BLD: 0 % (ref 0–5)
LYMPHOCYTES NFR BLD: 1.57 K/UL (ref 1.5–4)
LYMPHOCYTES RELATIVE PERCENT: 16 % (ref 20–42)
MCH RBC QN AUTO: 29.7 PG (ref 26–35)
MCHC RBC AUTO-ENTMCNC: 33.2 G/DL (ref 32–34.5)
MCV RBC AUTO: 89.4 FL (ref 80–99.9)
MONOCYTES NFR BLD: 0.84 K/UL (ref 0.1–0.95)
MONOCYTES NFR BLD: 9 % (ref 2–12)
NEUTROPHILS NFR BLD: 72 % (ref 43–80)
NEUTS SEG NFR BLD: 6.92 K/UL (ref 1.8–7.3)
PLATELET # BLD AUTO: 217 K/UL (ref 130–450)
PMV BLD AUTO: 10.1 FL (ref 7–12)
POTASSIUM SERPL-SCNC: 4 MMOL/L (ref 3.5–5.1)
RBC # BLD AUTO: 4.34 M/UL (ref 3.8–5.8)
SODIUM SERPL-SCNC: 136 MMOL/L (ref 136–145)
WBC OTHER # BLD: 9.6 K/UL (ref 4.5–11.5)

## 2025-04-19 PROCEDURE — 97535 SELF CARE MNGMENT TRAINING: CPT

## 2025-04-19 PROCEDURE — 97530 THERAPEUTIC ACTIVITIES: CPT

## 2025-04-19 PROCEDURE — 99233 SBSQ HOSP IP/OBS HIGH 50: CPT | Performed by: INTERNAL MEDICINE

## 2025-04-19 PROCEDURE — 6370000000 HC RX 637 (ALT 250 FOR IP): Performed by: STUDENT IN AN ORGANIZED HEALTH CARE EDUCATION/TRAINING PROGRAM

## 2025-04-19 PROCEDURE — 97161 PT EVAL LOW COMPLEX 20 MIN: CPT

## 2025-04-19 PROCEDURE — 97165 OT EVAL LOW COMPLEX 30 MIN: CPT

## 2025-04-19 PROCEDURE — 6360000002 HC RX W HCPCS: Performed by: STUDENT IN AN ORGANIZED HEALTH CARE EDUCATION/TRAINING PROGRAM

## 2025-04-19 PROCEDURE — 2500000003 HC RX 250 WO HCPCS: Performed by: STUDENT IN AN ORGANIZED HEALTH CARE EDUCATION/TRAINING PROGRAM

## 2025-04-19 PROCEDURE — 85025 COMPLETE CBC W/AUTO DIFF WBC: CPT

## 2025-04-19 PROCEDURE — 80048 BASIC METABOLIC PNL TOTAL CA: CPT

## 2025-04-19 PROCEDURE — 6370000000 HC RX 637 (ALT 250 FOR IP): Performed by: NURSE PRACTITIONER

## 2025-04-19 PROCEDURE — 2140000000 HC CCU INTERMEDIATE R&B

## 2025-04-19 PROCEDURE — 36415 COLL VENOUS BLD VENIPUNCTURE: CPT

## 2025-04-19 RX ADMIN — LISINOPRIL 20 MG: 20 TABLET ORAL at 08:44

## 2025-04-19 RX ADMIN — PANTOPRAZOLE SODIUM 40 MG: 40 TABLET, DELAYED RELEASE ORAL at 05:38

## 2025-04-19 RX ADMIN — ROSUVASTATIN CALCIUM 40 MG: 20 TABLET, FILM COATED ORAL at 08:44

## 2025-04-19 RX ADMIN — Medication 3 MG: at 21:46

## 2025-04-19 RX ADMIN — MORPHINE SULFATE 2 MG: 2 INJECTION, SOLUTION INTRAMUSCULAR; INTRAVENOUS at 04:50

## 2025-04-19 RX ADMIN — CLOPIDOGREL BISULFATE 75 MG: 75 TABLET, FILM COATED ORAL at 08:44

## 2025-04-19 RX ADMIN — LISINOPRIL 20 MG: 20 TABLET ORAL at 20:45

## 2025-04-19 RX ADMIN — MORPHINE SULFATE 2 MG: 2 INJECTION, SOLUTION INTRAMUSCULAR; INTRAVENOUS at 16:09

## 2025-04-19 RX ADMIN — ENOXAPARIN SODIUM 40 MG: 100 INJECTION SUBCUTANEOUS at 08:44

## 2025-04-19 RX ADMIN — POLYETHYLENE GLYCOL 3350 17 G: 17 POWDER, FOR SOLUTION ORAL at 08:44

## 2025-04-19 RX ADMIN — RANOLAZINE 500 MG: 500 TABLET, FILM COATED, EXTENDED RELEASE ORAL at 08:44

## 2025-04-19 RX ADMIN — SODIUM CHLORIDE, PRESERVATIVE FREE 10 ML: 5 INJECTION INTRAVENOUS at 20:54

## 2025-04-19 RX ADMIN — SODIUM CHLORIDE, PRESERVATIVE FREE 10 ML: 5 INJECTION INTRAVENOUS at 08:44

## 2025-04-19 RX ADMIN — MORPHINE SULFATE 2 MG: 2 INJECTION, SOLUTION INTRAMUSCULAR; INTRAVENOUS at 09:34

## 2025-04-19 RX ADMIN — RANOLAZINE 500 MG: 500 TABLET, FILM COATED, EXTENDED RELEASE ORAL at 20:45

## 2025-04-19 RX ADMIN — ISOSORBIDE MONONITRATE 60 MG: 30 TABLET, EXTENDED RELEASE ORAL at 08:43

## 2025-04-19 RX ADMIN — ASPIRIN 81 MG CHEWABLE TABLET 81 MG: 81 TABLET CHEWABLE at 08:43

## 2025-04-19 RX ADMIN — MORPHINE SULFATE 2 MG: 2 INJECTION, SOLUTION INTRAMUSCULAR; INTRAVENOUS at 20:49

## 2025-04-19 ASSESSMENT — PAIN - FUNCTIONAL ASSESSMENT
PAIN_FUNCTIONAL_ASSESSMENT: ACTIVITIES ARE NOT PREVENTED
PAIN_FUNCTIONAL_ASSESSMENT: ACTIVITIES ARE NOT PREVENTED

## 2025-04-19 ASSESSMENT — PAIN DESCRIPTION - DESCRIPTORS
DESCRIPTORS: ACHING;SQUEEZING;THROBBING
DESCRIPTORS: TIGHTNESS;SORE;DISCOMFORT
DESCRIPTORS: BURNING;CRAMPING;SHOOTING

## 2025-04-19 ASSESSMENT — PAIN DESCRIPTION - LOCATION
LOCATION: LEG

## 2025-04-19 ASSESSMENT — PAIN SCALES - GENERAL
PAINLEVEL_OUTOF10: 5
PAINLEVEL_OUTOF10: 2
PAINLEVEL_OUTOF10: 6
PAINLEVEL_OUTOF10: 8
PAINLEVEL_OUTOF10: 4
PAINLEVEL_OUTOF10: 2
PAINLEVEL_OUTOF10: 8

## 2025-04-19 ASSESSMENT — PAIN DESCRIPTION - ORIENTATION
ORIENTATION: LEFT
ORIENTATION: LEFT;INNER;UPPER
ORIENTATION: LEFT

## 2025-04-19 NOTE — PROGRESS NOTES
[facility]  Department of Pulmonary, Critical Care and Sleep Medicine  [facility]  Department of Internal Medicine  Progress Note    Ermias SUAREZ        Patients Primary Pulmonologist is: [facility]    SUBJECTIVE:  Patient seen and examined. Currently without any respiratory complaints. Continues to have left thigh pain, states the morphine helps. Felt very dizzy with the percocet.        OBJECTIVE:  Vitals:    04/19/25 0504 04/19/25 0520 04/19/25 0827 04/19/25 0934   BP:   (!) 169/94    Pulse:   57    Resp:  18 14 16   Temp:   97.8 °F (36.6 °C)    TempSrc:   Temporal    SpO2:   96%    Weight: 99.4 kg (219 lb 1.6 oz)      Height:         Constitutional: Alert,     EENT: EOMI SAMPSON. MMM. No icterus. No thrush.     Neck:  Trachea was midline.   Respiratory: CTA bilaterally, no use of accessory muscles  Cardiovascular: Regular, No murmur. No rubs.      Pulses:  Equal bilaterally.    Abdomen: Soft without organomegaly. No rebound, rigidity.  No guarding.  Lymphatic: No lymphadenopathy.  Musculoskeletal: Without weakness or gross deficits  Extremities:  No lower extremity edema. Reflexes appear adequate.   Skin:  Warm and dry.  No skin rashes.   Neurological/Psychiatric: No acute psychosis. Cranial nerves are intact.        DATA:    Monitor Strips:  Reviewed & discusses with technical team. No changes noted.    RADIOLOGY:  No new imaging studies      CBC with Differential:    Lab Results   Component Value Date/Time    WBC 9.6 04/19/2025 05:20 AM    RBC 4.34 04/19/2025 05:20 AM    HGB 12.9 04/19/2025 05:20 AM    HCT 38.8 04/19/2025 05:20 AM     04/19/2025 05:20 AM    MCV 89.4 04/19/2025 05:20 AM    MCH 29.7 04/19/2025 05:20 AM    MCHC 33.2 04/19/2025 05:20 AM    RDW 15.4 04/19/2025 05:20 AM    LYMPHOPCT 16 04/19/2025 05:20 AM     MONOPCT 9 04/19/2025 05:20 AM    EOSPCT 2 04/19/2025 05:20 AM    BASOPCT 1 04/19/2025 05:20 AM    MONOSABS 0.84 04/19/2025 05:20 AM    LYMPHSABS 1.57 04/19/2025 05:20 AM    EOSABS 0.22 04/19/2025 05:20 AM    BASOSABS 0.05 04/19/2025 05:20 AM     CMP:    Lab Results   Component Value Date/Time     04/19/2025 05:20 AM    K 4.0 04/19/2025 05:20 AM    K 3.3 05/23/2023 07:42 AM     04/19/2025 05:20 AM    CO2 22 04/19/2025 05:20 AM    BUN 24 04/19/2025 05:20 AM    CREATININE 0.9 04/19/2025 05:20 AM    GFRAA >60 04/06/2021 05:18 AM    LABGLOM 89 04/19/2025 05:20 AM    LABGLOM >60 05/23/2023 07:42 AM    GLUCOSE 137 04/19/2025 05:20 AM    GLUCOSE 101 02/20/2011 10:30 AM    CALCIUM 9.1 04/19/2025 05:20 AM    BILITOT 0.5 04/17/2025 02:15 PM    ALKPHOS 79 04/17/2025 02:15 PM    AST 16 04/17/2025 02:15 PM    ALT 15 04/17/2025 02:15 PM       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  Significant mediastinal adenopathy  COPD-states this was diagnosed on a work physical does not take any inhalers  Prior tobacco use  Remote asbestos exposure     Plan for EBUS on Wednesday at 8 AM this has been scheduled and  will perform the procedure  DuoNebs as needed  Incentive spirometry  Further evaluation as per neurosurgery and oncology  Continue pain management  Thank you for the consult we will continue to follow    Joyce Rae DO

## 2025-04-19 NOTE — PROGRESS NOTES
Physical Therapy  Physical Therapy Initial Assessment     Name: Corky Lawler  : 1945  MRN: [id number]      Date of Service: 2025    Evaluating PT:  Arleen Marx, PT, DPT, [id number]    Room #:  6416/6416-B  Diagnosis:  Lung mass [R91.8]  Abnormal CT of the chest [R93.89]  Abnormal CT of the abdomen [R93.5]  Lytic lesion of bone on x-ray [M89.8X9]  Abnormal computed tomography of lumbar spine [R93.7]  Acute midline low back pain with left-sided sciatica [M54.42]  PMHx/PSHx:    Past Medical History:   Diagnosis Date    AAA (abdominal aortic aneurysm)     4,5cm    Aortic regurgitation     mild to moderate    COPD (chronic obstructive pulmonary disease) (HCC)     Coronary atherosclerosis of native coronary artery     DJD (degenerative joint disease)     HTN (hypertension)     Hyperlipidemia     Mitral regurgitation     TIA (transient ischemic attack) 2013      Past Surgical History:   Procedure Laterality Date    AORTA SURGERY      Aortoiliac stent at [facility] 2016    APPENDECTOMY      BACK SURGERY      BLADDER SURGERY Left 2023    CYSTOSCOPY RETROGRADE PYELOGRAM, LASER LITHOTRIPSY WITH LEFT STENT INSERTION AND GUADARRAMA PLACEMENT performed by Corky Giang MD at [facility] OR    CARDIAC CATHETERIZATION  2013    DR Hernandez    CARDIAC SURGERY      stents    CHOLECYSTECTOMY      CORONARY ANGIOPLASTY WITH STENT PLACEMENT  2021    Dr. Del Rio (3) stents...Resolute Ludwig-- SVG to PDA, SVG to prox.OM, SVG to mid SVG     CORONARY ARTERY BYPASS GRAFT      DIAGNOSTIC CARDIAC CATH LAB PROCEDURE      EYE SURGERY  2017    HERNIA REPAIR      x4    SHOULDER ARTHROSCOPY      SHOULDER SURGERY      TRANSESOPHAGEAL ECHOCARDIOGRAM  2018    MADHURI with Dr. Hernandez      Procedure/Surgery:  none this admission   Precautions:  Fall risk, TLSO, Spinal Precautions, h/o visual deficit from prior CVA  Equipment Needs:  TBD    SUBJECTIVE:    Pt lives with his wife in a 2 story home with 2 stairs to

## 2025-04-19 NOTE — PLAN OF CARE
Problem: Discharge Planning  Goal: Discharge to home or other facility with appropriate resources  4/18/2025 2222 by Rosie Lew RN  Outcome: Progressing  4/18/2025 1324 by Deloris Cantu, RN  Outcome: Progressing     Problem: Pain  Goal: Verbalizes/displays adequate comfort level or baseline comfort level  4/18/2025 2222 by Rosie Lew RN  Outcome: Progressing  4/18/2025 1324 by Deloris Cantu, RN  Outcome: Progressing     Problem: Safety - Adult  Goal: Free from fall injury  4/18/2025 2222 by Rosie Lew RN  Outcome: Progressing  4/18/2025 1324 by Deloris Cantu, RN  Outcome: Progressing     Problem: Nutrition Deficit:  Goal: Optimize nutritional status  Outcome: Progressing

## 2025-04-19 NOTE — PROGRESS NOTES
Occupational Therapy  OCCUPATIONAL THERAPY INITIAL EVALUATION    [facility]  [address] [place], [place]      Date:2025                                                Patient Name: Corky Lawler  MRN: [id number]  : 1945  Room: [address] [place] [address]    Evaluating OT: Jesse Bradley OTR/L #[id number]     Referring Provider:   Vinayak Smith MD       Specific Provider Orders/Date: OT eval and treat 25    Diagnosis: Lung mass [R91.8]  Abnormal CT of the chest [R93.89]  Abnormal CT of the abdomen [R93.5]  Lytic lesion of bone on x-ray [M89.8X9]  Abnormal computed tomography of lumbar spine [R93.7]  Acute midline low back pain with left-sided sciatica [M54.42]   Pt admitted to hospital with back pain; + lung mass and L2 pathological fx      Pertinent Medical History:  has a past medical history of AAA (abdominal aortic aneurysm), Aortic regurgitation, COPD (chronic obstructive pulmonary disease) (HCC), Coronary atherosclerosis of native coronary artery, DJD (degenerative joint disease), HTN (hypertension), Hyperlipidemia, Mitral regurgitation, and TIA (transient ischemic attack).       Precautions:  Fall Risk, TLSO, spinal precautions    Assessment of current deficits    [x] Functional mobility  [x]ADLs  [x] Strength               []Cognition    [x] Functional transfers   [x] IADLs         [x] Safety Awareness   [x]Endurance    [] Fine Coordination              [x] Balance      [x] Vision/perception   []Sensation     []Gross Motor Coordination  [] ROM  [] Delirium                   [] Motor Control     OT PLAN OF CARE   OT POC based on physician orders, patient diagnosis and results of clinical assessment    Frequency/Duration 1-5 days/wk for 2 weeks PRN   Specific OT Treatment Interventions to include:   * Instruction/training on adapted ADL techniques and AE recommendations to increase functional independence within precautions       * Training on energy  conservation strategies, correct breathing pattern and techniques to improve independence/tolerance for self-care routine  * Functional transfer/mobility training/DME recommendations for increased independence, safety, and fall prevention  * Patient/Family education to increase follow through with safety techniques and functional independence  * Recommendation of environmental modifications for increased safety with functional transfers/mobility and ADLs  * Visual-perceptual training to improve environmental scanning, visual attention/focus, and oculomotor skills for increased safety/independence with functional transfers/mobility and ADLs  * Therapeutic exercise to improve motor endurance, ROM, and functional strength for ADLs/functional transfers  * Therapeutic activities to facilitate/challenge dynamic balance, stand tolerance for increased safety and independence with ADLs  * Therapeutic activities to facilitate gross/fine motor skills for increased independence with ADLs  * Neuro-muscular re-education: facilitation of righting/equilibrium reactions, midline orientation, scapular stability/mobility, normalization of muscle tone, and facilitation of volitional active controled movement      Recommended Adaptive Equipment:  TBD     Home Living: Pt lives with wife in a 2 story home with 2 DORIS and 1 hand rail. 1st floor set-up   Bathroom setup: walk-in shower with shower chair   Equipment owned: w/w    Prior Level of Function: Independent  with ADLs , Independent  with IADLs; ambulated without AD   Driving: yes   Occupation: retired    Pain Level: Pt denies pain this session    Cognition: A&O: 4/4; Follows 2 step directions   Memory: good   Sequencing: good   Problem solving: good   Judgement/safety: fair+      Functional Assessment:  AM-PAC Daily Activity Raw Score: 16/24   Initial Eval Status  Date: 4/19/2025   Treatment Status  Date: STGs = LTGs  Time frame: 10-14 days   Feeding Independent      Grooming Stand by

## 2025-04-19 NOTE — PROGRESS NOTES
Internal Medicine Progress Note    Patient's name: Corky Lawler  : 1945  Chief complaints (on day of admission): Back Pain (Approx one week ago was coughing and when coughed experienced pain low back pain with radiation to anterior thigh; cough resolved but now pain getting worse, unable to sleep)  Admission date: 2025  Date of service: 2025   Room: [address]  Primary care physician: Aurelio Hamilton MD  Reason for visit: Follow-up for back pain    Subjective  Corky was seen and examined at bedside   No new concerns overnight  Awaiting MRI    Review of Systems  There are no new complaints of chest pain, shortness of breath, abdominal pain, nausea, vomiting, diarrhea, constipation unless otherwise mentioned above.     Hospital Medications  Current Facility-Administered Medications   Medication Dose Route Frequency Provider Last Rate Last Admin    HYDROcodone-acetaminophen (NORCO) 5-325 MG per tablet 1 tablet  1 tablet Oral Q6H PRN Vinayak Smith MD   1 tablet at 25 1058    polyethylene glycol (GLYCOLAX) packet 17 g  17 g Oral Daily Vinayak Smith MD   17 g at 25 0844    sodium chloride flush 0.9 % injection 5-40 mL  5-40 mL IntraVENous 2 times per day Vinayak Smith MD   10 mL at 25 0844    sodium chloride flush 0.9 % injection 5-40 mL  5-40 mL IntraVENous PRN Vinayak Smith MD        0.9 % sodium chloride infusion   IntraVENous PRN Vinayak Smith MD        potassium chloride (KLOR-CON M) extended release tablet 40 mEq  40 mEq Oral PRN Vinayak Smith MD        Or    potassium bicarb-citric acid (EFFER-K) effervescent tablet 40 mEq  40 mEq Oral PRN Vinayak Smith MD        Or    potassium chloride 10 mEq/100 mL IVPB (Peripheral Line)  10 mEq IntraVENous PRN Vinayak Smith MD        magnesium sulfate 2000 mg in 50 mL IVPB premix  2,000 mg IntraVENous PRN Vinayak Smith MD        enoxaparin (LOVENOX) injection 40 mg  40 mg SubCUTAneous Daily Vinayak Smith MD   40 mg at 25 0844

## 2025-04-20 ENCOUNTER — APPOINTMENT (OUTPATIENT)
Dept: GENERAL RADIOLOGY | Age: 80
DRG: 181 | End: 2025-04-20
Payer: MEDICARE

## 2025-04-20 LAB
ANION GAP SERPL CALCULATED.3IONS-SCNC: 10 MMOL/L (ref 7–16)
BASOPHILS # BLD: 0.03 K/UL (ref 0–0.2)
BASOPHILS NFR BLD: 0 % (ref 0–2)
BUN SERPL-MCNC: 22 MG/DL (ref 8–23)
CALCIUM SERPL-MCNC: 9.1 MG/DL (ref 8.8–10.2)
CHLORIDE SERPL-SCNC: 103 MMOL/L (ref 98–107)
CO2 SERPL-SCNC: 23 MMOL/L (ref 22–29)
CREAT SERPL-MCNC: 0.8 MG/DL (ref 0.7–1.2)
EKG ATRIAL RATE: 67 BPM
EKG P AXIS: 117 DEGREES
EKG P-R INTERVAL: 244 MS
EKG Q-T INTERVAL: 434 MS
EKG QRS DURATION: 152 MS
EKG QTC CALCULATION (BAZETT): 458 MS
EKG R AXIS: -85 DEGREES
EKG T AXIS: 72 DEGREES
EKG VENTRICULAR RATE: 67 BPM
EOSINOPHIL # BLD: 0.26 K/UL (ref 0.05–0.5)
EOSINOPHILS RELATIVE PERCENT: 3 % (ref 0–6)
ERYTHROCYTE [DISTWIDTH] IN BLOOD BY AUTOMATED COUNT: 15.3 % (ref 11.5–15)
GFR, ESTIMATED: >90 ML/MIN/1.73M2
GLUCOSE SERPL-MCNC: 136 MG/DL (ref 74–99)
HCT VFR BLD AUTO: 39.3 % (ref 37–54)
HGB BLD-MCNC: 12.7 G/DL (ref 12.5–16.5)
IMM GRANULOCYTES # BLD AUTO: 0.04 K/UL (ref 0–0.58)
IMM GRANULOCYTES NFR BLD: 1 % (ref 0–5)
LYMPHOCYTES NFR BLD: 1.21 K/UL (ref 1.5–4)
LYMPHOCYTES RELATIVE PERCENT: 14 % (ref 20–42)
MCH RBC QN AUTO: 29.2 PG (ref 26–35)
MCHC RBC AUTO-ENTMCNC: 32.3 G/DL (ref 32–34.5)
MCV RBC AUTO: 90.3 FL (ref 80–99.9)
MONOCYTES NFR BLD: 0.74 K/UL (ref 0.1–0.95)
MONOCYTES NFR BLD: 8 % (ref 2–12)
NEUTROPHILS NFR BLD: 74 % (ref 43–80)
NEUTS SEG NFR BLD: 6.52 K/UL (ref 1.8–7.3)
PLATELET # BLD AUTO: 210 K/UL (ref 130–450)
PMV BLD AUTO: 10.4 FL (ref 7–12)
POTASSIUM SERPL-SCNC: 4.1 MMOL/L (ref 3.5–5.1)
RBC # BLD AUTO: 4.35 M/UL (ref 3.8–5.8)
SODIUM SERPL-SCNC: 137 MMOL/L (ref 136–145)
WBC OTHER # BLD: 8.8 K/UL (ref 4.5–11.5)

## 2025-04-20 PROCEDURE — 2500000003 HC RX 250 WO HCPCS: Performed by: STUDENT IN AN ORGANIZED HEALTH CARE EDUCATION/TRAINING PROGRAM

## 2025-04-20 PROCEDURE — 80048 BASIC METABOLIC PNL TOTAL CA: CPT

## 2025-04-20 PROCEDURE — 73552 X-RAY EXAM OF FEMUR 2/>: CPT

## 2025-04-20 PROCEDURE — 2140000000 HC CCU INTERMEDIATE R&B

## 2025-04-20 PROCEDURE — 6360000002 HC RX W HCPCS: Performed by: STUDENT IN AN ORGANIZED HEALTH CARE EDUCATION/TRAINING PROGRAM

## 2025-04-20 PROCEDURE — 85025 COMPLETE CBC W/AUTO DIFF WBC: CPT

## 2025-04-20 PROCEDURE — 36415 COLL VENOUS BLD VENIPUNCTURE: CPT

## 2025-04-20 PROCEDURE — 6370000000 HC RX 637 (ALT 250 FOR IP): Performed by: STUDENT IN AN ORGANIZED HEALTH CARE EDUCATION/TRAINING PROGRAM

## 2025-04-20 PROCEDURE — 6370000000 HC RX 637 (ALT 250 FOR IP): Performed by: NURSE PRACTITIONER

## 2025-04-20 PROCEDURE — 93010 ELECTROCARDIOGRAM REPORT: CPT | Performed by: INTERNAL MEDICINE

## 2025-04-20 RX ORDER — MORPHINE SULFATE 2 MG/ML
2 INJECTION, SOLUTION INTRAMUSCULAR; INTRAVENOUS ONCE
Status: COMPLETED | OUTPATIENT
Start: 2025-04-20 | End: 2025-04-20

## 2025-04-20 RX ADMIN — SODIUM CHLORIDE, PRESERVATIVE FREE 10 ML: 5 INJECTION INTRAVENOUS at 21:06

## 2025-04-20 RX ADMIN — MORPHINE SULFATE 2 MG: 2 INJECTION, SOLUTION INTRAMUSCULAR; INTRAVENOUS at 09:36

## 2025-04-20 RX ADMIN — MORPHINE SULFATE 2 MG: 2 INJECTION, SOLUTION INTRAMUSCULAR; INTRAVENOUS at 23:42

## 2025-04-20 RX ADMIN — ISOSORBIDE MONONITRATE 60 MG: 30 TABLET, EXTENDED RELEASE ORAL at 08:38

## 2025-04-20 RX ADMIN — MORPHINE SULFATE 2 MG: 2 INJECTION, SOLUTION INTRAMUSCULAR; INTRAVENOUS at 16:10

## 2025-04-20 RX ADMIN — Medication 3 MG: at 21:05

## 2025-04-20 RX ADMIN — MORPHINE SULFATE 2 MG: 2 INJECTION, SOLUTION INTRAMUSCULAR; INTRAVENOUS at 21:05

## 2025-04-20 RX ADMIN — LISINOPRIL 20 MG: 20 TABLET ORAL at 08:38

## 2025-04-20 RX ADMIN — SODIUM CHLORIDE, PRESERVATIVE FREE 10 ML: 5 INJECTION INTRAVENOUS at 08:39

## 2025-04-20 RX ADMIN — POLYETHYLENE GLYCOL 3350 17 G: 17 POWDER, FOR SOLUTION ORAL at 08:39

## 2025-04-20 RX ADMIN — LISINOPRIL 20 MG: 20 TABLET ORAL at 21:05

## 2025-04-20 RX ADMIN — RANOLAZINE 500 MG: 500 TABLET, FILM COATED, EXTENDED RELEASE ORAL at 08:38

## 2025-04-20 RX ADMIN — MORPHINE SULFATE 2 MG: 2 INJECTION, SOLUTION INTRAMUSCULAR; INTRAVENOUS at 03:07

## 2025-04-20 RX ADMIN — RANOLAZINE 500 MG: 500 TABLET, FILM COATED, EXTENDED RELEASE ORAL at 21:05

## 2025-04-20 RX ADMIN — ASPIRIN 81 MG CHEWABLE TABLET 81 MG: 81 TABLET CHEWABLE at 08:38

## 2025-04-20 RX ADMIN — MORPHINE SULFATE 2 MG: 2 INJECTION, SOLUTION INTRAMUSCULAR; INTRAVENOUS at 07:18

## 2025-04-20 RX ADMIN — MORPHINE SULFATE 2 MG: 2 INJECTION, SOLUTION INTRAMUSCULAR; INTRAVENOUS at 12:02

## 2025-04-20 RX ADMIN — PANTOPRAZOLE SODIUM 40 MG: 40 TABLET, DELAYED RELEASE ORAL at 05:29

## 2025-04-20 RX ADMIN — ENOXAPARIN SODIUM 40 MG: 100 INJECTION SUBCUTANEOUS at 08:38

## 2025-04-20 RX ADMIN — ROSUVASTATIN CALCIUM 40 MG: 20 TABLET, FILM COATED ORAL at 08:38

## 2025-04-20 RX ADMIN — CLOPIDOGREL BISULFATE 75 MG: 75 TABLET, FILM COATED ORAL at 08:38

## 2025-04-20 ASSESSMENT — PAIN SCALES - GENERAL
PAINLEVEL_OUTOF10: 0
PAINLEVEL_OUTOF10: 0
PAINLEVEL_OUTOF10: 8
PAINLEVEL_OUTOF10: 6
PAINLEVEL_OUTOF10: 4
PAINLEVEL_OUTOF10: 7
PAINLEVEL_OUTOF10: 0
PAINLEVEL_OUTOF10: 2
PAINLEVEL_OUTOF10: 9
PAINLEVEL_OUTOF10: 6
PAINLEVEL_OUTOF10: 3
PAINLEVEL_OUTOF10: 4

## 2025-04-20 ASSESSMENT — PAIN DESCRIPTION - DESCRIPTORS
DESCRIPTORS: ACHING;DISCOMFORT
DESCRIPTORS: TIGHTNESS;STABBING;SHOOTING
DESCRIPTORS: ACHING;DISCOMFORT;NAGGING
DESCRIPTORS: ACHING;DISCOMFORT
DESCRIPTORS: ACHING;DISCOMFORT;NAGGING
DESCRIPTORS: ACHING;DISCOMFORT;NAGGING

## 2025-04-20 ASSESSMENT — PAIN DESCRIPTION - LOCATION
LOCATION: LEG
LOCATION: BACK;LEG
LOCATION: LEG
LOCATION: LEG

## 2025-04-20 ASSESSMENT — PAIN DESCRIPTION - ORIENTATION
ORIENTATION: LEFT

## 2025-04-20 ASSESSMENT — PAIN SCALES - WONG BAKER
WONGBAKER_NUMERICALRESPONSE: NO HURT

## 2025-04-20 ASSESSMENT — PAIN - FUNCTIONAL ASSESSMENT
PAIN_FUNCTIONAL_ASSESSMENT: ACTIVITIES ARE NOT PREVENTED

## 2025-04-20 NOTE — CONSULTS
[facility]  Hematology/Oncology  Consult      Patient Name: Corky Lawler  YOB: 1945  PCP: Aurelio Hamilton MD   Referring Provider:      Reason for Consultation:   Chief Complaint   Patient presents with    Back Pain     Approx one week ago was coughing and when coughed experienced pain low back pain with radiation to anterior thigh; cough resolved but now pain getting worse, unable to sleep        History of Present Illness:  80-year-old man former smoker with past medical history relevant for hypertension, hyperlipidemia, TIA, coronary artery disease and COPD hospitalized with complaints of worsening low back pain radiating to lower extremities with pain in left anterior thigh and he also reports some cough.  No fever or chills.  CMP unremarkable.  CBC with normal WBC count with hemoglobin of 12.7 and normal platelet count.  CT scan of abdomen and pelvis showed a stable infrarenal AAA but there was a new lytic lesion involving the body of L2 and L1.  There was a soft tissue mass in the right hip measuring 3.7 cm suspicious for soft tissue hematoma.  CT of thoracic spine showed no fracture or subluxation but there was a large 6.5 cm spiculated right upper lobe mass consistent with malignancy.  CTA chest showed no evidence of pulmonary embolism.  A new mass was noted in the medial right upper lobe extending to the right hilum measuring 6.9 x 5.9 cm with associated mediastinal and right paratracheal lymphadenopathy and lytic lesion involving L1, L2.  Bone scan as well as MRI of thoracic and lumbar spine ordered and pending.  Seen by pulmonary with plans for EBUS scheduled on 4/23/2025      Diagnostic Data:     Past Medical History:   Diagnosis Date    AAA (abdominal aortic aneurysm)     4,5cm    Aortic regurgitation     mild to moderate    COPD (chronic obstructive pulmonary disease) (HCC)     Coronary atherosclerosis of native coronary artery     DJD (degenerative joint disease)     HTN  times daily 8/1/24  Yes Provider, MD Ronaldo   isosorbide mononitrate (IMDUR) 60 MG extended release tablet Take 1 tablet by mouth daily 5/23/23  Yes Jesse Sewell MD   nitroGLYCERIN (NITROSTAT) 0.4 MG SL tablet DISSOLVE 1 TABLET UNDER THE TONGUE AS NEEDED 11/27/22  Yes ProviderRonaldo MD   rosuvastatin (CRESTOR) 40 MG tablet Take 1 tablet by mouth daily 12/17/21  Yes Provider, MD Ronaldo   lisinopril (PRINIVIL;ZESTRIL) 20 MG tablet Take 1 tablet by mouth 2 times daily 4/6/21  Yes Jesse Sewell MD   clopidogrel (PLAVIX) 75 MG tablet Take 1 tablet by mouth daily 4/7/21  Yes Jesse Sewell MD   pantoprazole (PROTONIX) 40 MG tablet Take 1 tablet by mouth every morning (before breakfast) 4/7/21  Yes Jesse Sewell MD   aspirin 81 MG tablet Take 1 tablet by mouth daily   Yes Provider, MD Ronaldo       Allergies  Allergies   Allergen Reactions    Tramadol Nausea And Vomiting       Review of Systems:    Refer to HPI      Objective  BP (!) 153/81   Pulse 51   Temp 98.1 °F (36.7 °C) (Oral)   Resp 18   Ht 1.753 m (5' 9.02\")   Wt 99.4 kg (219 lb 1.6 oz)   SpO2 91%   BMI 32.34 kg/m²     Physical Exam:     General: AAO to person, place, time, in no acute distress,   Head and neck : PERRLA, EOMI . Sclera non icteric.  Oropharynx : Clear  Neck: no JVD,  no adenopathy,  Heart: Regular rate and regular rhythm  Lungs: Clear to auscultation   Extremities: No edema,no cyanosis,   Abdomen: Soft, non-tender;no masses, no organomegaly  Skin:  No rash.  Neurologic:Cranial nerves grossly intact. No focal motor or sensory deficits .    Recent Laboratory Data-   Lab Results   Component Value Date    WBC 8.8 04/20/2025    HGB 12.7 04/20/2025    HCT 39.3 04/20/2025    MCV 90.3 04/20/2025     04/20/2025    LYMPHOPCT 14 (L) 04/20/2025    RBC 4.35 04/20/2025    MCH 29.2 04/20/2025    MCHC 32.3 04/20/2025    RDW 15.3 (H) 04/20/2025    NEUTOPHILPCT 74 04/20/2025    MONOPCT 8

## 2025-04-20 NOTE — PLAN OF CARE
Problem: Discharge Planning  Goal: Discharge to home or other facility with appropriate resources  4/20/2025 0859 by Bret Blum RN  Outcome: Progressing  4/19/2025 2317 by Rosie Lew RN  Outcome: Progressing     Problem: Pain  Goal: Verbalizes/displays adequate comfort level or baseline comfort level  4/20/2025 0859 by Bret Blum RN  Outcome: Progressing  4/19/2025 2317 by Rosie Lew RN  Outcome: Progressing     Problem: Safety - Adult  Goal: Free from fall injury  4/20/2025 0859 by Bret Blum RN  Outcome: Progressing  4/19/2025 2317 by Rosie Lew RN  Outcome: Progressing     Problem: Nutrition Deficit:  Goal: Optimize nutritional status  4/20/2025 0859 by Bret Blum RN  Outcome: Progressing  4/19/2025 2317 by Rosie Lew, RN  Outcome: Progressing

## 2025-04-20 NOTE — PLAN OF CARE
Problem: Discharge Planning  Goal: Discharge to home or other facility with appropriate resources  4/19/2025 2317 by Rosie Lew RN  Outcome: Progressing  4/19/2025 1436 by Deloris Cantu RN  Outcome: Progressing     Problem: Pain  Goal: Verbalizes/displays adequate comfort level or baseline comfort level  4/19/2025 2317 by Rosie Lew RN  Outcome: Progressing  4/19/2025 1436 by Deloris Cantu RN  Outcome: Progressing     Problem: Safety - Adult  Goal: Free from fall injury  4/19/2025 2317 by Rosie Lew RN  Outcome: Progressing  4/19/2025 1436 by Deloris Cantu RN  Outcome: Progressing     Problem: Nutrition Deficit:  Goal: Optimize nutritional status  4/19/2025 2317 by Rosie Lew RN  Outcome: Progressing  4/19/2025 1436 by Deloris Cantu, RN  Outcome: Progressing

## 2025-04-20 NOTE — PROGRESS NOTES
Internal Medicine Progress Note    Patient's name: Corky Lawler  : 1945  Chief complaints (on day of admission): Back Pain (Approx one week ago was coughing and when coughed experienced pain low back pain with radiation to anterior thigh; cough resolved but now pain getting worse, unable to sleep)  Admission date: 2025  Date of service: 2025   Room: [address]  Primary care physician: Aurelio Hamilton MD  Reason for visit: Follow-up for back pain    Subjective  Corky was seen and examined at bedside   No new concerns overnight  Awaiting MRI    Review of Systems  There are no new complaints of chest pain, shortness of breath, abdominal pain, nausea, vomiting, diarrhea, constipation unless otherwise mentioned above.     Hospital Medications  Current Facility-Administered Medications   Medication Dose Route Frequency Provider Last Rate Last Admin    melatonin tablet 3 mg  3 mg Oral Nightly PRN Mimi Freeman APRN - CNP   3 mg at 25 2786    HYDROcodone-acetaminophen (NORCO) 5-325 MG per tablet 1 tablet  1 tablet Oral Q6H PRN Vinayak Smith MD   1 tablet at 25 1058    polyethylene glycol (GLYCOLAX) packet 17 g  17 g Oral Daily Vinayak Smith MD   17 g at 25 0839    sodium chloride flush 0.9 % injection 5-40 mL  5-40 mL IntraVENous 2 times per day Vinayak Smith MD   10 mL at 25 0839    sodium chloride flush 0.9 % injection 5-40 mL  5-40 mL IntraVENous PRN Vinayak Smith MD        0.9 % sodium chloride infusion   IntraVENous PRN Vinayak Smith MD        potassium chloride (KLOR-CON M) extended release tablet 40 mEq  40 mEq Oral PRN Vinayak Smith MD        Or    potassium bicarb-citric acid (EFFER-K) effervescent tablet 40 mEq  40 mEq Oral PRN Vinayak Smith MD        Or    potassium chloride 10 mEq/100 mL IVPB (Peripheral Line)  10 mEq IntraVENous PRN Vinayak Smith MD        magnesium sulfate 2000 mg in 50 mL IVPB premix  2,000 mg IntraVENous PRVinayak Hairston MD         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   3. Diverticulosis.   4. Nonobstructing 1 mm left renal calculus.   5. Heterogeneous mass is associated with the deep anterior right hip   musculature measuring 3.7 x 3.4 cm which may indicate a soft tissue hematoma   (axial image 185, series: 2).  Follow-up recommended.         XR CHEST (2 VW)   Final Result   Right suprahilar opacity may represent pneumonia in the appropriate clinical   setting. Follow-up to resolution is recommended.         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  This lesion measures approximately 6.9 x 5.9 cm  CT Lumbar: New lytic lesion is seen involving the body of L2 (counting from lumbar spine cephalad.  This lesion is at level of L1 when counting from the thoracic spine caudally). Heterogeneous mass is associated with the deep anterior right hip musculature measuring 3.7 x 3.4 cm which may indicate a soft tissue hematoma   NGSY consulted  MRI thoracic and lumbar spine with and without contrast  NM bone scan  Soft TLSO brace  Heme/Onc consulted  PRN pain control    Right Lung mass  CTA Pulm: There is a new masslike opacity with irregular margins located in the right upper lobe extending to level of the right hilum.  This lesion measures approximately 6.9 x 5.9 cm  Pulmonary on board  Plan for EBUS on 4/23/25  Heme/Onc consulted    History of CAD s/p CABG by Dr. Luna on 11/3/2007-Ranexa 500 mg twice daily, Imdur 60 mg daily, Crestor 40 mg daily, Plavix 75 mg daily, ASA

## 2025-04-21 ENCOUNTER — APPOINTMENT (OUTPATIENT)
Dept: MRI IMAGING | Age: 80
DRG: 181 | End: 2025-04-21
Payer: MEDICARE

## 2025-04-21 ENCOUNTER — APPOINTMENT (OUTPATIENT)
Dept: NUCLEAR MEDICINE | Age: 80
DRG: 181 | End: 2025-04-21
Payer: MEDICARE

## 2025-04-21 LAB
ANION GAP SERPL CALCULATED.3IONS-SCNC: 10 MMOL/L (ref 7–16)
BASOPHILS # BLD: 0.04 K/UL (ref 0–0.2)
BASOPHILS NFR BLD: 1 % (ref 0–2)
BUN SERPL-MCNC: 18 MG/DL (ref 8–23)
CALCIUM SERPL-MCNC: 8.8 MG/DL (ref 8.8–10.2)
CHLORIDE SERPL-SCNC: 103 MMOL/L (ref 98–107)
CO2 SERPL-SCNC: 24 MMOL/L (ref 22–29)
CREAT SERPL-MCNC: 0.8 MG/DL (ref 0.7–1.2)
EOSINOPHIL # BLD: 0.38 K/UL (ref 0.05–0.5)
EOSINOPHILS RELATIVE PERCENT: 5 % (ref 0–6)
ERYTHROCYTE [DISTWIDTH] IN BLOOD BY AUTOMATED COUNT: 15.4 % (ref 11.5–15)
GFR, ESTIMATED: >90 ML/MIN/1.73M2
GLUCOSE SERPL-MCNC: 139 MG/DL (ref 74–99)
HCT VFR BLD AUTO: 38.6 % (ref 37–54)
HGB BLD-MCNC: 12.6 G/DL (ref 12.5–16.5)
IMM GRANULOCYTES # BLD AUTO: 0.04 K/UL (ref 0–0.58)
IMM GRANULOCYTES NFR BLD: 1 % (ref 0–5)
LYMPHOCYTES NFR BLD: 1.26 K/UL (ref 1.5–4)
LYMPHOCYTES RELATIVE PERCENT: 15 % (ref 20–42)
MCH RBC QN AUTO: 29.6 PG (ref 26–35)
MCHC RBC AUTO-ENTMCNC: 32.6 G/DL (ref 32–34.5)
MCV RBC AUTO: 90.8 FL (ref 80–99.9)
MONOCYTES NFR BLD: 0.76 K/UL (ref 0.1–0.95)
MONOCYTES NFR BLD: 9 % (ref 2–12)
NEUTROPHILS NFR BLD: 71 % (ref 43–80)
NEUTS SEG NFR BLD: 5.96 K/UL (ref 1.8–7.3)
PLATELET # BLD AUTO: 211 K/UL (ref 130–450)
PMV BLD AUTO: 10.3 FL (ref 7–12)
POTASSIUM SERPL-SCNC: 3.8 MMOL/L (ref 3.5–5.1)
RBC # BLD AUTO: 4.25 M/UL (ref 3.8–5.8)
SODIUM SERPL-SCNC: 137 MMOL/L (ref 136–145)
WBC OTHER # BLD: 8.4 K/UL (ref 4.5–11.5)

## 2025-04-21 PROCEDURE — 2500000003 HC RX 250 WO HCPCS: Performed by: STUDENT IN AN ORGANIZED HEALTH CARE EDUCATION/TRAINING PROGRAM

## 2025-04-21 PROCEDURE — 2140000000 HC CCU INTERMEDIATE R&B

## 2025-04-21 PROCEDURE — 80048 BASIC METABOLIC PNL TOTAL CA: CPT

## 2025-04-21 PROCEDURE — 97530 THERAPEUTIC ACTIVITIES: CPT

## 2025-04-21 PROCEDURE — 6360000002 HC RX W HCPCS: Performed by: STUDENT IN AN ORGANIZED HEALTH CARE EDUCATION/TRAINING PROGRAM

## 2025-04-21 PROCEDURE — A9503 TC99M MEDRONATE: HCPCS | Performed by: RADIOLOGY

## 2025-04-21 PROCEDURE — 36415 COLL VENOUS BLD VENIPUNCTURE: CPT

## 2025-04-21 PROCEDURE — 6370000000 HC RX 637 (ALT 250 FOR IP): Performed by: NURSE PRACTITIONER

## 2025-04-21 PROCEDURE — 97535 SELF CARE MNGMENT TRAINING: CPT

## 2025-04-21 PROCEDURE — 6370000000 HC RX 637 (ALT 250 FOR IP): Performed by: STUDENT IN AN ORGANIZED HEALTH CARE EDUCATION/TRAINING PROGRAM

## 2025-04-21 PROCEDURE — 3430000000 HC RX DIAGNOSTIC RADIOPHARMACEUTICAL: Performed by: RADIOLOGY

## 2025-04-21 PROCEDURE — 6360000002 HC RX W HCPCS: Performed by: EMERGENCY MEDICINE

## 2025-04-21 PROCEDURE — 78306 BONE IMAGING WHOLE BODY: CPT | Performed by: STUDENT IN AN ORGANIZED HEALTH CARE EDUCATION/TRAINING PROGRAM

## 2025-04-21 PROCEDURE — 85025 COMPLETE CBC W/AUTO DIFF WBC: CPT

## 2025-04-21 PROCEDURE — 72146 MRI CHEST SPINE W/O DYE: CPT

## 2025-04-21 PROCEDURE — 72148 MRI LUMBAR SPINE W/O DYE: CPT

## 2025-04-21 RX ORDER — TC 99M MEDRONATE 20 MG/10ML
24 INJECTION, POWDER, LYOPHILIZED, FOR SOLUTION INTRAVENOUS
Status: COMPLETED | OUTPATIENT
Start: 2025-04-21 | End: 2025-04-21

## 2025-04-21 RX ORDER — OXYCODONE HYDROCHLORIDE 10 MG/1
10 TABLET ORAL EVERY 6 HOURS PRN
Refills: 0 | Status: DISCONTINUED | OUTPATIENT
Start: 2025-04-21 | End: 2025-04-22

## 2025-04-21 RX ORDER — LACTULOSE 10 G/15ML
30 SOLUTION ORAL ONCE
Status: COMPLETED | OUTPATIENT
Start: 2025-04-21 | End: 2025-04-21

## 2025-04-21 RX ADMIN — RANOLAZINE 500 MG: 500 TABLET, FILM COATED, EXTENDED RELEASE ORAL at 19:44

## 2025-04-21 RX ADMIN — RANOLAZINE 500 MG: 500 TABLET, FILM COATED, EXTENDED RELEASE ORAL at 08:28

## 2025-04-21 RX ADMIN — ISOSORBIDE MONONITRATE 60 MG: 30 TABLET, EXTENDED RELEASE ORAL at 08:28

## 2025-04-21 RX ADMIN — MORPHINE SULFATE 2 MG: 2 INJECTION, SOLUTION INTRAMUSCULAR; INTRAVENOUS at 04:17

## 2025-04-21 RX ADMIN — MORPHINE SULFATE 2 MG: 2 INJECTION, SOLUTION INTRAMUSCULAR; INTRAVENOUS at 15:50

## 2025-04-21 RX ADMIN — MORPHINE SULFATE 2 MG: 2 INJECTION, SOLUTION INTRAMUSCULAR; INTRAVENOUS at 19:44

## 2025-04-21 RX ADMIN — Medication 3 MG: at 23:47

## 2025-04-21 RX ADMIN — MORPHINE SULFATE 2 MG: 2 INJECTION, SOLUTION INTRAMUSCULAR; INTRAVENOUS at 08:27

## 2025-04-21 RX ADMIN — SODIUM CHLORIDE, PRESERVATIVE FREE 10 ML: 5 INJECTION INTRAVENOUS at 15:50

## 2025-04-21 RX ADMIN — ROSUVASTATIN CALCIUM 40 MG: 20 TABLET, FILM COATED ORAL at 08:28

## 2025-04-21 RX ADMIN — LISINOPRIL 20 MG: 20 TABLET ORAL at 19:44

## 2025-04-21 RX ADMIN — ASPIRIN 81 MG CHEWABLE TABLET 81 MG: 81 TABLET CHEWABLE at 08:28

## 2025-04-21 RX ADMIN — OXYCODONE HYDROCHLORIDE 10 MG: 10 TABLET ORAL at 12:24

## 2025-04-21 RX ADMIN — LACTULOSE 30 G: 20 SOLUTION ORAL at 12:24

## 2025-04-21 RX ADMIN — ACETAMINOPHEN 650 MG: 325 TABLET ORAL at 20:56

## 2025-04-21 RX ADMIN — MORPHINE SULFATE 2 MG: 2 INJECTION, SOLUTION INTRAMUSCULAR; INTRAVENOUS at 23:47

## 2025-04-21 RX ADMIN — OXYCODONE HYDROCHLORIDE 10 MG: 10 TABLET ORAL at 20:56

## 2025-04-21 RX ADMIN — TC 99M MEDRONATE 24 MILLICURIE: 20 INJECTION, POWDER, LYOPHILIZED, FOR SOLUTION INTRAVENOUS at 10:33

## 2025-04-21 RX ADMIN — SODIUM CHLORIDE, PRESERVATIVE FREE 10 ML: 5 INJECTION INTRAVENOUS at 19:44

## 2025-04-21 RX ADMIN — ENOXAPARIN SODIUM 40 MG: 100 INJECTION SUBCUTANEOUS at 08:28

## 2025-04-21 RX ADMIN — CLOPIDOGREL BISULFATE 75 MG: 75 TABLET, FILM COATED ORAL at 08:28

## 2025-04-21 RX ADMIN — ONDANSETRON 4 MG: 2 INJECTION, SOLUTION INTRAMUSCULAR; INTRAVENOUS at 15:57

## 2025-04-21 RX ADMIN — LISINOPRIL 20 MG: 20 TABLET ORAL at 08:50

## 2025-04-21 RX ADMIN — SODIUM CHLORIDE, PRESERVATIVE FREE 10 ML: 5 INJECTION INTRAVENOUS at 08:29

## 2025-04-21 RX ADMIN — PANTOPRAZOLE SODIUM 40 MG: 40 TABLET, DELAYED RELEASE ORAL at 04:43

## 2025-04-21 RX ADMIN — POLYETHYLENE GLYCOL 3350 17 G: 17 POWDER, FOR SOLUTION ORAL at 08:29

## 2025-04-21 ASSESSMENT — PAIN DESCRIPTION - ORIENTATION
ORIENTATION: LEFT

## 2025-04-21 ASSESSMENT — PAIN DESCRIPTION - DESCRIPTORS
DESCRIPTORS: ACHING
DESCRIPTORS: ACHING;CRAMPING;DISCOMFORT
DESCRIPTORS: ACHING;DISCOMFORT;SORE
DESCRIPTORS: ACHING;DISCOMFORT;DULL
DESCRIPTORS: ACHING;DISCOMFORT;SORE
DESCRIPTORS: ACHING;DISCOMFORT;DULL
DESCRIPTORS: ACHING;CRAMPING;DISCOMFORT

## 2025-04-21 ASSESSMENT — PAIN SCALES - GENERAL
PAINLEVEL_OUTOF10: 0
PAINLEVEL_OUTOF10: 8
PAINLEVEL_OUTOF10: 6
PAINLEVEL_OUTOF10: 9
PAINLEVEL_OUTOF10: 9
PAINLEVEL_OUTOF10: 0
PAINLEVEL_OUTOF10: 0
PAINLEVEL_OUTOF10: 10
PAINLEVEL_OUTOF10: 8
PAINLEVEL_OUTOF10: 0
PAINLEVEL_OUTOF10: 9
PAINLEVEL_OUTOF10: 5
PAINLEVEL_OUTOF10: 9
PAINLEVEL_OUTOF10: 4

## 2025-04-21 ASSESSMENT — PAIN DESCRIPTION - LOCATION
LOCATION: HIP
LOCATION: LEG

## 2025-04-21 ASSESSMENT — PAIN - FUNCTIONAL ASSESSMENT
PAIN_FUNCTIONAL_ASSESSMENT: ACTIVITIES ARE NOT PREVENTED

## 2025-04-21 ASSESSMENT — PAIN SCALES - WONG BAKER
WONGBAKER_NUMERICALRESPONSE: NO HURT
WONGBAKER_NUMERICALRESPONSE: NO HURT

## 2025-04-21 NOTE — PROGRESS NOTES
[facility]  Department of Pulmonary, Critical Care and Sleep Medicine  [facility]  Department of Internal Medicine  Progress Note    Ermias SUAREZ        Patients Primary Pulmonologist is: [facility]    SUBJECTIVE:    We are following Corky for RUL lung mass. Patient seen and examined. Currently without any respiratory complaints. Awake, alert and oriented.     OBJECTIVE:  Vitals:    04/21/25 0857 04/21/25 1157 04/21/25 1224 04/21/25 1254   BP:  (!) 165/76     Pulse:  51     Resp: 18 19 18 18   Temp:  97.5 °F (36.4 °C)     TempSrc:  Temporal     SpO2:  94%     Weight:       Height:         Constitutional: Alert,  NAD  EENT: EOMI SAMPSON. MMM. No icterus. No thrush.     Neck:  Trachea was midline.   Respiratory: CTA bilaterally, no use of accessory muscles  Cardiovascular: Regular, No murmur. No rubs.      Pulses:  Equal bilaterally.    Abdomen: Soft without organomegaly. No rebound, rigidity.  No guarding.  Lymphatic: No lymphadenopathy.  Musculoskeletal: Without weakness or gross deficits  Extremities:  No lower extremity edema. Reflexes appear adequate.   Skin:  Warm and dry.  No skin rashes.   Neurological/Psychiatric: No acute psychosis. Cranial nerves are intact.        DATA:    Monitor Strips:  Reviewed & discusses with technical team. No changes noted.    RADIOLOGY:  No new imaging studies      CBC with Differential:    Lab Results   Component Value Date/Time    WBC 8.4 04/21/2025 05:01 AM    RBC 4.25 04/21/2025 05:01 AM    HGB 12.6 04/21/2025 05:01 AM    HCT 38.6 04/21/2025 05:01 AM     04/21/2025 05:01 AM    MCV 90.8 04/21/2025 05:01 AM    MCH 29.6 04/21/2025 05:01 AM    MCHC 32.6 04/21/2025 05:01 AM    RDW 15.4 04/21/2025 05:01 AM    LYMPHOPCT 15 04/21/2025 05:01 AM    MONOPCT 9 04/21/2025 05:01 AM    EOSPCT 5

## 2025-04-21 NOTE — PLAN OF CARE
Problem: Discharge Planning  Goal: Discharge to home or other facility with appropriate resources  4/21/2025 0814 by Mala Stearns RN  Outcome: Progressing  4/21/2025 0613 by Kina Childs RN  Outcome: Progressing     Problem: Pain  Goal: Verbalizes/displays adequate comfort level or baseline comfort level  4/21/2025 0814 by Mala Stearns RN  Outcome: Progressing  4/21/2025 0613 by Kina Childs RN  Outcome: Progressing     Problem: Safety - Adult  Goal: Free from fall injury  4/21/2025 0814 by Mala Stearns RN  Outcome: Progressing  4/21/2025 0613 by Kina Childs RN  Outcome: Progressing     Problem: Nutrition Deficit:  Goal: Optimize nutritional status  4/21/2025 0814 by Mala Stearns RN  Outcome: Progressing  4/21/2025 0613 by Kina Childs RN  Outcome: Progressing      EOAE (evoked otoacoustic emission)

## 2025-04-21 NOTE — PLAN OF CARE
Problem: Pain  Goal: Verbalizes/displays adequate comfort level or baseline comfort level  4/21/2025 1950 by Lea Martin, RN  Outcome: Progressing  4/21/2025 0814 by Mala Stearns, RN  Outcome: Progressing  4/21/2025 0613 by Kina Childs, RN  Outcome: Progressing  Flowsheets (Taken 4/20/2025 2105)  Verbalizes/displays adequate comfort level or baseline comfort level: Encourage patient to monitor pain and request assistance

## 2025-04-21 NOTE — PROGRESS NOTES
Occupational Therapy  OT BEDSIDE TREATMENT NOTE   [facility]  [address], [place]      Date:2025  Patient Name: Corky Lawler  MRN: [id number]  : 1945  Room: [address] [place] [address]     Evaluating OT: Jesse Bradley OTR/L #[id number]      Referring Provider:   Vinayak Smith MD        Specific Provider Orders/Date: OT eval and treat 25     Diagnosis: Lung mass [R91.8]  Abnormal CT of the chest [R93.89]  Abnormal CT of the abdomen [R93.5]  Lytic lesion of bone on x-ray [M89.8X9]  Abnormal computed tomography of lumbar spine [R93.7]  Acute midline low back pain with left-sided sciatica [M54.42]   Pt admitted to hospital with back pain; + lung mass and L2 pathological fx       Pertinent Medical History:  has a past medical history of AAA (abdominal aortic aneurysm), Aortic regurgitation, COPD (chronic obstructive pulmonary disease) (HCC), Coronary atherosclerosis of native coronary artery, DJD (degenerative joint disease), HTN (hypertension), Hyperlipidemia, Mitral regurgitation, and TIA (transient ischemic attack).         Precautions:  Fall Risk, TLSO, spinal precautions     Assessment of current deficits    [x] Functional mobility          [x]ADLs           [x] Strength                  []Cognition    [x] Functional transfers        [x] IADLs         [x] Safety Awareness   [x]Endurance    [] Fine Coordination                        [x] Balance      [x] Vision/perception   []Sensation      []Gross Motor Coordination            [] ROM           [] Delirium                   [] Motor Control      OT PLAN OF CARE   OT POC based on physician orders, patient diagnosis and results of clinical assessment     Frequency/Duration 1-5 days/wk for 2 weeks PRN   Specific OT Treatment Interventions to include:   * Instruction/training on adapted ADL techniques and AE recommendations to increase functional independence within precautions       * Training on energy

## 2025-04-21 NOTE — CARE COORDINATION
Social Work/ Case Management Transition of Care Planning (Annalisa Madrid, JULIAW [phone]):     Per report and chart review Pt is on room air. /70. Pending NM bone scan whole body, MRI thoracis spine, MRI Lumbar spine. Oncology, pulmonology and Neurosurgery following. Pt schedule for EBUS 4/23. Pt has TLSO at bedside. PT 16/24, OT 16/24. Pt plans to discharge home with his wife who will transport him. Pt declined any HHC needs. SW/CM to follow.  Annalisa Madrid, MIRZA  4/21/2025

## 2025-04-21 NOTE — PROGRESS NOTES
Physical Therapy  Treatment Note    Name: Corky Lawler  : 1945  MRN: [id number]      Date of Service: 2025    Evaluating PT:  Arleen Marx, PT, DPT, [id number]    Room #:  6416/6416-B  Diagnosis:  Lung mass [R91.8]  Abnormal CT of the chest [R93.89]  Abnormal CT of the abdomen [R93.5]  Lytic lesion of bone on x-ray [M89.8X9]  Abnormal computed tomography of lumbar spine [R93.7]  Acute midline low back pain with left-sided sciatica [M54.42]  PMHx/PSHx:    Past Medical History:   Diagnosis Date    AAA (abdominal aortic aneurysm)     4,5cm    Aortic regurgitation     mild to moderate    COPD (chronic obstructive pulmonary disease) (HCC)     Coronary atherosclerosis of native coronary artery     DJD (degenerative joint disease)     HTN (hypertension)     Hyperlipidemia     Mitral regurgitation     TIA (transient ischemic attack) 2013      Past Surgical History:   Procedure Laterality Date    AORTA SURGERY      Aortoiliac stent at [facility] 2016    APPENDECTOMY      BACK SURGERY      BLADDER SURGERY Left 2023    CYSTOSCOPY RETROGRADE PYELOGRAM, LASER LITHOTRIPSY WITH LEFT STENT INSERTION AND GUADARRAMA PLACEMENT performed by Corky Giang MD at [facility]    CARDIAC CATHETERIZATION  2013    DR Hernandez    CARDIAC SURGERY      stents    CHOLECYSTECTOMY      CORONARY ANGIOPLASTY WITH STENT PLACEMENT  2021    Dr. Del Rio (3) stents...Resolute Ludwig-- SVG to PDA, SVG to prox.OM, SVG to mid SVG     CORONARY ARTERY BYPASS GRAFT      DIAGNOSTIC CARDIAC CATH LAB PROCEDURE      EYE SURGERY  2017    HERNIA REPAIR      x4    SHOULDER ARTHROSCOPY      SHOULDER SURGERY      TRANSESOPHAGEAL ECHOCARDIOGRAM  2018    MADHURI with Dr. Hernandez      Procedure/Surgery:  none this admission   Precautions:  Fall risk, TLSO, Spinal Precautions, h/o visual deficit from prior CVA  Equipment Needs:  TBD    SUBJECTIVE:    Pt lives with his wife in a 2 story home with 2 stairs to enter and 1 rail.  Bed is  entering, pt agreeable to participate. Pt assisted with donning TLSO. Pt instructed to transfer to EOB, completing transfer with no physical assistance. Pt sitting upright with good sitting balance. Pt with c/o dizziness with position change, provided time to allow symptoms to subside. Pt then cued for hand placement and instructed to stand from EOB. Pt standing with good balance with no AD. Pt instructed to ambulate to tolerance. Pt ambulating with fair sandy and balance, cueing provided for safe hallway negotiation. Pt demonstrated good tolerance to ambulation bout. Pt was assisted back to bedside and was transferred to bedside chair. Pt positioned for comfort with all needs met and call bell in reach prior to exiting.    Treatment:  Patient practiced and was instructed in the following treatment:    Bed mobility training - pt given verbal and tactile cues to facilitate proper sequencing and safety during rolling and supine>sit as well as provided with stand by assistance to complete task   STS and pivot transfer training - pt educated on proper hand and foot placement, safety and sequencing, and use of verbal and tactile cues to safely complete sit<>stand and pivot transfers with stand by assistance to complete task safely   Gait training- pt was given verbal and tactile cues to facilitate pt safety during ambulation as well as provided with hands on assistance.    PLAN:    Patient is making good progress towards established goals.  Will continue with current POC.      Time in  0912  Time out  0935    Total Treatment Time  23 minutes     CPT codes:  [] Gait training 10010 -- minutes  [] Manual therapy 19777 -- minutes  [x] Therapeutic activities 17055 23 minutes  [] Therapeutic exercises 88787 -- minutes  [] Neuromuscular reeducation 88791 -- minutes    Obdulio Snyder, PT, DPT  [id number]

## 2025-04-21 NOTE — PROGRESS NOTES
Internal Medicine Progress Note    Patient's name: Corky Lawler  : 1945  Chief complaints (on day of admission): Back Pain (Approx one week ago was coughing and when coughed experienced pain low back pain with radiation to anterior thigh; cough resolved but now pain getting worse, unable to sleep)  Admission date: 2025  Date of service: 2025   Room: [address]  Primary care physician: Aurelio Hamilton MD  Reason for visit: Follow-up for back pain    Subjective  Corky was seen and examined at bedside     Adjust pain regimen   Want him on oral pain meds   Wean the IV as tolerated     Review of Systems  There are no new complaints of chest pain, shortness of breath, abdominal pain, nausea, vomiting, diarrhea, constipation unless otherwise mentioned above.     Hospital Medications  Current Facility-Administered Medications   Medication Dose Route Frequency Provider Last Rate Last Admin    melatonin tablet 3 mg  3 mg Oral Nightly PRN Mimi Freeman, APRN - CNP   3 mg at 25    HYDROcodone-acetaminophen (NORCO) 5-325 MG per tablet 1 tablet  1 tablet Oral Q6H PRN Vinayak Smith MD   1 tablet at 25 1058    polyethylene glycol (GLYCOLAX) packet 17 g  17 g Oral Daily Vinayak Smith MD   17 g at 25 0839    sodium chloride flush 0.9 % injection 5-40 mL  5-40 mL IntraVENous 2 times per day Vinayak Smith MD   10 mL at 25    sodium chloride flush 0.9 % injection 5-40 mL  5-40 mL IntraVENous PRN Vinayak Smith MD        0.9 % sodium chloride infusion   IntraVENous PRN Vinayak Smith MD        potassium chloride (KLOR-CON M) extended release tablet 40 mEq  40 mEq Oral PRN Vinayak Smith MD        Or    potassium bicarb-citric acid (EFFER-K) effervescent tablet 40 mEq  40 mEq Oral PRN Vinayak Smith MD        Or    potassium chloride 10 mEq/100 mL IVPB (Peripheral Line)  10 mEq IntraVENous PRN Vinayak Smith MD        magnesium sulfate 2000 mg in 50 mL IVPB premix  2,000 mg

## 2025-04-22 ENCOUNTER — APPOINTMENT (OUTPATIENT)
Dept: MRI IMAGING | Age: 80
DRG: 181 | End: 2025-04-22
Payer: MEDICARE

## 2025-04-22 LAB
ANION GAP SERPL CALCULATED.3IONS-SCNC: 10 MMOL/L (ref 7–16)
BASOPHILS # BLD: 0.05 K/UL (ref 0–0.2)
BASOPHILS NFR BLD: 1 % (ref 0–2)
BUN SERPL-MCNC: 14 MG/DL (ref 8–23)
CALCIUM SERPL-MCNC: 9 MG/DL (ref 8.8–10.2)
CHLORIDE SERPL-SCNC: 103 MMOL/L (ref 98–107)
CO2 SERPL-SCNC: 24 MMOL/L (ref 22–29)
CREAT SERPL-MCNC: 0.7 MG/DL (ref 0.7–1.2)
EOSINOPHIL # BLD: 0.54 K/UL (ref 0.05–0.5)
EOSINOPHILS RELATIVE PERCENT: 6 % (ref 0–6)
ERYTHROCYTE [DISTWIDTH] IN BLOOD BY AUTOMATED COUNT: 15.3 % (ref 11.5–15)
GFR, ESTIMATED: >90 ML/MIN/1.73M2
GLUCOSE SERPL-MCNC: 133 MG/DL (ref 74–99)
HCT VFR BLD AUTO: 40.3 % (ref 37–54)
HGB BLD-MCNC: 13 G/DL (ref 12.5–16.5)
IMM GRANULOCYTES # BLD AUTO: 0.03 K/UL (ref 0–0.58)
IMM GRANULOCYTES NFR BLD: 0 % (ref 0–5)
LYMPHOCYTES NFR BLD: 1.33 K/UL (ref 1.5–4)
LYMPHOCYTES RELATIVE PERCENT: 16 % (ref 20–42)
MCH RBC QN AUTO: 29.3 PG (ref 26–35)
MCHC RBC AUTO-ENTMCNC: 32.3 G/DL (ref 32–34.5)
MCV RBC AUTO: 90.8 FL (ref 80–99.9)
MONOCYTES NFR BLD: 0.77 K/UL (ref 0.1–0.95)
MONOCYTES NFR BLD: 9 % (ref 2–12)
NEUTROPHILS NFR BLD: 68 % (ref 43–80)
NEUTS SEG NFR BLD: 5.69 K/UL (ref 1.8–7.3)
PLATELET # BLD AUTO: 206 K/UL (ref 130–450)
PMV BLD AUTO: 10.6 FL (ref 7–12)
POTASSIUM SERPL-SCNC: 3.9 MMOL/L (ref 3.5–5.1)
RBC # BLD AUTO: 4.44 M/UL (ref 3.8–5.8)
SODIUM SERPL-SCNC: 138 MMOL/L (ref 136–145)
WBC OTHER # BLD: 8.4 K/UL (ref 4.5–11.5)

## 2025-04-22 PROCEDURE — 6360000002 HC RX W HCPCS: Performed by: EMERGENCY MEDICINE

## 2025-04-22 PROCEDURE — 6370000000 HC RX 637 (ALT 250 FOR IP): Performed by: INTERNAL MEDICINE

## 2025-04-22 PROCEDURE — 6360000002 HC RX W HCPCS: Performed by: STUDENT IN AN ORGANIZED HEALTH CARE EDUCATION/TRAINING PROGRAM

## 2025-04-22 PROCEDURE — 36415 COLL VENOUS BLD VENIPUNCTURE: CPT

## 2025-04-22 PROCEDURE — 80048 BASIC METABOLIC PNL TOTAL CA: CPT

## 2025-04-22 PROCEDURE — 99232 SBSQ HOSP IP/OBS MODERATE 35: CPT | Performed by: INTERNAL MEDICINE

## 2025-04-22 PROCEDURE — 70551 MRI BRAIN STEM W/O DYE: CPT

## 2025-04-22 PROCEDURE — 6370000000 HC RX 637 (ALT 250 FOR IP): Performed by: STUDENT IN AN ORGANIZED HEALTH CARE EDUCATION/TRAINING PROGRAM

## 2025-04-22 PROCEDURE — 2500000003 HC RX 250 WO HCPCS: Performed by: STUDENT IN AN ORGANIZED HEALTH CARE EDUCATION/TRAINING PROGRAM

## 2025-04-22 PROCEDURE — 6370000000 HC RX 637 (ALT 250 FOR IP): Performed by: NURSE PRACTITIONER

## 2025-04-22 PROCEDURE — 85025 COMPLETE CBC W/AUTO DIFF WBC: CPT

## 2025-04-22 PROCEDURE — 2140000000 HC CCU INTERMEDIATE R&B

## 2025-04-22 RX ORDER — ONDANSETRON 4 MG/1
4 TABLET, FILM COATED ORAL ONCE
Status: DISCONTINUED | OUTPATIENT
Start: 2025-04-22 | End: 2025-04-24 | Stop reason: HOSPADM

## 2025-04-22 RX ORDER — MECLIZINE HCL 12.5 MG 12.5 MG/1
25 TABLET ORAL ONCE
Status: COMPLETED | OUTPATIENT
Start: 2025-04-22 | End: 2025-04-22

## 2025-04-22 RX ORDER — MORPHINE SULFATE 2 MG/ML
1 INJECTION, SOLUTION INTRAMUSCULAR; INTRAVENOUS EVERY 8 HOURS PRN
Status: DISCONTINUED | OUTPATIENT
Start: 2025-04-22 | End: 2025-04-24 | Stop reason: HOSPADM

## 2025-04-22 RX ORDER — OXYCODONE HYDROCHLORIDE 10 MG/1
10 TABLET ORAL EVERY 8 HOURS PRN
Refills: 0 | Status: DISCONTINUED | OUTPATIENT
Start: 2025-04-22 | End: 2025-04-24 | Stop reason: HOSPADM

## 2025-04-22 RX ORDER — OXYCODONE HYDROCHLORIDE 10 MG/1
10 TABLET ORAL EVERY 6 HOURS PRN
Qty: 20 TABLET | Refills: 0 | Status: SHIPPED | OUTPATIENT
Start: 2025-04-22 | End: 2025-04-27

## 2025-04-22 RX ORDER — GABAPENTIN 100 MG/1
100 CAPSULE ORAL 3 TIMES DAILY
Status: DISCONTINUED | OUTPATIENT
Start: 2025-04-22 | End: 2025-04-24 | Stop reason: HOSPADM

## 2025-04-22 RX ORDER — POLYETHYLENE GLYCOL 3350 17 G/17G
17 POWDER, FOR SOLUTION ORAL DAILY
Qty: 30 PACKET | Refills: 0 | Status: SHIPPED | OUTPATIENT
Start: 2025-04-23 | End: 2025-05-23

## 2025-04-22 RX ADMIN — PANTOPRAZOLE SODIUM 40 MG: 40 TABLET, DELAYED RELEASE ORAL at 04:08

## 2025-04-22 RX ADMIN — ACETAMINOPHEN 650 MG: 325 TABLET ORAL at 03:00

## 2025-04-22 RX ADMIN — MECLIZINE 25 MG: 12.5 TABLET ORAL at 18:02

## 2025-04-22 RX ADMIN — OXYCODONE HYDROCHLORIDE 10 MG: 10 TABLET ORAL at 03:00

## 2025-04-22 RX ADMIN — RANOLAZINE 500 MG: 500 TABLET, FILM COATED, EXTENDED RELEASE ORAL at 08:25

## 2025-04-22 RX ADMIN — POLYETHYLENE GLYCOL 3350 17 G: 17 POWDER, FOR SOLUTION ORAL at 08:24

## 2025-04-22 RX ADMIN — OXYCODONE HYDROCHLORIDE 10 MG: 10 TABLET ORAL at 15:30

## 2025-04-22 RX ADMIN — ENOXAPARIN SODIUM 40 MG: 100 INJECTION SUBCUTANEOUS at 08:24

## 2025-04-22 RX ADMIN — ROSUVASTATIN CALCIUM 40 MG: 20 TABLET, FILM COATED ORAL at 08:24

## 2025-04-22 RX ADMIN — SODIUM CHLORIDE, PRESERVATIVE FREE 10 ML: 5 INJECTION INTRAVENOUS at 08:25

## 2025-04-22 RX ADMIN — ISOSORBIDE MONONITRATE 60 MG: 30 TABLET, EXTENDED RELEASE ORAL at 08:24

## 2025-04-22 RX ADMIN — ACETAMINOPHEN 650 MG: 325 TABLET ORAL at 15:29

## 2025-04-22 RX ADMIN — ACETAMINOPHEN 650 MG: 325 TABLET ORAL at 21:33

## 2025-04-22 RX ADMIN — OXYCODONE HYDROCHLORIDE 10 MG: 10 TABLET ORAL at 21:33

## 2025-04-22 RX ADMIN — ACETAMINOPHEN 650 MG: 325 TABLET ORAL at 09:15

## 2025-04-22 RX ADMIN — GABAPENTIN 100 MG: 100 CAPSULE ORAL at 21:33

## 2025-04-22 RX ADMIN — ASPIRIN 81 MG CHEWABLE TABLET 81 MG: 81 TABLET CHEWABLE at 08:24

## 2025-04-22 RX ADMIN — LISINOPRIL 20 MG: 20 TABLET ORAL at 08:24

## 2025-04-22 RX ADMIN — SODIUM CHLORIDE, PRESERVATIVE FREE 10 ML: 5 INJECTION INTRAVENOUS at 21:34

## 2025-04-22 RX ADMIN — GABAPENTIN 100 MG: 100 CAPSULE ORAL at 16:49

## 2025-04-22 RX ADMIN — Medication 3 MG: at 21:33

## 2025-04-22 RX ADMIN — MORPHINE SULFATE 2 MG: 2 INJECTION, SOLUTION INTRAMUSCULAR; INTRAVENOUS at 04:00

## 2025-04-22 RX ADMIN — MORPHINE SULFATE 2 MG: 2 INJECTION, SOLUTION INTRAMUSCULAR; INTRAVENOUS at 12:29

## 2025-04-22 RX ADMIN — ONDANSETRON 4 MG: 2 INJECTION, SOLUTION INTRAMUSCULAR; INTRAVENOUS at 19:28

## 2025-04-22 RX ADMIN — LISINOPRIL 20 MG: 20 TABLET ORAL at 21:33

## 2025-04-22 RX ADMIN — OXYCODONE HYDROCHLORIDE 10 MG: 10 TABLET ORAL at 09:17

## 2025-04-22 ASSESSMENT — PAIN DESCRIPTION - DESCRIPTORS
DESCRIPTORS: ACHING;DISCOMFORT;NAGGING
DESCRIPTORS: ACHING;CRAMPING;DISCOMFORT
DESCRIPTORS: ACHING
DESCRIPTORS: ACHING;DISCOMFORT;NAGGING
DESCRIPTORS: ACHING
DESCRIPTORS: ACHING

## 2025-04-22 ASSESSMENT — PAIN DESCRIPTION - ORIENTATION
ORIENTATION: LEFT;UPPER
ORIENTATION: LEFT

## 2025-04-22 ASSESSMENT — PAIN DESCRIPTION - LOCATION
LOCATION: HIP
LOCATION: HIP
LOCATION: LEG
LOCATION: HIP
LOCATION: LEG
LOCATION: LEG

## 2025-04-22 ASSESSMENT — PAIN SCALES - GENERAL
PAINLEVEL_OUTOF10: 0
PAINLEVEL_OUTOF10: 0
PAINLEVEL_OUTOF10: 5
PAINLEVEL_OUTOF10: 3
PAINLEVEL_OUTOF10: 8
PAINLEVEL_OUTOF10: 0
PAINLEVEL_OUTOF10: 5
PAINLEVEL_OUTOF10: 8
PAINLEVEL_OUTOF10: 9
PAINLEVEL_OUTOF10: 7
PAINLEVEL_OUTOF10: 6
PAINLEVEL_OUTOF10: 6
PAINLEVEL_OUTOF10: 3

## 2025-04-22 NOTE — CARE COORDINATION
Patient has dizziness that has persisted through out the day and possibly worsened   Spoke with his wife over the phone and gave her an update and answered all of her questions to her satisfaction   Will obtain brain MRI wo con to rule out possible neurologic etiology e.g. stroke   Hold discharge

## 2025-04-22 NOTE — CARE COORDINATION
Social Work/ Case Management Transition of Care Planning (MIRZA Garza [phone]):     Per report and chart review Pt on room air. Pt for OP EBUS due to the need to hold Plavix for 5 days. Per attending, Pt can discharge if clear by consults. Pulmonology and Neurosurgery-ok to dc from their POV. SW waiting to hear back from Oncology. Pt plans to discharge home and his wife will transport.   MIRZA Garza  4/22/2025    Update: Per attending, Oncology has signed off and Pt can discharge.   Electronically signed by MIRZA Garza on 4/22/2025 at 2:00 PM

## 2025-04-22 NOTE — PROGRESS NOTES
CLINICAL PHARMACY NOTE: MEDS TO BEDS    Total # of Prescriptions Filled: 2   The following medications were delivered to the patient:  Oxycodone 10mg tabs  Miralax 510g    Additional Documentation:  To pt's wife Danielaayden at the pharmacy

## 2025-04-22 NOTE — PROGRESS NOTES
mouth every 6 hours as needed for Pain for up to 5 days. Max Daily Amount: 40 mg 4/22/25 4/27/25 Yes Jesse Mcbride MD   polyethylene glycol (GLYCOLAX) 17 g packet Take 1 packet by mouth daily 4/23/25 5/23/25 Yes Jesse Mcbride MD   ranolazine (RANEXA) 500 MG extended release tablet Take 1 tablet by mouth 2 times daily 8/1/24  Yes ProviderRonaldo MD   isosorbide mononitrate (IMDUR) 60 MG extended release tablet Take 1 tablet by mouth daily 5/23/23  Yes Jesse Sewell MD   nitroGLYCERIN (NITROSTAT) 0.4 MG SL tablet DISSOLVE 1 TABLET UNDER THE TONGUE AS NEEDED 11/27/22  Yes Ronaldo Ansari MD   rosuvastatin (CRESTOR) 40 MG tablet Take 1 tablet by mouth daily 12/17/21  Yes ProviderRonaldo MD   lisinopril (PRINIVIL;ZESTRIL) 20 MG tablet Take 1 tablet by mouth 2 times daily 4/6/21  Yes Jesse Sewell MD   clopidogrel (PLAVIX) 75 MG tablet Take 1 tablet by mouth daily 4/7/21  Yes Jesse Sewell MD   pantoprazole (PROTONIX) 40 MG tablet Take 1 tablet by mouth every morning (before breakfast) 4/7/21  Yes Jesse Sewell MD   aspirin 81 MG tablet Take 1 tablet by mouth daily   Yes Provider, MD Ronaldo       Allergies  Allergies   Allergen Reactions    Tramadol Nausea And Vomiting       Review of Systems:    Refer to HPI      Objective  BP (!) 146/71   Pulse 53   Temp 97.5 °F (36.4 °C) (Oral)   Resp 21   Ht 1.753 m (5' 9.02\")   Wt 99.4 kg (219 lb 1.6 oz)   SpO2 99%   BMI 32.34 kg/m²     Physical Exam:     General: AAO to person, place, time, in no acute distress,   Head and neck : PERRLA, EOMI . Sclera non icteric.  Oropharynx : Clear  Neck: no JVD,  no adenopathy,  Heart: Regular rate and regular rhythm  Lungs: Clear to auscultation   Extremities: No edema,no cyanosis,   Abdomen: Soft, non-tender;no masses, no organomegaly  Skin:  No rash.  Neurologic:Cranial nerves grossly intact. No focal motor or sensory deficits .    Recent Laboratory Data-   Lab Results    Component Value Date    WBC 8.4 04/22/2025    HGB 13.0 04/22/2025    HCT 40.3 04/22/2025    MCV 90.8 04/22/2025     04/22/2025    LYMPHOPCT 16 (L) 04/22/2025    RBC 4.44 04/22/2025    MCH 29.3 04/22/2025    MCHC 32.3 04/22/2025    RDW 15.3 (H) 04/22/2025    NEUTOPHILPCT 68 04/22/2025    MONOPCT 9 04/22/2025    EOSPCT 6 04/22/2025    BASOPCT 1 04/22/2025    NEUTROABS 5.69 04/22/2025    LYMPHSABS 1.33 (L) 04/22/2025    MONOSABS 0.77 04/22/2025    EOSABS 0.54 (H) 04/22/2025    BASOSABS 0.05 04/22/2025       Lab Results   Component Value Date     04/22/2025    K 3.9 04/22/2025     04/22/2025    CO2 24 04/22/2025    BUN 14 04/22/2025    CREATININE 0.7 04/22/2025    GLUCOSE 133 (H) 04/22/2025    CALCIUM 9.0 04/22/2025    BILITOT 0.5 04/17/2025    ALKPHOS 79 04/17/2025    AST 16 04/17/2025    ALT 15 04/17/2025    LABGLOM >90 04/22/2025    GFRAA >60 04/06/2021       No results found for: \"IRON\", \"TIBC\", \"FERRITIN\"        Radiology-    MRI THORACIC SPINE WO CONTRAST  Result Date: 4/21/2025  EXAMINATION: MRI OF THE THORACIC SPINE WITHOUT CONTRAST  4/21/2025 1:57 pm TECHNIQUE: Multiplanar multisequence MRI of the thoracic spine was performed without the administration of intravenous contrast. COMPARISON: CT chest and CT thoracic spine performed 04/17/2025 HISTORY: ORDERING SYSTEM PROVIDED HISTORY: r.o metastasis TECHNOLOGIST PROVIDED HISTORY: Reason for exam:->r.o metastasis FINDINGS: BONES/ALIGNMENT: There is normal alignment of the spine.  Background marrow signal is heterogeneous with multifocal T1 hyperintense signal which could represent fatty infiltration.  No evidence of acute fracture.  No definite suspicious focal STIR hyperintense marrow signal lesion is identified. SPINAL CORD: No abnormal cord signal is seen. SOFT TISSUES: No definite paraspinal mass identified.  Right lung mass and mediastinal lymphadenopathy are partially visualized, better characterized on previous CT. DEGENERATIVE CHANGES:  There is multilevel disc desiccation with mild posterior disc bulges within the mid to lower thoracic spine, from T6-T7 through T10-T11.  No significant spinal canal stenosis or neural foraminal narrowing of the thoracic spine.     No definite evidence of metastatic disease in the thoracic spine. Postcontrast imaging could be considered for further evaluation as clinically warranted.     MRI LUMBAR SPINE WO CONTRAST  Result Date: 4/21/2025  EXAMINATION: MRI OF THE LUMBAR SPINE WITHOUT CONTRAST, 4/21/2025 1:54 pm TECHNIQUE: Multiplanar multisequence MRI of the lumbar spine was performed without the administration of intravenous contrast. COMPARISON: CT abdomen and pelvis and CT lumbar spine 04/17/2025 HISTORY: ORDERING SYSTEM PROVIDED HISTORY: r/o metastasis TECHNOLOGIST PROVIDED HISTORY: Reason for exam:->r/o metastasis FINDINGS: BONES/ALIGNMENT: There is mild grade 1 retrolisthesis of L2 on L3 and L3 on L4.  There is a heterogeneous background marrow signal which may be secondary to fatty infiltration.  There is a 3.4 cm abnormal T1 hypointense, stir hyperintense lesion within the left aspect of the L2 vertebral body with left paraspinal extraosseous soft tissue extension inseparable from the left psoas muscle..  There is a benign hemangioma in the L1 vertebral body.  There is a small Schmorl's node in the L4 superior endplate.  There is multilevel lumbar disc desiccation with disc height loss at L2-L3 greater than L1-L2. SPINAL CORD: The conus terminates normally. SOFT TISSUES: Left paraspinal extraosseous extension of L2 vertebral body lesion, inseparable from the left psoas muscle.  Prior aorta bi-iliac aneurysm repair better evaluated on comparison CT. L1-L2: Mild posterior disc bulge.  No significant spinal canal stenosis or neural foraminal narrowing. L2-L3: Mild posterior disc bulge.  Bilateral facet arthropathy and ligamentum flavum thickening.  No significant spinal canal stenosis.  Moderate left and mild  the administration of intravenous contrast.  Multiplanar reformatted images are provided for review.  MIP images are provided for review. Automated exposure control, iterative reconstruction, and/or weight based adjustment of the mA/kV was utilized to reduce the radiation dose to as low as reasonably achievable. COMPARISON: August 25, 2020 HISTORY: ORDERING SYSTEM PROVIDED HISTORY: Concern for mass, concern for pulmonary embolus TECHNOLOGIST PROVIDED HISTORY: Reason for exam:->Concern for mass, concern for pulmonary embolus Additional Contrast?->None FINDINGS: There is a new masslike opacity with irregular margins located in the right upper lobe extending to level of the right hilum.  This lesion measures approximately 6.9 x 5.9 cm.  This lesion encases the right hilar vasculature and bronchi and extends into the right aspect of the mediastinum.  There are enlarged mediastinal lymph nodes notable in the right paratracheal location measuring up to 3.8 x 3.4 cm.  No pneumothorax.  No pleural effusion.  The heart is normal in size.  No pericardial effusion.  There is evidence of prior mediastinal surgery suggestive of coronary arterial bypass grafting. No filling defect in the pulmonary arteries to suggest pulmonary arterial embolism.  View of the upper abdomen shows a few incidental cysts associated with the liver appearing similar compared to prior.  Partial visualization of a lytic lesion involving body of L1.     1. New mass is seen in the medial right upper lobe extending to right hilum and encasing right hilar vasculature and bronchi suggestive of neoplasm measuring approximately 6.9 x 5.9 cm. 2. Enlarged mediastinal lymph nodes are present notable in right paratracheal location. 3. Partial visualization of a lytic lesion involving the visualized body of L1. 4. No evidence of pulmonary arterial embolism.     CT LUMBAR SPINE WO CONTRAST  Result Date: 4/17/2025  EXAMINATION: CT OF THE LUMBAR SPINE WITHOUT CONTRAST

## 2025-04-22 NOTE — PROGRESS NOTES
[facility]  Department of Pulmonary, Critical Care and Sleep Medicine  [facility]  Department of Internal Medicine  Progress Note    Ermias SUAREZ      Patients Primary Pulmonologist is: [facility]    SUBJECTIVE:    We are following Corky for RUL lung mass. Patient seen and examined. Currently without any respiratory complaints. Awake, alert and oriented. Reports dizziness with position change, new onset as well as tingling to digits on right hand. Family present at bedside.    OBJECTIVE:  Vitals:    04/22/25 0400 04/22/25 0430 04/22/25 0737 04/22/25 1106   BP:   123/84 (!) 164/64   Pulse:   53 83   Resp: 18 18 21 19   Temp:   98.5 °F (36.9 °C) 97.6 °F (36.4 °C)   TempSrc:   Oral Oral   SpO2:   95% 98%   Weight:       Height:         Constitutional: Alert,  NAD  EENT: EOMI SAMPSON. MMM. No icterus. No thrush.     Neck:  Trachea was midline.   Respiratory: CTA bilaterally, no use of accessory muscles  Cardiovascular: Regular, No murmur. No rubs.      Pulses:  Equal bilaterally.    Abdomen: Soft without organomegaly. No rebound, rigidity.  No guarding.  Lymphatic: No lymphadenopathy.  Musculoskeletal: Without weakness or gross deficits  Extremities:  No lower extremity edema. Reflexes appear adequate.   Skin:  Warm and dry.  No skin rashes.   Neurological/Psychiatric: No acute psychosis. Cranial nerves are intact.        DATA:    Monitor Strips:  Reviewed & discusses with technical team. No changes noted.    RADIOLOGY:  No new imaging studies      CBC with Differential:    Lab Results   Component Value Date/Time    WBC 8.4 04/22/2025 04:42 AM    RBC 4.44 04/22/2025 04:42 AM    HGB 13.0 04/22/2025 04:42 AM    HCT 40.3 04/22/2025 04:42 AM     04/22/2025 04:42 AM    MCV 90.8 04/22/2025 04:42 AM    MCH 29.3 04/22/2025 04:42 AM     discussed with Dr. Ca  Electronically signed by DANIELA Figueredo CNP on 4/22/2025 at 12:43 PM    Attending Attestation Note:    Patient seen and examined with Hospital Staff, NP.  I have extensively reviewed the chart lab work and imaging.  I agree with above.  Modifications and amendment of note made as necessary.    In addition, the following apply:    Current Facility-Administered Medications   Medication Dose Route Frequency Provider Last Rate Last Admin    morphine (PF) injection 1 mg  1 mg IntraVENous Q8H PRN Jesse Mcbride MD        gabapentin (NEURONTIN) capsule 100 mg  100 mg Oral TID Sav Ca MD   100 mg at 04/22/25 1649    oxyCODONE HCl (OXY-IR) immediate release tablet 10 mg  10 mg Oral Q6H PRN Jesse Mcbride MD   10 mg at 04/22/25 1530    melatonin tablet 3 mg  3 mg Oral Nightly PRN Mimi Freeman APRN - CNP   3 mg at 04/21/25 2347    polyethylene glycol (GLYCOLAX) packet 17 g  17 g Oral Daily Vinayak Smith MD   17 g at 04/22/25 0824    sodium chloride flush 0.9 % injection 5-40 mL  5-40 mL IntraVENous 2 times per day Vinayak Smith MD   10 mL at 04/22/25 0825    sodium chloride flush 0.9 % injection 5-40 mL  5-40 mL IntraVENous PRN Vinayak Smith MD   10 mL at 04/21/25 1550    0.9 % sodium chloride infusion   IntraVENous PRN Vinayak Smith MD        potassium chloride (KLOR-CON M) extended release tablet 40 mEq  40 mEq Oral PRN Vinayak Smith MD        Or    potassium bicarb-citric acid (EFFER-K) effervescent tablet 40 mEq  40 mEq Oral PRN Vinayak Smith MD        Or    potassium chloride 10 mEq/100 mL IVPB (Peripheral Line)  10 mEq IntraVENous PRN Vinayak Smith MD        magnesium sulfate 2000 mg in 50 mL IVPB premix  2,000 mg IntraVENous PRN Vinayak Smith MD        enoxaparin (LOVENOX) injection 40 mg  40 mg SubCUTAneous Daily Vinayak Smith MD   40 mg at 04/22/25 0824    acetaminophen (TYLENOL) tablet 650 mg  650 mg Oral Q6H PRN Vinayak Smith MD   650 mg at 04/22/25 1529    Or

## 2025-04-22 NOTE — PROGRESS NOTES
Patient received the Sacrament of the Anointing of the Sick by Father Ramon Gallardo on Monday, April 21, 2025.    If additional support is requested or needed please reach out to Spiritual Health ([phone]).    Chap. Melvin Pelaez MDIV, BCC

## 2025-04-22 NOTE — DISCHARGE INSTR - DIET

## 2025-04-22 NOTE — PROGRESS NOTES
Internal Medicine Progress Note    Patient's name: Corky Lawler  : 1945  Chief complaints (on day of admission): Back Pain (Approx one week ago was coughing and when coughed experienced pain low back pain with radiation to anterior thigh; cough resolved but now pain getting worse, unable to sleep)  Admission date: 2025  Date of service: 2025   Room: [address]  Primary care physician: Aurelio Hamilton MD  Reason for visit: Follow-up for back pain    Subjective  Corky was seen and examined at bedside     Pain better controlled on the oxy IR  Wean morphine   Planning for home today   Spoke with hem onc Dr Guan and Dr Nieves who will follow him as an op     Review of Systems  There are no new complaints of chest pain, shortness of breath, abdominal pain, nausea, vomiting, diarrhea, constipation unless otherwise mentioned above.     Hospital Medications  Current Facility-Administered Medications   Medication Dose Route Frequency Provider Last Rate Last Admin    oxyCODONE HCl (OXY-IR) immediate release tablet 10 mg  10 mg Oral Q6H PRN Jesse Mcbride MD   10 mg at 25 0917    melatonin tablet 3 mg  3 mg Oral Nightly PRN Mimi Freeman APRN - CNP   3 mg at 25 2347    polyethylene glycol (GLYCOLAX) packet 17 g  17 g Oral Daily Vinayak Smith MD   17 g at 25 0824    sodium chloride flush 0.9 % injection 5-40 mL  5-40 mL IntraVENous 2 times per day Vinayak Smith MD   10 mL at 25 0825    sodium chloride flush 0.9 % injection 5-40 mL  5-40 mL IntraVENous PRN Vinayak Smith MD   10 mL at 25 1550    0.9 % sodium chloride infusion   IntraVENous PRN Vinayak Smith MD        potassium chloride (KLOR-CON M) extended release tablet 40 mEq  40 mEq Oral PRN Vinayak Smith MD        Or    potassium bicarb-citric acid (EFFER-K) effervescent tablet 40 mEq  40 mEq Oral PRN Vinayak Smith MD        Or    potassium chloride 10 mEq/100 mL IVPB (Peripheral Line)  10 mEq IntraVENous PRN Sarah      Physical Exam:  General: AAO to person/place/time/purpose, NAD, no labored breathing  Eyes: conjunctivae/corneas clear, sclera non icteric  Skin: color/texture/turgor normal, no rashes or lesions  Lungs: CTAB, no retractions/use of accessory muscles, no vocal fremitus, no rhonchi, no crackle, no rales  Heart: regular rate, regular rhythm, no murmur  Abdomen: soft, NT, bowel sounds normal  Extremities: atraumatic, no edema  Neurologic: cranial nerves 2-12 grossly intact, no slurred speech    Most Recent Labs  Lab Results   Component Value Date    WBC 8.4 04/22/2025    HGB 13.0 04/22/2025    HCT 40.3 04/22/2025     04/22/2025     04/22/2025    K 3.9 04/22/2025     04/22/2025    CREATININE 0.7 04/22/2025    BUN 14 04/22/2025    CO2 24 04/22/2025    GLUCOSE 133 (H) 04/22/2025    ALT 15 04/17/2025    AST 16 04/17/2025    INR 1.4 09/18/2013    APTT 32.1 09/18/2013    TSH 3.320 05/19/2023    LABA1C 6.0 (H) 05/18/2023       MRI THORACIC SPINE WO CONTRAST   Final Result   No definite evidence of metastatic disease in the thoracic spine.   Postcontrast imaging could be considered for further evaluation as clinically   warranted.         MRI LUMBAR SPINE WO CONTRAST   Final Result   1. Abnormal signal lesion within the left aspect of the L2 vertebral body   with left paraspinal extraosseous soft tissue extension, suspicious for   metastatic disease.  Postcontrast MRI imaging could be performed for further   evaluation as clinically warranted.   2. Multilevel degenerative changes of the lumbar spine as described above. No   significant spinal canal stenosis.   3. Multilevel neural foraminal narrowing, as described above.         NM BONE SCAN WHOLE BODY   Final Result   1.  L2 vertebra solitary focus of increased uptake with corresponding   osteolytic lesion by recent CT exam.  Otherwise negative whole-body bone scan.      2.  MRI thoracic and lumbar spine to follow and reported separately.         XR

## 2025-04-23 ENCOUNTER — APPOINTMENT (OUTPATIENT)
Age: 80
DRG: 181 | End: 2025-04-23
Attending: STUDENT IN AN ORGANIZED HEALTH CARE EDUCATION/TRAINING PROGRAM
Payer: MEDICARE

## 2025-04-23 ENCOUNTER — APPOINTMENT (OUTPATIENT)
Dept: CT IMAGING | Age: 80
DRG: 181 | End: 2025-04-23
Attending: STUDENT IN AN ORGANIZED HEALTH CARE EDUCATION/TRAINING PROGRAM
Payer: MEDICARE

## 2025-04-23 PROBLEM — I63.9 ACUTE STROKE DUE TO ISCHEMIA (HCC): Status: ACTIVE | Noted: 2025-04-23

## 2025-04-23 LAB
ANION GAP SERPL CALCULATED.3IONS-SCNC: 10 MMOL/L (ref 7–16)
BASOPHILS # BLD: 0.06 K/UL (ref 0–0.2)
BASOPHILS NFR BLD: 1 % (ref 0–2)
BUN SERPL-MCNC: 16 MG/DL (ref 8–23)
CALCIUM SERPL-MCNC: 9 MG/DL (ref 8.8–10.2)
CHLORIDE SERPL-SCNC: 102 MMOL/L (ref 98–107)
CHOLEST SERPL-MCNC: 103 MG/DL
CO2 SERPL-SCNC: 24 MMOL/L (ref 22–29)
CREAT SERPL-MCNC: 0.7 MG/DL (ref 0.7–1.2)
ECHO AO ASC DIAM: 4.1 CM
ECHO AO ASCENDING AORTA INDEX: 1.92 CM/M2
ECHO AR MAX VEL PISA: 5.1 M/S
ECHO AV AREA PEAK VELOCITY: 2.1 CM2
ECHO AV AREA VTI: 2.1 CM2
ECHO AV AREA/BSA PEAK VELOCITY: 1 CM2/M2
ECHO AV AREA/BSA VTI: 1 CM2/M2
ECHO AV CUSP MM: 2.7 CM
ECHO AV MEAN GRADIENT: 12 MMHG
ECHO AV MEAN VELOCITY: 1.6 M/S
ECHO AV PEAK GRADIENT: 22 MMHG
ECHO AV PEAK VELOCITY: 2.4 M/S
ECHO AV REGURGITANT PHT: 751.3 MS
ECHO AV VELOCITY RATIO: 0.63
ECHO AV VTI: 45.8 CM
ECHO BSA: 2.2 M2
ECHO EST RA PRESSURE: 3 MMHG
ECHO LA DIAMETER INDEX: 2.38 CM/M2
ECHO LA DIAMETER: 5.1 CM
ECHO LA VOL A-L A2C: 73 ML (ref 18–58)
ECHO LA VOL A-L A4C: 70 ML (ref 18–58)
ECHO LA VOL MOD A2C: 69 ML (ref 18–58)
ECHO LA VOL MOD A4C: 67 ML (ref 18–58)
ECHO LA VOLUME AREA LENGTH: 72 ML
ECHO LA VOLUME INDEX A-L A2C: 34 ML/M2 (ref 16–34)
ECHO LA VOLUME INDEX A-L A4C: 33 ML/M2 (ref 16–34)
ECHO LA VOLUME INDEX AREA LENGTH: 34 ML/M2 (ref 16–34)
ECHO LA VOLUME INDEX MOD A2C: 32 ML/M2 (ref 16–34)
ECHO LA VOLUME INDEX MOD A4C: 31 ML/M2 (ref 16–34)
ECHO LV EDV A4C: 129 ML
ECHO LV EDV INDEX A4C: 60 ML/M2
ECHO LV EF PHYSICIAN: 50 %
ECHO LV EJECTION FRACTION A4C: 61 %
ECHO LV ESV A4C: 51 ML
ECHO LV ESV INDEX A4C: 24 ML/M2
ECHO LV FRACTIONAL SHORTENING: 30 % (ref 28–44)
ECHO LV INTERNAL DIMENSION DIASTOLE INDEX: 2.66 CM/M2
ECHO LV INTERNAL DIMENSION DIASTOLIC: 5.7 CM (ref 4.2–5.9)
ECHO LV INTERNAL DIMENSION SYSTOLIC INDEX: 1.87 CM/M2
ECHO LV INTERNAL DIMENSION SYSTOLIC: 4 CM
ECHO LV IVSD: 1.1 CM (ref 0.6–1)
ECHO LV IVSS: 1.2 CM
ECHO LV MASS 2D: 288.7 G (ref 88–224)
ECHO LV MASS INDEX 2D: 134.9 G/M2 (ref 49–115)
ECHO LV POSTERIOR WALL DIASTOLIC: 1.3 CM (ref 0.6–1)
ECHO LV POSTERIOR WALL SYSTOLIC: 1.4 CM
ECHO LV RELATIVE WALL THICKNESS RATIO: 0.46
ECHO LVOT AREA: 3.5 CM2
ECHO LVOT AV VTI INDEX: 0.63
ECHO LVOT DIAM: 2.1 CM
ECHO LVOT MEAN GRADIENT: 5 MMHG
ECHO LVOT PEAK GRADIENT: 9 MMHG
ECHO LVOT PEAK VELOCITY: 1.5 M/S
ECHO LVOT STROKE VOLUME INDEX: 46.8 ML/M2
ECHO LVOT SV: 100 ML
ECHO LVOT VTI: 28.9 CM
ECHO MV "A" WAVE DURATION: 141.9 MSEC
ECHO MV A VELOCITY: 0.94 M/S
ECHO MV AREA PHT: 2.2 CM2
ECHO MV AREA VTI: 3.2 CM2
ECHO MV E DECELERATION TIME (DT): 244.4 MS
ECHO MV E VELOCITY: 0.78 M/S
ECHO MV E/A RATIO: 0.83
ECHO MV LVOT VTI INDEX: 1.09
ECHO MV MAX VELOCITY: 1.1 M/S
ECHO MV MEAN GRADIENT: 2 MMHG
ECHO MV MEAN VELOCITY: 0.6 M/S
ECHO MV PEAK GRADIENT: 5 MMHG
ECHO MV PRESSURE HALF TIME (PHT): 102 MS
ECHO MV VTI: 31.4 CM
ECHO PULMONARY ARTERY END DIASTOLIC PRESSURE: 7 MMHG
ECHO PV MAX VELOCITY: 1.7 M/S
ECHO PV MEAN GRADIENT: 5 MMHG
ECHO PV MEAN VELOCITY: 1.1 M/S
ECHO PV PEAK GRADIENT: 11 MMHG
ECHO PV REGURGITANT MAX VELOCITY: 1.3 M/S
ECHO PV VTI: 27.2 CM
ECHO PVEIN A DURATION: 100.4 MS
ECHO PVEIN A VELOCITY: 0.3 M/S
ECHO PVEIN PEAK D VELOCITY: 0.7 M/S
ECHO PVEIN PEAK S VELOCITY: 0.5 M/S
ECHO PVEIN S/D RATIO: 0.7
ECHO RIGHT VENTRICULAR SYSTOLIC PRESSURE (RVSP): 15 MMHG
ECHO RV INTERNAL DIMENSION: 3.6 CM
ECHO TV REGURGITANT MAX VELOCITY: 1.75 M/S
ECHO TV REGURGITANT PEAK GRADIENT: 12 MMHG
EOSINOPHIL # BLD: 0.45 K/UL (ref 0.05–0.5)
EOSINOPHILS RELATIVE PERCENT: 5 % (ref 0–6)
ERYTHROCYTE [DISTWIDTH] IN BLOOD BY AUTOMATED COUNT: 15.3 % (ref 11.5–15)
GFR, ESTIMATED: >90 ML/MIN/1.73M2
GLUCOSE SERPL-MCNC: 113 MG/DL (ref 74–99)
HBA1C MFR BLD: 6.1 % (ref 4–5.6)
HCT VFR BLD AUTO: 40.2 % (ref 37–54)
HDLC SERPL-MCNC: 27 MG/DL
HGB BLD-MCNC: 13.2 G/DL (ref 12.5–16.5)
IMM GRANULOCYTES # BLD AUTO: 0.03 K/UL (ref 0–0.58)
IMM GRANULOCYTES NFR BLD: 0 % (ref 0–5)
LDLC SERPL CALC-MCNC: 49 MG/DL
LYMPHOCYTES NFR BLD: 1.42 K/UL (ref 1.5–4)
LYMPHOCYTES RELATIVE PERCENT: 16 % (ref 20–42)
MCH RBC QN AUTO: 29.8 PG (ref 26–35)
MCHC RBC AUTO-ENTMCNC: 32.8 G/DL (ref 32–34.5)
MCV RBC AUTO: 90.7 FL (ref 80–99.9)
MONOCYTES NFR BLD: 0.79 K/UL (ref 0.1–0.95)
MONOCYTES NFR BLD: 9 % (ref 2–12)
NEUTROPHILS NFR BLD: 70 % (ref 43–80)
NEUTS SEG NFR BLD: 6.3 K/UL (ref 1.8–7.3)
PLATELET # BLD AUTO: 215 K/UL (ref 130–450)
PMV BLD AUTO: 10.2 FL (ref 7–12)
POTASSIUM SERPL-SCNC: 3.9 MMOL/L (ref 3.5–5.1)
RBC # BLD AUTO: 4.43 M/UL (ref 3.8–5.8)
SODIUM SERPL-SCNC: 136 MMOL/L (ref 136–145)
TRIGL SERPL-MCNC: 136 MG/DL
VLDLC SERPL CALC-MCNC: 27 MG/DL
WBC OTHER # BLD: 9.1 K/UL (ref 4.5–11.5)

## 2025-04-23 PROCEDURE — 80061 LIPID PANEL: CPT

## 2025-04-23 PROCEDURE — 80048 BASIC METABOLIC PNL TOTAL CA: CPT

## 2025-04-23 PROCEDURE — 6370000000 HC RX 637 (ALT 250 FOR IP): Performed by: NURSE PRACTITIONER

## 2025-04-23 PROCEDURE — 36415 COLL VENOUS BLD VENIPUNCTURE: CPT

## 2025-04-23 PROCEDURE — 70496 CT ANGIOGRAPHY HEAD: CPT

## 2025-04-23 PROCEDURE — 83036 HEMOGLOBIN GLYCOSYLATED A1C: CPT

## 2025-04-23 PROCEDURE — 99232 SBSQ HOSP IP/OBS MODERATE 35: CPT | Performed by: INTERNAL MEDICINE

## 2025-04-23 PROCEDURE — 6370000000 HC RX 637 (ALT 250 FOR IP): Performed by: STUDENT IN AN ORGANIZED HEALTH CARE EDUCATION/TRAINING PROGRAM

## 2025-04-23 PROCEDURE — 93306 TTE W/DOPPLER COMPLETE: CPT | Performed by: INTERNAL MEDICINE

## 2025-04-23 PROCEDURE — 6370000000 HC RX 637 (ALT 250 FOR IP): Performed by: INTERNAL MEDICINE

## 2025-04-23 PROCEDURE — 85025 COMPLETE CBC W/AUTO DIFF WBC: CPT

## 2025-04-23 PROCEDURE — 6360000002 HC RX W HCPCS: Performed by: STUDENT IN AN ORGANIZED HEALTH CARE EDUCATION/TRAINING PROGRAM

## 2025-04-23 PROCEDURE — 70498 CT ANGIOGRAPHY NECK: CPT

## 2025-04-23 PROCEDURE — 99222 1ST HOSP IP/OBS MODERATE 55: CPT | Performed by: NURSE PRACTITIONER

## 2025-04-23 PROCEDURE — 2140000000 HC CCU INTERMEDIATE R&B

## 2025-04-23 PROCEDURE — 2500000003 HC RX 250 WO HCPCS: Performed by: STUDENT IN AN ORGANIZED HEALTH CARE EDUCATION/TRAINING PROGRAM

## 2025-04-23 PROCEDURE — 2500000003 HC RX 250 WO HCPCS: Performed by: RADIOLOGY

## 2025-04-23 PROCEDURE — 6360000004 HC RX CONTRAST MEDICATION: Performed by: RADIOLOGY

## 2025-04-23 PROCEDURE — 93306 TTE W/DOPPLER COMPLETE: CPT

## 2025-04-23 RX ORDER — SODIUM CHLORIDE 0.9 % (FLUSH) 0.9 %
10 SYRINGE (ML) INJECTION PRN
Status: DISCONTINUED | OUTPATIENT
Start: 2025-04-23 | End: 2025-04-24 | Stop reason: HOSPADM

## 2025-04-23 RX ORDER — IOPAMIDOL 755 MG/ML
75 INJECTION, SOLUTION INTRAVASCULAR
Status: COMPLETED | OUTPATIENT
Start: 2025-04-23 | End: 2025-04-23

## 2025-04-23 RX ORDER — GABAPENTIN 100 MG/1
100 CAPSULE ORAL 3 TIMES DAILY
Qty: 90 CAPSULE | Refills: 1 | Status: SHIPPED | OUTPATIENT
Start: 2025-04-23 | End: 2025-06-22

## 2025-04-23 RX ORDER — MECLIZINE HYDROCHLORIDE 25 MG/1
25 TABLET ORAL 3 TIMES DAILY PRN
Qty: 30 TABLET | Refills: 0 | Status: SHIPPED | OUTPATIENT
Start: 2025-04-23 | End: 2025-05-07

## 2025-04-23 RX ORDER — MECLIZINE HCL 12.5 MG 12.5 MG/1
25 TABLET ORAL 3 TIMES DAILY PRN
Status: DISCONTINUED | OUTPATIENT
Start: 2025-04-23 | End: 2025-04-24 | Stop reason: HOSPADM

## 2025-04-23 RX ADMIN — Medication 10 ML: at 14:07

## 2025-04-23 RX ADMIN — MECLIZINE 25 MG: 12.5 TABLET ORAL at 18:48

## 2025-04-23 RX ADMIN — POLYETHYLENE GLYCOL 3350 17 G: 17 POWDER, FOR SOLUTION ORAL at 08:40

## 2025-04-23 RX ADMIN — OXYCODONE HYDROCHLORIDE 10 MG: 10 TABLET ORAL at 15:36

## 2025-04-23 RX ADMIN — IOPAMIDOL 75 ML: 755 INJECTION, SOLUTION INTRAVENOUS at 14:11

## 2025-04-23 RX ADMIN — OXYCODONE HYDROCHLORIDE 10 MG: 10 TABLET ORAL at 23:51

## 2025-04-23 RX ADMIN — LISINOPRIL 20 MG: 20 TABLET ORAL at 08:41

## 2025-04-23 RX ADMIN — ACETAMINOPHEN 650 MG: 325 TABLET ORAL at 05:20

## 2025-04-23 RX ADMIN — PANTOPRAZOLE SODIUM 40 MG: 40 TABLET, DELAYED RELEASE ORAL at 05:20

## 2025-04-23 RX ADMIN — CLOPIDOGREL BISULFATE 75 MG: 75 TABLET, FILM COATED ORAL at 08:40

## 2025-04-23 RX ADMIN — ASPIRIN 81 MG CHEWABLE TABLET 81 MG: 81 TABLET CHEWABLE at 08:41

## 2025-04-23 RX ADMIN — GABAPENTIN 100 MG: 100 CAPSULE ORAL at 22:06

## 2025-04-23 RX ADMIN — OXYCODONE HYDROCHLORIDE 10 MG: 10 TABLET ORAL at 05:20

## 2025-04-23 RX ADMIN — ENOXAPARIN SODIUM 40 MG: 100 INJECTION SUBCUTANEOUS at 08:40

## 2025-04-23 RX ADMIN — SODIUM CHLORIDE, PRESERVATIVE FREE 10 ML: 5 INJECTION INTRAVENOUS at 22:06

## 2025-04-23 RX ADMIN — GABAPENTIN 100 MG: 100 CAPSULE ORAL at 08:40

## 2025-04-23 RX ADMIN — SODIUM CHLORIDE, PRESERVATIVE FREE 10 ML: 5 INJECTION INTRAVENOUS at 08:41

## 2025-04-23 RX ADMIN — LISINOPRIL 20 MG: 20 TABLET ORAL at 22:06

## 2025-04-23 RX ADMIN — ISOSORBIDE MONONITRATE 60 MG: 30 TABLET, EXTENDED RELEASE ORAL at 08:40

## 2025-04-23 RX ADMIN — RANOLAZINE 500 MG: 500 TABLET, FILM COATED, EXTENDED RELEASE ORAL at 22:06

## 2025-04-23 RX ADMIN — ACETAMINOPHEN 650 MG: 325 TABLET ORAL at 18:49

## 2025-04-23 RX ADMIN — ROSUVASTATIN CALCIUM 40 MG: 20 TABLET, FILM COATED ORAL at 08:40

## 2025-04-23 RX ADMIN — RANOLAZINE 500 MG: 500 TABLET, FILM COATED, EXTENDED RELEASE ORAL at 08:40

## 2025-04-23 ASSESSMENT — PAIN DESCRIPTION - DESCRIPTORS
DESCRIPTORS: ACHING;DISCOMFORT;BURNING
DESCRIPTORS: ACHING;BURNING;DISCOMFORT
DESCRIPTORS: ACHING
DESCRIPTORS: ACHING
DESCRIPTORS: ACHING;DISCOMFORT;JABBING

## 2025-04-23 ASSESSMENT — PAIN SCALES - GENERAL
PAINLEVEL_OUTOF10: 3
PAINLEVEL_OUTOF10: 4
PAINLEVEL_OUTOF10: 5
PAINLEVEL_OUTOF10: 8
PAINLEVEL_OUTOF10: 0
PAINLEVEL_OUTOF10: 0
PAINLEVEL_OUTOF10: 7

## 2025-04-23 ASSESSMENT — PAIN DESCRIPTION - LOCATION
LOCATION: LEG
LOCATION: HIP

## 2025-04-23 ASSESSMENT — PAIN DESCRIPTION - ORIENTATION
ORIENTATION: LEFT;UPPER
ORIENTATION: LEFT;UPPER
ORIENTATION: LEFT
ORIENTATION: LEFT;UPPER
ORIENTATION: LEFT;UPPER

## 2025-04-23 ASSESSMENT — PAIN DESCRIPTION - PAIN TYPE: TYPE: ACUTE PAIN

## 2025-04-23 ASSESSMENT — PAIN - FUNCTIONAL ASSESSMENT
PAIN_FUNCTIONAL_ASSESSMENT: ACTIVITIES ARE NOT PREVENTED

## 2025-04-23 ASSESSMENT — PAIN DESCRIPTION - FREQUENCY: FREQUENCY: CONTINUOUS

## 2025-04-23 ASSESSMENT — PAIN DESCRIPTION - ONSET: ONSET: ON-GOING

## 2025-04-23 NOTE — PROGRESS NOTES
Internal Medicine Progress Note    Patient's name: Corky Lawler  : 1945  Chief complaints (on day of admission): Back Pain (Approx one week ago was coughing and when coughed experienced pain low back pain with radiation to anterior thigh; cough resolved but now pain getting worse, unable to sleep)  Admission date: 2025  Date of service: 2025   Room: [address]  Primary care physician: Aurelio Hamilton MD  Reason for visit: Follow-up for back pain    Subjective  Corky was seen and examined at bedside     Pending     Review of Systems  There are no new complaints of chest pain, shortness of breath, abdominal pain, nausea, vomiting, diarrhea, constipation unless otherwise mentioned above.     Hospital Medications  Current Facility-Administered Medications   Medication Dose Route Frequency Provider Last Rate Last Admin    morphine (PF) injection 1 mg  1 mg IntraVENous Q8H PRN Jesse Mcbride MD        gabapentin (NEURONTIN) capsule 100 mg  100 mg Oral TID Sav Ca MD   100 mg at 25    ondansetron (ZOFRAN) tablet 4 mg  4 mg Oral Once Jesse Mcbride MD        oxyCODONE HCl (OXY-IR) immediate release tablet 10 mg  10 mg Oral Q8H PRN Jesse Mcbride MD   10 mg at 25 0520    melatonin tablet 3 mg  3 mg Oral Nightly PRN Mimi Freeman APRN - CNP   3 mg at 25    polyethylene glycol (GLYCOLAX) packet 17 g  17 g Oral Daily Vinayak Smith MD   17 g at 25 0824    sodium chloride flush 0.9 % injection 5-40 mL  5-40 mL IntraVENous 2 times per day Vinayak Smith MD   10 mL at 25 2134    sodium chloride flush 0.9 % injection 5-40 mL  5-40 mL IntraVENous PRN Vinayak Smith MD   10 mL at 25 1550    0.9 % sodium chloride infusion   IntraVENous PRN Vinayak Smith MD        potassium chloride (KLOR-CON M) extended release tablet 40 mEq  40 mEq Oral PRN Vinayak Smith MD        Or    potassium bicarb-citric acid (EFFER-K) effervescent tablet 40 mEq  40 mEq Oral PRN

## 2025-04-23 NOTE — PROGRESS NOTES
CLINICAL PHARMACY NOTE: MEDS TO BEDS    Total # of Prescriptions Filled: 1   The following medications were delivered to the patient:  Meclizine 25mg    Additional Documentation:  Delivered to patient 4-23-25

## 2025-04-23 NOTE — CARE COORDINATION
Social Work/ Case Management Transition of Care Planning (Annalisa Madrid, MIRZA [phone]):     Per report and chart review Pt is on room air. Pt pending Echo (in progress), CTA head and neck-SW spoke with Mireya in CT who stated Pt ate breakfast so soonest he can come is 1300 today. MRI inpatient is canceled and will be done OP as an open MRI. Pt will get EBUS as OP. Oncology, Pulmonology and Neurosurgery signed off. Neurology consulted. Pt to get Zio at discharge.  Pt plans to discharge home and his wife will transport. ORVILLE/MURIEL to follow.  MIRZA Garza  4/23/2025

## 2025-04-23 NOTE — PROGRESS NOTES
Internal Medicine Progress Note    Patient's name: Corky Lawler  : 1945  Chief complaints (on day of admission): Back Pain (Approx one week ago was coughing and when coughed experienced pain low back pain with radiation to anterior thigh; cough resolved but now pain getting worse, unable to sleep)  Admission date: 2025  Date of service: 2025   Room: [address]  Primary care physician: Aurelio Hamilton MD  Reason for visit: Follow-up for back pain    Subjective  Corky was seen and examined at bedside     Doing ok today   He tells me that he did not think to mention his hand numbness yesterday as he forgot to and states he had told another doctor about it after me. He states the numbness of his hand is now limited to his R finger tips and feels improved from yesterday. We went over the findings of the brain MRI and I discussed them with neurology as well. We would like to get a brain MRI w contrast but he is refusing at this time states he does not want to go back in the \"tube\". Informed him that we would be unable to differentiate possible metastatic lesion vs stroke without this test he expressed verbal understanding and wishes to consider having it done as an op.     Otherwise he is still feeling intermittently dizzy but has no new complaints of chest pain sob constipation diarrhea dysuria new numbness tingling or weakness     Review of Systems  There are no new complaints of chest pain, shortness of breath, abdominal pain, nausea, vomiting, diarrhea, constipation unless otherwise mentioned above.     Hospital Medications  Current Facility-Administered Medications   Medication Dose Route Frequency Provider Last Rate Last Admin    morphine (PF) injection 1 mg  1 mg IntraVENous Q8H PRN Jesse Mcbride MD        gabapentin (NEURONTIN) capsule 100 mg  100 mg Oral TID Sav Ca MD   100 mg at 25    ondansetron (ZOFRAN) tablet 4 mg  4 mg Oral Once Jesse Mcbride MD           Mild parenchymal volume loss.      Mild chronic microvascular disease.         MRI THORACIC SPINE WO CONTRAST   Final Result   No definite evidence of metastatic disease in the thoracic spine.   Postcontrast imaging could be considered for further evaluation as clinically   warranted.         MRI LUMBAR SPINE WO CONTRAST   Final Result   1. Abnormal signal lesion within the left aspect of the L2 vertebral body   with left paraspinal extraosseous soft tissue extension, suspicious for   metastatic disease.  Postcontrast MRI imaging could be performed for further   evaluation as clinically warranted.   2. Multilevel degenerative changes of the lumbar spine as described above. No   significant spinal canal stenosis.   3. Multilevel neural foraminal narrowing, as described above.         NM BONE SCAN WHOLE BODY   Final Result   1.  L2 vertebra solitary focus of increased uptake with corresponding   osteolytic lesion by recent CT exam.  Otherwise negative whole-body bone scan.      2.  MRI thoracic and lumbar spine to follow and reported separately.         XR FEMUR LEFT (MIN 2 VIEWS)   Final Result   1. No sign of fracture or destructive lesion of the femoral shaft.   Specifically, there is no sign of any blastic or lytic lesions to suggest   metastatic disease.   2. 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         CTA CHEST W CONTRAST   Final Result   1. New mass is seen in the medial right upper lobe extending to right hilum   and encasing right hilar vasculature and bronchi suggestive of neoplasm   measuring approximately 6.9 x 5.9 cm.   2. Enlarged mediastinal lymph nodes are present notable in right paratracheal   location.   3. Partial visualization of a lytic lesion involving the visualized body of   L1.   4. No evidence of pulmonary arterial embolism.         CT LUMBAR SPINE WO CONTRAST   Final Result   1. No acute fracture or malalignment.   2. Moderate multilevel  may indicate a soft tissue hematoma     Plan  Lytic lesions of the L1-L2 vertebrae with intractable back pain   Hem onc on board   NSG on board - TLSO   Pain control wean opiates as able     Small acute to sub acute stroke Left Occipital periventricular white matter   Past R occipital stroke noted   On ASA and Plavix already and statin  Plavix was held for 2 days for EBUS   CTA head and neck   ECHO w bubble   MRI with contrast as an op per patient preference   Neurology consultation discussed case with them     Right Lung mass  Pulmonary on board   Plan for EBUS as an outpatient Plavix will need to be held for 5 days timing per pulmonary   EBUS likely will need to be held off for at least a month given the findings above spoke with Dr Ca     History of CAD s/p CABG  By Dr. Luna on 11/3/2007-Ranexa 500 mg twice daily, Imdur 60 mg daily, Crestor 40 mg daily, Plavix 75 mg daily, ASA 81 mg daily    HTN  Lisinopril 20 mg twice daily    GERD  Protonix 40 mg daily    PT/OT 16/24, 16/24   Consults neurosurgery, pulmonary, heme-onc, neurology   DVT prophylaxis Lovenox  Code Status Full   Discharge plan: pending CTA head and neck, ECHO read     Electronically signed by Jesse Mcbride MD on 4/23/2025 at 8:00 AM    I can be reached through [employer].

## 2025-04-23 NOTE — PROGRESS NOTES
CLINICAL PHARMACY NOTE: MEDS TO BEDS    Total # of Prescriptions Filled: 1   The following medications were delivered to the patient:  GABAPENTIN 100 MG    Additional Documentation:   DELIVERED TO PT

## 2025-04-23 NOTE — PROGRESS NOTES
[facility]  Department of Pulmonary, Critical Care and Sleep Medicine  [facility]  Department of Internal Medicine  Progress Note    Ermias SUAREZ      Patients Primary Pulmonologist is: [facility]    SUBJECTIVE:    We are following Corky for RUL lung mass. Patient seen and examined. Currently without any respiratory complaints. Awake, alert and oriented. Continues to reports some dizziness, right hand tingling has improved. MRI yesterday revealing small acute to subacute stroke left occipital periventricular white matter.  No family present at the time of my examination.    OBJECTIVE:  Vitals:    04/23/25 0435 04/23/25 0520 04/23/25 0550 04/23/25 0826   BP:    (!) 159/56   Pulse:    51   Resp:  18 18 18   Temp:    97.8 °F (36.6 °C)   TempSrc:    Temporal   SpO2:    96%   Weight: 98.4 kg (217 lb)      Height:         Constitutional: Alert,  NAD  EENT: EOMI SAMPSON. MMM. No icterus. No thrush.     Neck:  Trachea was midline.   Respiratory: CTA bilaterally, no use of accessory muscles  Cardiovascular: Regular, No murmur. No rubs.      Pulses:  Equal bilaterally.    Abdomen: Soft without organomegaly. No rebound, rigidity.  No guarding.  Lymphatic: No lymphadenopathy.  Musculoskeletal: Without weakness or gross deficits  Extremities:  No lower extremity edema. Reflexes appear adequate.   Skin:  Warm and dry.  No skin rashes.   Neurological/Psychiatric: No acute psychosis. Cranial nerves are intact.        DATA:    Monitor Strips:  Reviewed & discusses with technical team. No changes noted.    RADIOLOGY:  No new imaging studies      CBC with Differential:    Lab Results   Component Value Date/Time    WBC 9.1 04/23/2025 05:00 AM    RBC 4.43 04/23/2025 05:00 AM    HGB 13.2 04/23/2025 05:00 AM    HCT 40.2 04/23/2025 05:00 AM      coordinating care with interdisciplinary teams, face to face encounter with patient, providing counseling/education to patient/family.     Sav Ca MD  4/23/2025  4:55 PM

## 2025-04-23 NOTE — CONSULTS
[facility]  Neuro Inpatient Consult        Corky Lawler is a 80 y.o. right handed man    Neurology was consulted for stroke    Past Medical History:     Past Medical History:   Diagnosis Date    AAA (abdominal aortic aneurysm)     4,5cm    Aortic regurgitation     mild to moderate    COPD (chronic obstructive pulmonary disease) (HCC)     Coronary atherosclerosis of native coronary artery     DJD (degenerative joint disease)     HTN (hypertension)     Hyperlipidemia     Mitral regurgitation     Occipital stroke (HCC) 2021    R occipital stroke    TIA (transient ischemic attack) 07/01/2013       Past Surgical History:     Past Surgical History:   Procedure Laterality Date    AORTA SURGERY      Aortoiliac stent at [facility] 2016    APPENDECTOMY      BACK SURGERY      BLADDER SURGERY Left 05/22/2023    CYSTOSCOPY RETROGRADE PYELOGRAM, LASER LITHOTRIPSY WITH LEFT STENT INSERTION AND GUADARRAMA PLACEMENT performed by Corky Giang MD at [facility] [place]    CARDIAC CATHETERIZATION  09/19/2013    DR Hernandez    CHOLECYSTECTOMY      CORONARY ANGIOPLASTY WITH STENT PLACEMENT  04/05/2021    Dr. Del Rio (3) stents...Resolute Wheeler-- SVG to PDA, SVG to prox.OM, SVG to mid SVG     CORONARY ARTERY BYPASS GRAFT      DIAGNOSTIC CARDIAC CATH LAB PROCEDURE      EYE SURGERY  09/2017    HERNIA REPAIR      x4    SHOULDER ARTHROSCOPY      TRANSESOPHAGEAL ECHOCARDIOGRAM  03/05/2018    MADHURI with Dr. Hernandez     Allergies:     Tramadol    Medications:     Prior to Admission medications    Medication Sig Start Date End Date Taking? Authorizing Provider   oxyCODONE HCl (OXY-IR) 10 MG immediate release tablet Take 1 tablet by mouth every 6 hours as needed for Pain for up to 5 days. Max Daily Amount: 40 mg 4/22/25 4/27/25 Yes Jesse Mcbride MD   polyethylene glycol (GLYCOLAX) 17 g packet Take 1 packet by mouth daily 4/23/25 5/23/25 Yes Jesse Mcbride MD   ranolazine (RANEXA) 500 MG extended release tablet Take 1  palate elevation  Normal   IX,X: gag reflex    XI: trapezius strength  5/5   XI: sternocleidomastoid strength 5/5   XI: neck extension strength  5/5   XII: tongue strength  Normal     Motor:  5/5 throughout  Normal bulk and tone   No drift  No abnormal movements    Sensory:  LT normal    Coordination:   FN, FFM and EDVIN normal    Gait:  Patient became acutely dizzy when sitting up or when turning his head to the right and ambulation was deferred for his safety    DTR:   2+ throughout  No Wyatt's     No other pathological reflexes    Laboratory/Radiology:     CBC with Differential:    Lab Results   Component Value Date/Time    WBC 9.1 04/23/2025 05:00 AM    RBC 4.43 04/23/2025 05:00 AM    HGB 13.2 04/23/2025 05:00 AM    HCT 40.2 04/23/2025 05:00 AM     04/23/2025 05:00 AM    MCV 90.7 04/23/2025 05:00 AM    MCH 29.8 04/23/2025 05:00 AM    MCHC 32.8 04/23/2025 05:00 AM    RDW 15.3 04/23/2025 05:00 AM    LYMPHOPCT 16 04/23/2025 05:00 AM    MONOPCT 9 04/23/2025 05:00 AM    EOSPCT 5 04/23/2025 05:00 AM    BASOPCT 1 04/23/2025 05:00 AM    MONOSABS 0.79 04/23/2025 05:00 AM    LYMPHSABS 1.42 04/23/2025 05:00 AM    EOSABS 0.45 04/23/2025 05:00 AM    BASOSABS 0.06 04/23/2025 05:00 AM     CMP:    Lab Results   Component Value Date/Time     04/23/2025 05:00 AM    K 3.9 04/23/2025 05:00 AM    K 3.3 05/23/2023 07:42 AM     04/23/2025 05:00 AM    CO2 24 04/23/2025 05:00 AM    BUN 16 04/23/2025 05:00 AM    CREATININE 0.7 04/23/2025 05:00 AM    GFRAA >60 04/06/2021 05:18 AM    LABGLOM >90 04/23/2025 05:00 AM    LABGLOM >60 05/23/2023 07:42 AM    GLUCOSE 113 04/23/2025 05:00 AM    GLUCOSE 101 02/20/2011 10:30 AM    CALCIUM 9.0 04/23/2025 05:00 AM    BILITOT 0.5 04/17/2025 02:15 PM    ALKPHOS 79 04/17/2025 02:15 PM    AST 16 04/17/2025 02:15 PM    ALT 15 04/17/2025 02:15 PM     MRI brain  IMPRESSION:  Small acute to subacute infarction in left occipital periventricular white  matter.  Old infarction in the right  occipital lobe.  Mild parenchymal volume loss.  Mild chronic microvascular disease.        CTAs head and neck    Echo pending     I independently reviewed all pertinent labs and imaging studies today    Assessment:     Acute ischemic stroke left occipital lobe: In a patient with history of right occipital ischemic stroke.  Patient has numerous vascular risk factors for stroke and area appears embolic--workup is underway.  However, it does have a mildly atypical linear appearance and, with his probable underlying cancer, metastatic lesion, must be ruled out. Exam reveals his residual mild LEFT visual field defects and acute dizziness.     Plan:     -Cancel in house MRI brain W contrast as he adamantly refuses; ordered open MRI brain W contrast on med rec to be done as OP  -Check lipids and A1C  -Try meclizine 25 mg TID PRN for dizziness for 14 days  -Await CTAs   -Await echo report  -Place Zio patch AT at discharge  -Continue ASA, Plavix, and high intensity statin for now  -Discussed with his wife via phone  -Discussed with Dr Mcbride and Dr Marion  -PT/OT  -Mod vasc risk factors: goal BP <140/90, LDL <70, A1C <7.0    Reviewed signs and symptoms of stroke including B.E.F.A.S.T:   Balance: Does the person have a sudden loss of balance?  Eyes: Has the person lost vision in one or both eyes?  Face: Does the person's face look uneven?  Arm: Is one arm weaker or numb?  Speech: Is the person's speech slurred? Does the person have trouble speaking or seen confused?   Time: Call 9-1-1 immediately.     Will follow up testing and sign off if negative  Stroke clinic follow up    DANIELA Londono CNP  8:06 AM  4/23/2025

## 2025-04-23 NOTE — CARE COORDINATION
Notified by radiology on MRI results showing acute to subacute infarct in left occipital periventricular white matter. Already on DAPT / high intensity statin. Neurology consulted

## 2025-04-23 NOTE — PROGRESS NOTES
[facility]  Hematology/Oncology  Consult      Patient Name: Corky Lawler  YOB: 1945  PCP: Aurelio Hamilton MD   Referring Provider:      Reason for Consultation:   Chief Complaint   Patient presents with    Back Pain     Approx one week ago was coughing and when coughed experienced pain low back pain with radiation to anterior thigh; cough resolved but now pain getting worse, unable to sleep        History of Present Illness:  80-year-old man former smoker with past medical history relevant for hypertension, hyperlipidemia, TIA, coronary artery disease and COPD hospitalized with complaints of worsening low back pain radiating to lower extremities with pain in left anterior thigh and he also reports some cough.  No fever or chills.  CMP unremarkable.  CBC with normal WBC count with hemoglobin of 12.7 and normal platelet count.  CT scan of abdomen and pelvis showed a stable infrarenal AAA but there was a new lytic lesion involving the body of L2 and L1.  There was a soft tissue mass in the right hip measuring 3.7 cm suspicious for soft tissue hematoma.  CT of thoracic spine showed no fracture or subluxation but there was a large 6.5 cm spiculated right upper lobe mass consistent with malignancy.  CTA chest showed no evidence of pulmonary embolism.  A new mass was noted in the medial right upper lobe extending to the right hilum measuring 6.9 x 5.9 cm with associated mediastinal and right paratracheal lymphadenopathy and lytic lesion involving L1, L2.  Bone scan as well as MRI of thoracic and lumbar spine ordered and pending.  Seen by pulmonary with plans for EBUS scheduled on 4/23/2025      Diagnostic Data:     Past Medical History:   Diagnosis Date    AAA (abdominal aortic aneurysm)     4,5cm    Aortic regurgitation     mild to moderate    COPD (chronic obstructive pulmonary disease) (HCC)     Coronary atherosclerosis of native coronary artery     DJD (degenerative joint disease)     HTN  needed for Dizziness 4/23/25 5/7/25 Yes Chidi Aaron, APRN - CNP   gabapentin (NEURONTIN) 100 MG capsule Take 1 capsule by mouth 3 times daily for 60 days. 4/23/25 6/22/25 Yes Jesse Mcbride MD   oxyCODONE HCl (OXY-IR) 10 MG immediate release tablet Take 1 tablet by mouth every 6 hours as needed for Pain for up to 5 days. Max Daily Amount: 40 mg 4/22/25 4/27/25 Yes Jesse Mcbride MD   polyethylene glycol (GLYCOLAX) 17 g packet Take 1 packet by mouth daily 4/23/25 5/23/25 Yes Jesse Mcbride MD   ranolazine (RANEXA) 500 MG extended release tablet Take 1 tablet by mouth 2 times daily 8/1/24  Yes Provider, MD Ronaldo   isosorbide mononitrate (IMDUR) 60 MG extended release tablet Take 1 tablet by mouth daily 5/23/23  Yes Jesse Sewell MD   nitroGLYCERIN (NITROSTAT) 0.4 MG SL tablet DISSOLVE 1 TABLET UNDER THE TONGUE AS NEEDED 11/27/22  Yes Provider, MD Ronaldo   rosuvastatin (CRESTOR) 40 MG tablet Take 1 tablet by mouth daily 12/17/21  Yes Provider, MD Ronaldo   lisinopril (PRINIVIL;ZESTRIL) 20 MG tablet Take 1 tablet by mouth 2 times daily 4/6/21  Yes Jesse Sewell MD   clopidogrel (PLAVIX) 75 MG tablet Take 1 tablet by mouth daily 4/7/21  Yes Jesse Sewell MD   pantoprazole (PROTONIX) 40 MG tablet Take 1 tablet by mouth every morning (before breakfast) 4/7/21  Yes Jesse Sewell MD   aspirin 81 MG tablet Take 1 tablet by mouth daily   Yes Provider, MD Ronaldo       Allergies  Allergies   Allergen Reactions    Tramadol Nausea And Vomiting       Review of Systems:    Refer to HPI      Objective  BP (!) 159/56   Pulse 51   Temp 97.8 °F (36.6 °C) (Temporal)   Resp 18   Ht 1.753 m (5' 9.02\")   Wt 98.4 kg (217 lb)   SpO2 96%   BMI 32.03 kg/m²     Physical Exam:     General: AAO to person, place, time, in no acute distress,   Head and neck : PERRLA, EOMI . Sclera non icteric.  Oropharynx : Clear  Neck: no JVD,  no adenopathy,  Heart: Regular rate and regular  musculature measuring approximately 3.7 x 3.4 cm.     1. New lytic lesion is seen involving the body of L2 (counting from lumbar spine cephalad.  This lesion is at level of L1 when counting from the thoracic spine caudally). 2. Stable fusiform infrarenal abdominal aortic aneurysm with evidence of aortic endograft repair. 3. Diverticulosis. 4. Nonobstructing 1 mm left renal calculus. 5. Heterogeneous mass is associated with the deep anterior right hip musculature measuring 3.7 x 3.4 cm which may indicate a soft tissue hematoma (axial image 185, series: 2).  Follow-up recommended.     CTA CHEST W CONTRAST  Result Date: 4/17/2025  EXAMINATION: CTA OF THE CHEST 4/17/2025 4:16 pm TECHNIQUE: CTA of the chest was performed after the administration of intravenous contrast.  Multiplanar reformatted images are provided for review.  MIP images are provided for review. Automated exposure control, iterative reconstruction, and/or weight based adjustment of the mA/kV was utilized to reduce the radiation dose to as low as reasonably achievable. COMPARISON: August 25, 2020 HISTORY: ORDERING SYSTEM PROVIDED HISTORY: Concern for mass, concern for pulmonary embolus TECHNOLOGIST PROVIDED HISTORY: Reason for exam:->Concern for mass, concern for pulmonary embolus Additional Contrast?->None FINDINGS: There is a new masslike opacity with irregular margins located in the right upper lobe extending to level of the right hilum.  This lesion measures approximately 6.9 x 5.9 cm.  This lesion encases the right hilar vasculature and bronchi and extends into the right aspect of the mediastinum.  There are enlarged mediastinal lymph nodes notable in the right paratracheal location measuring up to 3.8 x 3.4 cm.  No pneumothorax.  No pleural effusion.  The heart is normal in size.  No pericardial effusion.  There is evidence of prior mediastinal surgery suggestive of coronary arterial bypass grafting. No filling defect in the pulmonary arteries to  pulmonary with plans for EBUS next Wednesday    Thank you for the consult, will follow.    4/22/25  Discharge held due to dizziness  MRI brain ordered to R/O stroke, CNS metastasis  Bulky right upper lobe lung mass measuring 6.9 x 5.9 cm with associated right hilar and mediastinal lymphadenopathy and an L1, L2 lytic lesion  Patient with lung neoplasm clinically stage IV, T3 N2 M1b  Bone scan with increased radiotracer at L2  X ray of left femur negative  MRI of spine with abnormal signal lesion within the left aspect of the L2 vertebral body  with left paraspinal extraosseous soft tissue extension, suspicious for  metastatic disease.  Will consult Rad Onc for palliative XRT to L2  EBUS planned on 5/1/25 4/23/25  - Bulky right upper lobe lung mass measuring 6.9 x 5.9 cm with associated right hilar and mediastinal lymphadenopathy and an L1, L2 lytic lesion  - Patient with lung neoplasm clinically stage IV, T3 N2 M1b  - Bone scan with increased radiotracer at L2  - MRI spine: abnormal signal lesion within the left aspect of the L2 vertebral body with left paraspinal extraosseous soft tissue extension, suspicious for metastatic disease  - Rad Onc consulted for palliative XRT to L2  - MRI brain without contrast: small acute to subacute infarct L occipital periventricular white matter. Old infarction in the R occipital lobe. Mild parenchymal volume loss. Mild chronic microvascular disease  - Neurology has been consulted. MRI brain with contrast ordered (Pt refusing in house MRI, to be done as open MRI), CTA head and neck have been ordered. ECHO negative. For zio patch at discharge. To continue ASA, Plavix, and high intensity statin for now   - EBUS will be delayed due to L occipital stroke as above  - We will follow    DANIELA Turk - CNP  Electronically signed 4/23/2025 at 1:35 PM

## 2025-04-24 ENCOUNTER — HOSPITAL ENCOUNTER (OUTPATIENT)
Dept: RADIATION ONCOLOGY | Age: 80
Discharge: HOME | End: 2025-04-24

## 2025-04-24 VITALS
WEIGHT: 218.1 LBS | RESPIRATION RATE: 14 BRPM | HEART RATE: 58 BPM | BODY MASS INDEX: 32.3 KG/M2 | HEIGHT: 69 IN | OXYGEN SATURATION: 95 % | SYSTOLIC BLOOD PRESSURE: 103 MMHG | TEMPERATURE: 97.3 F | DIASTOLIC BLOOD PRESSURE: 58 MMHG

## 2025-04-24 LAB
ANION GAP SERPL CALCULATED.3IONS-SCNC: 11 MMOL/L (ref 7–16)
BASOPHILS # BLD: 0.03 K/UL (ref 0–0.2)
BASOPHILS NFR BLD: 0 % (ref 0–2)
BUN SERPL-MCNC: 15 MG/DL (ref 8–23)
CALCIUM SERPL-MCNC: 9 MG/DL (ref 8.8–10.2)
CHLORIDE SERPL-SCNC: 103 MMOL/L (ref 98–107)
CO2 SERPL-SCNC: 23 MMOL/L (ref 22–29)
CREAT SERPL-MCNC: 0.8 MG/DL (ref 0.7–1.2)
EOSINOPHIL # BLD: 0.42 K/UL (ref 0.05–0.5)
EOSINOPHILS RELATIVE PERCENT: 5 % (ref 0–6)
ERYTHROCYTE [DISTWIDTH] IN BLOOD BY AUTOMATED COUNT: 15.5 % (ref 11.5–15)
GFR, ESTIMATED: >90 ML/MIN/1.73M2
GLUCOSE SERPL-MCNC: 126 MG/DL (ref 74–99)
HCT VFR BLD AUTO: 40 % (ref 37–54)
HGB BLD-MCNC: 13.1 G/DL (ref 12.5–16.5)
IMM GRANULOCYTES # BLD AUTO: 0.03 K/UL (ref 0–0.58)
IMM GRANULOCYTES NFR BLD: 0 % (ref 0–5)
LYMPHOCYTES NFR BLD: 1.42 K/UL (ref 1.5–4)
LYMPHOCYTES RELATIVE PERCENT: 17 % (ref 20–42)
MCH RBC QN AUTO: 29.5 PG (ref 26–35)
MCHC RBC AUTO-ENTMCNC: 32.8 G/DL (ref 32–34.5)
MCV RBC AUTO: 90.1 FL (ref 80–99.9)
MONOCYTES NFR BLD: 0.71 K/UL (ref 0.1–0.95)
MONOCYTES NFR BLD: 9 % (ref 2–12)
NEUTROPHILS NFR BLD: 68 % (ref 43–80)
NEUTS SEG NFR BLD: 5.67 K/UL (ref 1.8–7.3)
PLATELET # BLD AUTO: 212 K/UL (ref 130–450)
PMV BLD AUTO: 10.4 FL (ref 7–12)
POTASSIUM SERPL-SCNC: 3.8 MMOL/L (ref 3.5–5.1)
RBC # BLD AUTO: 4.44 M/UL (ref 3.8–5.8)
SODIUM SERPL-SCNC: 138 MMOL/L (ref 136–145)
WBC OTHER # BLD: 8.3 K/UL (ref 4.5–11.5)

## 2025-04-24 PROCEDURE — 6360000002 HC RX W HCPCS: Performed by: STUDENT IN AN ORGANIZED HEALTH CARE EDUCATION/TRAINING PROGRAM

## 2025-04-24 PROCEDURE — 97530 THERAPEUTIC ACTIVITIES: CPT

## 2025-04-24 PROCEDURE — 80048 BASIC METABOLIC PNL TOTAL CA: CPT

## 2025-04-24 PROCEDURE — 36415 COLL VENOUS BLD VENIPUNCTURE: CPT

## 2025-04-24 PROCEDURE — 6370000000 HC RX 637 (ALT 250 FOR IP): Performed by: STUDENT IN AN ORGANIZED HEALTH CARE EDUCATION/TRAINING PROGRAM

## 2025-04-24 PROCEDURE — 6370000000 HC RX 637 (ALT 250 FOR IP): Performed by: INTERNAL MEDICINE

## 2025-04-24 PROCEDURE — 2500000003 HC RX 250 WO HCPCS: Performed by: STUDENT IN AN ORGANIZED HEALTH CARE EDUCATION/TRAINING PROGRAM

## 2025-04-24 PROCEDURE — 85025 COMPLETE CBC W/AUTO DIFF WBC: CPT

## 2025-04-24 PROCEDURE — 99232 SBSQ HOSP IP/OBS MODERATE 35: CPT | Performed by: NURSE PRACTITIONER

## 2025-04-24 PROCEDURE — 6370000000 HC RX 637 (ALT 250 FOR IP): Performed by: NURSE PRACTITIONER

## 2025-04-24 PROCEDURE — 93246 EXT ECG>7D<15D RECORDING: CPT

## 2025-04-24 RX ADMIN — ISOSORBIDE MONONITRATE 60 MG: 30 TABLET, EXTENDED RELEASE ORAL at 08:12

## 2025-04-24 RX ADMIN — OXYCODONE HYDROCHLORIDE 10 MG: 10 TABLET ORAL at 08:01

## 2025-04-24 RX ADMIN — PANTOPRAZOLE SODIUM 40 MG: 40 TABLET, DELAYED RELEASE ORAL at 05:43

## 2025-04-24 RX ADMIN — GABAPENTIN 100 MG: 100 CAPSULE ORAL at 08:13

## 2025-04-24 RX ADMIN — CLOPIDOGREL BISULFATE 75 MG: 75 TABLET, FILM COATED ORAL at 08:12

## 2025-04-24 RX ADMIN — ROSUVASTATIN CALCIUM 40 MG: 20 TABLET, FILM COATED ORAL at 10:07

## 2025-04-24 RX ADMIN — SODIUM CHLORIDE, PRESERVATIVE FREE 10 ML: 5 INJECTION INTRAVENOUS at 08:12

## 2025-04-24 RX ADMIN — LISINOPRIL 20 MG: 20 TABLET ORAL at 08:13

## 2025-04-24 RX ADMIN — ACETAMINOPHEN 650 MG: 325 TABLET ORAL at 12:31

## 2025-04-24 RX ADMIN — GABAPENTIN 100 MG: 100 CAPSULE ORAL at 14:27

## 2025-04-24 RX ADMIN — ACETAMINOPHEN 650 MG: 325 TABLET ORAL at 03:43

## 2025-04-24 RX ADMIN — ENOXAPARIN SODIUM 40 MG: 100 INJECTION SUBCUTANEOUS at 08:12

## 2025-04-24 RX ADMIN — POLYETHYLENE GLYCOL 3350 17 G: 17 POWDER, FOR SOLUTION ORAL at 08:13

## 2025-04-24 RX ADMIN — ASPIRIN 81 MG CHEWABLE TABLET 81 MG: 81 TABLET CHEWABLE at 08:13

## 2025-04-24 RX ADMIN — RANOLAZINE 500 MG: 500 TABLET, FILM COATED, EXTENDED RELEASE ORAL at 10:07

## 2025-04-24 ASSESSMENT — PAIN DESCRIPTION - LOCATION
LOCATION: HIP;BACK
LOCATION: HIP
LOCATION: LEG

## 2025-04-24 ASSESSMENT — PAIN DESCRIPTION - DESCRIPTORS
DESCRIPTORS: ACHING;BURNING;CRAMPING
DESCRIPTORS: ACHING;BURNING;DISCOMFORT

## 2025-04-24 ASSESSMENT — PAIN DESCRIPTION - ORIENTATION
ORIENTATION: LEFT
ORIENTATION: LEFT;UPPER

## 2025-04-24 ASSESSMENT — PAIN DESCRIPTION - FREQUENCY: FREQUENCY: CONTINUOUS

## 2025-04-24 ASSESSMENT — PAIN SCALES - GENERAL
PAINLEVEL_OUTOF10: 2
PAINLEVEL_OUTOF10: 6
PAINLEVEL_OUTOF10: 0
PAINLEVEL_OUTOF10: 3
PAINLEVEL_OUTOF10: 3
PAINLEVEL_OUTOF10: 9
PAINLEVEL_OUTOF10: 8

## 2025-04-24 ASSESSMENT — PAIN DESCRIPTION - ONSET: ONSET: ON-GOING

## 2025-04-24 ASSESSMENT — PAIN DESCRIPTION - PAIN TYPE: TYPE: ACUTE PAIN

## 2025-04-24 ASSESSMENT — PAIN SCALES - WONG BAKER: WONGBAKER_NUMERICALRESPONSE: NO HURT

## 2025-04-24 ASSESSMENT — PAIN - FUNCTIONAL ASSESSMENT: PAIN_FUNCTIONAL_ASSESSMENT: ACTIVITIES ARE NOT PREVENTED

## 2025-04-24 NOTE — PROGRESS NOTES
Discharge instructions reviewed with patient and wife at bedside; Verónica AT #[id number] applied to L-upper chest after skin preparation; instructed patient on care of patch, do not get wet; and keep the gateway not more than 10 feet away to ensure proper transmission.

## 2025-04-24 NOTE — PROGRESS NOTES
[facility]  Neuro Inpatient Follow Up       Corky Lawler is a 80 y.o. right handed man    Neurology is following for stroke    Sig PMH includes right occipital stroke in 2021, TIA, CAD with stent, COPD, AAA, HTN, HLD    HPI:  The patient presented to the [place] ER on 4/17 with back pain, coughing, and left leg pain.    About a week ago he was prescribed antibiotics by his PCP for coughing.  While coughing he had the sudden onset of thoracic and lumbar back pain as well as pain in his left upper leg. Imaging revealed a masslike opacity in his right upper lobe as well as lytic lesions of his L1 and L2 vertebrae and he was admitted--heme/onc, pulm, and NSGY saw him.  Per oncology, the mass is concerning for lung neoplasm.  There is a plan for lung biopsy     On 4/22 the patient was reporting dizziness with head movements.  Primary ordered an MRI of the brain which revealed a small embolic appearing left occipital stroke.  He is already on aspirin, Plavix, and high intensity statin at home.  He has a history of right occipital stroke in 2021 with mild left peripheral visual deficits.    4/23: CTAs and echo unrevealing. Meclizine added. MRI w contrast was ordered to make sure no mets, but he adamantly refused and is planned for open as OP    Subjective  He is sitting up in bed. Dizziness is not completely gone but improved and he feels the meclizine is working.     No family present but neuro updated his wife via phone yesterday    No chest pain or palpitations  No SOB  No falls, tripping or stumbling  No incontinence of bowels or bladder  No itching or bruising appreciated  No numbness, tingling or focal arm/leg weakness  No speech or swallowing troubles    ROS otherwise negative     Medications:     Prior to Admission medications    Medication Sig Start Date End Date Taking? Authorizing Provider   meclizine (ANTIVERT) 25 MG tablet Take 1 tablet by mouth 3 times daily as needed

## 2025-04-24 NOTE — PROGRESS NOTES
Radiation Oncology    Neal Shields MD                New Consult Note    Name:  Corky Lawler  : 1945    Diagnosis: NSCLC    Stage:IV    HPI    Corky Lawler is a 80 y.o. male who presents today upon referral from Dr. Nieves.  The patient is a very kind 80-year-old gentleman that presented with some lumbar back pain.  Indeed, he presented to the ED on 2025 and this prompted a workup.  He had a CT which revealed suspicious findings consistent with metastatic disease at the L2 vertebral body.  Imaging of the brain ascertained on 2025 revealed a subacute infarct.  He does have bronchoscopy planned on 2025.  The lumbar CT confirmed a lytic lesion involving L1/L2.    Currently, the patient has no hemoptysis, cough, or shortness of breath.  He just complains of some back pain.  Indeed, his back pain is rather bothersome.  I was asked to see this patient today for consideration of palliative radiotherapy to the site.      Review of Systems    Review of Systems - Negative except as above             Past Medical History:   Diagnosis Date    AAA (abdominal aortic aneurysm)     4,5cm    Aortic regurgitation     mild to moderate    COPD (chronic obstructive pulmonary disease) (HCC)     Coronary atherosclerosis of native coronary artery     DJD (degenerative joint disease)     HTN (hypertension)     Hyperlipidemia     Mitral regurgitation     Occipital stroke (HCC)     R occipital stroke    TIA (transient ischemic attack) 2013         Past Surgical History:   Procedure Laterality Date    AORTA SURGERY      Aort[facility]iliac stent at [facility] 2016    APPENDECTOMY      BACK SURGERY      BLADDER SURGERY Left 2023    CYSTOSCOPY RETROGRADE PYELOGRAM, LASER LITHOTRIPSY WITH LEFT STENT INSERTION AND GUADARRAMA PLACEMENT [place] Giang [place] [facility] [place]    CARDIAC CATHETERIZATION  2013    DR Hernandez    CHOLECYSTECTOMY      CORONARY ANGIOPLASTY WITH STENT PLACEMENT

## 2025-04-24 NOTE — PROGRESS NOTES
Physical Therapy  Treatment Note    Name: Corky Lawler  : 1945  MRN: [id number]      Date of Service: 2025    Evaluating PT:  Arleen Marx, PT, DPT, [id number]    Room #:  6416/6416-B  Diagnosis:  Lung mass [R91.8]  Abnormal CT of the chest [R93.89]  Abnormal CT of the abdomen [R93.5]  Lytic lesion of bone on x-ray [M89.8X9]  Abnormal computed tomography of lumbar spine [R93.7]  Acute midline low back pain with left-sided sciatica [M54.42]  PMHx/PSHx:    Past Medical History:   Diagnosis Date    AAA (abdominal aortic aneurysm)     4,5cm    Aortic regurgitation     mild to moderate    COPD (chronic obstructive pulmonary disease) (HCC)     Coronary atherosclerosis of native coronary artery     DJD (degenerative joint disease)     HTN (hypertension)     Hyperlipidemia     Mitral regurgitation     Occipital stroke (HCC)     R occipital stroke    TIA (transient ischemic attack) 2013      Past Surgical History:   Procedure Laterality Date    AORTA SURGERY      Aortoiliac stent at [facility] 2016    APPENDECTOMY      BACK SURGERY      BLADDER SURGERY Left 2023    CYSTOSCOPY RETROGRADE PYELOGRAM, LASER LITHOTRIPSY WITH LEFT STENT INSERTION AND GUADARRAMA PLACEMENT [place] [facility] [place]    CARDIAC CATHETERIZATION  2013    DR Hernandez    CHOLECYSTECTOMY      CORONARY ANGIOPLASTY WITH STENT PLACEMENT  2021    Dr. Del Rio (3) stents...Resolute Ludwig-- SVG to PDA, SVG to prox.OM, SVG to mid SVG     CORONARY ARTERY BYPASS GRAFT      DIAGNOSTIC CARDIAC CATH LAB PROCEDURE      EYE SURGERY  2017    HERNIA REPAIR      x4    SHOULDER ARTHROSCOPY      TRANSESOPHAGEAL ECHOCARDIOGRAM  2018    MADHURI with Dr. Hernandez      Procedure/Surgery:  none this admission   Precautions:  Fall risk, TLSO, Spinal Precautions, h/o visual deficit from prior CVA  Equipment Needs:  TBD    SUBJECTIVE:    Pt lives with his wife in a 2 story home with 2 stairs to enter and 1 rail.  Bed

## 2025-04-24 NOTE — PLAN OF CARE
Problem: Discharge Planning  Goal: Discharge to home or other facility with appropriate resources  4/24/2025 1055 by Irma Lange, RN  Outcome: Progressing  Flowsheets (Taken 4/24/2025 0815)  Discharge to home or other facility with appropriate resources: Identify barriers to discharge with patient and caregiver  4/23/2025 2347 by Cathie Barth, RN  Outcome: Progressing  Flowsheets (Taken 4/23/2025 2200)  Discharge to home or other facility with appropriate resources: Identify barriers to discharge with patient and caregiver

## 2025-04-24 NOTE — CARE COORDINATION
Discharge order noted. Patient to discharge home today, to receive Zio Patch and wife to transport home.    Electronically signed by CARLOS Monge on 4/24/2025 at 9:59 AM

## 2025-04-24 NOTE — PROGRESS NOTES
Internal Medicine Progress Note    Patient's name: Corky Lawler  : 1945  Chief complaints (on day of admission): Back Pain (Approx one week ago was coughing and when coughed experienced pain low back pain with radiation to anterior thigh; cough resolved but now pain getting worse, unable to sleep)  Admission date: 2025  Date of service: 2025   Room: [address]  Primary care physician: Aurelio Hamilton MD  Reason for visit: Follow-up for back pain    Subjective  Corky was seen and examined at bedside     Doing well   Dizziness improved   Ambulating the halls   No new complaints for me   Denies cp sob or any other issues   Planning to go home today     Review of Systems  There are no new complaints of chest pain, shortness of breath, abdominal pain, nausea, vomiting, diarrhea, constipation unless otherwise mentioned above.     Hospital Medications  Current Facility-Administered Medications   Medication Dose Route Frequency Provider Last Rate Last Admin    meclizine (ANTIVERT) tablet 25 mg  25 mg Oral TID PRN Chidi Aaron APRN - CNP   25 mg at 25 1848    sodium chloride flush 0.9 % injection 10 mL  10 mL IntraVENous PRN Tha Wilson MD   10 mL at 25 1407    morphine (PF) injection 1 mg  1 mg IntraVENous Q8H PRN Jesse Mcbride MD        gabapentin (NEURONTIN) capsule 100 mg  100 mg Oral TID Sav Ca MD   100 mg at 25 0813    ondansetron (ZOFRAN) tablet 4 mg  4 mg Oral Once Jesse Mcbride MD        oxyCODONE HCl (OXY-IR) immediate release tablet 10 mg  10 mg Oral Q8H PRN Jesse Mcbride MD   10 mg at 25 0801    melatonin tablet 3 mg  3 mg Oral Nightly PRN Mimi Freeman APRN - CNP   3 mg at 25 2133    polyethylene glycol (GLYCOLAX) packet 17 g  17 g Oral Daily Vinayak Smith MD   17 g at 25 0813    sodium chloride flush 0.9 % injection 5-40 mL  5-40 mL IntraVENous 2 times per day Vinayak Smith MD   10 mL at 25 0812    sodium chloride  Findings   highly worrisome for malignancy.         CT ABDOMEN PELVIS WO CONTRAST Additional Contrast? None   Final Result   1. New lytic lesion is seen involving the body of L2 (counting from lumbar   spine cephalad.  This lesion is at level of L1 when counting from the   thoracic spine caudally).   2. Stable fusiform infrarenal abdominal aortic 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   3. Diverticulosis.   4. Nonobstructing 1 mm left renal calculus.   5. Heterogeneous mass is associated with the deep anterior right hip   musculature measuring 3.7 x 3.4 cm which may indicate a soft tissue hematoma   (axial image 185, series: 2).  Follow-up recommended.         XR CHEST (2 VW)   Final Result   Right suprahilar opacity may represent pneumonia in the appropriate clinical   setting. Follow-up to resolution is recommended.         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  This lesion measures approximately 6.9 x 5.9 cm    CT Lumbar: New lytic lesion is seen involving the body of L2 (counting from lumbar spine cephalad.  This lesion is at level of L1 when counting from the thoracic spine caudally). Heterogeneous mass is associated with the deep anterior right hip musculature measuring 3.7 x 3.4 cm which may indicate a soft tissue hematoma     Plan  Lytic lesions of the L1-L2 vertebrae with intractable back pain   Hem onc on board   NSG on board - TLSO   Pain control wean opiates as able     Small acute to sub acute

## 2025-04-24 NOTE — PLAN OF CARE
Problem: Discharge Planning  Goal: Discharge to home or other facility with appropriate resources  Outcome: Progressing  Flowsheets (Taken 4/23/2025 2200)  Discharge to home or other facility with appropriate resources: Identify barriers to discharge with patient and caregiver     Problem: Pain  Goal: Verbalizes/displays adequate comfort level or baseline comfort level  Outcome: Progressing     Problem: Safety - Adult  Goal: Free from fall injury  Outcome: Progressing     Problem: Nutrition Deficit:  Goal: Optimize nutritional status  Outcome: Progressing     Problem: Chronic Conditions and Co-morbidities  Goal: Patient's chronic conditions and co-morbidity symptoms are monitored and maintained or improved  Outcome: Progressing  Flowsheets (Taken 4/23/2025 2200)  Care Plan - Patient's Chronic Conditions and Co-Morbidity Symptoms are Monitored and Maintained or Improved: Monitor and assess patient's chronic conditions and comorbid symptoms for stability, deterioration, or improvement

## 2025-04-25 NOTE — DISCHARGE SUMMARY
Internal Medicine Discharge Summary    NAME: Corky Lawler :  1945  MRN:  [id number] PCP:Aurelio Hamilton MD    ADMITTED: 2025   DISCHARGED: 2025  5:38 PM    ADMITTING PHYSICIAN: Bhavya att. providers found    PCP: Aurelio Hamilton MD    CONSULTANT(S):   IP CONSULT TO PULMONOLOGY  IP CONSULT TO NEUROSURGERY  IP CONSULT TO ONCOLOGY  INPATIENT CONSULT TO ORTHOTIST/PROSTHETIST  IP CONSULT TO RADIATION ONCOLOGY  IP CONSULT TO NEUROLOGY     ADMITTING DIAGNOSIS:   Lung mass [R91.8]  Abnormal CT of the chest [R93.89]  Abnormal CT of the abdomen [R93.5]  Lytic lesion of bone on x-ray [M89.8X9]  Abnormal computed tomography of lumbar spine [R93.7]  Acute midline low back pain with left-sided sciatica [M54.42]     Please see H&P for further details    DISCHARGE DIAGNOSES:   Active Hospital Problems    Diagnosis     Acute stroke due to ischemia (HCC) [I63.9]     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 male patient of Aurelio Hamilton MD who  has a past medical history of AAA (abdominal aortic aneurysm), Aortic regurgitation, COPD (chronic obstructive pulmonary disease) (HCC), Coronary atherosclerosis of native coronary artery, DJD (degenerative joint disease), HTN (hypertension), Hyperlipidemia, Mitral regurgitation, Occipital stroke (HCC), and TIA (transient ischemic attack). who originally had concerns including Back Pain (Approx one week ago was coughing and when coughed experienced pain low back pain with radiation to anterior thigh; cough resolved but now pain getting worse, unable to sleep). at presentation on 2025, and was found to have Lung mass [R91.8]  Abnormal CT of the chest [R93.89]  Abnormal CT of the abdomen [R93.5]  Lytic  for exam:->r/o metastasis FINDINGS: BONES/ALIGNMENT: There is mild grade 1 retrolisthesis of L2 on L3 and L3 on L4.  There is a heterogeneous background marrow signal which may be secondary to fatty infiltration.  There is a 3.4 cm abnormal T1 hypointense, stir hyperintense lesion within the left aspect of the L2 vertebral body with left paraspinal extraosseous soft tissue extension inseparable from the left psoas muscle..  There is a benign hemangioma in the L1 vertebral body.  There is a small Schmorl's node in the L4 superior endplate.  There is multilevel lumbar disc desiccation with disc height loss at L2-L3 greater than L1-L2. SPINAL CORD: The conus terminates normally. SOFT TISSUES: Left paraspinal extraosseous extension of L2 vertebral body lesion, inseparable from the left psoas muscle.  Prior aorta bi-iliac aneurysm repair better evaluated on comparison CT. L1-L2: Mild posterior disc bulge.  No significant spinal canal stenosis or neural foraminal narrowing. L2-L3: Mild posterior disc bulge.  Bilateral facet arthropathy and ligamentum flavum thickening.  No significant spinal canal stenosis.  Moderate left and mild right neural foraminal narrowing. L3-L4: Mild posterior disc bulge.  Bilateral facet arthropathy and ligamentum flavum thickening.  No significant spinal canal stenosis.  Moderate bilateral neural foraminal narrowing. L4-L5: Mild posterior disc bulge.  No significant spinal canal stenosis. Mild bilateral facet arthropathy.  Moderate bilateral neural foraminal narrowing. L5-S1: Posterior disc bulge.  Mild bilateral facet arthropathy and ligamentum flavum thickening.  No significant spinal canal stenosis.  Moderate to severe bilateral neural foraminal narrowing.     1. Abnormal signal lesion within the left aspect of the L2 vertebral body with left paraspinal extraosseous soft tissue extension, suspicious for metastatic disease.  Postcontrast MRI imaging could be performed for further evaluation  heights are maintained. No osseous destructive lesion is seen. DEGENERATIVE CHANGES: Moderate multilevel degenerative changes. SOFT TISSUES/RETROPERITONEUM: No paraspinal mass is seen.     1. No acute fracture or malalignment. 2. Moderate multilevel degenerative changes.     CT THORACIC SPINE WO CONTRAST  Result Date: 4/17/2025  EXAMINATION: CT OF THE THORACIC SPINE WITHOUT CONTRAST  4/17/2025 3:58 pm: TECHNIQUE: CT of the thoracic spine was performed without the administration of intravenous contrast. Multiplanar reformatted images are provided for review. Automated exposure control, iterative reconstruction, and/or weight based adjustment of the mA/kV was utilized to reduce the radiation dose to as low as reasonably achievable. COMPARISON: None. HISTORY: ORDERING SYSTEM PROVIDED HISTORY: lower t spine pain, concern for fx TECHNOLOGIST PROVIDED HISTORY: Reason for exam:->lower t spine pain, concern for fx FINDINGS: BONES/ALIGNMENT: There is normal alignment of the spine. The vertebral body heights are maintained. No osseous destructive lesion is seen. DEGENERATIVE CHANGES: Moderate multilevel degenerative changes. SOFT TISSUES: No paraspinal mass is seen. MISCELLANEOUS: 5.5 cm x 2.5 cm x 6.5 cm spiculated right upper lobe mass.  Is is     1. No acute fracture or subluxation. 2. Moderate multilevel degenerative changes. 3. 5.5 cm x 2.5 cm x 6.5 cm spiculated right upper lobe mass.  Findings highly worrisome for malignancy.     XR CHEST (2 VW)  Result Date: 4/17/2025  EXAMINATION: TWO XRAY VIEWS OF THE CHEST 4/17/2025 2:49 pm COMPARISON: None. HISTORY: ORDERING SYSTEM PROVIDED HISTORY: CHEST PAIN TECHNOLOGIST PROVIDED HISTORY: Reason for exam:->CHEST PAIN FINDINGS: The heart is mildly enlarged.  There is right suprahilar opacity.  The left lung is clear.  No pneumothorax or pleural effusion.     Right suprahilar opacity may represent pneumonia in the appropriate clinical setting. Follow-up to resolution is  recommended.       MICROBIOLOGY:  BLOOD CX #1  No results for input(s): \"BC\" in the last 72 hours.  BLOOD CX #2  No results for input(s): \"BLOODCULT2\" in the last 72 hours.  TIP CULTURE  No results for input(s): \"CXCATHTIP\" in the last 72 hours.   CULTURE, RESPIRATORY   No results for input(s): \"CULTRESP\" in the last 72 hours.  RESPIRATORY SMEAR  No results for input(s): \"RESPSMEAR\" in the last 72 hours.         DISPOSITION:  The patient's condition is fair.   The patient is being discharged to home    DISCHARGE MEDICATIONS:      Medication List        START taking these medications      gabapentin 100 MG capsule  Commonly known as: NEURONTIN  Take 1 capsule by mouth 3 times daily for 60 days.     meclizine 25 MG tablet  Commonly known as: ANTIVERT  Take 1 tablet by mouth 3 times daily as needed for Dizziness     oxyCODONE HCl 10 MG immediate release tablet  Commonly known as: OXY-IR  Take 1 tablet by mouth every 6 hours as needed for Pain for up to 5 days. Max Daily Amount: 40 mg     polyethylene glycol 17 g packet  Commonly known as: GLYCOLAX  Take 1 packet by mouth daily            CONTINUE taking these medications      aspirin 81 MG tablet     clopidogrel 75 MG tablet  Commonly known as: PLAVIX  Take 1 tablet by mouth daily     isosorbide mononitrate 60 MG extended release tablet  Commonly known as: IMDUR  Take 1 tablet by mouth daily     lisinopril 20 MG tablet  Commonly known as: PRINIVIL;ZESTRIL  Take 1 tablet by mouth 2 times daily     nitroGLYCERIN 0.4 MG SL tablet  Commonly known as: NITROSTAT     pantoprazole 40 MG tablet  Commonly known as: PROTONIX  Take 1 tablet by mouth every morning (before breakfast)     ranolazine 500 MG extended release tablet  Commonly known as: RANEXA     rosuvastatin 40 MG tablet  Commonly known as: CRESTOR               Where to Get Your Medications        These medications were sent to [facility] - [place], [place] - [address] - [address]

## 2025-04-29 ENCOUNTER — TELEPHONE (OUTPATIENT)
Dept: PULMONOLOGY | Age: 80
End: 2025-04-29

## 2025-04-29 NOTE — TELEPHONE ENCOUNTER
PAT nursing calling to clarify holding Plavix and ASA prior to Bronch procedure 5/21/25 with Dr Ca. Advised RN will check with provider and will call her back with update on plans for hold on anticoagulants. Message sent to Dr Ca.

## 2025-05-01 ENCOUNTER — HOSPITAL ENCOUNTER (OUTPATIENT)
Dept: RADIATION ONCOLOGY | Age: 80
Discharge: HOME | End: 2025-05-01
Payer: MEDICARE

## 2025-05-01 PROCEDURE — 77290 THER RAD SIMULAJ FIELD CPLX: CPT | Performed by: RADIOLOGY

## 2025-05-01 PROCEDURE — 77334 RADIATION TREATMENT AID(S): CPT | Performed by: RADIOLOGY

## 2025-05-02 ENCOUNTER — TELEPHONE (OUTPATIENT)
Dept: NEUROSURGERY | Age: 80
End: 2025-05-02

## 2025-05-02 DIAGNOSIS — M84.48XD PATHOLOGICAL FRACTURE OF LUMBAR VERTEBRA WITH ROUTINE HEALING, SUBSEQUENT ENCOUNTER: Primary | ICD-10-CM

## 2025-05-05 ENCOUNTER — HOSPITAL ENCOUNTER (OUTPATIENT)
Dept: RADIATION ONCOLOGY | Age: 80
Discharge: HOME | End: 2025-05-05
Payer: MEDICARE

## 2025-05-05 ENCOUNTER — TELEPHONE (OUTPATIENT)
Dept: PULMONOLOGY | Age: 80
End: 2025-05-05

## 2025-05-05 PROCEDURE — 77300 RADIATION THERAPY DOSE PLAN: CPT | Performed by: RADIOLOGY

## 2025-05-05 PROCEDURE — 77295 3-D RADIOTHERAPY PLAN: CPT | Performed by: RADIOLOGY

## 2025-05-05 PROCEDURE — 77334 RADIATION TREATMENT AID(S): CPT | Performed by: RADIOLOGY

## 2025-05-05 NOTE — TELEPHONE ENCOUNTER
Spoke with Mirlande in regards to Bronchoscopy scheduled 5/21/2025 Arrive at 6am procedure at 8am instructions given To hold Plavix 5 days prior starting 5/16/25 and aspirin starting 5/19/25 Spouse voiced understanding instructions also mailed out to patient.

## 2025-05-07 ENCOUNTER — HOSPITAL ENCOUNTER (OUTPATIENT)
Dept: RADIATION ONCOLOGY | Age: 80
Discharge: HOME | End: 2025-05-07
Payer: MEDICARE

## 2025-05-07 PROCEDURE — 77402 RADIATION TX DELIVERY LVL 1: CPT | Performed by: RADIOLOGY

## 2025-05-07 PROCEDURE — 77417 THER RADIOLOGY PORT IMAGE(S): CPT | Performed by: RADIOLOGY

## 2025-05-08 ENCOUNTER — HOSPITAL ENCOUNTER (OUTPATIENT)
Dept: RADIATION ONCOLOGY | Age: 80
Discharge: HOME | End: 2025-05-08
Payer: MEDICARE

## 2025-05-08 VITALS
DIASTOLIC BLOOD PRESSURE: 65 MMHG | OXYGEN SATURATION: 99 % | TEMPERATURE: 97.6 F | HEART RATE: 73 BPM | SYSTOLIC BLOOD PRESSURE: 142 MMHG | RESPIRATION RATE: 18 BRPM

## 2025-05-08 PROCEDURE — 77402 RADIATION TX DELIVERY LVL 1: CPT | Performed by: RADIOLOGY

## 2025-05-08 ASSESSMENT — PAIN SCALES - GENERAL: PAINLEVEL_OUTOF10: 10

## 2025-05-08 ASSESSMENT — PAIN DESCRIPTION - LOCATION: LOCATION: BACK;LEG

## 2025-05-08 NOTE — PROGRESS NOTES
Corky Lawler  5/8/2025  Wt Readings from Last 3 Encounters:   04/24/25 98.9 kg (218 lb 1.6 oz)   04/04/25 100.7 kg (222 lb)   10/02/24 104.3 kg (230 lb)     There is no height or weight on file to calculate BMI.        Treatment Area:L2-L3    Patient was seen today for weekly visit.     Comfort Alteration  KPS:70%  Fatigue: Mild      Nutritional Alteration  Anorexia: Yes   Nausea: No   Vomiting: No     Elimination Alterations  Constipation: no  Diarrhea:  no  Bowel Incontinence: No  Urinary Incontinence: No    Skin Alteration   Sensation:no    Radiation Dermatitis:  no      Emotional  Coping: effective    Sexuality Alteration  na    Injury, potential bleeding or infection: Patient in 10/10 pain. Patient is taking pain medication and is not working. Patient crying out please give me something.        Lab Results   Component Value Date    WBC 8.3 04/24/2025    HGB 13.1 04/24/2025    HCT 40.0 04/24/2025     04/24/2025       BP (!) 142/65   Pulse 73   Temp 97.6 °F (36.4 °C) (Temporal)   Resp 18   SpO2 99%   BP within normal range? no   -if no, manually recheck in 5-10 min  NEW BP reading:  BP within normal range? yes   -if no, notify attending provider for further instruction      Assessment/Plan: Pt completed 2/10fx and 600/3000cGy.    Shelly Callaway RN

## 2025-05-08 NOTE — PROGRESS NOTES
Radiation Oncology   On Treatment Visit  Neal Shields MD      5/8/2025  Corky Lawler  Date of Service:       HPI:  The patient was is receiving radiotherapy to L2/L3.  He has received 2 out of 10 fractions.  Today, he has received 600 cGy out of a planned 3000 cGy of radiotherapy.  Unfortunately, he was in significant pain and left without being seen.  This on treatment visit was done virtually today.    In discussing the situation with Mr. Lawler, he adamantly does not want to be seen by palliative care or have a visit to the ED as he says his pain is currently 10 out of 10 in the region we are treating, specifically L2/L3.  There is no numbness or tingling.  However, it is limiting to his quality of life.  I did give him my cell number I told him if the situation should worsen or he should change his mind we will expedite a visit to the emergency room.         Past Medical History:   Diagnosis Date    AAA (abdominal aortic aneurysm)     4,5cm    Aortic regurgitation     mild to moderate    COPD (chronic obstructive pulmonary disease) ([facility])     Coronary atherosclerosis of native coronary artery     DJD (degenerative joint disease)     HTN (hypertension)     Hyperlipidemia     Mitral regurgitation     Occipital stroke (HCC) 2021    R occipital stroke    TIA (transient ischemic attack) 07/01/2013         Past Surgical History:   Procedure Laterality Date    AORTA SURGERY      Aortoiliac stent at [facility] 2016    APPENDECTOMY      BACK SURGERY      BLADDER SURGERY Left 05/22/2023    CYSTOSCOPY RETROGRADE PYELOGRAM, LASER LITHOTRIPSY WITH LEFT STENT INSERTION AND GUADARRAMA PLACEMENT [place] [facility] [place]    CARDIAC CATHETERIZATION  09/19/2013    DR Hernandez    CHOLECYSTECTOMY      CORONARY ANGIOPLASTY WITH STENT PLACEMENT  04/05/2021    Dr. Del Rio (3) stents...Resolute Ludwig-- SVG to PDA, SVG to prox.OM, SVG to mid SVG     CORONARY ARTERY BYPASS GRAFT

## 2025-05-09 ENCOUNTER — HOSPITAL ENCOUNTER (OUTPATIENT)
Dept: RADIATION ONCOLOGY | Age: 80
Discharge: HOME | End: 2025-05-09
Payer: MEDICARE

## 2025-05-09 PROCEDURE — 77402 RADIATION TX DELIVERY LVL 1: CPT | Performed by: RADIOLOGY

## 2025-05-12 ENCOUNTER — APPOINTMENT (OUTPATIENT)
Dept: CT IMAGING | Age: 80
DRG: 542 | End: 2025-05-12
Payer: MEDICARE

## 2025-05-12 ENCOUNTER — HOSPITAL ENCOUNTER (OUTPATIENT)
Dept: RADIATION ONCOLOGY | Age: 80
Discharge: HOME | End: 2025-05-12
Payer: MEDICARE

## 2025-05-12 ENCOUNTER — HOSPITAL ENCOUNTER (INPATIENT)
Age: 80
LOS: 17 days | Discharge: HOME | DRG: 542 | End: 2025-05-29
Attending: EMERGENCY MEDICINE | Admitting: STUDENT IN AN ORGANIZED HEALTH CARE EDUCATION/TRAINING PROGRAM
Payer: MEDICARE

## 2025-05-12 DIAGNOSIS — Z51.5 END OF LIFE CARE: ICD-10-CM

## 2025-05-12 DIAGNOSIS — Z51.5 PALLIATIVE CARE ENCOUNTER: ICD-10-CM

## 2025-05-12 DIAGNOSIS — M84.48XA PATHOLOGICAL FRACTURE OF LUMBOSACRAL SPINE: ICD-10-CM

## 2025-05-12 DIAGNOSIS — M54.9 INTRACTABLE BACK PAIN: ICD-10-CM

## 2025-05-12 DIAGNOSIS — S32.019A CLOSED FRACTURE OF FIRST LUMBAR VERTEBRA, UNSPECIFIED FRACTURE MORPHOLOGY, INITIAL ENCOUNTER (HCC): ICD-10-CM

## 2025-05-12 DIAGNOSIS — S32.028A OTHER CLOSED FRACTURE OF SECOND LUMBAR VERTEBRA, INITIAL ENCOUNTER (HCC): Primary | ICD-10-CM

## 2025-05-12 DIAGNOSIS — S32.029A CLOSED FRACTURE OF SECOND LUMBAR VERTEBRA, UNSPECIFIED FRACTURE MORPHOLOGY, INITIAL ENCOUNTER (HCC): ICD-10-CM

## 2025-05-12 DIAGNOSIS — R91.8 LUNG MASS: ICD-10-CM

## 2025-05-12 LAB
ALBUMIN SERPL-MCNC: 4 G/DL (ref 3.5–5.2)
ALP SERPL-CCNC: 89 U/L (ref 40–129)
ALT SERPL-CCNC: 8 U/L (ref 0–50)
ANION GAP SERPL CALCULATED.3IONS-SCNC: 15 MMOL/L (ref 7–16)
AST SERPL-CCNC: 15 U/L (ref 0–50)
BASOPHILS # BLD: 0.05 K/UL (ref 0–0.2)
BASOPHILS NFR BLD: 1 % (ref 0–2)
BILIRUB SERPL-MCNC: 0.7 MG/DL (ref 0–1.2)
BUN SERPL-MCNC: 14 MG/DL (ref 8–23)
CALCIUM SERPL-MCNC: 9.5 MG/DL (ref 8.8–10.2)
CHLORIDE SERPL-SCNC: 104 MMOL/L (ref 98–107)
CO2 SERPL-SCNC: 22 MMOL/L (ref 22–29)
CREAT SERPL-MCNC: 0.8 MG/DL (ref 0.7–1.2)
EOSINOPHIL # BLD: 0.06 K/UL (ref 0.05–0.5)
EOSINOPHILS RELATIVE PERCENT: 1 % (ref 0–6)
ERYTHROCYTE [DISTWIDTH] IN BLOOD BY AUTOMATED COUNT: 15.6 % (ref 11.5–15)
GFR, ESTIMATED: >90 ML/MIN/1.73M2
GLUCOSE SERPL-MCNC: 158 MG/DL (ref 74–99)
HCT VFR BLD AUTO: 45.4 % (ref 37–54)
HGB BLD-MCNC: 14.9 G/DL (ref 12.5–16.5)
IMM GRANULOCYTES # BLD AUTO: 0.06 K/UL (ref 0–0.58)
IMM GRANULOCYTES NFR BLD: 1 % (ref 0–5)
LYMPHOCYTES NFR BLD: 0.83 K/UL (ref 1.5–4)
LYMPHOCYTES RELATIVE PERCENT: 8 % (ref 20–42)
MCH RBC QN AUTO: 30 PG (ref 26–35)
MCHC RBC AUTO-ENTMCNC: 32.8 G/DL (ref 32–34.5)
MCV RBC AUTO: 91.3 FL (ref 80–99.9)
MONOCYTES NFR BLD: 0.76 K/UL (ref 0.1–0.95)
MONOCYTES NFR BLD: 7 % (ref 2–12)
NEUTROPHILS NFR BLD: 84 % (ref 43–80)
NEUTS SEG NFR BLD: 9.06 K/UL (ref 1.8–7.3)
PLATELET # BLD AUTO: 286 K/UL (ref 130–450)
PMV BLD AUTO: 10.4 FL (ref 7–12)
POTASSIUM SERPL-SCNC: 3.8 MMOL/L (ref 3.5–5.1)
PROT SERPL-MCNC: 7.5 G/DL (ref 6.4–8.3)
RBC # BLD AUTO: 4.97 M/UL (ref 3.8–5.8)
SODIUM SERPL-SCNC: 141 MMOL/L (ref 136–145)
WBC OTHER # BLD: 10.8 K/UL (ref 4.5–11.5)

## 2025-05-12 PROCEDURE — 72131 CT LUMBAR SPINE W/O DYE: CPT

## 2025-05-12 PROCEDURE — 96375 TX/PRO/DX INJ NEW DRUG ADDON: CPT

## 2025-05-12 PROCEDURE — 99285 EMERGENCY DEPT VISIT HI MDM: CPT

## 2025-05-12 PROCEDURE — 96374 THER/PROPH/DIAG INJ IV PUSH: CPT

## 2025-05-12 PROCEDURE — 6360000002 HC RX W HCPCS: Performed by: EMERGENCY MEDICINE

## 2025-05-12 PROCEDURE — 6370000000 HC RX 637 (ALT 250 FOR IP): Performed by: STUDENT IN AN ORGANIZED HEALTH CARE EDUCATION/TRAINING PROGRAM

## 2025-05-12 PROCEDURE — 2060000000 HC ICU INTERMEDIATE R&B

## 2025-05-12 PROCEDURE — 6360000002 HC RX W HCPCS: Performed by: STUDENT IN AN ORGANIZED HEALTH CARE EDUCATION/TRAINING PROGRAM

## 2025-05-12 PROCEDURE — 96376 TX/PRO/DX INJ SAME DRUG ADON: CPT

## 2025-05-12 PROCEDURE — 6360000002 HC RX W HCPCS

## 2025-05-12 PROCEDURE — 77402 RADIATION TX DELIVERY LVL 1: CPT | Performed by: RADIOLOGY

## 2025-05-12 PROCEDURE — 99222 1ST HOSP IP/OBS MODERATE 55: CPT | Performed by: NEUROLOGICAL SURGERY

## 2025-05-12 PROCEDURE — 80053 COMPREHEN METABOLIC PANEL: CPT

## 2025-05-12 PROCEDURE — 85025 COMPLETE CBC W/AUTO DIFF WBC: CPT

## 2025-05-12 RX ORDER — DIPHENHYDRAMINE HYDROCHLORIDE 50 MG/ML
25 INJECTION, SOLUTION INTRAMUSCULAR; INTRAVENOUS ONCE
Status: COMPLETED | OUTPATIENT
Start: 2025-05-12 | End: 2025-05-12

## 2025-05-12 RX ORDER — FENTANYL CITRATE 50 UG/ML
50 INJECTION, SOLUTION INTRAMUSCULAR; INTRAVENOUS ONCE
Status: DISCONTINUED | OUTPATIENT
Start: 2025-05-12 | End: 2025-05-12

## 2025-05-12 RX ORDER — ONDANSETRON 4 MG/1
4 TABLET, ORALLY DISINTEGRATING ORAL EVERY 8 HOURS PRN
Status: DISCONTINUED | OUTPATIENT
Start: 2025-05-12 | End: 2025-05-29 | Stop reason: HOSPADM

## 2025-05-12 RX ORDER — LISINOPRIL 20 MG/1
20 TABLET ORAL 2 TIMES DAILY
Status: DISCONTINUED | OUTPATIENT
Start: 2025-05-12 | End: 2025-05-29 | Stop reason: HOSPADM

## 2025-05-12 RX ORDER — ACETAMINOPHEN 325 MG/1
650 TABLET ORAL EVERY 6 HOURS PRN
Status: DISCONTINUED | OUTPATIENT
Start: 2025-05-12 | End: 2025-05-29 | Stop reason: HOSPADM

## 2025-05-12 RX ORDER — HYDROMORPHONE HYDROCHLORIDE 1 MG/ML
2 INJECTION, SOLUTION INTRAMUSCULAR; INTRAVENOUS; SUBCUTANEOUS ONCE
Status: COMPLETED | OUTPATIENT
Start: 2025-05-12 | End: 2025-05-12

## 2025-05-12 RX ORDER — ACETAMINOPHEN 650 MG/1
650 SUPPOSITORY RECTAL EVERY 6 HOURS PRN
Status: DISCONTINUED | OUTPATIENT
Start: 2025-05-12 | End: 2025-05-29 | Stop reason: HOSPADM

## 2025-05-12 RX ORDER — HYDROMORPHONE HYDROCHLORIDE 1 MG/ML
1 INJECTION, SOLUTION INTRAMUSCULAR; INTRAVENOUS; SUBCUTANEOUS ONCE
Refills: 0 | Status: COMPLETED | OUTPATIENT
Start: 2025-05-12 | End: 2025-05-12

## 2025-05-12 RX ORDER — POLYETHYLENE GLYCOL 3350 17 G/17G
17 POWDER, FOR SOLUTION ORAL DAILY PRN
Status: DISCONTINUED | OUTPATIENT
Start: 2025-05-12 | End: 2025-05-17

## 2025-05-12 RX ORDER — RANOLAZINE 500 MG/1
500 TABLET, EXTENDED RELEASE ORAL 2 TIMES DAILY
Status: DISCONTINUED | OUTPATIENT
Start: 2025-05-12 | End: 2025-05-29 | Stop reason: HOSPADM

## 2025-05-12 RX ORDER — SODIUM CHLORIDE 0.9 % (FLUSH) 0.9 %
5-40 SYRINGE (ML) INJECTION EVERY 12 HOURS SCHEDULED
Status: DISCONTINUED | OUTPATIENT
Start: 2025-05-12 | End: 2025-05-22

## 2025-05-12 RX ORDER — MORPHINE SULFATE 4 MG/ML
4 INJECTION, SOLUTION INTRAMUSCULAR; INTRAVENOUS ONCE
Refills: 0 | Status: COMPLETED | OUTPATIENT
Start: 2025-05-12 | End: 2025-05-12

## 2025-05-12 RX ORDER — ISOSORBIDE MONONITRATE 30 MG/1
30 TABLET, EXTENDED RELEASE ORAL DAILY
Status: ON HOLD | COMMUNITY
End: 2025-05-24 | Stop reason: HOSPADM

## 2025-05-12 RX ORDER — GABAPENTIN 100 MG/1
100 CAPSULE ORAL 3 TIMES DAILY
Status: DISCONTINUED | OUTPATIENT
Start: 2025-05-12 | End: 2025-05-29 | Stop reason: HOSPADM

## 2025-05-12 RX ORDER — CLOPIDOGREL BISULFATE 75 MG/1
75 TABLET ORAL DAILY
Status: DISCONTINUED | OUTPATIENT
Start: 2025-05-13 | End: 2025-05-12

## 2025-05-12 RX ORDER — SODIUM CHLORIDE 9 MG/ML
INJECTION, SOLUTION INTRAVENOUS PRN
Status: DISCONTINUED | OUTPATIENT
Start: 2025-05-12 | End: 2025-05-22

## 2025-05-12 RX ORDER — SODIUM CHLORIDE 0.9 % (FLUSH) 0.9 %
5-40 SYRINGE (ML) INJECTION PRN
Status: DISCONTINUED | OUTPATIENT
Start: 2025-05-12 | End: 2025-05-22

## 2025-05-12 RX ORDER — ENOXAPARIN SODIUM 100 MG/ML
40 INJECTION SUBCUTANEOUS DAILY
Status: DISCONTINUED | OUTPATIENT
Start: 2025-05-12 | End: 2025-05-12

## 2025-05-12 RX ORDER — PANTOPRAZOLE SODIUM 40 MG/1
40 TABLET, DELAYED RELEASE ORAL
Status: DISCONTINUED | OUTPATIENT
Start: 2025-05-13 | End: 2025-05-29 | Stop reason: HOSPADM

## 2025-05-12 RX ORDER — HYDROMORPHONE HYDROCHLORIDE 1 MG/ML
1 INJECTION, SOLUTION INTRAMUSCULAR; INTRAVENOUS; SUBCUTANEOUS ONCE
Status: COMPLETED | OUTPATIENT
Start: 2025-05-12 | End: 2025-05-12

## 2025-05-12 RX ORDER — ISOSORBIDE MONONITRATE 30 MG/1
60 TABLET, EXTENDED RELEASE ORAL DAILY
Status: DISCONTINUED | OUTPATIENT
Start: 2025-05-12 | End: 2025-05-12

## 2025-05-12 RX ORDER — VIT A/VIT C/VIT E/ZINC/COPPER 7160-113
1 TABLET, DELAYED RELEASE (ENTERIC COATED) ORAL DAILY
COMMUNITY

## 2025-05-12 RX ORDER — ASPIRIN 81 MG/1
81 TABLET ORAL DAILY
Status: DISCONTINUED | OUTPATIENT
Start: 2025-05-13 | End: 2025-05-12

## 2025-05-12 RX ORDER — TAMSULOSIN HYDROCHLORIDE 0.4 MG/1
0.4 CAPSULE ORAL DAILY
Status: ON HOLD | COMMUNITY
End: 2025-05-24 | Stop reason: HOSPADM

## 2025-05-12 RX ORDER — OXYCODONE AND ACETAMINOPHEN 10; 325 MG/1; MG/1
1 TABLET ORAL EVERY 6 HOURS PRN
COMMUNITY
Start: 2025-05-05 | End: 2025-06-05

## 2025-05-12 RX ORDER — ROSUVASTATIN CALCIUM 20 MG/1
40 TABLET, COATED ORAL DAILY
Status: DISCONTINUED | OUTPATIENT
Start: 2025-05-12 | End: 2025-05-29 | Stop reason: HOSPADM

## 2025-05-12 RX ORDER — ONDANSETRON 2 MG/ML
4 INJECTION INTRAMUSCULAR; INTRAVENOUS EVERY 6 HOURS PRN
Status: DISCONTINUED | OUTPATIENT
Start: 2025-05-12 | End: 2025-05-29 | Stop reason: HOSPADM

## 2025-05-12 RX ORDER — HYDROMORPHONE HYDROCHLORIDE 1 MG/ML
1 INJECTION, SOLUTION INTRAMUSCULAR; INTRAVENOUS; SUBCUTANEOUS
Status: DISCONTINUED | OUTPATIENT
Start: 2025-05-12 | End: 2025-05-13

## 2025-05-12 RX ORDER — ISOSORBIDE MONONITRATE 30 MG/1
30 TABLET, EXTENDED RELEASE ORAL DAILY
Status: DISCONTINUED | OUTPATIENT
Start: 2025-05-13 | End: 2025-05-29 | Stop reason: HOSPADM

## 2025-05-12 RX ADMIN — HYDROMORPHONE HYDROCHLORIDE 1 MG: 1 INJECTION, SOLUTION INTRAMUSCULAR; INTRAVENOUS; SUBCUTANEOUS at 13:27

## 2025-05-12 RX ADMIN — HYDROMORPHONE HYDROCHLORIDE 2 MG: 1 INJECTION, SOLUTION INTRAMUSCULAR; INTRAVENOUS; SUBCUTANEOUS at 14:42

## 2025-05-12 RX ADMIN — HYDROMORPHONE HYDROCHLORIDE 1 MG: 1 INJECTION, SOLUTION INTRAMUSCULAR; INTRAVENOUS; SUBCUTANEOUS at 23:58

## 2025-05-12 RX ADMIN — LISINOPRIL 20 MG: 10 TABLET ORAL at 21:00

## 2025-05-12 RX ADMIN — HYDROMORPHONE HYDROCHLORIDE 1 MG: 1 INJECTION, SOLUTION INTRAMUSCULAR; INTRAVENOUS; SUBCUTANEOUS at 16:28

## 2025-05-12 RX ADMIN — DIPHENHYDRAMINE HYDROCHLORIDE 25 MG: 50 INJECTION INTRAMUSCULAR; INTRAVENOUS at 16:28

## 2025-05-12 RX ADMIN — HYDROMORPHONE HYDROCHLORIDE 1 MG: 1 INJECTION, SOLUTION INTRAMUSCULAR; INTRAVENOUS; SUBCUTANEOUS at 21:01

## 2025-05-12 RX ADMIN — MORPHINE SULFATE 4 MG: 4 INJECTION INTRAVENOUS at 12:22

## 2025-05-12 RX ADMIN — GABAPENTIN 100 MG: 100 CAPSULE ORAL at 21:00

## 2025-05-12 ASSESSMENT — LIFESTYLE VARIABLES
HOW OFTEN DO YOU HAVE A DRINK CONTAINING ALCOHOL: NEVER
HOW MANY STANDARD DRINKS CONTAINING ALCOHOL DO YOU HAVE ON A TYPICAL DAY: PATIENT DOES NOT DRINK

## 2025-05-12 ASSESSMENT — PAIN SCALES - GENERAL
PAINLEVEL_OUTOF10: 8
PAINLEVEL_OUTOF10: 7
PAINLEVEL_OUTOF10: 10

## 2025-05-12 ASSESSMENT — PAIN DESCRIPTION - LOCATION: LOCATION: BACK

## 2025-05-12 ASSESSMENT — PAIN - FUNCTIONAL ASSESSMENT: PAIN_FUNCTIONAL_ASSESSMENT: 0-10

## 2025-05-12 NOTE — CARE COORDINATION
05/12/25 Case Management Note: Patient is in the ER with intractable back pain due to spinal lytic lesions with lung cancer being treated with radiation therapy. He is now with worsening pain. Chart is being reviewed as patient is a return to the ER within 30 days of discharge to home. He receives radiation under Dr Aileen conde at [facility]. He did have a dose of radiation on 5/8/25. Will follow for plan. Patient has not been admitted from the ER as of yet. Electronically signed by Gin Acharya RN CM on 5/12/2025 at 1:00 PM

## 2025-05-12 NOTE — CARE COORDINATION
Internal Medicine On-call Care Coordination Note    I was called by the ED physician because they recommended admission for this patient and we cover their PCP.  The history as I understand it after discussion with the ED physician is as follows:    Hx of Lytic lesions of L1-L2 vertebrae with intractable back pain.  In the setting of Lung CA  Presenting for back pain  CT Lumbar: Enlarging lytic lesion in the left lateral L2 vertebral body, now associated with a pathological fracture. 2. New subtle lytic lesion in the left lateral aspect of the L3 vertebral body.  Has received 4 rounds of radiation therapy  Cannot walk due to pain    I placed admission orders.  Including:    General admission orders  Home meds ordered  Neurosurgery consulted  Heme/onc consulted  PT/OT  PRN pain control  Palliative care consulted    Either Dr. Smith, Dr. Mcbride, or our coverage will see the patient tomorrow for H&P.    DVT prophylaxis: Lovenox  Code Status: FULL    Electronically signed by Vinayak Smith MD on 5/12/2025 at 6:10 PM

## 2025-05-12 NOTE — ED PROVIDER NOTES
[facility] EMERGENCY DEPARTMENT  EMERGENCY DEPARTMENT ENCOUNTER        Pt Name: Corky Lawler  MRN: [id number]  Birthdate 1945  Date of evaluation: 5/12/2025  Provider: Philippe Patricio DO  PCP: Aurelio Hamilton MD  Note Started: 12:12 PM EDT 5/12/25    CHIEF COMPLAINT       Chief Complaint   Patient presents with    Back Pain     Pt L1 and L2 pain. Pt has lumbar cancer. Pt received treatment today . Pt unable to tolerate pain. Pt denies numbness to lower extremities denies loss of bowel or bladder.        HISTORY OF PRESENT ILLNESS: 1 or more Elements   History From: PATIENT     Limitations to history : None    Corky Lawler is a 80 y.o. 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  He reports this started earlier today and denies any injury.  He reports the pain is making it difficult for him to walk.  He has no urinary symptoms.  No red flag symptoms such as saddle anesthesia, numbness, urinary retention or bowel incontinence.  He has no risk factors for epidural abscess.  He is currently receiving radiation treatment to his lumbar spine.      Nursing Notes were all reviewed and agreed with or any disagreements were addressed in the HPI.    REVIEW OF SYSTEMS :      Review of Systems    POSITIVE (+): Back pain  NEGATIVE (-): Fever, chills, numbness, nausea, vomiting, urinary retention, bowel incontinence, chest pain    SURGICAL HISTORY     Past Surgical History:   Procedure Laterality Date    AORTA SURGERY      Aortoiliac stent at [facility] 2016    APPENDECTOMY      BACK SURGERY      BLADDER SURGERY Left 05/22/2023    CYSTOSCOPY RETROGRADE PYELOGRAM, LASER LITHOTRIPSY WITH LEFT STENT INSERTION AND GUADARRAMA PLACEMENT performed by Corky Giang MD at [facility] OR    CARDIAC CATHETERIZATION  09/19/2013    DR Hernandez    CHOLECYSTECTOMY      CORONARY ANGIOPLASTY WITH STENT PLACEMENT   Percentile Diastolic BP Percentile Temp Temp Source Pulse Resp SpO2   05/12/25 1154 -- -- 05/12/25 1148 05/12/25 1148 05/12/25 1148 -- 05/12/25 1148   114/67   97.4 °F (36.3 °C) Temporal 97  95 %      Height Weight         -- --                       Physical Exam  Constitutional:       General: He is not in acute distress.     Appearance: Normal appearance. He is not ill-appearing.   HENT:      Head: Normocephalic.      Mouth/Throat:      Mouth: Mucous membranes are moist.   Eyes:      Conjunctiva/sclera: Conjunctivae normal.      Pupils: Pupils are equal, round, and reactive to light.   Cardiovascular:      Rate and Rhythm: Normal rate and regular rhythm.      Pulses: Normal pulses.   Pulmonary:      Effort: Pulmonary effort is normal. No respiratory distress.      Breath sounds: Normal breath sounds. No stridor. No wheezing or rales.   Abdominal:      General: There is no distension.      Palpations: Abdomen is soft.      Tenderness: There is no abdominal tenderness. There is no guarding.   Musculoskeletal:      Cervical back: Normal range of motion and neck supple.      Comments: Tenderness to palpation of the lumbar spine with no overlying erythema or induration   Skin:     Findings: No erythema.   Neurological:      Comments: Full ROM of the lower extremities  No sensation loss to the lower extremities           DIAGNOSTIC RESULTS   LABS:    Labs Reviewed   CBC WITH AUTO DIFFERENTIAL - Abnormal; Notable for the following components:       Result Value    RDW 15.6 (*)     Neutrophils % 84 (*)     Lymphocytes % 8 (*)     Neutrophils Absolute 9.06 (*)     Lymphocytes Absolute 0.83 (*)     All other components within normal limits   COMPREHENSIVE METABOLIC PANEL - Abnormal; Notable for the following components:    Glucose 158 (*)     All other components within normal limits       When ordered only abnormal lab results are displayed. All other labs were within normal range or not returned as of this  dictation.        RADIOLOGY:   Non-plain film images such as CT, Ultrasound and MRI are read by the radiologist. Plain radiographic images are visualized and preliminarily interpreted by the ED Provider with the below findings:    CT LUMBAR SPINE WO CONTRAST   Final Result   1. Enlarging lytic lesion in the left lateral L2 vertebral body, now   associated with a pathological fracture.   2. New subtle lytic lesion in the left lateral aspect of the L3 vertebral   body.   3. The metastatic lesion in the L1 vertebral body, as seen on the previous   MRI, is not evident by CT.               Interpretation per the Radiologist below, if available at the time of this note:        PROCEDURES   Unless otherwise noted below, none     Procedures      PAST MEDICAL HISTORY      has a past medical history of AAA (abdominal aortic aneurysm), Aortic regurgitation, COPD (chronic obstructive pulmonary disease) (HCC), Coronary atherosclerosis of native coronary artery, DJD (degenerative joint disease), HTN (hypertension), Hyperlipidemia, Mitral regurgitation, Occipital stroke (HCC) (2021), and TIA (transient ischemic attack) (07/01/2013).     EMERGENCY DEPARTMENT COURSE and DIFFERENTIAL DIAGNOSIS/MDM:   Vitals:    Vitals:    05/12/25 1154 05/12/25 1315 05/12/25 1322 05/12/25 1625   BP: 114/67 134/75  (!) 135/58   Pulse:  61  59   Resp:  17  18   Temp:       TempSrc:       SpO2:  96%  92%   Weight:   98 kg (216 lb 0.8 oz)                Is this patient to be included in the SEP-1 core measure? 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  On arrival he appears  in pain.  His vitals are stable.  He was placed on a monitor and ordered Dilaudid.  CT of the lumbar spine along with labs were collected indicating new pathological fracture and lytic lesions. Dr. Cottrell notified. Pt was admitted. TLSO brace ordered.   Medications   sodium chloride flush 0.9 % injection 5-40 mL (has no administration in time range)   sodium chloride flush 0.9 % injection 5-40 mL (has no administration in time range)   0.9 % sodium chloride infusion (has no administration in time range)   enoxaparin (LOVENOX) injection 40 mg (has no administration in time range)   ondansetron (ZOFRAN-ODT) disintegrating tablet 4 mg (has no administration in time range)     Or   ondansetron (ZOFRAN) injection 4 mg (has no administration in time range)   polyethylene glycol (GLYCOLAX) packet 17 g (has no administration in time range)   acetaminophen (TYLENOL) tablet 650 mg (has no administration in time range)     Or   acetaminophen (TYLENOL) suppository 650 mg (has no administration in time range)   HYDROmorphone (DILAUDID) injection 0.5 mg (has no administration in time range)     Or   HYDROmorphone HCl PF (DILAUDID) injection 1 mg (has no administration in time range)   aspirin tablet 81 mg (has no administration in time range)   clopidogrel (PLAVIX) tablet 75 mg (has no administration in time range)   gabapentin (NEURONTIN) capsule 100 mg (has no administration in time range)   isosorbide mononitrate (IMDUR) extended release tablet 60 mg (has no administration in time range)   lisinopril (PRINIVIL;ZESTRIL) tablet 20 mg (has no administration in time range)   pantoprazole (PROTONIX) tablet 40 mg (has no administration in time range)   ranolazine (RANEXA) extended release tablet 500 mg (has no administration in time range)   rosuvastatin (CRESTOR) tablet 40 mg (has no administration in time range)   morphine sulfate (PF) injection 4 mg (4 mg IntraVENous Given 5/12/25 1222)   HYDROmorphone HCl PF (DILAUDID) injection

## 2025-05-13 PROCEDURE — 99222 1ST HOSP IP/OBS MODERATE 55: CPT | Performed by: PHYSICIAN ASSISTANT

## 2025-05-13 PROCEDURE — 2060000000 HC ICU INTERMEDIATE R&B

## 2025-05-13 PROCEDURE — 6370000000 HC RX 637 (ALT 250 FOR IP): Performed by: PHYSICIAN ASSISTANT

## 2025-05-13 PROCEDURE — 6370000000 HC RX 637 (ALT 250 FOR IP): Performed by: INTERNAL MEDICINE

## 2025-05-13 PROCEDURE — 6360000002 HC RX W HCPCS: Performed by: STUDENT IN AN ORGANIZED HEALTH CARE EDUCATION/TRAINING PROGRAM

## 2025-05-13 PROCEDURE — 6360000002 HC RX W HCPCS: Performed by: PHYSICIAN ASSISTANT

## 2025-05-13 PROCEDURE — 2500000003 HC RX 250 WO HCPCS: Performed by: STUDENT IN AN ORGANIZED HEALTH CARE EDUCATION/TRAINING PROGRAM

## 2025-05-13 PROCEDURE — 6370000000 HC RX 637 (ALT 250 FOR IP): Performed by: STUDENT IN AN ORGANIZED HEALTH CARE EDUCATION/TRAINING PROGRAM

## 2025-05-13 RX ORDER — HYDROMORPHONE HYDROCHLORIDE 1 MG/ML
1 INJECTION, SOLUTION INTRAMUSCULAR; INTRAVENOUS; SUBCUTANEOUS
Status: DISCONTINUED | OUTPATIENT
Start: 2025-05-13 | End: 2025-05-16

## 2025-05-13 RX ORDER — OXYCODONE HYDROCHLORIDE 10 MG/1
10 TABLET ORAL
Refills: 0 | Status: DISCONTINUED | OUTPATIENT
Start: 2025-05-13 | End: 2025-05-23

## 2025-05-13 RX ORDER — CYCLOBENZAPRINE HCL 10 MG
10 TABLET ORAL 3 TIMES DAILY PRN
Status: DISCONTINUED | OUTPATIENT
Start: 2025-05-13 | End: 2025-05-29 | Stop reason: HOSPADM

## 2025-05-13 RX ADMIN — OXYCODONE HYDROCHLORIDE 10 MG: 10 TABLET ORAL at 14:01

## 2025-05-13 RX ADMIN — GABAPENTIN 100 MG: 100 CAPSULE ORAL at 19:45

## 2025-05-13 RX ADMIN — GABAPENTIN 100 MG: 100 CAPSULE ORAL at 09:21

## 2025-05-13 RX ADMIN — GABAPENTIN 100 MG: 100 CAPSULE ORAL at 14:03

## 2025-05-13 RX ADMIN — HYDROMORPHONE HYDROCHLORIDE 0.5 MG: 1 INJECTION, SOLUTION INTRAMUSCULAR; INTRAVENOUS; SUBCUTANEOUS at 12:16

## 2025-05-13 RX ADMIN — RANOLAZINE 500 MG: 500 TABLET, FILM COATED, EXTENDED RELEASE ORAL at 09:20

## 2025-05-13 RX ADMIN — HYDROMORPHONE HYDROCHLORIDE 1 MG: 1 INJECTION, SOLUTION INTRAMUSCULAR; INTRAVENOUS; SUBCUTANEOUS at 02:56

## 2025-05-13 RX ADMIN — SODIUM CHLORIDE, PRESERVATIVE FREE 10 ML: 5 INJECTION INTRAVENOUS at 09:20

## 2025-05-13 RX ADMIN — LISINOPRIL 20 MG: 10 TABLET ORAL at 19:45

## 2025-05-13 RX ADMIN — LISINOPRIL 20 MG: 10 TABLET ORAL at 09:21

## 2025-05-13 RX ADMIN — PANTOPRAZOLE SODIUM 40 MG: 40 TABLET, DELAYED RELEASE ORAL at 09:26

## 2025-05-13 RX ADMIN — HYDROMORPHONE HYDROCHLORIDE 0.5 MG: 1 INJECTION, SOLUTION INTRAMUSCULAR; INTRAVENOUS; SUBCUTANEOUS at 15:41

## 2025-05-13 RX ADMIN — HYDROMORPHONE HYDROCHLORIDE 1 MG: 1 INJECTION, SOLUTION INTRAMUSCULAR; INTRAVENOUS; SUBCUTANEOUS at 18:07

## 2025-05-13 RX ADMIN — SODIUM CHLORIDE, PRESERVATIVE FREE 10 ML: 5 INJECTION INTRAVENOUS at 19:46

## 2025-05-13 RX ADMIN — OXYCODONE HYDROCHLORIDE 10 MG: 10 TABLET ORAL at 19:27

## 2025-05-13 RX ADMIN — HYDROMORPHONE HYDROCHLORIDE 1 MG: 1 INJECTION, SOLUTION INTRAMUSCULAR; INTRAVENOUS; SUBCUTANEOUS at 05:58

## 2025-05-13 RX ADMIN — HYDROMORPHONE HYDROCHLORIDE 1 MG: 1 INJECTION, SOLUTION INTRAMUSCULAR; INTRAVENOUS; SUBCUTANEOUS at 09:16

## 2025-05-13 RX ADMIN — HYDROMORPHONE HYDROCHLORIDE 1 MG: 1 INJECTION, SOLUTION INTRAMUSCULAR; INTRAVENOUS; SUBCUTANEOUS at 21:00

## 2025-05-13 RX ADMIN — ROSUVASTATIN 40 MG: 20 TABLET, FILM COATED ORAL at 09:21

## 2025-05-13 RX ADMIN — RANOLAZINE 500 MG: 500 TABLET, FILM COATED, EXTENDED RELEASE ORAL at 19:45

## 2025-05-13 RX ADMIN — ISOSORBIDE MONONITRATE 30 MG: 30 TABLET, EXTENDED RELEASE ORAL at 09:21

## 2025-05-13 RX ADMIN — CYCLOBENZAPRINE 10 MG: 10 TABLET, FILM COATED ORAL at 14:06

## 2025-05-13 ASSESSMENT — PAIN DESCRIPTION - ORIENTATION
ORIENTATION: LEFT;LOWER
ORIENTATION: POSTERIOR;LOWER
ORIENTATION: LEFT
ORIENTATION: LEFT
ORIENTATION: LOWER;LEFT
ORIENTATION: POSTERIOR
ORIENTATION: LEFT

## 2025-05-13 ASSESSMENT — PAIN SCALES - GENERAL
PAINLEVEL_OUTOF10: 7
PAINLEVEL_OUTOF10: 7
PAINLEVEL_OUTOF10: 10
PAINLEVEL_OUTOF10: 3
PAINLEVEL_OUTOF10: 8
PAINLEVEL_OUTOF10: 4
PAINLEVEL_OUTOF10: 8
PAINLEVEL_OUTOF10: 9
PAINLEVEL_OUTOF10: 8
PAINLEVEL_OUTOF10: 7

## 2025-05-13 ASSESSMENT — PAIN DESCRIPTION - DESCRIPTORS
DESCRIPTORS: DISCOMFORT;BURNING;SHARP
DESCRIPTORS: BURNING
DESCRIPTORS: BURNING;SHOOTING;SHARP
DESCRIPTORS: BURNING
DESCRIPTORS: ACHING;DISCOMFORT;SORE
DESCRIPTORS: BURNING
DESCRIPTORS: BURNING

## 2025-05-13 ASSESSMENT — PAIN DESCRIPTION - PAIN TYPE: TYPE: ACUTE PAIN

## 2025-05-13 ASSESSMENT — PAIN DESCRIPTION - LOCATION
LOCATION: LEG
LOCATION: BACK
LOCATION: BACK
LOCATION: BACK;LEG
LOCATION: BACK;LEG
LOCATION: LEG
LOCATION: BACK;LEG
LOCATION: LEG
LOCATION: BACK;LEG

## 2025-05-13 ASSESSMENT — PAIN - FUNCTIONAL ASSESSMENT
PAIN_FUNCTIONAL_ASSESSMENT: PREVENTS OR INTERFERES SOME ACTIVE ACTIVITIES AND ADLS
PAIN_FUNCTIONAL_ASSESSMENT: ACTIVITIES ARE NOT PREVENTED
PAIN_FUNCTIONAL_ASSESSMENT: PREVENTS OR INTERFERES SOME ACTIVE ACTIVITIES AND ADLS

## 2025-05-13 ASSESSMENT — PAIN DESCRIPTION - FREQUENCY: FREQUENCY: CONTINUOUS

## 2025-05-13 ASSESSMENT — PAIN DESCRIPTION - DIRECTION: RADIATING_TOWARDS: BACK

## 2025-05-13 ASSESSMENT — PAIN DESCRIPTION - ONSET: ONSET: ON-GOING

## 2025-05-13 NOTE — PROGRESS NOTES
Notified Belle Samaniego with palliative pt was getting 1 mg dilaudid q 3 PRN which he states he liked better than the 0.5 mg Q 1 hour PRN,

## 2025-05-13 NOTE — CONSULTS
[facility]  Hematology/Oncology  Consult      Patient Name: Corky Lawler  YOB: 1945  PCP: Aurelio Hamilton MD   Referring Provider:      Reason for Consultation:   Chief Complaint   Patient presents with    Back Pain     Pt L1 and L2 pain. Pt has lumbar cancer. Pt received treatment today . Pt unable to tolerate pain. Pt denies numbness to lower extremities denies loss of bowel or bladder.         History of Present Illness: This is a 80-year-old male patient who was seen by our service when he was recently hospitalized and found to have bulky right upper lobe mass measuring 6.9 x 5.9 cm with associated hilar and mediastinal lymphadenopathy and an L1, L2 lytic lesion.  He thought to have a lung neoplasm clinically stage IV, [id number].  NM bone scan showed increased radiotracer at L2.  Patient's hospital stay was complicated by acute stroke.  MRI brain without contrast showed small acute to subacute infarct in the left occipital periventricular white matter.  Patient refused to have MRI brain with contrast.  He was started on ASA, Plavix, high intensity statin.  His EBUS has been delayed and is scheduled for 5/21/2025.  Following with radiation oncology for palliative radiation therapy and patient is status post 2 out of 10 fractions receiving 600 cGy with plans for 3000 cGy radiotherapy to L2/L3.  Patient was to follow-up with Dr. Nieves on 5/29/2025 however presented to the ED for evaluation of intractable back pain. CT of spine showing enlarging lytic lesion in the left lateral L2 vertebral body with associated pathological fracture, new subtle lytic lesion of the left lateral aspect of L3 vertebral body. Patient admitted for further workup and pain control.  Palliative has been consulted for pain control. Neurosurgery consulted.  Conservative measures for pain control to be attempted however if not achieved plan for L1 and L2 kyphoplasty.  Blood work is otherwise unremarkable.   associated with a pathological fracture. 2. New subtle lytic lesion in the left lateral aspect of the L3 vertebral body. 3. The metastatic lesion in the L1 vertebral body, as seen on the previous MRI, is not evident by CT.     CTA HEAD W CONTRAST  Result Date: 4/23/2025  EXAMINATION: CTA OF THE HEAD WITH CONTRAST; CTA OF THE NECK 4/23/2025 12:59 pm: TECHNIQUE: CTA of the head/brain was performed with the administration of intravenous contrast. Multiplanar reformatted images are provided for review.  MIP images are provided for review. CT of the head was performed without the administration of intravenous contrast. CTA of the neck was performed with the administration of intravenous contrast. Multiplanar reformatted images are provided for review.  MIP images are provided for review. Stenosis of the internal carotid arteries measured using NASCET criteria. Automated exposure control, iterative reconstruction, and/or weight based adjustment of the mA/kV was utilized to reduce the radiation dose to as low as reasonably achievable. COMPARISON: MRI of the brain from yesterday. CT pulmonary angiogram from 04/17/2025. HISTORY: ORDERING SYSTEM PROVIDED HISTORY: occipital stroke TECHNOLOGIST PROVIDED HISTORY: Reason for exam:->occipital stroke Has a \"code stroke\" or \"stroke alert\" been called?-> What reading provider will be dictating this exam?->CRC FINDINGS: CT HEAD: BRAIN/VENTRICLES:  The small area of restricted diffusion seen in the left periventricular posterior parietooccipital region is not seen to have a corresponding abnormality on today's CT. There is no change in a small area cortical and subcortical encephalomalacia of the posteromedial right parietooccipital region, consistent with an old small distal posterior right PCA infarct. No acute intracranial hemorrhage or extraaxial fluid collection. Grey-white differentiation is maintained.  No evidence of mass, mass effect or midline shift.  No evidence of  pleural effusion. 6. Increasing patchy infiltrate around the mass in the posterior right lower lobe, consistent with developing postobstructive pneumonia. CTA HEAD: No stenosis, occlusion, or aneurysm of the proximal intracranial arteries.     Echo (TTE) complete (PRN contrast/bubble/strain/3D)  Result Date: 4/23/2025    Left Ventricle: Normal left ventricular systolic function with a visually estimated EF of 50 - 55%. Left ventricle size is normal. Findings consistent with moderate concentric hypertrophy. Normal wall motion. Indeterminate diastolic function.   Right Ventricle: Right ventricle size is normal. Normal systolic function.   Aortic Valve: Mild to moderate regurgitation. Mild stenosis of the aortic valve. AV mean gradient is 12 mmHg. AV area by continuity VTI is 2.1 cm2.   Tricuspid Valve: Unable to assess RVSP.   Left Atrium: Left atrium size is normal.   Interatrial Septum: Agitated saline study was negative with and without provocation.   Right Atrium: Right atrium is mildly dilated.   Image quality is adequate.     MRI BRAIN WO CONTRAST  Result Date: 4/22/2025  EXAMINATION: MRI OF THE BRAIN WITHOUT CONTRAST  4/22/2025 8:30 pm TECHNIQUE: Multiplanar multisequence MRI of the brain was performed without the administration of intravenous contrast. COMPARISON: None. HISTORY: ORDERING SYSTEM PROVIDED HISTORY: Dizziness persistent and worsening TECHNOLOGIST PROVIDED HISTORY: Reason for exam:->Dizziness persistent and worsening What is the sedation requirement?->None FINDINGS: INTRACRANIAL STRUCTURES/VENTRICLES: There is small acute to subacute infarction in left occipital periventricular white matter.  There is old infarction in the right occipital lobe.  There is mild parenchymal volume loss.  There is mild T2/FLAIR hyperintensity in the periventricular and subcortical white matter, likely related to mild chronic microvascular disease. No mass effect or midline shift. No evidence of an acute intracranial  hemorrhage. There is no evidence of hydrocephalus. The sellar/suprasellar regions appear unremarkable.  The normal signal voids within the major intracranial vessels appear maintained. ORBITS: The visualized portion of the orbits demonstrate no acute abnormality. SINUSES: There is scattered minimal mucosal thickening in the paranasal sinuses.  There is minimal left mastoid effusion. BONES/SOFT TISSUES: The bone marrow signal intensity appears normal. The soft tissues demonstrate no acute abnormality.     Small acute to subacute infarction in left occipital periventricular white matter. Old infarction in the right occipital lobe. Mild parenchymal volume loss. Mild chronic microvascular disease.     MRI THORACIC SPINE WO CONTRAST  Result Date: 4/21/2025  EXAMINATION: MRI OF THE THORACIC SPINE WITHOUT CONTRAST  4/21/2025 1:57 pm TECHNIQUE: Multiplanar multisequence MRI of the thoracic spine was performed without the administration of intravenous contrast. COMPARISON: CT chest and CT thoracic spine performed 04/17/2025 HISTORY: ORDERING SYSTEM PROVIDED HISTORY: r.o metastasis TECHNOLOGIST PROVIDED HISTORY: Reason for exam:->r.o metastasis FINDINGS: BONES/ALIGNMENT: There is normal alignment of the spine.  Background marrow signal is heterogeneous with multifocal T1 hyperintense signal which could represent fatty infiltration.  No evidence of acute fracture.  No definite suspicious focal STIR hyperintense marrow signal lesion is identified. SPINAL CORD: No abnormal cord signal is seen. SOFT TISSUES: No definite paraspinal mass identified.  Right lung mass and mediastinal lymphadenopathy are partially visualized, better characterized on previous CT. DEGENERATIVE CHANGES: There is multilevel disc desiccation with mild posterior disc bulges within the mid to lower thoracic spine, from T6-T7 through T10-T11.  No significant spinal canal stenosis or neural foraminal narrowing of the thoracic spine.     No definite evidence of  significant spinal canal stenosis. Mild bilateral facet arthropathy.  Moderate bilateral neural foraminal narrowing. L5-S1: Posterior disc bulge.  Mild bilateral facet arthropathy and ligamentum flavum thickening.  No significant spinal canal stenosis.  Moderate to severe bilateral neural foraminal narrowing.     1. Abnormal signal lesion within the left aspect of the L2 vertebral body with left paraspinal extraosseous soft tissue extension, suspicious for metastatic disease.  Postcontrast MRI imaging could be performed for further evaluation as clinically warranted. 2. Multilevel degenerative changes of the lumbar spine as described above. No significant spinal canal stenosis. 3. Multilevel neural foraminal narrowing, as described above.     NM BONE SCAN WHOLE BODY  Result Date: 4/21/2025  EXAMINATION: WHOLE BODY BONE SCAN  4/21/2025 TECHNIQUE: The patient was injected intravenously with 24 mCi of 99 mTc MDP and scintigraphy of the entire skeleton was performed approximately three hours later. COMPARISON: CT abdomen and pelvis and lumbar spine CT 04/17/2025 HISTORY: ORDERING SYSTEM PROVIDED HISTORY: evaluate for mets TECHNOLOGIST PROVIDED HISTORY: Reason for exam:->evaluate for mets What reading provider will be dictating this exam?->CRC FINDINGS: The L2 vertebral body to the left of midline 8 demonstrates a solitary small focus of increased uptake which corresponds to an osteolytic lesion evident by recent CT exam.  Bone scan shows no additional suspicious areas of increased or decreased uptake. Bilateral renal activity is present.     1.  L2 vertebra solitary focus of increased uptake with corresponding osteolytic lesion by recent CT exam.  Otherwise negative whole-body bone scan. 2.  MRI thoracic and lumbar spine to follow and reported separately.     XR FEMUR LEFT (MIN 2 VIEWS)  Result Date: 4/20/2025  EXAMINATION: 2 XRAY VIEWS OF THE LEFT FEMUR 4/20/2025 1:39 pm COMPARISON: None. HISTORY: ORDERING SYSTEM  reduce the radiation dose to as low as reasonably achievable. COMPARISON: August 25, 2020 HISTORY: ORDERING SYSTEM PROVIDED HISTORY: Concern for mass, concern for pulmonary embolus TECHNOLOGIST PROVIDED HISTORY: Reason for exam:->Concern for mass, concern for pulmonary embolus Additional Contrast?->None FINDINGS: There is a new masslike opacity with irregular margins located in the right upper lobe extending to level of the right hilum.  This lesion measures approximately 6.9 x 5.9 cm.  This lesion encases the right hilar vasculature and bronchi and extends into the right aspect of the mediastinum.  There are enlarged mediastinal lymph nodes notable in the right paratracheal location measuring up to 3.8 x 3.4 cm.  No pneumothorax.  No pleural effusion.  The heart is normal in size.  No pericardial effusion.  There is evidence of prior mediastinal surgery suggestive of coronary arterial bypass grafting. No filling defect in the pulmonary arteries to suggest pulmonary arterial embolism.  View of the upper abdomen shows a few incidental cysts associated with the liver appearing similar compared to prior.  Partial visualization of a lytic lesion involving body of L1.     1. New mass is seen in the medial right upper lobe extending to right hilum and encasing right hilar vasculature and bronchi suggestive of neoplasm measuring approximately 6.9 x 5.9 cm. 2. Enlarged mediastinal lymph nodes are present notable in right paratracheal location. 3. Partial visualization of a lytic lesion involving the visualized body of L1. 4. No evidence of pulmonary arterial embolism.     CT LUMBAR SPINE WO CONTRAST  Result Date: 4/17/2025  EXAMINATION: CT OF THE LUMBAR SPINE WITHOUT CONTRAST  4/17/2025 TECHNIQUE: CT of the lumbar spine was performed without the administration of intravenous contrast. Multiplanar reformatted images are provided for review. Adjustment of mA and/or kV according to patient size was utilized.  Automated

## 2025-05-13 NOTE — PROGRESS NOTES
Physical Therapy    PT orders received and medical chart reviewed. PT eval on hold at this time pending TLSO/activity order clarification. Contacted Dr. Cottrell via phone to clarify if needed, voicemail left. Will re attempt at a later date/ time. Thank you.    Michele Faulkner, PT, DPT  [id number]

## 2025-05-13 NOTE — CONSULTS
Palliative Care Department  [phone]  Palliative Care Initial Consult  Provider Ramya Chris PA-C     Corky Lawler  [phone]  Hospital Day: 2  Date of Initial Consult: 05/12/2025  Referring Provider: Vinayak Smith MD   Palliative Medicine was consulted for assistance with: History of lung cancer; intractable pain with pathologic fracture of spine; goals of care    HPI:   Corky Lawler is a 80 y.o. with a medical history of metastatic lung cancer to the bone undergoing radiation therapy who was admitted on 5/12/2025 from home with a CHIEF COMPLAINT of intractable back pain.  In the ED, CT of spine showing enlarging lytic lesion in the left lateral L2 vertebral body with associated pathological fracture, new subtle lytic lesion of the left lateral aspect of L3 vertebral body.  Patient admitted for further workup and pain control.  Neurosurgery and oncology consulted.  Palliative care consulted for goals of care and symptom management.    ASSESSMENT/PLAN:     Pertinent Hospital Diagnoses     Metastatic lung cancer to the bone  Pathological fracture of L2  Intractable back pain      Palliative Care Encounter / Counseling Regarding Goals of Care  Please see detailed goals of care discussion as below  At this time, Cokry Lawler, Does have capacity for medical decision-making.  Capacity is time limited and situation/question specific  Outcome of goals of care meeting:   Address acute pain  Continue cancer directed therapies  Code status Full Code  Advanced Directives: no POA or living will in 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    Thank you for allowing Palliative Medicine to participate in the care of Corky Lawler.

## 2025-05-13 NOTE — CONSULTS
[facility]              [address] [place] [address]                              CONSULTATION      PATIENT NAME: BINA TANG              : 1945  MED REC NO: [id number]                        ROOM: 20  ACCOUNT NO: [id number]                       ADMIT DATE: 2025  PROVIDER: Valeria Cottrell MD    NEUROSURGERY CONSULT    CONSULT DATE: 2025      REASON FOR CONSULT:  Back pain and spinal metastasis.    HISTORY OF PRESENT ILLNESS:  The patient is a 80-year-old gentleman who is known to my service.  He was seen about a month ago for an L2 pathologic fracture.  At that time, it was recommended that he try a TLSO brace and radiation.  Since then, he has received some radiation treatments, but yesterday he developed excruciating pain that he rated as a 10/10.  The pain has not improved.  He actually had a radiation treatment today without any significant improvement in his pain, and he presents to the emergency room.  He denied any new numbness, tingling, weakness, or loss of control of bowel or bladder function.    PAST MEDICAL HISTORY:  Positive for abdominal aortic aneurysm, aortic regurgitation, COPD, coronary artery disease, hypertension, hyperlipidemia, mitral regurgitation.    PAST SURGICAL HISTORY:  Positive for aortoiliac stent placement, appendectomy, bladder surgery, cardiac catheterization, cardiac stent placement, cholecystectomy.    FAMILY HISTORY:  Positive for cancer in his mother and father.    SOCIAL HISTORY:  He is an ex-smoker, and he does consume alcoholic beverages socially.    ALLERGIES:  INCLUDES TRAMADOL.      HOME MEDICATIONS:  Include aspirin and Plavix.    REVIEW OF SYSTEMS:  HEENT:  Negative for headache, double vision, or blurred vision.  CARDIOVASCULAR:  Negative for chest pain, arrhythmia, or palpitations.  RESPIRATORY:  Negative for shortness of breath, asthma, bronchitis, or pneumonia.  GASTROINTESTINAL:   05/13/2025 00:07:46     BETSEY/CHADWICK  Job #:  [id number]     Doc#:  [id number]

## 2025-05-13 NOTE — PROGRESS NOTES
Occupational Therapy  Occupational Therapy    Date:2025  Patient Name: Corky Lawler  MRN: [id number]  : 1945  Room: [address] [place] [address]-A     OT orders received and chart reviewed. OT eval on hold at this time pending TLSO/activity order clarification. Contacted Dr. Cottrell via phone to clarify if needed, voicemail left.     OT will follow and re-attempt eval as appropriate, pending neurosurgery POC/TLSO need, at a later time/date.     Lianet Darnell, ISIDRA, OTR/L [id number]

## 2025-05-13 NOTE — H&P
Internal Medicine History & Physical     Name: Corky Lawler  : 1945  Chief Complaint: Back Pain (Pt L1 and L2 pain. Pt has lumbar cancer. Pt received treatment today . Pt unable to tolerate pain. Pt denies numbness to lower extremities denies loss of bowel or bladder. )  Primary Care Physician: Aurelio Hamilton MD  Admission date: 2025  Date of service: 2025     History of Present Illness  Corky is a 80 y.o. year old male with a PMH of who presented with a chief complaint of back pain.  Of significance patient has history of lung cancer with mets to the spine and previous lytic L1 and L2 lesions.  Patient states that he was undergoing radiation therapy (fourth round) and on the way there he was having extreme back pain.  Patient states he takes Oxy IR 10 mg at home and that the pain medication was not relieving his pain at all.  Due to these concerns patient presented to the ED for further evaluation.  In the ED CT scan of the spine showed enlarging lytic lesions in the left lateral L2 vertebral body with associated pathological fracture, new subtle lytic lesion of left lateral aspect of L3 vertebral body.  Neurosurgery was consulted with recommendation for brace placement and that if back pain does not improve, considerations of L1 and L2 kyphoplasty.  Due to these concerns patient admitted for further evaluation.       Past Medical History:   Diagnosis Date    AAA (abdominal aortic aneurysm)     4,5cm    Aortic regurgitation     mild to moderate    COPD (chronic obstructive pulmonary disease) (HCC)     Coronary atherosclerosis of native coronary artery     DJD (degenerative joint disease)     HTN (hypertension)     Hyperlipidemia     Mitral regurgitation     Occipital stroke (HCC)     R occipital stroke    TIA (transient ischemic attack) 2013       Past Surgical History:   Procedure Laterality Date    AORTA SURGERY      Aortoiliac stent at [facility] 2016    APPENDECTOMY

## 2025-05-14 ENCOUNTER — HOSPITAL ENCOUNTER (OUTPATIENT)
Dept: RADIATION ONCOLOGY | Age: 80
End: 2025-05-14
Payer: MEDICARE

## 2025-05-14 ENCOUNTER — ANESTHESIA EVENT (OUTPATIENT)
Dept: OPERATING ROOM | Age: 80
End: 2025-05-14
Payer: MEDICARE

## 2025-05-14 LAB
ANION GAP SERPL CALCULATED.3IONS-SCNC: 12 MMOL/L (ref 7–16)
BASOPHILS # BLD: 0.04 K/UL (ref 0–0.2)
BASOPHILS NFR BLD: 0 % (ref 0–2)
BUN SERPL-MCNC: 35 MG/DL (ref 8–23)
CALCIUM SERPL-MCNC: 8.8 MG/DL (ref 8.8–10.2)
CHLORIDE SERPL-SCNC: 97 MMOL/L (ref 98–107)
CO2 SERPL-SCNC: 25 MMOL/L (ref 22–29)
CREAT SERPL-MCNC: 2.1 MG/DL (ref 0.7–1.2)
EOSINOPHIL # BLD: 0.21 K/UL (ref 0.05–0.5)
EOSINOPHILS RELATIVE PERCENT: 2 % (ref 0–6)
ERYTHROCYTE [DISTWIDTH] IN BLOOD BY AUTOMATED COUNT: 15.1 % (ref 11.5–15)
GFR, ESTIMATED: 32 ML/MIN/1.73M2
GLUCOSE SERPL-MCNC: 153 MG/DL (ref 74–99)
HCT VFR BLD AUTO: 39.7 % (ref 37–54)
HGB BLD-MCNC: 12.7 G/DL (ref 12.5–16.5)
IMM GRANULOCYTES # BLD AUTO: 0.13 K/UL (ref 0–0.58)
IMM GRANULOCYTES NFR BLD: 1 % (ref 0–5)
LYMPHOCYTES NFR BLD: 0.79 K/UL (ref 1.5–4)
LYMPHOCYTES RELATIVE PERCENT: 8 % (ref 20–42)
MCH RBC QN AUTO: 29.7 PG (ref 26–35)
MCHC RBC AUTO-ENTMCNC: 32 G/DL (ref 32–34.5)
MCV RBC AUTO: 92.8 FL (ref 80–99.9)
MONOCYTES NFR BLD: 0.88 K/UL (ref 0.1–0.95)
MONOCYTES NFR BLD: 9 % (ref 2–12)
NEUTROPHILS NFR BLD: 80 % (ref 43–80)
NEUTS SEG NFR BLD: 8.13 K/UL (ref 1.8–7.3)
PLATELET # BLD AUTO: 267 K/UL (ref 130–450)
PMV BLD AUTO: 10.3 FL (ref 7–12)
POTASSIUM SERPL-SCNC: 3.8 MMOL/L (ref 3.5–5.1)
RBC # BLD AUTO: 4.28 M/UL (ref 3.8–5.8)
SODIUM SERPL-SCNC: 133 MMOL/L (ref 136–145)
WBC OTHER # BLD: 10.2 K/UL (ref 4.5–11.5)

## 2025-05-14 PROCEDURE — 2500000003 HC RX 250 WO HCPCS: Performed by: STUDENT IN AN ORGANIZED HEALTH CARE EDUCATION/TRAINING PROGRAM

## 2025-05-14 PROCEDURE — 99232 SBSQ HOSP IP/OBS MODERATE 35: CPT | Performed by: NEUROLOGICAL SURGERY

## 2025-05-14 PROCEDURE — 6370000000 HC RX 637 (ALT 250 FOR IP): Performed by: PHYSICIAN ASSISTANT

## 2025-05-14 PROCEDURE — 6370000000 HC RX 637 (ALT 250 FOR IP): Performed by: INTERNAL MEDICINE

## 2025-05-14 PROCEDURE — 36415 COLL VENOUS BLD VENIPUNCTURE: CPT

## 2025-05-14 PROCEDURE — 6360000002 HC RX W HCPCS: Performed by: PHYSICIAN ASSISTANT

## 2025-05-14 PROCEDURE — 6370000000 HC RX 637 (ALT 250 FOR IP): Performed by: STUDENT IN AN ORGANIZED HEALTH CARE EDUCATION/TRAINING PROGRAM

## 2025-05-14 PROCEDURE — 6360000002 HC RX W HCPCS: Performed by: STUDENT IN AN ORGANIZED HEALTH CARE EDUCATION/TRAINING PROGRAM

## 2025-05-14 PROCEDURE — 80048 BASIC METABOLIC PNL TOTAL CA: CPT

## 2025-05-14 PROCEDURE — 2060000000 HC ICU INTERMEDIATE R&B

## 2025-05-14 PROCEDURE — 99232 SBSQ HOSP IP/OBS MODERATE 35: CPT | Performed by: PHYSICIAN ASSISTANT

## 2025-05-14 PROCEDURE — 85025 COMPLETE CBC W/AUTO DIFF WBC: CPT

## 2025-05-14 RX ADMIN — GABAPENTIN 100 MG: 100 CAPSULE ORAL at 08:13

## 2025-05-14 RX ADMIN — RANOLAZINE 500 MG: 500 TABLET, FILM COATED, EXTENDED RELEASE ORAL at 20:30

## 2025-05-14 RX ADMIN — HYDROMORPHONE HYDROCHLORIDE 1 MG: 1 INJECTION, SOLUTION INTRAMUSCULAR; INTRAVENOUS; SUBCUTANEOUS at 09:22

## 2025-05-14 RX ADMIN — ISOSORBIDE MONONITRATE 30 MG: 30 TABLET, EXTENDED RELEASE ORAL at 08:14

## 2025-05-14 RX ADMIN — GABAPENTIN 100 MG: 100 CAPSULE ORAL at 20:30

## 2025-05-14 RX ADMIN — HYDROMORPHONE HYDROCHLORIDE 1 MG: 1 INJECTION, SOLUTION INTRAMUSCULAR; INTRAVENOUS; SUBCUTANEOUS at 06:19

## 2025-05-14 RX ADMIN — HYDROMORPHONE HYDROCHLORIDE 1 MG: 1 INJECTION, SOLUTION INTRAMUSCULAR; INTRAVENOUS; SUBCUTANEOUS at 23:07

## 2025-05-14 RX ADMIN — RANOLAZINE 500 MG: 500 TABLET, FILM COATED, EXTENDED RELEASE ORAL at 08:14

## 2025-05-14 RX ADMIN — OXYCODONE HYDROCHLORIDE 10 MG: 10 TABLET ORAL at 08:14

## 2025-05-14 RX ADMIN — ROSUVASTATIN 40 MG: 20 TABLET, FILM COATED ORAL at 08:13

## 2025-05-14 RX ADMIN — OXYCODONE HYDROCHLORIDE 10 MG: 10 TABLET ORAL at 20:41

## 2025-05-14 RX ADMIN — HYDROMORPHONE HYDROCHLORIDE 1 MG: 1 INJECTION, SOLUTION INTRAMUSCULAR; INTRAVENOUS; SUBCUTANEOUS at 01:04

## 2025-05-14 RX ADMIN — SODIUM CHLORIDE, PRESERVATIVE FREE 10 ML: 5 INJECTION INTRAVENOUS at 08:17

## 2025-05-14 RX ADMIN — LISINOPRIL 20 MG: 10 TABLET ORAL at 20:30

## 2025-05-14 RX ADMIN — LISINOPRIL 20 MG: 10 TABLET ORAL at 08:13

## 2025-05-14 RX ADMIN — SODIUM CHLORIDE, PRESERVATIVE FREE 10 ML: 5 INJECTION INTRAVENOUS at 20:30

## 2025-05-14 RX ADMIN — HYDROMORPHONE HYDROCHLORIDE 1 MG: 1 INJECTION, SOLUTION INTRAMUSCULAR; INTRAVENOUS; SUBCUTANEOUS at 12:13

## 2025-05-14 RX ADMIN — GABAPENTIN 100 MG: 100 CAPSULE ORAL at 14:19

## 2025-05-14 RX ADMIN — PANTOPRAZOLE SODIUM 40 MG: 40 TABLET, DELAYED RELEASE ORAL at 06:13

## 2025-05-14 ASSESSMENT — PAIN DESCRIPTION - DESCRIPTORS
DESCRIPTORS: BURNING
DESCRIPTORS: CRAMPING;SHARP;STABBING
DESCRIPTORS: BURNING

## 2025-05-14 ASSESSMENT — PAIN - FUNCTIONAL ASSESSMENT
PAIN_FUNCTIONAL_ASSESSMENT: ACTIVITIES ARE NOT PREVENTED
PAIN_FUNCTIONAL_ASSESSMENT: PREVENTS OR INTERFERES SOME ACTIVE ACTIVITIES AND ADLS
PAIN_FUNCTIONAL_ASSESSMENT: ACTIVITIES ARE NOT PREVENTED
PAIN_FUNCTIONAL_ASSESSMENT: PREVENTS OR INTERFERES SOME ACTIVE ACTIVITIES AND ADLS
PAIN_FUNCTIONAL_ASSESSMENT: PREVENTS OR INTERFERES SOME ACTIVE ACTIVITIES AND ADLS

## 2025-05-14 ASSESSMENT — PAIN DESCRIPTION - PAIN TYPE: TYPE: ACUTE PAIN

## 2025-05-14 ASSESSMENT — PAIN DESCRIPTION - LOCATION
LOCATION: BACK;LEG;HIP
LOCATION: BACK
LOCATION: BUTTOCKS;LEG
LOCATION: HIP
LOCATION: HIP
LOCATION: BACK;HIP;LEG
LOCATION: BACK;LEG

## 2025-05-14 ASSESSMENT — PAIN DESCRIPTION - ORIENTATION
ORIENTATION: LEFT
ORIENTATION: LEFT;LOWER
ORIENTATION: LEFT
ORIENTATION: LOWER;MID;LEFT
ORIENTATION: LEFT
ORIENTATION: LOWER
ORIENTATION: LEFT

## 2025-05-14 ASSESSMENT — PAIN SCALES - GENERAL
PAINLEVEL_OUTOF10: 8
PAINLEVEL_OUTOF10: 7
PAINLEVEL_OUTOF10: 9
PAINLEVEL_OUTOF10: 7
PAINLEVEL_OUTOF10: 3
PAINLEVEL_OUTOF10: 3
PAINLEVEL_OUTOF10: 6
PAINLEVEL_OUTOF10: 8
PAINLEVEL_OUTOF10: 10
PAINLEVEL_OUTOF10: 9
PAINLEVEL_OUTOF10: 4
PAINLEVEL_OUTOF10: 7

## 2025-05-14 ASSESSMENT — PAIN DESCRIPTION - DIRECTION: RADIATING_TOWARDS: BACK

## 2025-05-14 NOTE — PROGRESS NOTES
Patient received the Sacrament of the Anointing of the Sick by Father Ramon Gallardo.     If additional support is requested or needed please reach out to Spiritual Health ([phone]).    Chap. Melvin Pelaez MDIV, BCC

## 2025-05-14 NOTE — PROGRESS NOTES
Comprehensive Nutrition Assessment    Type and Reason for Visit:  Initial, Positive nutrition screen    Nutrition Recommendations/Plan:     Encouraged and educated patient on ONS, although patient declined ONS. Will monitor meal intakes and monitor.        Malnutrition Assessment:  Malnutrition Status:  At risk for malnutrition (05/14/25 1349)    Context:  Acute Illness     Findings of the 6 clinical characteristics of malnutrition:  Energy Intake:  Mild decrease in energy intake  Weight Loss:  Unable to assess (d/t lack of recent wt hx on file)     Body Fat Loss:  No body fat loss     Muscle Mass Loss:  No muscle mass loss    Fluid Accumulation:  No fluid accumulation     Strength:  Not Performed    Nutrition Assessment:    Pt adm d/t lower back pain; Pt adm w/ pathological frx of lumbosacral spine; Pt s/p recent adm for patholigical lumbar vertebral fx/Rt lung CA w/ spinal mets on XRT (4/18), CVA (4/23); PMhx COPD, CAD, HTN, TIA, DJD, HLD, CABG; Pt currently appears nourished w/ no signs of malnutrition at this time; Despite encouragment/education, pt declined ONS at this time; Will monitor meal intakes and follow.    Nutrition Related Findings:    I/O WNL, A&Ox4, rounded abd, active BS, trace edema, missing teeth, elevated BGL Wound Type: None       Current Nutrition Intake & Therapies:    Average Meal Intake: 51-75% (5/14, pt stated per lunch intake)  Average Supplements Intake: None Ordered  ADULT DIET; Regular    Anthropometric Measures:  Height: 175.3 cm (5' 9.02\")  Ideal Body Weight (IBW): 160 lbs (73 kg)    Admission Body Weight: 98 kg (216 lb) (actual 5/12)  Current Body Weight: 97.7 kg (215 lb 6.2 oz) (5/14, bedscale), 134.6 % IBW. Weight Source: Bed scale  Current BMI (kg/m2): 31.8  Usual Body Weight: 101.6 kg (224 lb) (3/3/25, office visit wt; EMR shows wt of ~218 lbs, bedscale, on 4/18)     % Weight Change (Calculated): -3.8  Weight Adjustment For: No Adjustment                 BMI Categories:  Obese Class 1 (BMI 30.0-34.9)    Estimated Daily Nutrient Needs:  Energy Requirements Based On: Formula  Weight Used for Energy Requirements: Current  Energy (kcal/day): 2747-9100 kcal (MSJx1.3SF)  Weight Used for Protein Requirements: Ideal  Protein (g/day):  g (1.3-1.5g/kgIBW)  Method Used for Fluid Requirements: 1 ml/kcal  Fluid (ml/day): 2746-2149    Nutrition Diagnosis:   Inadequate oral intake related to catabolic illness as evidenced by intake 51-75%, poor intake prior to admission    Nutrition Interventions:   Food and/or Nutrient Delivery: Continue Current Diet  Nutrition Education/Counseling: Education/Counseling initiated (ONS options/benefits)  Coordination of Nutrition Care: Continue to monitor while inpatient       Goals:  Goals: PO intake 75% or greater, by next RD assessment  Type of Goal: New goal       Nutrition Monitoring and Evaluation:   Behavioral-Environmental Outcomes: None Identified  Food/Nutrient Intake Outcomes: Food and Nutrient Intake  Physical Signs/Symptoms Outcomes: Biochemical Data, Chewing or Swallowing, GI Status, Fluid Status or Edema, Weight, Skin, Nutrition Focused Physical Findings    Discharge Planning:    Continue current diet     Kathy Heard  Contact: [phone]

## 2025-05-14 NOTE — PROGRESS NOTES
Internal Medicine Progress Note    Patient's name: Corky Lawler  : 1945  Chief complaints (on day of admission): Back Pain (Pt L1 and L2 pain. Pt has lumbar cancer. Pt received treatment today . Pt unable to tolerate pain. Pt denies numbness to lower extremities denies loss of bowel or bladder. )  Admission date: 2025  Date of service: 2025   Room: [address] IMCU/NEURO  Primary care physician: Aurelio Hamilton MD  Reason for visit: Follow-up for Back pain    Subjective  Corky was seen and examined at bedside   Multiple family members present  All questions addressed  Patient states pain is more controlled today after adjustments of regimen by Palliative care  Anticipating Kyphoplasty tomorrow 5/15/25      Hospital Medications  Current Facility-Administered Medications   Medication Dose Route Frequency Provider Last Rate Last Admin    cyclobenzaprine (FLEXERIL) tablet 10 mg  10 mg Oral TID PRN Vinayak Smith MD   10 mg at 25 1406    oxyCODONE HCl (OXY-IR) immediate release tablet 10 mg  10 mg Oral Q3H PRN Ramya Chris PA-C   10 mg at 25 1927    HYDROmorphone HCl PF (DILAUDID) injection 1 mg  1 mg IntraVENous Q2H PRN Ramya Chris PA-C   1 mg at 25 0619    sodium chloride flush 0.9 % injection 5-40 mL  5-40 mL IntraVENous 2 times per day Vinayak Smith MD   10 mL at 25 194    sodium chloride flush 0.9 % injection 5-40 mL  5-40 mL IntraVENous PRN Vinayak Smith MD        0.9 % sodium chloride infusion   IntraVENous PRN Vinayak Smith MD        ondansetron (ZOFRAN-ODT) disintegrating tablet 4 mg  4 mg Oral Q8H PRN Vinayak Smith MD        Or    ondansetron (ZOFRAN) injection 4 mg  4 mg IntraVENous Q6H PRN Vinayak Smith MD        polyethylene glycol (GLYCOLAX) packet 17 g  17 g Oral Daily PRN Vinayak Smith MD        acetaminophen (TYLENOL) tablet 650 mg  650 mg Oral Q6H PRN Vinayak Smith MD        Or    acetaminophen (TYLENOL) suppository 650 mg  650 mg Rectal Q6H PRN Smith,  fracture.   2. New subtle lytic lesion in the left lateral aspect of the L3 vertebral   body.   3. The metastatic lesion in the L1 vertebral body, as seen on the previous   MRI, is not evident by CT.               Assessment   Active Hospital Problems    Diagnosis     Pathological fracture of lumbosacral spine [M84.48XA]          Plan  Lytic lesions of the L1-L2 vertebrae with intractable back pain and with new pathological fracture  CT Lumbar: Enlarging lytic lesion in the left lateral L2 vertebral body, now associated with a pathological fracture. 2. New subtle lytic lesion in the left lateral aspect of the L3 vertebral body.  Has received 4 rounds of radiation therapy  Neurosurgery on board  Plan for L1 and L2 kyphoplasty 5/15/2025  PRN pain control  Heme/Onc  Palliative care consulted  Adjusted pain regimen to Dilaudid q2hrs PRN  Oxycodone 10mg q3hrs PRN  Patient having some left lower extremity spasms and associated back pain, trial of Flexeril ordered    R. Lung CA with mets to spine  Heme/onc on board    History of CAD s/p CABG  By Dr. Luna on 11/3/2007-Ranexa 500 mg twice daily, Imdur 60 mg daily, Crestor 40 mg daily, Plavix 75 mg daily, ASA 81 mg daily    History of Subacute brain infarct on 4/22/25    HTN - Lisinopril 20 mg twice daily  GERD - Protonix 40 mg daily    PT/OT  Consults Neurosurgery, Heme/Onc, Palliative  DVT prophylaxis Protonix  Code Status FULL  Discharge plan: TBD pending clinical improvement     Electronically signed by Vinayak Smith MD on 5/14/2025 at 8:04 AM    I can be reached through [facility].

## 2025-05-14 NOTE — PROGRESS NOTES
Palliative Care Department  [phone]  Palliative Care Progress Note  Provider Ramya Chris PA-C     Corky Lawler  [id number]  Hospital Day: 3  Date of Initial Consult: 05/12/2025  Referring Provider: Vinayak Smith MD   Palliative Medicine was consulted for assistance with: History of lung cancer; intractable pain with pathologic fracture of spine; goals of care    HPI:   Corky Lawler is a 80 y.o. with a medical history of metastatic lung cancer to the bone undergoing radiation therapy who was admitted on 5/12/2025 from home with a CHIEF COMPLAINT of intractable back pain.  In the ED, CT of spine showing enlarging lytic lesion in the left lateral L2 vertebral body with associated pathological fracture, new subtle lytic lesion of the left lateral aspect of L3 vertebral body.  Patient admitted for further workup and pain control.  Neurosurgery and oncology consulted.  Palliative care consulted for goals of care and symptom management.    ASSESSMENT/PLAN:     Pertinent Hospital Diagnoses     Metastatic lung cancer to the bone  Pathological fracture of L2  Intractable back pain      Palliative Care Encounter / Counseling Regarding Goals of Care  Please see detailed goals of care discussion as below  At this time, Corky Lawler, Does have capacity for medical decision-making.  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  # Acute pain crisis from pathologic lumbar vertebral fractures  # Kyphoplasty scheduled for tomorrow--> this is the most definitive way to  alleviate his acute pain  # Increase frequency of dilaudid to every 2 hrs PRN severe pain  # Start oxycodone 10 mg every 3 hours as needed pain  # Regimen working well, after procedure will adjust pain regimen    Spiritual assessment: no spiritual distress identified  Bereavement and grief: to be determined  Referrals to: none today  SUBJECTIVE:     Current medical issues leading to Palliative Medicine involvement include   Active Hospital Problems    Diagnosis Date Noted    Pathological fracture of lumbosacral spine [M84.48XA] 05/12/2025       Details of Conversation:      Patient seen at bedside.  His wife is also at bedside.  Pain is well-controlled on Dilaudid 1 mg every 2 hours for severe pain and oxycodone 3 mg every 3 hours as needed for moderate pain.  Plan for kyphoplasty tomorrow.  Will adjust pain regimen accordingly after procedure.  He is planned for bronchoscopy next week for tissue sampling of lung mass.  His goal is to continue cancer directed therapies and finish palliative radiation therapy.  We discussed CODE STATUS.  Patient would like to remain a full code.  He would not want to live long-term on the ventilator, his wife is next of kin and aware of his wishes.  OBJECTIVE:   Prognosis: Guarded    Physical Exam:  /61   Pulse 64   Temp 97.3 °F (36.3 °C) (Temporal)   Resp 20   Ht 1.753 m (5' 9\")   Wt 98 kg (216 lb 0.8 oz)   SpO2 95%   BMI 31.91 kg/m²   Constitutional: Awake/alert  Eyes: no scleral icterus  ENMT:  Normocephalic, atraumatic  Lungs: Nonlabored at rest on nasal cannula  Heart:: Regular rate  Ext: Movement of extremities limited by pain  Skin:  Warm and dry, no rashes on visible skin  Psych: non-anxious affect  Neuro: No focal deficits.  movement limited by pain    Objective data reviewed: labs, images, records, medication use, vitals, and chart    Discussed patient and the plan of care with the other IDT members: Floor Nurse and Patient    Time/Communication  Greater than  50% of time spent, total 35 minutes in counseling and coordination of care at the bedside regarding goals of care, symptom management, diagnosis and prognosis, and see above.    Thank you for allowing Palliative Medicine to participate in the care of Corky Lawler.

## 2025-05-14 NOTE — PROGRESS NOTES
Occupational Therapy  Occupational Therapy    Date:2025  Patient Name: Corky Lawler  MRN: [id number]  : 1945  Room: [address] [place] [address]-A     OT orders received and chart reviewed. OT eval on hold at this time per RN, pt is in quite a bit of pain and plans to go to OR with neurosurgery tomorrow 5/15.     OT will follow and re-attempt eval as appropriate post op.     Lianet Darnell, ISIDRA, OTR/L [id number]

## 2025-05-14 NOTE — CARE COORDINATION
Readmit here per chart review for Back Pain (Pt L1 and L2 pain. Pt has lumbar cancer. Pt received treatment today . Pt unable to tolerate pain. has history of lung cancer with mets to the spine and previous lytic L1 and L2 lesions.  Patient states that he was undergoing radiation therapy (fourth round) and on the way there he was having extreme back pain.  Patient states he takes Oxy IR 10 mg at home and that the pain medication was not relieving his pain at all.  Neurosurgery hem/onc palliative  consults- per NS --If we are unable to get his pain reasonably well controlled, would recommend L1 and L2 kyphoplasty. Currently using 4 liters oxygen  none at home TLSO brace PT OT  Cm spoke with pt bedside to discuss CM role & dc planning. Pt's PCP is Dr Hamilton & uses [facility] in [place]. Pt lives with his wife in a house with 2 steps to enter. The bed/bathroom are on the 2nd floor with 13 steps however can do 1 st floor set up with bed bath. PTA pt was independent with no DME. Pt has no SNF. Pt has hx of [facility] with [facility] Pt's dc plan is home & his family/wife can transport. He has 6 radiation txs left. Electronically signed by Ramila Guerra RN on 5/14/2025 at 10:00 AM

## 2025-05-14 NOTE — PROGRESS NOTES
Department of Neurosurgery  Progress Note    CHIEF COMPLAINT: back pain and spinal mets    SUBJECTIVE:  Continued severe back pain. Plan for kyphoplasty tomorrow    REVIEW OF SYSTEMS :  Constitutional: Negative for chills and fever.    Neurological: Negative for dizziness, tremors and speech change.   CVS: negative for chest pain  Resp: negative for SOB    OBJECTIVE:   VITALS:  BP (!) 92/49   Pulse 62   Temp 97.9 °F (36.6 °C) (Temporal)   Resp 18   Ht 1.753 m (5' 9\")   Wt 98 kg (216 lb 0.8 oz)   SpO2 95%   BMI 31.91 kg/m²     PHYSICAL:  Neurologic: Alert and oriented x3; PERRL  Motor Exam:  LLE 4/5 strength, otherwise remaining strength 5/5  Sensory:  Sensory intact  TTP lumbar spine  Skin is warm  Abdomen is soft    DATA:  CBC:   Lab Results   Component Value Date/Time    WBC 10.8 05/12/2025 12:22 PM    RBC 4.97 05/12/2025 12:22 PM    HGB 14.9 05/12/2025 12:22 PM    HCT 45.4 05/12/2025 12:22 PM    MCV 91.3 05/12/2025 12:22 PM    MCH 30.0 05/12/2025 12:22 PM    MCHC 32.8 05/12/2025 12:22 PM    RDW 15.6 05/12/2025 12:22 PM     05/12/2025 12:22 PM    MPV 10.4 05/12/2025 12:22 PM     BMP:    Lab Results   Component Value Date/Time     05/12/2025 12:22 PM    K 3.8 05/12/2025 12:22 PM    K 3.3 05/23/2023 07:42 AM     05/12/2025 12:22 PM    CO2 22 05/12/2025 12:22 PM    BUN 14 05/12/2025 12:22 PM    CREATININE 0.8 05/12/2025 12:22 PM    CALCIUM 9.5 05/12/2025 12:22 PM    GFRAA >60 04/06/2021 05:18 AM    LABGLOM >90 05/12/2025 12:22 PM    LABGLOM >60 05/23/2023 07:42 AM    GLUCOSE 158 05/12/2025 12:22 PM    GLUCOSE 101 02/20/2011 10:30 AM     PT/INR:    Lab Results   Component Value Date/Time    PROTIME 13.5 09/18/2013 10:30 AM    PROTIME 12.0 02/20/2011 10:30 AM    INR 1.4 09/18/2013 10:30 AM     PTT:    Lab Results   Component Value Date/Time    APTT 32.1 09/18/2013 10:30 AM   [APTT}    Current Inpatient Medications  Current Facility-Administered Medications: cyclobenzaprine (FLEXERIL) tablet 10  mg, 10 mg, Oral, TID PRN  oxyCODONE HCl (OXY-IR) immediate release tablet 10 mg, 10 mg, Oral, Q3H PRN  HYDROmorphone HCl PF (DILAUDID) injection 1 mg, 1 mg, IntraVENous, Q2H PRN  sodium chloride flush 0.9 % injection 5-40 mL, 5-40 mL, IntraVENous, 2 times per day  sodium chloride flush 0.9 % injection 5-40 mL, 5-40 mL, IntraVENous, PRN  0.9 % sodium chloride infusion, , IntraVENous, PRN  ondansetron (ZOFRAN-ODT) disintegrating tablet 4 mg, 4 mg, Oral, Q8H PRN **OR** ondansetron (ZOFRAN) injection 4 mg, 4 mg, IntraVENous, Q6H PRN  polyethylene glycol (GLYCOLAX) packet 17 g, 17 g, Oral, Daily PRN  acetaminophen (TYLENOL) tablet 650 mg, 650 mg, Oral, Q6H PRN **OR** acetaminophen (TYLENOL) suppository 650 mg, 650 mg, Rectal, Q6H PRN  gabapentin (NEURONTIN) capsule 100 mg, 100 mg, Oral, TID  lisinopril (PRINIVIL;ZESTRIL) tablet 20 mg, 20 mg, Oral, BID  pantoprazole (PROTONIX) tablet 40 mg, 40 mg, Oral, QAM AC  ranolazine (RANEXA) extended release tablet 500 mg, 500 mg, Oral, BID  rosuvastatin (CRESTOR) tablet 40 mg, 40 mg, Oral, Daily  isosorbide mononitrate (IMDUR) extended release tablet 30 mg, 30 mg, Oral, Daily    ASSESSMENT:   This is a 80 year old male with spinal metastasis at L1 and L2    PLAN:  Plan for L1 and L2 kyphoplasty tomorrow 5/15. R/B/A discussed and patient would like to proceed. All questions answered at this time  NPO after midnight      Electronically signed by Mary Ramos PA-C on 5/14/2025 at 11:00 AM      I have interviewed and examined the patient and agree with above. He has pathologic fractures with severe pain despite bracing, narcotics and XRT.  Recommending kyphopasty.  R/B/A have been discussed and he wishes to proceed    Valeria Cottrell MD

## 2025-05-14 NOTE — ACP (ADVANCE CARE PLANNING)
Advance Care Planning   Healthcare Decision Maker:    Primary Decision Maker: Mirlande Lawler - Spouse - [phone]    Secondary Decision Maker: Guillermo Lawler P - Child - [phone]    Click here to complete Healthcare Decision Makers including selection of the Healthcare Decision Maker Relationship (ie \"Primary\").

## 2025-05-15 ENCOUNTER — APPOINTMENT (OUTPATIENT)
Dept: GENERAL RADIOLOGY | Age: 80
DRG: 542 | End: 2025-05-15
Payer: MEDICARE

## 2025-05-15 ENCOUNTER — HOSPITAL ENCOUNTER (OUTPATIENT)
Dept: RADIATION ONCOLOGY | Age: 80
End: 2025-05-15
Payer: MEDICARE

## 2025-05-15 ENCOUNTER — ANESTHESIA (OUTPATIENT)
Dept: OPERATING ROOM | Age: 80
End: 2025-05-15
Payer: MEDICARE

## 2025-05-15 ENCOUNTER — APPOINTMENT (OUTPATIENT)
Dept: RADIATION ONCOLOGY | Age: 80
End: 2025-05-15
Payer: MEDICARE

## 2025-05-15 PROBLEM — S32.029A CLOSED FRACTURE OF SECOND LUMBAR VERTEBRA (HCC): Status: ACTIVE | Noted: 2025-05-15

## 2025-05-15 LAB
ANION GAP SERPL CALCULATED.3IONS-SCNC: 11 MMOL/L (ref 7–16)
BASOPHILS # BLD: 0.03 K/UL (ref 0–0.2)
BASOPHILS NFR BLD: 0 % (ref 0–2)
BUN SERPL-MCNC: 37 MG/DL (ref 8–23)
CALCIUM SERPL-MCNC: 8.9 MG/DL (ref 8.8–10.2)
CHLORIDE SERPL-SCNC: 97 MMOL/L (ref 98–107)
CO2 SERPL-SCNC: 25 MMOL/L (ref 22–29)
CREAT SERPL-MCNC: 1.5 MG/DL (ref 0.7–1.2)
EOSINOPHIL # BLD: 0.22 K/UL (ref 0.05–0.5)
EOSINOPHILS RELATIVE PERCENT: 2 % (ref 0–6)
ERYTHROCYTE [DISTWIDTH] IN BLOOD BY AUTOMATED COUNT: 14.8 % (ref 11.5–15)
GFR, ESTIMATED: 49 ML/MIN/1.73M2
GLUCOSE SERPL-MCNC: 151 MG/DL (ref 74–99)
HCT VFR BLD AUTO: 37.2 % (ref 37–54)
HGB BLD-MCNC: 12.1 G/DL (ref 12.5–16.5)
IMM GRANULOCYTES # BLD AUTO: 0.05 K/UL (ref 0–0.58)
IMM GRANULOCYTES NFR BLD: 1 % (ref 0–5)
LYMPHOCYTES NFR BLD: 0.76 K/UL (ref 1.5–4)
LYMPHOCYTES RELATIVE PERCENT: 8 % (ref 20–42)
MCH RBC QN AUTO: 29.7 PG (ref 26–35)
MCHC RBC AUTO-ENTMCNC: 32.5 G/DL (ref 32–34.5)
MCV RBC AUTO: 91.4 FL (ref 80–99.9)
MONOCYTES NFR BLD: 0.98 K/UL (ref 0.1–0.95)
MONOCYTES NFR BLD: 10 % (ref 2–12)
NEUTROPHILS NFR BLD: 79 % (ref 43–80)
NEUTS SEG NFR BLD: 7.56 K/UL (ref 1.8–7.3)
PLATELET # BLD AUTO: 225 K/UL (ref 130–450)
PMV BLD AUTO: 10.2 FL (ref 7–12)
POTASSIUM SERPL-SCNC: 4.1 MMOL/L (ref 3.5–5.1)
RBC # BLD AUTO: 4.07 M/UL (ref 3.8–5.8)
SODIUM SERPL-SCNC: 133 MMOL/L (ref 136–145)
WBC OTHER # BLD: 9.6 K/UL (ref 4.5–11.5)

## 2025-05-15 PROCEDURE — 3600000004 HC SURGERY LEVEL 4 BASE: Performed by: NEUROLOGICAL SURGERY

## 2025-05-15 PROCEDURE — 99231 SBSQ HOSP IP/OBS SF/LOW 25: CPT | Performed by: PHYSICIAN ASSISTANT

## 2025-05-15 PROCEDURE — 0QS03ZZ REPOSITION LUMBAR VERTEBRA, PERCUTANEOUS APPROACH: ICD-10-PCS | Performed by: STUDENT IN AN ORGANIZED HEALTH CARE EDUCATION/TRAINING PROGRAM

## 2025-05-15 PROCEDURE — 6360000002 HC RX W HCPCS: Performed by: STUDENT IN AN ORGANIZED HEALTH CARE EDUCATION/TRAINING PROGRAM

## 2025-05-15 PROCEDURE — 2500000003 HC RX 250 WO HCPCS: Performed by: STUDENT IN AN ORGANIZED HEALTH CARE EDUCATION/TRAINING PROGRAM

## 2025-05-15 PROCEDURE — 88311 DECALCIFY TISSUE: CPT

## 2025-05-15 PROCEDURE — 7100000000 HC PACU RECOVERY - FIRST 15 MIN: Performed by: NEUROLOGICAL SURGERY

## 2025-05-15 PROCEDURE — 6370000000 HC RX 637 (ALT 250 FOR IP): Performed by: PHYSICIAN ASSISTANT

## 2025-05-15 PROCEDURE — 3700000000 HC ANESTHESIA ATTENDED CARE: Performed by: NEUROLOGICAL SURGERY

## 2025-05-15 PROCEDURE — 6370000000 HC RX 637 (ALT 250 FOR IP): Performed by: STUDENT IN AN ORGANIZED HEALTH CARE EDUCATION/TRAINING PROGRAM

## 2025-05-15 PROCEDURE — 2500000003 HC RX 250 WO HCPCS

## 2025-05-15 PROCEDURE — 86850 RBC ANTIBODY SCREEN: CPT

## 2025-05-15 PROCEDURE — 71045 X-RAY EXAM CHEST 1 VIEW: CPT

## 2025-05-15 PROCEDURE — 6360000002 HC RX W HCPCS: Performed by: ANESTHESIOLOGY

## 2025-05-15 PROCEDURE — 2500000003 HC RX 250 WO HCPCS: Performed by: PHYSICIAN ASSISTANT

## 2025-05-15 PROCEDURE — 3700000001 HC ADD 15 MINUTES (ANESTHESIA): Performed by: NEUROLOGICAL SURGERY

## 2025-05-15 PROCEDURE — 7100000001 HC PACU RECOVERY - ADDTL 15 MIN: Performed by: NEUROLOGICAL SURGERY

## 2025-05-15 PROCEDURE — 88307 TISSUE EXAM BY PATHOLOGIST: CPT

## 2025-05-15 PROCEDURE — 6360000002 HC RX W HCPCS: Performed by: PHYSICIAN ASSISTANT

## 2025-05-15 PROCEDURE — 3600000014 HC SURGERY LEVEL 4 ADDTL 15MIN: Performed by: NEUROLOGICAL SURGERY

## 2025-05-15 PROCEDURE — 22514 PERQ VERTEBRAL AUGMENTATION: CPT | Performed by: NEUROLOGICAL SURGERY

## 2025-05-15 PROCEDURE — 6370000000 HC RX 637 (ALT 250 FOR IP): Performed by: ANESTHESIOLOGY

## 2025-05-15 PROCEDURE — 86901 BLOOD TYPING SEROLOGIC RH(D): CPT

## 2025-05-15 PROCEDURE — 2700000000 HC OXYGEN THERAPY PER DAY

## 2025-05-15 PROCEDURE — 86920 COMPATIBILITY TEST SPIN: CPT

## 2025-05-15 PROCEDURE — 2720000010 HC SURG SUPPLY STERILE: Performed by: NEUROLOGICAL SURGERY

## 2025-05-15 PROCEDURE — 85025 COMPLETE CBC W/AUTO DIFF WBC: CPT

## 2025-05-15 PROCEDURE — 86900 BLOOD TYPING SEROLOGIC ABO: CPT

## 2025-05-15 PROCEDURE — 6360000002 HC RX W HCPCS

## 2025-05-15 PROCEDURE — 22515 PERQ VERTEBRAL AUGMENTATION: CPT | Performed by: NEUROLOGICAL SURGERY

## 2025-05-15 PROCEDURE — 2709999900 HC NON-CHARGEABLE SUPPLY: Performed by: NEUROLOGICAL SURGERY

## 2025-05-15 PROCEDURE — C1713 ANCHOR/SCREW BN/BN,TIS/BN: HCPCS | Performed by: NEUROLOGICAL SURGERY

## 2025-05-15 PROCEDURE — 0QU03JZ SUPPLEMENT LUMBAR VERTEBRA WITH SYNTHETIC SUBSTITUTE, PERCUTANEOUS APPROACH: ICD-10-PCS | Performed by: STUDENT IN AN ORGANIZED HEALTH CARE EDUCATION/TRAINING PROGRAM

## 2025-05-15 PROCEDURE — 80048 BASIC METABOLIC PNL TOTAL CA: CPT

## 2025-05-15 PROCEDURE — 36415 COLL VENOUS BLD VENIPUNCTURE: CPT

## 2025-05-15 PROCEDURE — C1894 INTRO/SHEATH, NON-LASER: HCPCS | Performed by: NEUROLOGICAL SURGERY

## 2025-05-15 PROCEDURE — 6360000002 HC RX W HCPCS: Performed by: NEUROLOGICAL SURGERY

## 2025-05-15 PROCEDURE — 2060000000 HC ICU INTERMEDIATE R&B

## 2025-05-15 PROCEDURE — 2580000003 HC RX 258

## 2025-05-15 PROCEDURE — 86922 COMPATIBILITY TEST ANTIGLOB: CPT

## 2025-05-15 RX ORDER — LIDOCAINE HYDROCHLORIDE AND EPINEPHRINE 5; 5 MG/ML; UG/ML
INJECTION, SOLUTION INFILTRATION; PERINEURAL PRN
Status: DISCONTINUED | OUTPATIENT
Start: 2025-05-15 | End: 2025-05-15 | Stop reason: ALTCHOICE

## 2025-05-15 RX ORDER — NALOXONE HYDROCHLORIDE 0.4 MG/ML
INJECTION, SOLUTION INTRAMUSCULAR; INTRAVENOUS; SUBCUTANEOUS PRN
Status: DISCONTINUED | OUTPATIENT
Start: 2025-05-15 | End: 2025-05-15 | Stop reason: HOSPADM

## 2025-05-15 RX ORDER — PHENYLEPHRINE HCL IN 0.9% NACL 1 MG/10 ML
SYRINGE (ML) INTRAVENOUS
Status: DISCONTINUED | OUTPATIENT
Start: 2025-05-15 | End: 2025-05-15 | Stop reason: SDUPTHER

## 2025-05-15 RX ORDER — ONDANSETRON 2 MG/ML
INJECTION INTRAMUSCULAR; INTRAVENOUS
Status: DISCONTINUED | OUTPATIENT
Start: 2025-05-15 | End: 2025-05-15 | Stop reason: SDUPTHER

## 2025-05-15 RX ORDER — ROCURONIUM BROMIDE 10 MG/ML
INJECTION, SOLUTION INTRAVENOUS
Status: DISCONTINUED | OUTPATIENT
Start: 2025-05-15 | End: 2025-05-15 | Stop reason: SDUPTHER

## 2025-05-15 RX ORDER — SODIUM CHLORIDE 0.9 % (FLUSH) 0.9 %
5-40 SYRINGE (ML) INJECTION EVERY 12 HOURS SCHEDULED
Status: DISCONTINUED | OUTPATIENT
Start: 2025-05-15 | End: 2025-05-15 | Stop reason: HOSPADM

## 2025-05-15 RX ORDER — SODIUM CHLORIDE 0.9 % (FLUSH) 0.9 %
5-40 SYRINGE (ML) INJECTION PRN
Status: DISCONTINUED | OUTPATIENT
Start: 2025-05-15 | End: 2025-05-15 | Stop reason: HOSPADM

## 2025-05-15 RX ORDER — SODIUM CHLORIDE 9 MG/ML
INJECTION, SOLUTION INTRAVENOUS PRN
Status: DISCONTINUED | OUTPATIENT
Start: 2025-05-15 | End: 2025-05-15 | Stop reason: HOSPADM

## 2025-05-15 RX ORDER — LIDOCAINE HYDROCHLORIDE 20 MG/ML
INJECTION, SOLUTION INTRAVENOUS
Status: DISCONTINUED | OUTPATIENT
Start: 2025-05-15 | End: 2025-05-15 | Stop reason: SDUPTHER

## 2025-05-15 RX ORDER — DROPERIDOL 2.5 MG/ML
0.62 INJECTION, SOLUTION INTRAMUSCULAR; INTRAVENOUS
Status: DISCONTINUED | OUTPATIENT
Start: 2025-05-15 | End: 2025-05-15 | Stop reason: HOSPADM

## 2025-05-15 RX ORDER — HYDRALAZINE HYDROCHLORIDE 20 MG/ML
10 INJECTION INTRAMUSCULAR; INTRAVENOUS
Status: DISCONTINUED | OUTPATIENT
Start: 2025-05-15 | End: 2025-05-15 | Stop reason: HOSPADM

## 2025-05-15 RX ORDER — ONDANSETRON 2 MG/ML
4 INJECTION INTRAMUSCULAR; INTRAVENOUS
Status: DISCONTINUED | OUTPATIENT
Start: 2025-05-15 | End: 2025-05-15 | Stop reason: HOSPADM

## 2025-05-15 RX ORDER — HYDROMORPHONE HYDROCHLORIDE 1 MG/ML
0.25 INJECTION, SOLUTION INTRAMUSCULAR; INTRAVENOUS; SUBCUTANEOUS EVERY 5 MIN PRN
Status: DISCONTINUED | OUTPATIENT
Start: 2025-05-15 | End: 2025-05-15 | Stop reason: HOSPADM

## 2025-05-15 RX ORDER — PROPOFOL 10 MG/ML
INJECTION, EMULSION INTRAVENOUS
Status: DISCONTINUED | OUTPATIENT
Start: 2025-05-15 | End: 2025-05-15 | Stop reason: SDUPTHER

## 2025-05-15 RX ORDER — LABETALOL HYDROCHLORIDE 5 MG/ML
10 INJECTION, SOLUTION INTRAVENOUS
Status: DISCONTINUED | OUTPATIENT
Start: 2025-05-15 | End: 2025-05-15 | Stop reason: HOSPADM

## 2025-05-15 RX ORDER — HYDROMORPHONE HYDROCHLORIDE 1 MG/ML
0.5 INJECTION, SOLUTION INTRAMUSCULAR; INTRAVENOUS; SUBCUTANEOUS EVERY 5 MIN PRN
Status: COMPLETED | OUTPATIENT
Start: 2025-05-15 | End: 2025-05-15

## 2025-05-15 RX ORDER — IPRATROPIUM BROMIDE AND ALBUTEROL SULFATE 2.5; .5 MG/3ML; MG/3ML
1 SOLUTION RESPIRATORY (INHALATION)
Status: DISCONTINUED | OUTPATIENT
Start: 2025-05-15 | End: 2025-05-15 | Stop reason: HOSPADM

## 2025-05-15 RX ORDER — DEXAMETHASONE SODIUM PHOSPHATE 10 MG/ML
INJECTION, SOLUTION INTRA-ARTICULAR; INTRALESIONAL; INTRAMUSCULAR; INTRAVENOUS; SOFT TISSUE
Status: DISCONTINUED | OUTPATIENT
Start: 2025-05-15 | End: 2025-05-15 | Stop reason: SDUPTHER

## 2025-05-15 RX ORDER — BUPIVACAINE HYDROCHLORIDE 2.5 MG/ML
INJECTION, SOLUTION EPIDURAL; INFILTRATION; INTRACAUDAL; PERINEURAL PRN
Status: DISCONTINUED | OUTPATIENT
Start: 2025-05-15 | End: 2025-05-15 | Stop reason: ALTCHOICE

## 2025-05-15 RX ORDER — SODIUM CHLORIDE 9 MG/ML
INJECTION, SOLUTION INTRAVENOUS
Status: DISCONTINUED | OUTPATIENT
Start: 2025-05-15 | End: 2025-05-15 | Stop reason: SDUPTHER

## 2025-05-15 RX ORDER — FENTANYL CITRATE 50 UG/ML
INJECTION, SOLUTION INTRAMUSCULAR; INTRAVENOUS
Status: DISCONTINUED | OUTPATIENT
Start: 2025-05-15 | End: 2025-05-15 | Stop reason: SDUPTHER

## 2025-05-15 RX ORDER — NITROGLYCERIN 0.4 MG/1
0.4 TABLET SUBLINGUAL EVERY 5 MIN PRN
Status: DISCONTINUED | OUTPATIENT
Start: 2025-05-15 | End: 2025-05-29 | Stop reason: HOSPADM

## 2025-05-15 RX ORDER — DIPHENHYDRAMINE HYDROCHLORIDE 50 MG/ML
12.5 INJECTION, SOLUTION INTRAMUSCULAR; INTRAVENOUS
Status: DISCONTINUED | OUTPATIENT
Start: 2025-05-15 | End: 2025-05-15 | Stop reason: HOSPADM

## 2025-05-15 RX ORDER — MEPERIDINE HYDROCHLORIDE 25 MG/ML
12.5 INJECTION INTRAMUSCULAR; INTRAVENOUS; SUBCUTANEOUS
Status: DISCONTINUED | OUTPATIENT
Start: 2025-05-15 | End: 2025-05-15 | Stop reason: HOSPADM

## 2025-05-15 RX ORDER — SODIUM CHLORIDE 9 MG/ML
INJECTION, SOLUTION INTRAVENOUS CONTINUOUS
Status: DISCONTINUED | OUTPATIENT
Start: 2025-05-15 | End: 2025-05-15 | Stop reason: HOSPADM

## 2025-05-15 RX ADMIN — PROPOFOL 130 MG: 10 INJECTION, EMULSION INTRAVENOUS at 11:58

## 2025-05-15 RX ADMIN — LIDOCAINE HYDROCHLORIDE 100 MG: 20 INJECTION, SOLUTION INTRAVENOUS at 11:58

## 2025-05-15 RX ADMIN — FENTANYL CITRATE 50 MCG: 0.05 INJECTION, SOLUTION INTRAMUSCULAR; INTRAVENOUS at 12:49

## 2025-05-15 RX ADMIN — HYDROMORPHONE HYDROCHLORIDE 1 MG: 1 INJECTION, SOLUTION INTRAMUSCULAR; INTRAVENOUS; SUBCUTANEOUS at 22:53

## 2025-05-15 RX ADMIN — Medication 200 MCG: at 12:08

## 2025-05-15 RX ADMIN — FENTANYL CITRATE 100 MCG: 0.05 INJECTION, SOLUTION INTRAMUSCULAR; INTRAVENOUS at 11:58

## 2025-05-15 RX ADMIN — RANOLAZINE 500 MG: 500 TABLET, FILM COATED, EXTENDED RELEASE ORAL at 20:01

## 2025-05-15 RX ADMIN — GABAPENTIN 100 MG: 100 CAPSULE ORAL at 20:00

## 2025-05-15 RX ADMIN — PANTOPRAZOLE SODIUM 40 MG: 40 TABLET, DELAYED RELEASE ORAL at 05:36

## 2025-05-15 RX ADMIN — FENTANYL CITRATE 50 MCG: 0.05 INJECTION, SOLUTION INTRAMUSCULAR; INTRAVENOUS at 12:40

## 2025-05-15 RX ADMIN — Medication 100 MCG: at 11:58

## 2025-05-15 RX ADMIN — HYDROMORPHONE HYDROCHLORIDE 0.5 MG: 1 INJECTION, SOLUTION INTRAMUSCULAR; INTRAVENOUS; SUBCUTANEOUS at 13:23

## 2025-05-15 RX ADMIN — ONDANSETRON HYDROCHLORIDE 4 MG: 2 SOLUTION INTRAMUSCULAR; INTRAVENOUS at 12:31

## 2025-05-15 RX ADMIN — HYDROMORPHONE HYDROCHLORIDE 0.5 MG: 1 INJECTION, SOLUTION INTRAMUSCULAR; INTRAVENOUS; SUBCUTANEOUS at 14:01

## 2025-05-15 RX ADMIN — HYDROMORPHONE HYDROCHLORIDE 1 MG: 1 INJECTION, SOLUTION INTRAMUSCULAR; INTRAVENOUS; SUBCUTANEOUS at 20:01

## 2025-05-15 RX ADMIN — SUGAMMADEX 200 MG: 100 INJECTION, SOLUTION INTRAVENOUS at 12:31

## 2025-05-15 RX ADMIN — OXYCODONE HYDROCHLORIDE 10 MG: 10 TABLET ORAL at 21:15

## 2025-05-15 RX ADMIN — LIDOCAINE HYDROCHLORIDE 100 MG: 20 INJECTION, SOLUTION INTRAVENOUS at 12:19

## 2025-05-15 RX ADMIN — HYDROMORPHONE HYDROCHLORIDE 0.5 MG: 1 INJECTION, SOLUTION INTRAMUSCULAR; INTRAVENOUS; SUBCUTANEOUS at 13:28

## 2025-05-15 RX ADMIN — DEXAMETHASONE SODIUM PHOSPHATE 10 MG: 10 INJECTION INTRAMUSCULAR; INTRAVENOUS at 12:05

## 2025-05-15 RX ADMIN — SODIUM CHLORIDE, PRESERVATIVE FREE 10 ML: 5 INJECTION INTRAVENOUS at 20:01

## 2025-05-15 RX ADMIN — CEFAZOLIN 2000 MG: 1 INJECTION, POWDER, FOR SOLUTION INTRAMUSCULAR; INTRAVENOUS at 12:06

## 2025-05-15 RX ADMIN — LIDOCAINE HYDROCHLORIDE 200 MG: 20 INJECTION, SOLUTION INTRAVENOUS at 12:05

## 2025-05-15 RX ADMIN — HYDROMORPHONE HYDROCHLORIDE 1 MG: 1 INJECTION, SOLUTION INTRAMUSCULAR; INTRAVENOUS; SUBCUTANEOUS at 17:33

## 2025-05-15 RX ADMIN — NITROGLYCERIN 0.4 MG: 0.4 TABLET, ORALLY DISINTEGRATING SUBLINGUAL at 13:46

## 2025-05-15 RX ADMIN — ROCURONIUM BROMIDE 50 MG: 10 INJECTION, SOLUTION INTRAVENOUS at 11:58

## 2025-05-15 RX ADMIN — SODIUM CHLORIDE: 9 INJECTION, SOLUTION INTRAVENOUS at 11:45

## 2025-05-15 RX ADMIN — LIDOCAINE HYDROCHLORIDE 50 MG: 20 INJECTION, SOLUTION INTRAVENOUS at 12:31

## 2025-05-15 RX ADMIN — HYDROMORPHONE HYDROCHLORIDE 0.5 MG: 1 INJECTION, SOLUTION INTRAMUSCULAR; INTRAVENOUS; SUBCUTANEOUS at 14:20

## 2025-05-15 RX ADMIN — FENTANYL CITRATE 50 MCG: 0.05 INJECTION, SOLUTION INTRAMUSCULAR; INTRAVENOUS at 12:53

## 2025-05-15 RX ADMIN — OXYCODONE HYDROCHLORIDE 10 MG: 10 TABLET ORAL at 16:01

## 2025-05-15 ASSESSMENT — PAIN DESCRIPTION - DESCRIPTORS
DESCRIPTORS: DISCOMFORT
DESCRIPTORS: ACHING;DISCOMFORT;SORE
DESCRIPTORS: ACHING;DISCOMFORT
DESCRIPTORS: ACHING;DISCOMFORT;SORE
DESCRIPTORS: ACHING;DISCOMFORT
DESCRIPTORS: ACHING;DISCOMFORT
DESCRIPTORS: ACHING;DISCOMFORT;SORE
DESCRIPTORS: ACHING;DISCOMFORT;THROBBING
DESCRIPTORS: ACHING;DISCOMFORT;SORE
DESCRIPTORS: ACHING;DISCOMFORT;SORE
DESCRIPTORS: ACHING;DISCOMFORT

## 2025-05-15 ASSESSMENT — PAIN - FUNCTIONAL ASSESSMENT
PAIN_FUNCTIONAL_ASSESSMENT: PREVENTS OR INTERFERES SOME ACTIVE ACTIVITIES AND ADLS
PAIN_FUNCTIONAL_ASSESSMENT: ACTIVITIES ARE NOT PREVENTED
PAIN_FUNCTIONAL_ASSESSMENT: ADULT NONVERBAL PAIN SCALE (NPVS)
PAIN_FUNCTIONAL_ASSESSMENT: PREVENTS OR INTERFERES SOME ACTIVE ACTIVITIES AND ADLS

## 2025-05-15 ASSESSMENT — PAIN SCALES - GENERAL
PAINLEVEL_OUTOF10: 7
PAINLEVEL_OUTOF10: 7
PAINLEVEL_OUTOF10: 8
PAINLEVEL_OUTOF10: 7
PAINLEVEL_OUTOF10: 10
PAINLEVEL_OUTOF10: 6
PAINLEVEL_OUTOF10: 8
PAINLEVEL_OUTOF10: 7
PAINLEVEL_OUTOF10: 8
PAINLEVEL_OUTOF10: 0
PAINLEVEL_OUTOF10: 7
PAINLEVEL_OUTOF10: 7

## 2025-05-15 ASSESSMENT — PAIN DESCRIPTION - LOCATION
LOCATION: BACK
LOCATION: CHEST
LOCATION: BACK

## 2025-05-15 ASSESSMENT — PAIN DESCRIPTION - PAIN TYPE
TYPE: ACUTE PAIN

## 2025-05-15 ASSESSMENT — PAIN DESCRIPTION - ORIENTATION
ORIENTATION: LOWER
ORIENTATION: LOWER;MID
ORIENTATION: LOWER;MID
ORIENTATION: LOWER

## 2025-05-15 ASSESSMENT — COPD QUESTIONNAIRES: CAT_SEVERITY: MODERATE

## 2025-05-15 NOTE — PROGRESS NOTES
Perfect serve Dr. Smith, if pt can downgrade to medsurg.    Okay to downgrade to medsurg per Dr. Smith.

## 2025-05-15 NOTE — ANESTHESIA POSTPROCEDURE EVALUATION
Department of Anesthesiology  Postprocedure Note    Patient: Corky Lawler  MRN: [id number]  YOB: 1945  Date of evaluation: 5/15/2025    Procedure Summary       Date: 05/15/25 Room / Location: [address]    Anesthesia Start: 1145 Anesthesia Stop: 1255    Procedure: L1 & L2 KYPHOPLASTY (Spine Lumbar) Diagnosis:       Closed fracture of first lumbar vertebra, unspecified fracture morphology, initial encounter (HCC)      Closed fracture of second lumbar vertebra, unspecified fracture morphology, initial encounter (HCC)      (Closed fracture of first lumbar vertebra, unspecified fracture morphology, initial encounter ([facility]) [S32.019A])      (Closed fracture of second lumbar vertebra, unspecified fracture morphology, initial encounter ([facility]) [S32.029A])    Surgeons: Valeria Cottrell MD Responsible Provider: Ernesto Barba DO    Anesthesia Type: General ASA Status: 3            Anesthesia Type: General    Horace Phase I: Horace Score: 8    Horace Phase II:      Anesthesia Post Evaluation    Patient location during evaluation: PACU  Patient participation: complete - patient participated  Level of consciousness: awake and alert  Airway patency: patent  Nausea & Vomiting: no nausea and no vomiting  Cardiovascular status: blood pressure returned to baseline  Respiratory status: acceptable  Hydration status: euvolemic  Multimodal analgesia pain management approach  Pain management: adequate    No notable events documented.

## 2025-05-15 NOTE — BRIEF OP NOTE
Brief Postoperative Note      Patient: Corky Lawler  YOB: 1945  MRN: [id number]    Date of Procedure: 5/15/2025    Pre-Op Diagnosis Codes:      * Closed fracture of first lumbar vertebra, unspecified fracture morphology, initial encounter ([facility]) [S32.019A]     * Closed fracture of second lumbar vertebra, unspecified fracture morphology, initial encounter ([facility]) [S32.029A]    Post-Op Diagnosis: Same       Procedure(s):  L1 & L2 KYPHOPLASTY    Surgeon(s):  Valeria Cottrell MD    Assistant:  Resident: Jesse Pate DO    Anesthesia: General    Estimated Blood Loss (mL): Minimal    Complications: None    Specimens:   ID Type Source Tests Collected by Time Destination   A : L1 & L2 bone biopsy Tissue Spine SURGICAL PATHOLOGY Valeria Cottrell MD 5/15/2025 1225        Implants:  * No implants in log *      Drains: * No LDAs found *    Findings:  Infection Present At Time Of Surgery (PATOS) (choose all levels that have infection present):  No infection present  Other Findings: see dictated op note    Electronically signed by Valeria Cottrell MD on 5/15/2025 at 6:17 PM

## 2025-05-15 NOTE — PROGRESS NOTES
Internal Medicine Progress Note    Patient's name: Corky Lawler  : 1945  Chief complaints (on day of admission): Back Pain (Pt L1 and L2 pain. Pt has lumbar cancer. Pt received treatment today . Pt unable to tolerate pain. Pt denies numbness to lower extremities denies loss of bowel or bladder. )  Admission date: 2025  Date of service: 5/15/2025   Room: [address] IMCU/NEURO  Primary care physician: Aurelio Hamilton MD  Reason for visit: Follow-up for Back pain    Subjective  Corky was seen and examined at bedside   No family members present  Patient states pain is more controlled today after adjustments of regimen by Palliative care  Anticipating Kyphoplasty today 5/15/25  No new concerns      Hospital Medications  Current Facility-Administered Medications   Medication Dose Route Frequency Provider Last Rate Last Admin    cyclobenzaprine (FLEXERIL) tablet 10 mg  10 mg Oral TID PRN Vinayak Smith MD   10 mg at 25 1406    oxyCODONE HCl (OXY-IR) immediate release tablet 10 mg  10 mg Oral Q3H PRN Ramya Chris PA-C   10 mg at 25 2041    HYDROmorphone HCl PF (DILAUDID) injection 1 mg  1 mg IntraVENous Q2H PRN Vinayak Smith MD   1 mg at 25 2307    sodium chloride flush 0.9 % injection 5-40 mL  5-40 mL IntraVENous 2 times per day Vinayak Smith MD   10 mL at 25 2030    sodium chloride flush 0.9 % injection 5-40 mL  5-40 mL IntraVENous PRN Vinayak Smith MD        0.9 % sodium chloride infusion   IntraVENous PRN Vinayak Smith MD        ondansetron (ZOFRAN-ODT) disintegrating tablet 4 mg  4 mg Oral Q8H PRN Vinayak Smith MD        Or    ondansetron (ZOFRAN) injection 4 mg  4 mg IntraVENous Q6H PRN Vinayak Smith MD        polyethylene glycol (GLYCOLAX) packet 17 g  17 g Oral Daily PRN Vinayak Smith MD        acetaminophen (TYLENOL) tablet 650 mg  650 mg Oral Q6H PRN Vinayak Smith MD        Or    acetaminophen (TYLENOL) suppository 650 mg  650 mg Rectal Q6H PRN Vinayak Smith MD           associated with a pathological fracture.   2. New subtle lytic lesion in the left lateral aspect of the L3 vertebral   body.   3. The metastatic lesion in the L1 vertebral body, as seen on the previous   MRI, is not evident by CT.         XR CHEST PORTABLE    (Results Pending)         Assessment   Active Hospital Problems    Diagnosis     Pathological fracture of lumbosacral spine [M84.48XA]          Plan  Lytic lesions of the L1-L2 vertebrae with intractable back pain and with new pathological fracture  CT Lumbar: Enlarging lytic lesion in the left lateral L2 vertebral body, now associated with a pathological fracture. 2. New subtle lytic lesion in the left lateral aspect of the L3 vertebral body.  Has received 4 rounds of radiation therapy  Neurosurgery on board  Plan for L1 and L2 kyphoplasty 5/15/2025  PRN pain control  Heme/Onc  Palliative care consulted  Adjusted pain regimen to Dilaudid q2hrs PRN  Oxycodone 10mg q3hrs PRN  Patient having some left lower extremity spasms and associated back pain, trial of Flexeril ordered    CHUCK stage II  Baseline Cr: 0.8  On presentation Cr: 2.1  Hold ACE  improving    R. Lung CA with mets to spine  Heme/onc on board    History of CAD s/p CABG  By Dr. Luna on 11/3/2007-Ranexa 500 mg twice daily, Imdur 60 mg daily, Crestor 40 mg daily, Plavix 75 mg daily, ASA 81 mg daily    History of Subacute brain infarct on 4/22/25    HTN - Lisinopril 20 mg twice daily (held for elevated Cr)  GERD - Protonix 40 mg daily    PT/OT  Consults Neurosurgery, Heme/Onc, Palliative  DVT prophylaxis Protonix  Code Status FULL  Discharge plan: TBD pending clinical improvement     Electronically signed by Vinayak Smith MD on 5/15/2025 at 9:16 AM    I can be reached through [employer].

## 2025-05-15 NOTE — PROGRESS NOTES
Department of Neurosurgery  Progress Note    CHIEF COMPLAINT: back pain and spinal mets    SUBJECTIVE:  OR today for kyphoplasty     REVIEW OF SYSTEMS :  Constitutional: Negative for chills and fever.    Neurological: Negative for dizziness, tremors and speech change.     OBJECTIVE:   VITALS:  BP (!) 97/51   Pulse 55   Temp 98.4 °F (36.9 °C) (Oral)   Resp 18   Ht 1.753 m (5' 9.02\")   Wt 97.7 kg (215 lb 6.4 oz)   SpO2 97%   BMI 31.79 kg/m²     PHYSICAL:  Neurologic: Alert and oriented x3; PERRL  Motor Exam:  LLE 4/5 strength, otherwise remaining strength 5/5  Sensory:  Sensory intact  TTP lumbar spine      DATA:  CBC:   Lab Results   Component Value Date/Time    WBC 9.6 05/15/2025 06:02 AM    RBC 4.07 05/15/2025 06:02 AM    HGB 12.1 05/15/2025 06:02 AM    HCT 37.2 05/15/2025 06:02 AM    MCV 91.4 05/15/2025 06:02 AM    MCH 29.7 05/15/2025 06:02 AM    MCHC 32.5 05/15/2025 06:02 AM    RDW 14.8 05/15/2025 06:02 AM     05/15/2025 06:02 AM    MPV 10.2 05/15/2025 06:02 AM     BMP:    Lab Results   Component Value Date/Time     05/15/2025 06:02 AM    K 4.1 05/15/2025 06:02 AM    K 3.3 05/23/2023 07:42 AM    CL 97 05/15/2025 06:02 AM    CO2 25 05/15/2025 06:02 AM    BUN 37 05/15/2025 06:02 AM    CREATININE 1.5 05/15/2025 06:02 AM    CALCIUM 8.9 05/15/2025 06:02 AM    GFRAA >60 04/06/2021 05:18 AM    LABGLOM 49 05/15/2025 06:02 AM    LABGLOM >60 05/23/2023 07:42 AM    GLUCOSE 151 05/15/2025 06:02 AM    GLUCOSE 101 02/20/2011 10:30 AM     PT/INR:    Lab Results   Component Value Date/Time    PROTIME 13.5 09/18/2013 10:30 AM    PROTIME 12.0 02/20/2011 10:30 AM    INR 1.4 09/18/2013 10:30 AM     PTT:    Lab Results   Component Value Date/Time    APTT 32.1 09/18/2013 10:30 AM   [APTT}    Current Inpatient Medications  Current Facility-Administered Medications: cyclobenzaprine (FLEXERIL) tablet 10 mg, 10 mg, Oral, TID PRN  oxyCODONE HCl (OXY-IR) immediate release tablet 10 mg, 10 mg, Oral, Q3H PRN  HYDROmorphone

## 2025-05-15 NOTE — CARE COORDINATION
Per chart review- here per chart review for Back Pain (Pt L1 and L2 pain. Pt has lumbar cancer. Having radiation txs. NPO for surgery today L1 and L2  kyphoplasty with neurosurgery. Await PT/OT post op  His plan prior to above.is to return home as he has 6 radiation txs left. His family/wife can transport Cm/sw to follow.Electronically signed by Ramila Guerra RN on 5/15/2025 at 10:08 AM

## 2025-05-15 NOTE — PROGRESS NOTES
Palliative Care Department  [phone]  Palliative Care Progress Note  Provider Ramya Chris PA-C     Corky Lawler  [id number]  Hospital Day: 4  Date of Initial Consult: 05/12/2025  Referring Provider: Vinayak Smith MD   Palliative Medicine was consulted for assistance with: History of lung cancer; intractable pain with pathologic fracture of spine; goals of care    HPI:   Corky Lawler is a 80 y.o. with a medical history of metastatic lung cancer to the bone undergoing radiation therapy who was admitted on 5/12/2025 from home with a CHIEF COMPLAINT of intractable back pain.  In the ED, CT of spine showing enlarging lytic lesion in the left lateral L2 vertebral body with associated pathological fracture, new subtle lytic lesion of the left lateral aspect of L3 vertebral body.  Patient admitted for further workup and pain control.  Neurosurgery and oncology consulted.  Palliative care consulted for goals of care and symptom management.    ASSESSMENT/PLAN:     Pertinent Hospital Diagnoses     Metastatic lung cancer to the bone  Pathological fracture of L2  Intractable back pain      Palliative Care Encounter / Counseling Regarding Goals of Care  Please see detailed goals of care discussion as below  At this time, Corky Lawler, Does have capacity for medical decision-making.  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  # Acute pain crisis from pathologic lumbar vertebral fractures  # Status post kyphoplasty  # Increase frequency of dilaudid to every 2 hrs PRN severe pain  # Start oxycodone 10 mg every 3 hours as needed pain  # Regimen working well-->once pain controlled will wean  off IV and optimize oral regimen for home  # Will refer to palliative care clinic for outpatient follow up    Spiritual assessment: no spiritual distress identified  Bereavement and grief: to be determined  Referrals to: none today  SUBJECTIVE:     Current medical issues leading to Palliative Medicine involvement include   Active Hospital Problems    Diagnosis Date Noted    Pathological fracture of lumbosacral spine [M84.48XA] 05/12/2025       Details of Conversation:      Patient seen at bedside.  His wife is also at bedside.  Recently returned from kyphoplasty.  Reports exacerbation in his back pain after procedure.  Will continue current pain regimen through the night and reassess pain tomorrow morning.  Will need to start weaning IV Dilaudid and optimize oral regimen in preparation for discharge.     OBJECTIVE:   Prognosis: Guarded    Physical Exam:  /68   Pulse 63   Temp 97.7 °F (36.5 °C) (Oral)   Resp 16   Ht 1.753 m (5' 9.02\")   Wt 97.7 kg (215 lb 6.4 oz)   SpO2 92%   BMI 31.79 kg/m²   Constitutional: Awake/alert  Eyes: no scleral icterus  ENMT:  Normocephalic, atraumatic  Lungs: Nonlabored at rest on nasal cannula  Heart:: Regular rate  Ext: Movement of extremities limited by pain  Skin:  Warm and dry, no rashes on visible skin  Psych: non-anxious affect  Neuro: No focal deficits.  movement limited by pain    Objective data reviewed: labs, images, records, medication use, vitals, and chart    Discussed patient and the plan of care with the other IDT members: Floor Nurse and Patient    Time/Communication  Greater than 50% of time spent, total 25 minutes in counseling and coordination of care at the bedside regarding goals of care, symptom management, diagnosis and prognosis, and see above.    Thank you for allowing Palliative Medicine to participate in the care of Corky CANTRELL Lawler.

## 2025-05-15 NOTE — PLAN OF CARE
Problem: Safety - Adult  Goal: Free from fall injury  5/15/2025 0132 by Teressa Bean RN  Outcome: Progressing  5/14/2025 1716 by Melania Acevedo RN  Outcome: Progressing     Problem: Chronic Conditions and Co-morbidities  Goal: Patient's chronic conditions and co-morbidity symptoms are monitored and maintained or improved  5/15/2025 0132 by Teressa Bean RN  Outcome: Progressing  5/14/2025 1716 by Melania Acevedo RN  Outcome: Progressing     Problem: Discharge Planning  Goal: Discharge to home or other facility with appropriate resources  Outcome: Progressing     Problem: Pain  Goal: Verbalizes/displays adequate comfort level or baseline comfort level  5/15/2025 0132 by Teressa Bean RN  Outcome: Progressing  5/14/2025 1716 by Melania Acevedo RN  Outcome: Progressing     Problem: Skin/Tissue Integrity  Goal: Skin integrity remains intact  Description: 1.  Monitor for areas of redness and/or skin breakdown2.  Assess vascular access sites hourly3.  Every 4-6 hours minimum:  Change oxygen saturation probe site4.  Every 4-6 hours:  If on nasal continuous positive airway pressure, respiratory therapy assess nares and determine need for appliance change or resting period  Outcome: Progressing

## 2025-05-15 NOTE — ANESTHESIA PRE PROCEDURE
05/08/25 (!) 142/65   04/24/25 (!) 103/58       NPO Status: Time of last liquid consumption: 2300                        Time of last solid consumption: 2000 > 8hrs                        Date of last liquid consumption: 05/14/25                        Date of last solid food consumption: 05/14/25    BMI:   Wt Readings from Last 3 Encounters:   05/14/25 97.7 kg (215 lb 6.4 oz)   04/24/25 98.9 kg (218 lb 1.6 oz)   04/04/25 100.7 kg (222 lb)     Body mass index is 31.79 kg/m².    CBC:   Lab Results   Component Value Date/Time    WBC 9.6 05/15/2025 06:02 AM    RBC 4.07 05/15/2025 06:02 AM    HGB 12.1 05/15/2025 06:02 AM    HCT 37.2 05/15/2025 06:02 AM    MCV 91.4 05/15/2025 06:02 AM    RDW 14.8 05/15/2025 06:02 AM     05/15/2025 06:02 AM       CMP:   Lab Results   Component Value Date/Time     05/15/2025 06:02 AM    K 4.1 05/15/2025 06:02 AM    K 3.3 05/23/2023 07:42 AM    CL 97 05/15/2025 06:02 AM    CO2 25 05/15/2025 06:02 AM    BUN 37 05/15/2025 06:02 AM    CREATININE 1.5 05/15/2025 06:02 AM    GFRAA >60 04/06/2021 05:18 AM    LABGLOM 49 05/15/2025 06:02 AM    LABGLOM >60 05/23/2023 07:42 AM    GLUCOSE 151 05/15/2025 06:02 AM    GLUCOSE 101 02/20/2011 10:30 AM    CALCIUM 8.9 05/15/2025 06:02 AM    BILITOT 0.7 05/12/2025 12:22 PM    ALKPHOS 89 05/12/2025 12:22 PM    AST 15 05/12/2025 12:22 PM    ALT 8 05/12/2025 12:22 PM       POC Tests: No results for input(s): \"POCGLU\", \"POCNA\", \"POCK\", \"POCCL\", \"POCBUN\", \"POCHEMO\", \"POCHCT\" in the last 72 hours.    Coags:   Lab Results   Component Value Date/Time    PROTIME 13.5 09/18/2013 10:30 AM    PROTIME 12.0 02/20/2011 10:30 AM    INR 1.4 09/18/2013 10:30 AM    APTT 32.1 09/18/2013 10:30 AM       HCG (If Applicable): No results found for: \"PREGTESTUR\", \"PREGSERUM\", \"HCG\", \"HCGQUANT\"     ABGs: No results found for: \"PHART\", \"PO2ART\", \"SJM9CMZ\", \"DES0DZP\", \"BEART\", \"J4OEPHXW\"     Type & Screen (If Applicable):  No results found for: \"LABABO\"    Drug/Infectious

## 2025-05-15 NOTE — PROGRESS NOTES
Dr Barba informed that patient is having chest pain and states that he takes nitroglycerin. Dr Barba to order sublingual nitroglycerin.

## 2025-05-16 ENCOUNTER — APPOINTMENT (OUTPATIENT)
Dept: RADIATION ONCOLOGY | Age: 80
End: 2025-05-16
Payer: MEDICARE

## 2025-05-16 LAB
ANION GAP SERPL CALCULATED.3IONS-SCNC: 9 MMOL/L (ref 7–16)
BASOPHILS # BLD: 0 K/UL (ref 0–0.2)
BASOPHILS NFR BLD: 0 % (ref 0–2)
BUN SERPL-MCNC: 32 MG/DL (ref 8–23)
CALCIUM SERPL-MCNC: 9 MG/DL (ref 8.8–10.2)
CHLORIDE SERPL-SCNC: 100 MMOL/L (ref 98–107)
CO2 SERPL-SCNC: 24 MMOL/L (ref 22–29)
CREAT SERPL-MCNC: 0.9 MG/DL (ref 0.7–1.2)
EOSINOPHIL # BLD: 0 K/UL (ref 0.05–0.5)
EOSINOPHILS RELATIVE PERCENT: 0 % (ref 0–6)
ERYTHROCYTE [DISTWIDTH] IN BLOOD BY AUTOMATED COUNT: 14.6 % (ref 11.5–15)
GFR, ESTIMATED: 87 ML/MIN/1.73M2
GLUCOSE SERPL-MCNC: 160 MG/DL (ref 74–99)
HCT VFR BLD AUTO: 39.4 % (ref 37–54)
HGB BLD-MCNC: 13.1 G/DL (ref 12.5–16.5)
LYMPHOCYTES NFR BLD: 0.19 K/UL (ref 1.5–4)
LYMPHOCYTES RELATIVE PERCENT: 2 % (ref 20–42)
MCH RBC QN AUTO: 29.6 PG (ref 26–35)
MCHC RBC AUTO-ENTMCNC: 33.2 G/DL (ref 32–34.5)
MCV RBC AUTO: 89.1 FL (ref 80–99.9)
MONOCYTES NFR BLD: 0.75 K/UL (ref 0.1–0.95)
MONOCYTES NFR BLD: 7 % (ref 2–12)
MYELOCYTES ABSOLUTE COUNT: 0.09 K/UL
MYELOCYTES: 1 %
NEUTROPHILS NFR BLD: 90 % (ref 43–80)
NEUTS SEG NFR BLD: 9.77 K/UL (ref 1.8–7.3)
PLATELET # BLD AUTO: 250 K/UL (ref 130–450)
PMV BLD AUTO: 10.4 FL (ref 7–12)
POTASSIUM SERPL-SCNC: 4.4 MMOL/L (ref 3.5–5.1)
RBC # BLD AUTO: 4.42 M/UL (ref 3.8–5.8)
RBC # BLD: ABNORMAL 10*6/UL
SODIUM SERPL-SCNC: 134 MMOL/L (ref 136–145)
WBC OTHER # BLD: 10.8 K/UL (ref 4.5–11.5)

## 2025-05-16 PROCEDURE — 2700000000 HC OXYGEN THERAPY PER DAY

## 2025-05-16 PROCEDURE — 80048 BASIC METABOLIC PNL TOTAL CA: CPT

## 2025-05-16 PROCEDURE — 6370000000 HC RX 637 (ALT 250 FOR IP): Performed by: ANESTHESIOLOGY

## 2025-05-16 PROCEDURE — 6370000000 HC RX 637 (ALT 250 FOR IP): Performed by: PHYSICIAN ASSISTANT

## 2025-05-16 PROCEDURE — 6360000002 HC RX W HCPCS: Performed by: STUDENT IN AN ORGANIZED HEALTH CARE EDUCATION/TRAINING PROGRAM

## 2025-05-16 PROCEDURE — 36415 COLL VENOUS BLD VENIPUNCTURE: CPT

## 2025-05-16 PROCEDURE — 6370000000 HC RX 637 (ALT 250 FOR IP): Performed by: INTERNAL MEDICINE

## 2025-05-16 PROCEDURE — 85025 COMPLETE CBC W/AUTO DIFF WBC: CPT

## 2025-05-16 PROCEDURE — 1200000000 HC SEMI PRIVATE

## 2025-05-16 PROCEDURE — 2500000003 HC RX 250 WO HCPCS: Performed by: STUDENT IN AN ORGANIZED HEALTH CARE EDUCATION/TRAINING PROGRAM

## 2025-05-16 PROCEDURE — 97166 OT EVAL MOD COMPLEX 45 MIN: CPT

## 2025-05-16 PROCEDURE — 97161 PT EVAL LOW COMPLEX 20 MIN: CPT

## 2025-05-16 PROCEDURE — 97530 THERAPEUTIC ACTIVITIES: CPT

## 2025-05-16 PROCEDURE — 97535 SELF CARE MNGMENT TRAINING: CPT

## 2025-05-16 PROCEDURE — 6370000000 HC RX 637 (ALT 250 FOR IP): Performed by: STUDENT IN AN ORGANIZED HEALTH CARE EDUCATION/TRAINING PROGRAM

## 2025-05-16 PROCEDURE — 6360000002 HC RX W HCPCS: Performed by: PHYSICIAN ASSISTANT

## 2025-05-16 PROCEDURE — 99232 SBSQ HOSP IP/OBS MODERATE 35: CPT | Performed by: PHYSICIAN ASSISTANT

## 2025-05-16 RX ORDER — HYDROMORPHONE HYDROCHLORIDE 1 MG/ML
1 INJECTION, SOLUTION INTRAMUSCULAR; INTRAVENOUS; SUBCUTANEOUS
Status: DISCONTINUED | OUTPATIENT
Start: 2025-05-17 | End: 2025-05-19

## 2025-05-16 RX ORDER — FENTANYL 50 UG/1
1 PATCH TRANSDERMAL
Refills: 0 | Status: DISCONTINUED | OUTPATIENT
Start: 2025-05-16 | End: 2025-05-29 | Stop reason: HOSPADM

## 2025-05-16 RX ORDER — HYDROMORPHONE HYDROCHLORIDE 1 MG/ML
1 INJECTION, SOLUTION INTRAMUSCULAR; INTRAVENOUS; SUBCUTANEOUS
Status: DISPENSED | OUTPATIENT
Start: 2025-05-16 | End: 2025-05-17

## 2025-05-16 RX ORDER — HYDROMORPHONE HYDROCHLORIDE 1 MG/ML
1 INJECTION, SOLUTION INTRAMUSCULAR; INTRAVENOUS; SUBCUTANEOUS EVERY 4 HOURS PRN
Status: DISPENSED | OUTPATIENT
Start: 2025-05-18 | End: 2025-05-19

## 2025-05-16 RX ADMIN — OXYCODONE HYDROCHLORIDE 10 MG: 10 TABLET ORAL at 14:34

## 2025-05-16 RX ADMIN — ROSUVASTATIN 40 MG: 20 TABLET, FILM COATED ORAL at 08:23

## 2025-05-16 RX ADMIN — HYDROMORPHONE HYDROCHLORIDE 1 MG: 1 INJECTION, SOLUTION INTRAMUSCULAR; INTRAVENOUS; SUBCUTANEOUS at 11:37

## 2025-05-16 RX ADMIN — ISOSORBIDE MONONITRATE 30 MG: 30 TABLET, EXTENDED RELEASE ORAL at 08:29

## 2025-05-16 RX ADMIN — CYCLOBENZAPRINE 10 MG: 10 TABLET, FILM COATED ORAL at 23:21

## 2025-05-16 RX ADMIN — CYCLOBENZAPRINE 10 MG: 10 TABLET, FILM COATED ORAL at 01:23

## 2025-05-16 RX ADMIN — GABAPENTIN 100 MG: 100 CAPSULE ORAL at 08:24

## 2025-05-16 RX ADMIN — GABAPENTIN 100 MG: 100 CAPSULE ORAL at 13:34

## 2025-05-16 RX ADMIN — GABAPENTIN 100 MG: 100 CAPSULE ORAL at 20:16

## 2025-05-16 RX ADMIN — RANOLAZINE 500 MG: 500 TABLET, FILM COATED, EXTENDED RELEASE ORAL at 20:18

## 2025-05-16 RX ADMIN — SODIUM CHLORIDE, PRESERVATIVE FREE 10 ML: 5 INJECTION INTRAVENOUS at 21:00

## 2025-05-16 RX ADMIN — OXYCODONE HYDROCHLORIDE 10 MG: 10 TABLET ORAL at 05:24

## 2025-05-16 RX ADMIN — OXYCODONE HYDROCHLORIDE 10 MG: 10 TABLET ORAL at 10:13

## 2025-05-16 RX ADMIN — OXYCODONE HYDROCHLORIDE 10 MG: 10 TABLET ORAL at 18:40

## 2025-05-16 RX ADMIN — PANTOPRAZOLE SODIUM 40 MG: 40 TABLET, DELAYED RELEASE ORAL at 05:24

## 2025-05-16 RX ADMIN — HYDROMORPHONE HYDROCHLORIDE 1 MG: 1 INJECTION, SOLUTION INTRAMUSCULAR; INTRAVENOUS; SUBCUTANEOUS at 01:59

## 2025-05-16 RX ADMIN — HYDROMORPHONE HYDROCHLORIDE 1 MG: 1 INJECTION, SOLUTION INTRAMUSCULAR; INTRAVENOUS; SUBCUTANEOUS at 08:24

## 2025-05-16 RX ADMIN — RANOLAZINE 500 MG: 500 TABLET, FILM COATED, EXTENDED RELEASE ORAL at 08:24

## 2025-05-16 RX ADMIN — OXYCODONE HYDROCHLORIDE 10 MG: 10 TABLET ORAL at 23:21

## 2025-05-16 RX ADMIN — SODIUM CHLORIDE, PRESERVATIVE FREE 10 ML: 5 INJECTION INTRAVENOUS at 08:24

## 2025-05-16 RX ADMIN — HYDROMORPHONE HYDROCHLORIDE 1 MG: 1 INJECTION, SOLUTION INTRAMUSCULAR; INTRAVENOUS; SUBCUTANEOUS at 20:14

## 2025-05-16 RX ADMIN — NITROGLYCERIN 0.4 MG: 0.4 TABLET, ORALLY DISINTEGRATING SUBLINGUAL at 01:07

## 2025-05-16 RX ADMIN — CYCLOBENZAPRINE 10 MG: 10 TABLET, FILM COATED ORAL at 13:34

## 2025-05-16 RX ADMIN — HYDROMORPHONE HYDROCHLORIDE 1 MG: 1 INJECTION, SOLUTION INTRAMUSCULAR; INTRAVENOUS; SUBCUTANEOUS at 15:59

## 2025-05-16 ASSESSMENT — PAIN DESCRIPTION - DESCRIPTORS
DESCRIPTORS: BURNING;CRUSHING;DISCOMFORT
DESCRIPTORS: SORE;SPASM;ACHING
DESCRIPTORS: ACHING;DISCOMFORT;SORE
DESCRIPTORS: ACHING;SORE;DISCOMFORT
DESCRIPTORS: ACHING;DISCOMFORT;SORE
DESCRIPTORS: ACHING;DISCOMFORT;SORE
DESCRIPTORS: ACHING;DISCOMFORT;DULL
DESCRIPTORS: ACHING;CRUSHING;DISCOMFORT
DESCRIPTORS: ACHING;DISCOMFORT;SORE
DESCRIPTORS: ACHING;SORE;DISCOMFORT
DESCRIPTORS: ACHING;DISCOMFORT;DULL
DESCRIPTORS: PRESSURE
DESCRIPTORS: ACHING;SORE;DISCOMFORT
DESCRIPTORS: BURNING;STABBING;SHARP

## 2025-05-16 ASSESSMENT — PAIN SCALES - GENERAL
PAINLEVEL_OUTOF10: 6
PAINLEVEL_OUTOF10: 7
PAINLEVEL_OUTOF10: 0
PAINLEVEL_OUTOF10: 9
PAINLEVEL_OUTOF10: 7
PAINLEVEL_OUTOF10: 9
PAINLEVEL_OUTOF10: 4
PAINLEVEL_OUTOF10: 7
PAINLEVEL_OUTOF10: 7
PAINLEVEL_OUTOF10: 9
PAINLEVEL_OUTOF10: 4
PAINLEVEL_OUTOF10: 10
PAINLEVEL_OUTOF10: 3
PAINLEVEL_OUTOF10: 7
PAINLEVEL_OUTOF10: 9
PAINLEVEL_OUTOF10: 5
PAINLEVEL_OUTOF10: 7
PAINLEVEL_OUTOF10: 7
PAINLEVEL_OUTOF10: 5
PAINLEVEL_OUTOF10: 7
PAINLEVEL_OUTOF10: 8

## 2025-05-16 ASSESSMENT — PAIN DESCRIPTION - LOCATION
LOCATION: BACK
LOCATION: BACK
LOCATION: BACK;LEG
LOCATION: BACK
LOCATION: CHEST
LOCATION: LEG;BACK
LOCATION: BACK;LEG
LOCATION: BACK
LOCATION: BACK
LOCATION: BACK;LEG
LOCATION: BACK
LOCATION: BACK;LEG
LOCATION: BACK
LOCATION: BACK
LOCATION: CHEST
LOCATION: BACK

## 2025-05-16 ASSESSMENT — PAIN DESCRIPTION - ORIENTATION
ORIENTATION: MID;UPPER
ORIENTATION: MID;LOWER
ORIENTATION: LEFT
ORIENTATION: LOWER;MID
ORIENTATION: RIGHT;LEFT;MID
ORIENTATION: LEFT
ORIENTATION: LOWER;MID
ORIENTATION: LOWER;UPPER
ORIENTATION: LEFT
ORIENTATION: LOWER
ORIENTATION: LOWER
ORIENTATION: LEFT
ORIENTATION: LOWER

## 2025-05-16 ASSESSMENT — PAIN DESCRIPTION - ONSET
ONSET: ON-GOING
ONSET: ON-GOING

## 2025-05-16 ASSESSMENT — PAIN DESCRIPTION - PAIN TYPE
TYPE: ACUTE PAIN;SURGICAL PAIN
TYPE: ACUTE PAIN
TYPE: ACUTE PAIN
TYPE: ACUTE PAIN;SURGICAL PAIN
TYPE: ACUTE PAIN

## 2025-05-16 ASSESSMENT — PAIN DESCRIPTION - FREQUENCY
FREQUENCY: CONTINUOUS
FREQUENCY: CONTINUOUS

## 2025-05-16 NOTE — CARE COORDINATION
Transition of care. Per chart review- L1 and L2 acute pathologic fracture from spinal metastasis. POD# 1  L1 & L2 KYPHOPLASTY   Per nursing  still extreme pain requiring iv dilaudid. TLSO in room. Per attending will be here thru weekend until pain under control,  Met with patient today to discuss d/c plan. Declining snf  [facility]  or outpt therapy. He will decide when pain has subsided. Has 6 radiation txs left Electronically signed by Ramila Guerra RN on 5/16/2025 at 12:06 PM    Met with patient and wife in room - will decide hhc or not and what company when pain more controlled.Electronically signed by Ramila Guerra RN on 5/16/2025 at 1:53 PM

## 2025-05-16 NOTE — PROGRESS NOTES
Palliative Care Department  [phone]  Palliative Care Progress Note  Provider Ramya Chris PA-C     Corky Lawler  [id number]  Hospital Day: 5  Date of Initial Consult: 05/12/2025  Referring Provider: Vinayak Smith MD   Palliative Medicine was consulted for assistance with: History of lung cancer; intractable pain with pathologic fracture of spine; goals of care    HPI:   Corky Lawler is a 80 y.o. with a medical history of metastatic lung cancer to the bone undergoing radiation therapy who was admitted on 5/12/2025 from home with a CHIEF COMPLAINT of intractable back pain.  In the ED, CT of spine showing enlarging lytic lesion in the left lateral L2 vertebral body with associated pathological fracture, new subtle lytic lesion of the left lateral aspect of L3 vertebral body.  Patient admitted for further workup and pain control.  Neurosurgery and oncology consulted.  Palliative care consulted for goals of care and symptom management.    ASSESSMENT/PLAN:     Pertinent Hospital Diagnoses     Metastatic lung cancer to the bone  Pathological fracture of L2  Intractable back pain      Palliative Care Encounter / Counseling Regarding Goals of Care  Please see detailed goals of care discussion as below  At this time, Corky Lawler, Does have capacity for medical decision-making.  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

## 2025-05-16 NOTE — PROGRESS NOTES
Department of Neurosurgery  Progress Note    CHIEF COMPLAINT: s/p L1 and L2 kyphoplasty 5/15    SUBJECTIVE:  Pre op back pain improved. No new issues overnight.     REVIEW OF SYSTEMS :  Constitutional: Negative for chills and fever.    Neurological: Negative for dizziness, tremors and speech change.     OBJECTIVE:   VITALS:  BP (!) 119/51   Pulse 65   Temp 98.2 °F (36.8 °C) (Temporal)   Resp 16   Ht 1.753 m (5' 9.02\")   Wt 97.7 kg (215 lb 6.4 oz)   SpO2 98%   BMI 31.79 kg/m²     PHYSICAL:  Neurologic: Alert and oriented x3; PERRL  Motor Exam:  Motor exam is symmetrical 5 out of 5 all extremities bilaterally  Sensory:  Sensory intact  Incision c/d/i      DATA:  CBC:   Lab Results   Component Value Date/Time    WBC 10.8 05/16/2025 06:06 AM    RBC 4.42 05/16/2025 06:06 AM    HGB 13.1 05/16/2025 06:06 AM    HCT 39.4 05/16/2025 06:06 AM    MCV 89.1 05/16/2025 06:06 AM    MCH 29.6 05/16/2025 06:06 AM    MCHC 33.2 05/16/2025 06:06 AM    RDW 14.6 05/16/2025 06:06 AM     05/16/2025 06:06 AM    MPV 10.4 05/16/2025 06:06 AM     BMP:    Lab Results   Component Value Date/Time     05/16/2025 06:06 AM    K 4.4 05/16/2025 06:06 AM    K 3.3 05/23/2023 07:42 AM     05/16/2025 06:06 AM    CO2 24 05/16/2025 06:06 AM    BUN 32 05/16/2025 06:06 AM    CREATININE 0.9 05/16/2025 06:06 AM    CALCIUM 9.0 05/16/2025 06:06 AM    GFRAA >60 04/06/2021 05:18 AM    LABGLOM 87 05/16/2025 06:06 AM    LABGLOM >60 05/23/2023 07:42 AM    GLUCOSE 160 05/16/2025 06:06 AM    GLUCOSE 101 02/20/2011 10:30 AM     PT/INR:    Lab Results   Component Value Date/Time    PROTIME 13.5 09/18/2013 10:30 AM    PROTIME 12.0 02/20/2011 10:30 AM    INR 1.4 09/18/2013 10:30 AM     PTT:    Lab Results   Component Value Date/Time    APTT 32.1 09/18/2013 10:30 AM   [APTT}    Current Inpatient Medications  Current Facility-Administered Medications: [START ON 5/17/2025] HYDROmorphone HCl PF (DILAUDID) injection 1 mg, 1 mg, IntraVENous, Q3H

## 2025-05-16 NOTE — PROGRESS NOTES
Internal Medicine Progress Note    Patient's name: Corky Lawler  : 1945  Chief complaints (on day of admission): Back Pain (Pt L1 and L2 pain. Pt has lumbar cancer. Pt received treatment today . Pt unable to tolerate pain. Pt denies numbness to lower extremities denies loss of bowel or bladder. )  Admission date: 2025  Date of service: 2025   Room: [address] IMCU/NEURO  Primary care physician: Aurelio Hamilton MD  Reason for visit: Follow-up for Back pain    Subjective  Corky was seen and examined at bedside   S/p kyphoplasty on 5/15/25  No family members present  States still with pain, but noticed a bit more rotational mobility  No new concerns    Hospital Medications  Current Facility-Administered Medications   Medication Dose Route Frequency Provider Last Rate Last Admin    [START ON 2025] HYDROmorphone HCl PF (DILAUDID) injection 1 mg  1 mg IntraVENous Q3H PRN Ramya Chris PA-C        HYDROmorphone HCl PF (DILAUDID) injection 1 mg  1 mg IntraVENous Q2H PRN Ramya Chris PA-C        [START ON 2025] HYDROmorphone HCl PF (DILAUDID) injection 1 mg  1 mg IntraVENous Q4H PRN Ramya Chris PA-C        fentaNYL (DURAGESIC) 50 MCG/HR 1 patch  1 patch TransDERmal Q72H Ramya Chris PA-C   1 patch at 25 1434    nitroGLYCERIN (NITROSTAT) SL tablet 0.4 mg  0.4 mg SubLINGual Q5 Min PRN Ernesto Barba DO   0.4 mg at 25 0107    cyclobenzaprine (FLEXERIL) tablet 10 mg  10 mg Oral TID PRN Vinayak Smith MD   10 mg at 25 1334    oxyCODONE HCl (OXY-IR) immediate release tablet 10 mg  10 mg Oral Q3H PRN Ramya Chris PA-C   10 mg at 25 1434    sodium chloride flush 0.9 % injection 5-40 mL  5-40 mL IntraVENous 2 times per day Vinayak Smith MD   10 mL at 25 0824    sodium chloride flush 0.9 % injection 5-40 mL  5-40 mL IntraVENous PRN Vinayak Smith MD        0.9 % sodium chloride infusion   IntraVENous PRN Vinayak Smith MD        ondansetron (ZOFRAN-ODT)

## 2025-05-16 NOTE — PROGRESS NOTES
Physical Therapy  Initial Assessment     Name: Corky Lawler  : 1945  MRN: [id number]      Date of Service: 2025    Evaluating PT: Michele Faulkner, PT, DPT, [id number]      Room #:  8512/8512-A  Diagnosis:  Intractable back pain [M54.9]  Pathologic lumbar vertebral fracture, initial encounter [M84.48XA]  Pathological fracture of lumbosacral spine [M84.48XA]  PMHx/PSHx:   has a past medical history of AAA (abdominal aortic aneurysm), Aortic regurgitation, COPD (chronic obstructive pulmonary disease) ([facility]), Coronary atherosclerosis of native coronary artery, DJD (degenerative joint disease), HTN (hypertension), Hyperlipidemia, Mitral regurgitation, Occipital stroke (HCC), and TIA (transient ischemic attack).  Procedure/Surgery:  L1 & L2 KYPHOPLASTY 5/15  Precautions:  Fall risk, TLSO, spinal precautions, alarm  Equipment Needs:  N/A    SUBJECTIVE:    Pt lives with his wife in a 2 story home with 2 stair(s) and 1 rail(s) to enter. Pt independent with functional mobility PTA. Pt ambulated with no AD prior to admission. Pt owns a WW.    OBJECTIVE:   Initial Evaluation  Date: 25 Treatment Date: Short Term/ Long Term   Goals   AM-PAC 6 Clicks 10/24     Was pt agreeable to Eval/treatment? yes     Does pt have pain? 4/10 at rest, 9/10 with movement     Bed Mobility  Rolling: ModA  Supine to sit: ModA  Sit to supine: ModA  Scooting: ModA  Rolling: Independent  Supine to sit: Independent  Sit to supine: Independent  Scooting: Independent   Transfers Sit to stand: ModA  Stand to sit: ModA  Stand pivot: ModA  Sit to stand: Independent  Stand to sit: Independent  Stand pivot: Independent   Ambulation    Few small side steps with modA. Pain limited  200 feet with Independent with LRD   Stair negotiation: ascended and descended NT  3 step(s) with 1 rail(s) with Independent   ROM BUE: Refer to OT note  BLE: WFL     Strength BUE: Refer to OT note  BLE: WFL     Balance Sitting EOB: SBA  Dynamic Standing: modA  Sitting  EOB: Independent  Dynamic Standing: Independent     Pt is A & O x: x4, answers all questions consistently   Sensation: no reports of numbness or tingling  Edema: N/A    Patient education  Pt educated on Pt educated on role and benefits of PT in acute care setting.     Patient response to education:   Pt verbalized understanding Pt demonstrated skill Pt requires further education in this area   yes yes reiterate     ASSESSMENT:    Conditions Requiring Skilled Therapeutic Intervention:    [x]Decreased strength     []Decreased ROM  [x]Decreased functional mobility  [x]Decreased balance   [x]Decreased endurance   [x]Decreased posture  [x]Decreased sensation  [x]Decreased coordination   [x]Decreased vision  [x]Decreased safety awareness   []Increased pain       Comments:    Pt was supine in bed upon room entry; agreeable to PT evaluation. Pt educated on TLSO wear schedule and instructions to don and doff. Pt required assist with all functional mobility this date and was very pain limited. Pt unable to ambulate forward this date d/t pain and was assisted with side steps to reposition in bed Pt completed all mobility noted above. Pt impulsive with movements throughout the session and had poor recall of precautions. Pt O2 93-95% on room air throughout the session. Pt was left in the bed with alarm activated with all needs met at conclusion of session. RN notified.    Treatment:  Patient practiced and was instructed in the following treatment:    Therapeutic activities:  Bed mobility: Cues for hand placement during bed mobility transfers.  Transfers: Cues for weight shifting during sit <> stand transfers. Pt completed multiple transfers from different surface heights (bed).  Ambulation: Cues for WW safety.  Vitals and symptoms were closely monitored throughout session.  Skilled positioning in bed to protect skin/joint integrity and for pain management.  Education on TLSO and spinal precautions.    Pt's/family goals:  1. To  feel better    Prognosis is good for reaching above PT goals.    Patient and or family understand(s) diagnosis, prognosis, and plan of care.  yes    PHYSICAL THERAPY PLAN OF CARE:    PT POC is established based on physician order and patient diagnosis     Referring provider/PT Order:    Start   Ordering Provider    05/12/25 1830  PT eval and treat  Start:  05/12/25 1830,   End:  05/12/25 1830,   ONE TIME,   Standing Count:  1 Occurrences,   R         Vinayak Smith MD      Diagnosis:  Intractable back pain [M54.9]  Pathologic lumbar vertebral fracture, initial encounter [M84.48XA]  Pathological fracture of lumbosacral spine [M84.48XA]  Specific instructions for next treatment:  to progress functional mobility    Current Treatment Recommendations:     [x] Strengthening to improve independence with functional mobility   [] ROM to improve independence with functional mobility   [x] Balance Training to improve static/dynamic balance and to reduce fall risk  [x] Endurance Training to improve activity tolerance during functional mobility   [x] Transfer Training to improve safety and independence with all functional transfers   [x] Gait Training to improve gait mechanics, endurance and assess need for appropriate assistive device  [x] Stair Training in preparation for safe discharge home and/or into the community   [x] Positioning to prevent skin breakdown and contractures  [x] Safety and Education Training   [x] Patient/Caregiver Education   [x] HEP  [] Other     PT long term treatment goals are located in above grid    Frequency of treatments: 2-5x/week x 1-2 weeks.    Time in  935  Time out  1013    Total Treatment Time  23 minutes     Evaluation Time includes thorough review of current medical information, gathering information on past medical history/social history and prior level of function, completion of standardized testing/informal observation of tasks, assessment of data and education on plan of care and

## 2025-05-16 NOTE — PROGRESS NOTES
OCCUPATIONAL THERAPY INITIAL EVALUATION    [facility]  [address] [place], [place]      Date:2025                                                  Patient Name: Corky Lawler  MRN: [id number]  : 1945  Room: [address] [place] [address]    Evaluating OT: CHERI Estrada, OTR/L  # [id number]    Referring Provider:  Vinayak Smith MD   Specific Provider Orders:  \"OT Eval and Treat\"  25    Diagnosis: Intractable back pain [M54.9]  Pathologic lumbar vertebral fracture, initial encounter [M84.48XA]  Pathological fracture of lumbosacral spine [M84.48XA]    Pt was admitted w/ w/ severe back pain r/t L2 pathologic fracture r/t mets to spine.      Pertinent Medical History:  Pt has a past medical history of AAA (abdominal aortic aneurysm), Aortic regurgitation, COPD (chronic obstructive pulmonary disease) ([facility]), Coronary atherosclerosis of native coronary artery, DJD (degenerative joint disease), HTN (hypertension), Hyperlipidemia, Mitral regurgitation, Occipital stroke (HCC), and TIA (transient ischemic attack).,  has a past surgical history that includes hernia repair; back surgery; Appendectomy; Diagnostic Cardiac Cath Lab Procedure; Coronary artery bypass graft; Shoulder arthroscopy; Cardiac catheterization (2013); Cholecystectomy; eye surgery (2017); transesophageal echocardiogram (2018); Aorta surgery; Coronary angioplasty with stent (2021); Bladder surgery (Left, 2023); and Spine surgery (N/A, 5/15/2025).    Surgeries this admission: 5-15-25:  L1, L2 Kyphoplasty     Precautions:  Fall Risk  Spinal Precautions  TLSO when HOB > 45*    Assessment of current deficits   [x] Functional mobility                [x]ADLs  [x] Strength                 []Cognition   [x] Functional transfers              [x] IADLs         [x] Safety Awareness   [x]Endurance   [] Fine Coordination                 [x] Balance               [] Vision/perception  Tub-Shower and Walk-in-Shower on 2nd floor, Standard-height Commode   Equipment owned:  SPC, WW, Raised Commode Seat w/ Arm Rests, Shower chair, Reacher, Sock Aid, Long Shoe Horn    Available Family Assist:  Pt's wife can provide assist PRN    Prior Level of Function:  Pt reported receiving assist w/ LB ADLs (slip-on shoes, does not wear socks), otherwise IND w/ ADLs, Light IADLs, Transfers and Mobility using SPC for ambulation, WW PRN - using WW more often recently d/t severity of pain  Driving:  Yes - not recently  Occupation:  Not reported;  [employer]    Pain Level:  4/10 at rest in supine prior to start of session/ Pain increased to 5/10 rolling side-to-side to don brace, increased to 8-9/10 sitting EOB, increased to 10/10 w/ standing - Pt described the pain shooting up from his back \"up through my Face to the Top of my Head\";  Relief w/ Rest and Repositioning, Nsg Notified   Additional Complaints:  None    Cognition: A & O x 4   Able to follow multi-step commands INDly       Memory: Good(-)       Sequencing: Fair +       Problem solving: Fair +       Judgement/safety:Fair +       Communication: Fair +       Insight: Fair +  Additional Comments:  Pt was pleasant and cooperative.      Vitals/Lab Values:  WFL Room air        Functional Assessment:  AM-PAC Daily Activity Raw Score: 14/24     Initial Eval Status  Date: 5-16-25   Treatment Status  Date: STGs = LTGs  Time frame: 10-14 days   Feeding IND after set up      NA   Grooming SUP/set up    In semi-supine  Unable to tolerate task while seated EOB d/t severity of pain seated EOB    Mod I    Standing at the Sink   UB Dressing Max A - Brace  SUP - Pull-over shirt    Donning brace and shirt in supine, semi-supine  Pt ed/demo re: safe techs for donning/doffing/wearing brace    Min A /Set up     LB Dressing Mod A/set up    Max A to don footwear, thread LEs into pants, Min A to pull pants over hips in supine and in standing  Mod VCs, Pt ed for safe/adaptive techs, use

## 2025-05-16 NOTE — DISCHARGE INSTRUCTIONS
Neurosurgery Discharge Instructions:    1. No lifting more than 10 pounds.  2. Refrain from bending, twisting, or turning at the waist.   3. No brace is needed to be worn.  4. Can remove dressing and leave open to air one (1) day after surgery .  5. All stitches are under the skin and will dissolve in time.  6. Patient may shower. DO NOT soak or scrub at incision site.  7. Do not drive while taking narcotic pain medications.   8. Take medications as prescribed. Continue taking stool softener while taking narcotic pain medications.   9. Follow up in the Neurosurgery clinic in 4-6 weeks. No films necessary.

## 2025-05-16 NOTE — PROGRESS NOTES
[facility]  Hematology/Oncology  Consult      Patient Name: Corky Lawler  YOB: 1945  PCP: Aurelio Hamilton MD   Referring Provider:      Reason for Consultation:   Chief Complaint   Patient presents with    Back Pain     Pt L1 and L2 pain. Pt has lumbar cancer. Pt received treatment today . Pt unable to tolerate pain. Pt denies numbness to lower extremities denies loss of bowel or bladder.         History of Present Illness: This is a 80-year-old male patient who was seen by our service when he was recently hospitalized and found to have bulky right upper lobe mass measuring 6.9 x 5.9 cm with associated hilar and mediastinal lymphadenopathy and an L1, L2 lytic lesion.  He thought to have a lung neoplasm clinically stage IV, [id number].  NM bone scan showed increased radiotracer at L2.  Patient's hospital stay was complicated by acute stroke.  MRI brain without contrast showed small acute to subacute infarct in the left occipital periventricular white matter.  Patient refused to have MRI brain with contrast.  He was started on ASA, Plavix, high intensity statin.  His EBUS has been delayed and is scheduled for 5/21/2025.  Following with radiation oncology for palliative radiation therapy and patient is status post 2 out of 10 fractions receiving 600 cGy with plans for 3000 cGy radiotherapy to L2/L3.  Patient was to follow-up with Dr. Nieves on 5/29/2025 however presented to the ED for evaluation of intractable back pain. CT of spine showing enlarging lytic lesion in the left lateral L2 vertebral body with associated pathological fracture, new subtle lytic lesion of the left lateral aspect of L3 vertebral body. Patient admitted for further workup and pain control.  Palliative has been consulted for pain control. Neurosurgery consulted.  Conservative measures for pain control to be attempted however if not achieved plan for L1 and L2 kyphoplasty.  Blood work is otherwise unremarkable.   \"TIBC\", \"FERRITIN\"        Radiology-    FLUORO FOR SURGICAL PROCEDURES  Result Date: 5/15/2025  EXAMINATION: SPOT FLUOROSCOPIC IMAGES 5/15/2025 1:34 pm TECHNIQUE: Fluoroscopy was provided by the radiology department for procedure. Radiologist was not present during examination. FLUOROSCOPY DOSE AND TYPE: Radiation Exposure Index: Kerma mGy, 67.84 4 FLUOROSCOPY TIME: 85.8 seconds COMPARISON: None HISTORY: ORDERING SYSTEM PROVIDED HISTORY: L1-L2 kyphoplasty TECHNOLOGIST PROVIDED HISTORY: Reason for exam:->L1-L2 kyphoplasty Intraprocedural imaging. FINDINGS: Fluoroscopic support provided to subspecialty service for assistance with kyphoplasty.  No obvious complication on the images provided. Please see subspecialty report for full details and interpretation of real time imaging.     Intraprocedural fluoroscopic spot images as above.  See separate procedure report for more information.     XR CHEST PORTABLE  Result Date: 5/15/2025  EXAMINATION: ONE XRAY VIEW OF THE CHEST 5/15/2025 10:13 am COMPARISON: Chest x-ray dated 04/17/2025 HISTORY: ORDERING SYSTEM PROVIDED HISTORY: increased o2 requirement TECHNOLOGIST PROVIDED HISTORY: Reason for exam:->increased o2 requirement FINDINGS: Increased cardiac prominence with increased perihilar and mid lung predominant interstitial pulmonary edema pattern opacifications without separate consolidation.  No pneumothorax or large effusion.     Findings suggestive of congestive heart failure with interstitial pulmonary edema pattern.  Findings of increased since 04/17/2025 comparison     CT LUMBAR SPINE WO CONTRAST  Result Date: 5/12/2025  EXAMINATION: CT OF THE LUMBAR SPINE WITHOUT CONTRAST  5/12/2025 TECHNIQUE: CT of the lumbar spine was performed without the administration of intravenous contrast. Multiplanar reformatted images are provided for review. Adjustment of mA and/or kV according to patient size was utilized.  Automated exposure control, iterative reconstruction, and/or  weight based adjustment of the mA/kV was utilized to reduce the radiation dose to as low as reasonably achievable. COMPARISON: CT of the lumbar spine, 04/17/2025. 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  Compared to 04/17/2025, there is now a pathological fracture along the left lateral aspect of the L2 vertebral body, at the site of the metastasis. A subtle lytic lesion is seen along the left lateral aspect of the L3 vertebral body (axial sequence, image 61).  The metastatic lesion identified in the L1 vertebral body on the previous MRI is not evident by CT. DEGENERATIVE CHANGES: Small disc bulges at multiple levels.  No significant central canal stenosis.  Moderate neural foraminal stenoses at the left L2-3 and bilateral L4-5 and L5-S1 levels. SOFT TISSUES/RETROPERITONEUM: No paraspinal mass is seen. An aorto iliac stent is seen traversing the abdominal aortic aneurysm which measures approximately 4 cm in maximal transverse dimension.     1. Enlarging lytic lesion in the left lateral L2 vertebral body, now associated with a pathological fracture. 2. New subtle lytic lesion in the left lateral aspect of the L3 vertebral body. 3. The metastatic lesion in the L1 vertebral body, as seen on the previous MRI, is not evident by CT.     CTA HEAD W CONTRAST  Result Date: 4/23/2025  EXAMINATION: CTA OF THE HEAD WITH CONTRAST; CTA OF THE NECK 4/23/2025 12:59 pm: TECHNIQUE: CTA of the head/brain was performed with the administration of intravenous  There is mild primary osteoarthritis of the medial and lateral joint compartments of the knee. There is no sign of soft tissue swelling or radiopaque foreign body. Multiple surgical clips are scattered throughout the medial left thigh.     1. No sign of fracture or destructive lesion of the femoral shaft. Specifically, there is no sign of any blastic or lytic lesions to suggest metastatic disease. 2. Mild primary osteoarthritis of the medial and lateral joint compartments of the knee.     CT ABDOMEN PELVIS WO CONTRAST Additional Contrast? None  Result Date: 4/17/2025  EXAMINATION: CT OF THE ABDOMEN AND PELVIS WITHOUT CONTRAST 4/17/2025 3:58 pm TECHNIQUE: CT of the abdomen and pelvis was performed without the administration of intravenous contrast. Multiplanar reformatted images are provided for review. Automated exposure control, iterative reconstruction, and/or weight based adjustment of the mA/kV was utilized to reduce the radiation dose to as low as reasonably achievable. COMPARISON: May 17, 2023 HISTORY: ORDERING SYSTEM PROVIDED HISTORY: left flank pain, stone? TECHNOLOGIST PROVIDED HISTORY: Reason for exam:->left flank pain, stone? Additional Contrast?->None Decision Support Exception - unselect if not a suspected or confirmed emergency medical condition->Emergency Medical Condition (MA) FINDINGS: Redemonstration of a few cysts associated with the liver measuring up to 3.6 cm in the left hepatic lobe.  No intrahepatic or extrahepatic bile duct dilatation.  There is evidence of cholecystectomy.  The pancreas and spleen are unremarkable.  The adrenal glands are unremarkable.  There is a nonobstructing 1 mm left renal calculus.  There is nonspecific bilateral perinephric fat stranding which may indicate chronic medical renal disease with appropriate clinical history.  There is a fusiform infrarenal abdominal aortic aneurysm measuring up to 3.8 cm which is similar in size compared to prior with evidence of aortic

## 2025-05-16 NOTE — PLAN OF CARE
Problem: Safety - Adult  Goal: Free from fall injury  Outcome: Progressing     Problem: Chronic Conditions and Co-morbidities  Goal: Patient's chronic conditions and co-morbidity symptoms are monitored and maintained or improved  Outcome: Progressing     Problem: Discharge Planning  Goal: Discharge to home or other facility with appropriate resources  Outcome: Progressing     Problem: Skin/Tissue Integrity  Goal: Skin integrity remains intact  Description: 1.  Monitor for areas of redness and/or skin breakdown2.  Assess vascular access sites hourly3.  Every 4-6 hours minimum:  Change oxygen saturation probe site4.  Every 4-6 hours:  If on nasal continuous positive airway pressure, respiratory therapy assess nares and determine need for appliance change or resting period  Outcome: Progressing     Problem: Neurosensory - Adult  Goal: Achieves stable or improved neurological status  Outcome: Progressing

## 2025-05-16 NOTE — OP NOTE
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    OPERATIVE PROCEDURES:    1. Left-sided unilateral percutaneous transpedicular approach to L1 vertebral body for L1 vertebral body bone biopsy and L1 vertebral body balloon kyphoplasty with the use of Medtronic Kyphon balloon and polymethylmethacrylate.  2. Left-sided unilateral percutaneous transpedicular approach to L2 vertebral body for L2 vertebral body bone biopsy and L2 vertebral body balloon kyphoplasty with the use of Medtronic Kyphon balloon and polymethylmethacrylate.  3. Use of biplanar fluoroscopy interpreted by myself, the surgeon.    ANESTHESIA:  Generalized endotracheal anesthesia.    ASSISTANT:  Melvin Pate DO.    COMPLICATIONS:  None.    ESTIMATED BLOOD LOSS:  Minimal.    SPECIMEN:  Tumor.    OPERATIVE INDICATIONS:  The patient is a 80-year-old gentleman who was recently diagnosed with spinal metastasis.  He re-presented to the hospital with excruciating back pain, and imaging showed that he had persistent pathologic fractures from metastasis at L1 and L2; and after risks, benefits, and alternatives were discussed with the patient, it was determined that he would undergo the above-listed procedure.    DESCRIPTION OF PROCEDURE:  The patient was brought into the operating room.  A time-out was performed where he was identified by his name, medical record number, and the operative procedure which he  was brought to undergo.  Next, induction of generalized endotracheal anesthesia was then commenced.  Upon completion of induction of generalized endotracheal anesthesia, he received preoperative antibiotics.  He was then flipped in a prone position on a Adal table.  All pressure points were padded.  His lumbosacral region was prepped and draped in usual sterile fashion.  After this was done, using biplanar fluoroscopy, I marked the entry point to the L1 pedicle on the patient's left side.  I infiltrated the skin with lidocaine with epinephrine 1:200,000.  I used a 15 blade to make a stab incision.  I docked the Jamshidi.  Once I passed the introducer on the facet, I advanced it into the pedicle into the vertebral body.  I was able to get across midline.  I removed the inner stylet, left the outer working cannula in place.  I inserted the bone biopsy tool.  After obtaining a biopsy, I inserted a Medtronic Kyphon balloon that was inflated to 300 psi.  I deflated the balloon and injected 6 mL of polymethylmethacrylate into the vertebral body.  I then removed the outer working cannula, and I closed the skin with Dermabond.  Next, I identified the entry point to the L2 pedicle on the patient's left side.  I infiltrated the skin with lidocaine with epinephrine 1:200,000.  I used a 15 blade to make a stab incision.  I docked the Jamshidi.  Once I passed the introducer on the facet, I advanced it into the pedicle into the vertebral body.  I was able to get across midline.  I removed the inner stylet, left the outer working cannula in place.  I inserted the bone biopsy tool.  After obtaining a biopsy, I inserted a Medtronic Kyphon balloon that was inflated to 280 psi.  I deflated the balloon and injected a total of 6 mL of polymethylmethacrylate into the vertebral body.  I removed the outer working cannula.  I obtained a final AP and lateral fluoroscopic image.  There was no evidence of extravasation of cement.  The skin

## 2025-05-16 NOTE — PROGRESS NOTES
4 Eyes Skin Assessment     NAME:  Corky Lawler  YOB: 1945  MEDICAL RECORD NUMBER:  [id number]    The patient is being assessed for  Transfer to New Unit    I agree that at least one RN has performed a thorough Head to Toe Skin Assessment on the patient. ALL assessment sites listed below have been assessed.      Areas assessed by both nurses:    Head, Face, Ears, Shoulders, Back, Chest, Arms, Elbows, Hands, Sacrum. Buttock, Coccyx, Ischium, and Legs. Feet and Heels        Does the Patient have a Wound? No noted wound(s)       Willard Prevention initiated by RN: No  Wound Care Orders initiated by RN: No    Pressure Injury (Stage 3,4, Unstageable, DTI, NWPT, and Complex wounds) if present, place Wound referral order by RN under : No    New Ostomies, if present place, Ostomy referral order under : No       Pt has redness on buttocks, redness on elbows. ecchymosis, vascular discoloration, BUE.   Nurse 1 eSignature: Electronically signed by Maia Odell RN on 5/16/25 at 3:50 PM EDT    **SHARE this note so that the co-signing nurse can place an eSignature**    Nurse 2 eSignature: Electronically signed by Prema Henley RN on 5/16/25 at 3:57 PM EDT

## 2025-05-16 NOTE — PLAN OF CARE
Problem: Safety - Adult  Goal: Free from fall injury  Outcome: Progressing     Problem: Chronic Conditions and Co-morbidities  Goal: Patient's chronic conditions and co-morbidity symptoms are monitored and maintained or improved  Outcome: Progressing     Problem: Discharge Planning  Goal: Discharge to home or other facility with appropriate resources  Outcome: Progressing     Problem: Pain  Goal: Verbalizes/displays adequate comfort level or baseline comfort level  Outcome: Progressing     Problem: Skin/Tissue Integrity  Goal: Skin integrity remains intact  Description: 1.  Monitor for areas of redness and/or skin breakdown2.  Assess vascular access sites hourly3.  Every 4-6 hours minimum:  Change oxygen saturation probe site4.  Every 4-6 hours:  If on nasal continuous positive airway pressure, respiratory therapy assess nares and determine need for appliance change or resting period  Outcome: Progressing     Problem: Neurosensory - Adult  Goal: Achieves stable or improved neurological status  Outcome: Progressing  Goal: Achieves maximal functionality and self care  Outcome: Progressing     Problem: Musculoskeletal - Adult  Goal: Return mobility to safest level of function  Outcome: Progressing  Goal: Return ADL status to a safe level of function  Outcome: Progressing

## 2025-05-17 LAB
ANION GAP SERPL CALCULATED.3IONS-SCNC: 10 MMOL/L (ref 7–16)
BASOPHILS # BLD: 0.04 K/UL (ref 0–0.2)
BASOPHILS NFR BLD: 0 % (ref 0–2)
BUN SERPL-MCNC: 32 MG/DL (ref 8–23)
CALCIUM SERPL-MCNC: 8.9 MG/DL (ref 8.8–10.2)
CHLORIDE SERPL-SCNC: 98 MMOL/L (ref 98–107)
CO2 SERPL-SCNC: 26 MMOL/L (ref 22–29)
CREAT SERPL-MCNC: 0.9 MG/DL (ref 0.7–1.2)
EOSINOPHIL # BLD: 0.15 K/UL (ref 0.05–0.5)
EOSINOPHILS RELATIVE PERCENT: 2 % (ref 0–6)
ERYTHROCYTE [DISTWIDTH] IN BLOOD BY AUTOMATED COUNT: 14.7 % (ref 11.5–15)
GFR, ESTIMATED: 87 ML/MIN/1.73M2
GLUCOSE SERPL-MCNC: 145 MG/DL (ref 74–99)
HCT VFR BLD AUTO: 39.6 % (ref 37–54)
HGB BLD-MCNC: 12.8 G/DL (ref 12.5–16.5)
IMM GRANULOCYTES # BLD AUTO: 0.07 K/UL (ref 0–0.58)
IMM GRANULOCYTES NFR BLD: 1 % (ref 0–5)
LYMPHOCYTES NFR BLD: 0.88 K/UL (ref 1.5–4)
LYMPHOCYTES RELATIVE PERCENT: 9 % (ref 20–42)
MCH RBC QN AUTO: 29.5 PG (ref 26–35)
MCHC RBC AUTO-ENTMCNC: 32.3 G/DL (ref 32–34.5)
MCV RBC AUTO: 91.2 FL (ref 80–99.9)
MONOCYTES NFR BLD: 0.96 K/UL (ref 0.1–0.95)
MONOCYTES NFR BLD: 10 % (ref 2–12)
NEUTROPHILS NFR BLD: 78 % (ref 43–80)
NEUTS SEG NFR BLD: 7.46 K/UL (ref 1.8–7.3)
PLATELET # BLD AUTO: 250 K/UL (ref 130–450)
PMV BLD AUTO: 10.4 FL (ref 7–12)
POTASSIUM SERPL-SCNC: 4.1 MMOL/L (ref 3.5–5.1)
RBC # BLD AUTO: 4.34 M/UL (ref 3.8–5.8)
SODIUM SERPL-SCNC: 134 MMOL/L (ref 136–145)
WBC OTHER # BLD: 9.6 K/UL (ref 4.5–11.5)

## 2025-05-17 PROCEDURE — 6370000000 HC RX 637 (ALT 250 FOR IP): Performed by: INTERNAL MEDICINE

## 2025-05-17 PROCEDURE — 2700000000 HC OXYGEN THERAPY PER DAY

## 2025-05-17 PROCEDURE — 6370000000 HC RX 637 (ALT 250 FOR IP): Performed by: PHYSICIAN ASSISTANT

## 2025-05-17 PROCEDURE — 80048 BASIC METABOLIC PNL TOTAL CA: CPT

## 2025-05-17 PROCEDURE — 85025 COMPLETE CBC W/AUTO DIFF WBC: CPT

## 2025-05-17 PROCEDURE — 1200000000 HC SEMI PRIVATE

## 2025-05-17 PROCEDURE — 97530 THERAPEUTIC ACTIVITIES: CPT

## 2025-05-17 PROCEDURE — 36415 COLL VENOUS BLD VENIPUNCTURE: CPT

## 2025-05-17 PROCEDURE — 6370000000 HC RX 637 (ALT 250 FOR IP): Performed by: STUDENT IN AN ORGANIZED HEALTH CARE EDUCATION/TRAINING PROGRAM

## 2025-05-17 PROCEDURE — 6360000002 HC RX W HCPCS: Performed by: PHYSICIAN ASSISTANT

## 2025-05-17 PROCEDURE — 2500000003 HC RX 250 WO HCPCS: Performed by: STUDENT IN AN ORGANIZED HEALTH CARE EDUCATION/TRAINING PROGRAM

## 2025-05-17 RX ORDER — SENNOSIDES 8.6 MG/1
2 TABLET ORAL 2 TIMES DAILY
Status: DISCONTINUED | OUTPATIENT
Start: 2025-05-17 | End: 2025-05-29 | Stop reason: HOSPADM

## 2025-05-17 RX ORDER — POLYETHYLENE GLYCOL 3350 17 G/17G
17 POWDER, FOR SOLUTION ORAL 2 TIMES DAILY
Status: DISCONTINUED | OUTPATIENT
Start: 2025-05-17 | End: 2025-05-29 | Stop reason: HOSPADM

## 2025-05-17 RX ADMIN — POLYETHYLENE GLYCOL 3350 17 G: 17 POWDER, FOR SOLUTION ORAL at 15:17

## 2025-05-17 RX ADMIN — ROSUVASTATIN 40 MG: 20 TABLET, FILM COATED ORAL at 08:12

## 2025-05-17 RX ADMIN — STANDARDIZED SENNA CONCENTRATE 17.2 MG: 8.6 TABLET ORAL at 15:17

## 2025-05-17 RX ADMIN — HYDROMORPHONE HYDROCHLORIDE 1 MG: 1 INJECTION, SOLUTION INTRAMUSCULAR; INTRAVENOUS; SUBCUTANEOUS at 11:36

## 2025-05-17 RX ADMIN — OXYCODONE HYDROCHLORIDE 10 MG: 10 TABLET ORAL at 20:06

## 2025-05-17 RX ADMIN — ISOSORBIDE MONONITRATE 30 MG: 30 TABLET, EXTENDED RELEASE ORAL at 08:12

## 2025-05-17 RX ADMIN — CYCLOBENZAPRINE 10 MG: 10 TABLET, FILM COATED ORAL at 20:06

## 2025-05-17 RX ADMIN — HYDROMORPHONE HYDROCHLORIDE 1 MG: 1 INJECTION, SOLUTION INTRAMUSCULAR; INTRAVENOUS; SUBCUTANEOUS at 06:44

## 2025-05-17 RX ADMIN — GABAPENTIN 100 MG: 100 CAPSULE ORAL at 20:06

## 2025-05-17 RX ADMIN — RANOLAZINE 500 MG: 500 TABLET, FILM COATED, EXTENDED RELEASE ORAL at 08:14

## 2025-05-17 RX ADMIN — PANTOPRAZOLE SODIUM 40 MG: 40 TABLET, DELAYED RELEASE ORAL at 06:37

## 2025-05-17 RX ADMIN — SODIUM CHLORIDE, PRESERVATIVE FREE 10 ML: 5 INJECTION INTRAVENOUS at 08:15

## 2025-05-17 RX ADMIN — OXYCODONE HYDROCHLORIDE 10 MG: 10 TABLET ORAL at 08:13

## 2025-05-17 RX ADMIN — GABAPENTIN 100 MG: 100 CAPSULE ORAL at 15:17

## 2025-05-17 RX ADMIN — HYDROMORPHONE HYDROCHLORIDE 1 MG: 1 INJECTION, SOLUTION INTRAMUSCULAR; INTRAVENOUS; SUBCUTANEOUS at 17:58

## 2025-05-17 RX ADMIN — RANOLAZINE 500 MG: 500 TABLET, FILM COATED, EXTENDED RELEASE ORAL at 21:00

## 2025-05-17 RX ADMIN — GABAPENTIN 100 MG: 100 CAPSULE ORAL at 08:13

## 2025-05-17 RX ADMIN — HYDROMORPHONE HYDROCHLORIDE 1 MG: 1 INJECTION, SOLUTION INTRAMUSCULAR; INTRAVENOUS; SUBCUTANEOUS at 00:28

## 2025-05-17 RX ADMIN — SODIUM CHLORIDE, PRESERVATIVE FREE 10 ML: 5 INJECTION INTRAVENOUS at 21:00

## 2025-05-17 RX ADMIN — STANDARDIZED SENNA CONCENTRATE 17.2 MG: 8.6 TABLET ORAL at 21:00

## 2025-05-17 RX ADMIN — HYDROMORPHONE HYDROCHLORIDE 1 MG: 1 INJECTION, SOLUTION INTRAMUSCULAR; INTRAVENOUS; SUBCUTANEOUS at 21:09

## 2025-05-17 ASSESSMENT — PAIN DESCRIPTION - LOCATION
LOCATION: BACK;LEG

## 2025-05-17 ASSESSMENT — PAIN DESCRIPTION - ORIENTATION
ORIENTATION: LOWER;LEFT
ORIENTATION: LEFT
ORIENTATION: LEFT;LOWER
ORIENTATION: LOWER;LEFT
ORIENTATION: LEFT
ORIENTATION: LEFT
ORIENTATION: LOWER;POSTERIOR;LEFT
ORIENTATION: LEFT

## 2025-05-17 ASSESSMENT — PAIN SCALES - GENERAL
PAINLEVEL_OUTOF10: 8
PAINLEVEL_OUTOF10: 7
PAINLEVEL_OUTOF10: 7
PAINLEVEL_OUTOF10: 6
PAINLEVEL_OUTOF10: 7
PAINLEVEL_OUTOF10: 7
PAINLEVEL_OUTOF10: 8
PAINLEVEL_OUTOF10: 7
PAINLEVEL_OUTOF10: 7
PAINLEVEL_OUTOF10: 6
PAINLEVEL_OUTOF10: 8
PAINLEVEL_OUTOF10: 8
PAINLEVEL_OUTOF10: 7
PAINLEVEL_OUTOF10: 8
PAINLEVEL_OUTOF10: 7
PAINLEVEL_OUTOF10: 7
PAINLEVEL_OUTOF10: 8
PAINLEVEL_OUTOF10: 7
PAINLEVEL_OUTOF10: 7
PAINLEVEL_OUTOF10: 6
PAINLEVEL_OUTOF10: 7
PAINLEVEL_OUTOF10: 6
PAINLEVEL_OUTOF10: 6
PAINLEVEL_OUTOF10: 7
PAINLEVEL_OUTOF10: 7
PAINLEVEL_OUTOF10: 9
PAINLEVEL_OUTOF10: 7
PAINLEVEL_OUTOF10: 6
PAINLEVEL_OUTOF10: 7
PAINLEVEL_OUTOF10: 7
PAINLEVEL_OUTOF10: 8
PAINLEVEL_OUTOF10: 8
PAINLEVEL_OUTOF10: 6

## 2025-05-17 ASSESSMENT — PAIN DESCRIPTION - FREQUENCY
FREQUENCY: CONTINUOUS
FREQUENCY: CONTINUOUS

## 2025-05-17 ASSESSMENT — PAIN - FUNCTIONAL ASSESSMENT

## 2025-05-17 ASSESSMENT — PAIN DESCRIPTION - DIRECTION: RADIATING_TOWARDS: DOWN LEFT LEG

## 2025-05-17 ASSESSMENT — PAIN DESCRIPTION - DESCRIPTORS
DESCRIPTORS: ACHING;DISCOMFORT;BURNING
DESCRIPTORS: ACHING;CRAMPING;CRUSHING
DESCRIPTORS: ACHING;DISCOMFORT;TIGHTNESS
DESCRIPTORS: DISCOMFORT;DULL;ACHING
DESCRIPTORS: ACHING;DULL;BURNING
DESCRIPTORS: ACHING;DISCOMFORT;BURNING
DESCRIPTORS: ACHING;DISCOMFORT;BURNING

## 2025-05-17 NOTE — PLAN OF CARE
Problem: Safety - Adult  Goal: Free from fall injury  5/16/2025 2244 by Nelli Calzada RN  Outcome: Progressing  5/16/2025 1615 by Maia Odell RN  Outcome: Progressing     Problem: Chronic Conditions and Co-morbidities  Goal: Patient's chronic conditions and co-morbidity symptoms are monitored and maintained or improved  5/16/2025 2244 by Nelli Calzada RN  Outcome: Progressing  5/16/2025 1615 by Maia Odell RN  Outcome: Progressing     Problem: Discharge Planning  Goal: Discharge to home or other facility with appropriate resources  5/16/2025 2244 by Nelli Calzada RN  Outcome: Progressing  5/16/2025 1615 by Maia Odell RN  Outcome: Progressing     Problem: Pain  Goal: Verbalizes/displays adequate comfort level or baseline comfort level  Outcome: Progressing     Problem: Skin/Tissue Integrity  Goal: Skin integrity remains intact  Description: 1.  Monitor for areas of redness and/or skin breakdown2.  Assess vascular access sites hourly3.  Every 4-6 hours minimum:  Change oxygen saturation probe site4.  Every 4-6 hours:  If on nasal continuous positive airway pressure, respiratory therapy assess nares and determine need for appliance change or resting period  5/16/2025 2244 by Nelli Calzada RN  Outcome: Progressing  5/16/2025 1615 by Maia Odell RN  Outcome: Progressing     Problem: Neurosensory - Adult  Goal: Achieves stable or improved neurological status  5/16/2025 2244 by Nelli Calzada RN  Outcome: Progressing  5/16/2025 1615 by Maia Odell RN  Outcome: Progressing     Problem: Neurosensory - Adult  Goal: Achieves maximal functionality and self care  Outcome: Progressing     Problem: Musculoskeletal - Adult  Goal: Return mobility to safest level of function  Outcome: Progressing     Problem: Musculoskeletal - Adult  Goal: Return ADL status to a safe level of function  Outcome: Progressing

## 2025-05-17 NOTE — PLAN OF CARE
Problem: Safety - Adult  Goal: Free from fall injury  5/17/2025 0820 by Karen Patterson RN  Outcome: Progressing     Problem: Chronic Conditions and Co-morbidities  Goal: Patient's chronic conditions and co-morbidity symptoms are monitored and maintained or improved  5/17/2025 0820 by Karen Patterson RN  Outcome: Progressing     Problem: Discharge Planning  Goal: Discharge to home or other facility with appropriate resources  5/17/2025 0820 by Karen Patterson RN  Outcome: Progressing     Problem: Pain  Goal: Verbalizes/displays adequate comfort level or baseline comfort level  5/17/2025 0820 by Karen Patterson RN  Outcome: Progressing     Problem: Skin/Tissue Integrity  Goal: Skin integrity remains intact  Description: 1.  Monitor for areas of redness and/or skin breakdown2.  Assess vascular access sites hourly3.  Every 4-6 hours minimum:  Change oxygen saturation probe site4.  Every 4-6 hours:  If on nasal continuous positive airway pressure, respiratory therapy assess nares and determine need for appliance change or resting period  5/17/2025 0820 by Karen Patterson RN  Outcome: Progressing     Problem: Neurosensory - Adult  Goal: Achieves stable or improved neurological status  5/17/2025 0820 by Karen Patterson RN  Outcome: Progressing     Problem: Neurosensory - Adult  Goal: Achieves maximal functionality and self care  5/17/2025 0820 by Karen Patterson RN  Outcome: Progressing     Problem: Musculoskeletal - Adult  Goal: Return mobility to safest level of function  5/17/2025 0820 by Karen Patterson RN  Outcome: Progressing     Problem: Musculoskeletal - Adult  Goal: Return ADL status to a safe level of function  5/17/2025 0820 by Karen Patterson RN  Outcome: Progressing

## 2025-05-17 NOTE — PROGRESS NOTES
Internal Medicine Progress Note    Patient's name: Corky Lawler  : 1945  Chief complaints (on day of admission): Back Pain (Pt L1 and L2 pain. Pt has lumbar cancer. Pt received treatment today . Pt unable to tolerate pain. Pt denies numbness to lower extremities denies loss of bowel or bladder. )  Admission date: 2025  Date of service: 2025   Room: [address] IMCU/NEURO  Primary care physician: Aurelio Hamilton MD  Reason for visit: Follow-up for Back pain    Subjective  Corky was seen and examined at bedside   S/p kyphoplasty on 5/15/25  Wife at bedside, all questions addressed  Pain much improved today and worked with PT    Hospital Medications  Current Facility-Administered Medications   Medication Dose Route Frequency Provider Last Rate Last Admin    HYDROmorphone HCl PF (DILAUDID) injection 1 mg  1 mg IntraVENous Q3H PRN Ramya Chris PA-C   1 mg at 25 1136    [START ON 2025] HYDROmorphone HCl PF (DILAUDID) injection 1 mg  1 mg IntraVENous Q4H PRN Ramya Chris PA-C        fentaNYL (DURAGESIC) 50 MCG/HR 1 patch  1 patch TransDERmal Q72H Ramya Chris PA-C   1 patch at 25 1434    nitroGLYCERIN (NITROSTAT) SL tablet 0.4 mg  0.4 mg SubLINGual Q5 Min PRN Ernesto Barba DO   0.4 mg at 25 0107    cyclobenzaprine (FLEXERIL) tablet 10 mg  10 mg Oral TID PRN Vinayak mSith MD   10 mg at 25 2321    oxyCODONE HCl (OXY-IR) immediate release tablet 10 mg  10 mg Oral Q3H PRN Ramya Chris PA-C   10 mg at 25 0813    sodium chloride flush 0.9 % injection 5-40 mL  5-40 mL IntraVENous 2 times per day Vinayak Smith MD   10 mL at 25 0815    sodium chloride flush 0.9 % injection 5-40 mL  5-40 mL IntraVENous PRN Vinayak Smith MD        0.9 % sodium chloride infusion   IntraVENous PRN Smith, Vinayak, MD        ondansetron (ZOFRAN-ODT) disintegrating tablet 4 mg  4 mg Oral Q8H PRN Vinayak Smith MD        Or    ondansetron (ZOFRAN) injection 4 mg  4 mg  05/17/2025    CREATININE 0.9 05/17/2025    BUN 32 (H) 05/17/2025    CO2 26 05/17/2025    GLUCOSE 145 (H) 05/17/2025    ALT 8 05/12/2025    AST 15 05/12/2025    INR 1.4 09/18/2013    APTT 32.1 09/18/2013    TSH 3.320 05/19/2023    LABA1C 6.1 (H) 04/23/2025       FLUORO FOR SURGICAL PROCEDURES   Final Result   Intraprocedural fluoroscopic spot images as above.  See separate procedure   report for more information.         XR CHEST PORTABLE   Final Result   Findings suggestive of congestive heart failure with interstitial pulmonary   edema pattern.  Findings of increased since 04/17/2025 comparison         CT LUMBAR SPINE WO CONTRAST   Final Result   1. Enlarging lytic lesion in the left lateral L2 vertebral body, now   associated with a pathological fracture.   2. New subtle lytic lesion in the left lateral aspect of the L3 vertebral   body.   3. The metastatic lesion in the L1 vertebral body, as seen on the previous   MRI, is not evident by CT.               Assessment   Active Hospital Problems    Diagnosis     Closed fracture of second lumbar vertebra (HCC) [S32.029A]     Pathological fracture of lumbosacral spine [M84.48XA]          Plan  Lytic lesions of the L1-L2 vertebrae with intractable back pain and with new pathological fracture  CT Lumbar: Enlarging lytic lesion in the left lateral L2 vertebral body, now associated with a pathological fracture. 2. New subtle lytic lesion in the left lateral aspect of the L3 vertebral body.  Has received 4 rounds of radiation therapy  Neurosurgery on board  S/p L1 and L2 kyphoplasty 5/15/2025  PRN pain control  Heme/Onc  Palliative care consulted  Adjusted pain regimen to Dilaudid q2hrs PRN  Oxycodone 10mg q3hrs PRN  Patient having some left lower extremity spasms and associated back pain, trial of Flexeril ordered  PT/OT eval and possible placement    CHUCK stage II, resolved  Baseline Cr: 0.8  On presentation Cr: 2.1    R. Lung CA with mets to spine  Heme/onc on

## 2025-05-17 NOTE — PROGRESS NOTES
Critical Care Critical Care Infectious Disease Internal Medicine Internal Medicine Physical Therapy  Treatment     Name: Corky Lawler  : 1945  MRN: [id number]      Date of Service: 2025    Evaluating PT: Michele Faulkner, PT, DPT, [id number]      Room #:  5206/5209-A  Diagnosis:  Intractable back pain [M54.9]  Pathologic lumbar vertebral fracture, initial encounter [M84.48XA]  Pathological fracture of lumbosacral spine [M84.48XA]  PMHx/PSHx:   has a past medical history of AAA (abdominal aortic aneurysm), Aortic regurgitation, COPD (chronic obstructive pulmonary disease) ([facility]), Coronary atherosclerosis of native coronary artery, DJD (degenerative joint disease), HTN (hypertension), Hyperlipidemia, Mitral regurgitation, Occipital stroke (HCC), and TIA (transient ischemic attack).  Procedure/Surgery:  L1 & L2 KYPHOPLASTY 5/15  Precautions:  Fall risk, TLSO, spinal precautions, alarm  Equipment Needs:  N/A    SUBJECTIVE:    Pt lives with his wife in a 2 story home with 2 stair(s) and 1 rail(s) to enter. Pt independent with functional mobility PTA. Pt ambulated with no AD prior to admission. Pt owns a WW.    OBJECTIVE:   Initial Evaluation  Date: 25 Treatment Date:25 Short Term/ Long Term   Goals   AM-PAC 6 Clicks 10/24 16/24    Was pt agreeable to Eval/treatment? yes yes    Does pt have pain? 4/10 at rest, 9/10 with movement 3-4/10 L thigh pain     Bed Mobility  Rolling: ModA  Supine to sit: ModA  Sit to supine: ModA  Scooting: ModA Rolling: Juliano  Supine to sit: Juliano  Sit to supine: NT  Scooting: Juliano Rolling: Independent  Supine to sit: Independent  Sit to supine: Independent  Scooting: Independent   Transfers Sit to stand: ModA  Stand to sit: ModA  Stand pivot: ModA Sit to stand: Juliano  Stand to sit: Juliano  Stand pivot: Juliano WW Sit to stand: Independent  Stand to sit: Independent  Stand pivot: Independent   Ambulation    Few small side steps with modA. Pain limited 25'x2 with WW Juliano 200 feet with Independent with LRD   Stair negotiation: ascended and descended NT NT 3 step(s) with  Critical Care Infectious Disease Internal Medicine Internal Medicine Internal Medicine Critical Care Internal Medicine Internal Medicine Internal Medicine safety during sit to stand and stand to sit as well as provided with physical assistance.  Gait training- pt was given verbal and tactile cues to facilitate balance and safety during ambulation as well as provided with physical assistance.  Skilled positioning - Pt placed in the chair with pillows utilized to facilitate upright posture, joint and skin integrity, and interaction with environment.     Skilled monitoring of vitals throughout session.     PLAN:    Patient is making fair progress towards established goals.  Will continue with current POC.      Time in  0735  Time out  0800    Total Treatment Time  25 minutes     CPT codes:  [] Gait training 20621 0 minutes  [] Manual therapy 08084 0 minutes  [x] Therapeutic activities 25653 25 minutes  [] Therapeutic exercises 59088 0 minutes  [] Neuromuscular reeducation 55172 0 minutes    Arleen Marx PT, DPT  [id number]     Critical Care Critical Care Critical Care Critical Care

## 2025-05-18 LAB
ANION GAP SERPL CALCULATED.3IONS-SCNC: 10 MMOL/L (ref 7–16)
BASOPHILS # BLD: 0.05 K/UL (ref 0–0.2)
BASOPHILS NFR BLD: 1 % (ref 0–2)
BUN SERPL-MCNC: 32 MG/DL (ref 8–23)
CALCIUM SERPL-MCNC: 8.9 MG/DL (ref 8.8–10.2)
CHLORIDE SERPL-SCNC: 98 MMOL/L (ref 98–107)
CO2 SERPL-SCNC: 25 MMOL/L (ref 22–29)
CREAT SERPL-MCNC: 0.9 MG/DL (ref 0.7–1.2)
EOSINOPHIL # BLD: 0.31 K/UL (ref 0.05–0.5)
EOSINOPHILS RELATIVE PERCENT: 3 % (ref 0–6)
ERYTHROCYTE [DISTWIDTH] IN BLOOD BY AUTOMATED COUNT: 14.8 % (ref 11.5–15)
GFR, ESTIMATED: 88 ML/MIN/1.73M2
GLUCOSE SERPL-MCNC: 149 MG/DL (ref 74–99)
HCT VFR BLD AUTO: 39 % (ref 37–54)
HGB BLD-MCNC: 12.5 G/DL (ref 12.5–16.5)
IMM GRANULOCYTES # BLD AUTO: 0.05 K/UL (ref 0–0.58)
IMM GRANULOCYTES NFR BLD: 1 % (ref 0–5)
LYMPHOCYTES NFR BLD: 0.96 K/UL (ref 1.5–4)
LYMPHOCYTES RELATIVE PERCENT: 9 % (ref 20–42)
MCH RBC QN AUTO: 29.4 PG (ref 26–35)
MCHC RBC AUTO-ENTMCNC: 32.1 G/DL (ref 32–34.5)
MCV RBC AUTO: 91.8 FL (ref 80–99.9)
MONOCYTES NFR BLD: 1.29 K/UL (ref 0.1–0.95)
MONOCYTES NFR BLD: 12 % (ref 2–12)
NEUTROPHILS NFR BLD: 75 % (ref 43–80)
NEUTS SEG NFR BLD: 7.87 K/UL (ref 1.8–7.3)
PLATELET # BLD AUTO: 251 K/UL (ref 130–450)
PMV BLD AUTO: 10 FL (ref 7–12)
POTASSIUM SERPL-SCNC: 4.8 MMOL/L (ref 3.5–5.1)
RBC # BLD AUTO: 4.25 M/UL (ref 3.8–5.8)
SODIUM SERPL-SCNC: 133 MMOL/L (ref 136–145)
WBC OTHER # BLD: 10.5 K/UL (ref 4.5–11.5)

## 2025-05-18 PROCEDURE — 2500000003 HC RX 250 WO HCPCS: Performed by: STUDENT IN AN ORGANIZED HEALTH CARE EDUCATION/TRAINING PROGRAM

## 2025-05-18 PROCEDURE — 85025 COMPLETE CBC W/AUTO DIFF WBC: CPT

## 2025-05-18 PROCEDURE — 2700000000 HC OXYGEN THERAPY PER DAY

## 2025-05-18 PROCEDURE — 36415 COLL VENOUS BLD VENIPUNCTURE: CPT

## 2025-05-18 PROCEDURE — 6370000000 HC RX 637 (ALT 250 FOR IP): Performed by: STUDENT IN AN ORGANIZED HEALTH CARE EDUCATION/TRAINING PROGRAM

## 2025-05-18 PROCEDURE — 6370000000 HC RX 637 (ALT 250 FOR IP): Performed by: INTERNAL MEDICINE

## 2025-05-18 PROCEDURE — 80048 BASIC METABOLIC PNL TOTAL CA: CPT

## 2025-05-18 PROCEDURE — 6370000000 HC RX 637 (ALT 250 FOR IP): Performed by: PHYSICIAN ASSISTANT

## 2025-05-18 PROCEDURE — 6360000002 HC RX W HCPCS: Performed by: PHYSICIAN ASSISTANT

## 2025-05-18 PROCEDURE — 1200000000 HC SEMI PRIVATE

## 2025-05-18 RX ADMIN — OXYCODONE HYDROCHLORIDE 10 MG: 10 TABLET ORAL at 04:29

## 2025-05-18 RX ADMIN — OXYCODONE HYDROCHLORIDE 10 MG: 10 TABLET ORAL at 18:05

## 2025-05-18 RX ADMIN — CYCLOBENZAPRINE 10 MG: 10 TABLET, FILM COATED ORAL at 18:06

## 2025-05-18 RX ADMIN — PANTOPRAZOLE SODIUM 40 MG: 40 TABLET, DELAYED RELEASE ORAL at 05:59

## 2025-05-18 RX ADMIN — HYDROMORPHONE HYDROCHLORIDE 1 MG: 1 INJECTION, SOLUTION INTRAMUSCULAR; INTRAVENOUS; SUBCUTANEOUS at 06:02

## 2025-05-18 RX ADMIN — OXYCODONE HYDROCHLORIDE 10 MG: 10 TABLET ORAL at 12:26

## 2025-05-18 RX ADMIN — SODIUM CHLORIDE, PRESERVATIVE FREE 10 ML: 5 INJECTION INTRAVENOUS at 08:51

## 2025-05-18 RX ADMIN — SODIUM CHLORIDE, PRESERVATIVE FREE 10 ML: 5 INJECTION INTRAVENOUS at 20:05

## 2025-05-18 RX ADMIN — ISOSORBIDE MONONITRATE 30 MG: 30 TABLET, EXTENDED RELEASE ORAL at 08:49

## 2025-05-18 RX ADMIN — GABAPENTIN 100 MG: 100 CAPSULE ORAL at 20:05

## 2025-05-18 RX ADMIN — ROSUVASTATIN 40 MG: 20 TABLET, FILM COATED ORAL at 08:49

## 2025-05-18 RX ADMIN — GABAPENTIN 100 MG: 100 CAPSULE ORAL at 08:49

## 2025-05-18 RX ADMIN — HYDROMORPHONE HYDROCHLORIDE 1 MG: 1 INJECTION, SOLUTION INTRAMUSCULAR; INTRAVENOUS; SUBCUTANEOUS at 00:15

## 2025-05-18 RX ADMIN — OXYCODONE HYDROCHLORIDE 10 MG: 10 TABLET ORAL at 08:49

## 2025-05-18 RX ADMIN — RANOLAZINE 500 MG: 500 TABLET, FILM COATED, EXTENDED RELEASE ORAL at 20:05

## 2025-05-18 RX ADMIN — GABAPENTIN 100 MG: 100 CAPSULE ORAL at 14:16

## 2025-05-18 RX ADMIN — RANOLAZINE 500 MG: 500 TABLET, FILM COATED, EXTENDED RELEASE ORAL at 08:49

## 2025-05-18 ASSESSMENT — PAIN SCALES - GENERAL
PAINLEVEL_OUTOF10: 5
PAINLEVEL_OUTOF10: 4
PAINLEVEL_OUTOF10: 5
PAINLEVEL_OUTOF10: 5
PAINLEVEL_OUTOF10: 7
PAINLEVEL_OUTOF10: 5
PAINLEVEL_OUTOF10: 4
PAINLEVEL_OUTOF10: 5
PAINLEVEL_OUTOF10: 7
PAINLEVEL_OUTOF10: 5
PAINLEVEL_OUTOF10: 7
PAINLEVEL_OUTOF10: 4
PAINLEVEL_OUTOF10: 7
PAINLEVEL_OUTOF10: 7
PAINLEVEL_OUTOF10: 8
PAINLEVEL_OUTOF10: 4
PAINLEVEL_OUTOF10: 8
PAINLEVEL_OUTOF10: 7
PAINLEVEL_OUTOF10: 5
PAINLEVEL_OUTOF10: 5
PAINLEVEL_OUTOF10: 7
PAINLEVEL_OUTOF10: 5
PAINLEVEL_OUTOF10: 4
PAINLEVEL_OUTOF10: 5
PAINLEVEL_OUTOF10: 4

## 2025-05-18 ASSESSMENT — PAIN DESCRIPTION - DESCRIPTORS
DESCRIPTORS: ACHING;DISCOMFORT;BURNING
DESCRIPTORS: ACHING;DISCOMFORT;SORE
DESCRIPTORS: ACHING;DISCOMFORT;DULL
DESCRIPTORS: ACHING;CRUSHING;DISCOMFORT
DESCRIPTORS: ACHING;DISCOMFORT;SHARP;TIGHTNESS
DESCRIPTORS: DISCOMFORT;DULL;GNAWING
DESCRIPTORS: ACHING;DISCOMFORT;BURNING

## 2025-05-18 ASSESSMENT — PAIN DESCRIPTION - LOCATION
LOCATION: BACK;LEG
LOCATION: BACK
LOCATION: BACK;LEG
LOCATION: LEG;BACK
LOCATION: BACK;LEG

## 2025-05-18 ASSESSMENT — PAIN DESCRIPTION - ORIENTATION
ORIENTATION: LOWER;LEFT
ORIENTATION: POSTERIOR;LEFT
ORIENTATION: LOWER
ORIENTATION: POSTERIOR;LOWER;LEFT
ORIENTATION: LEFT
ORIENTATION: LOWER;LEFT
ORIENTATION: LEFT;LOWER

## 2025-05-18 ASSESSMENT — PAIN DESCRIPTION - PAIN TYPE: TYPE: ACUTE PAIN;SURGICAL PAIN

## 2025-05-18 ASSESSMENT — PAIN DESCRIPTION - ONSET: ONSET: ON-GOING

## 2025-05-18 ASSESSMENT — PAIN DESCRIPTION - FREQUENCY: FREQUENCY: INTERMITTENT

## 2025-05-18 NOTE — PLAN OF CARE
Problem: Chronic Conditions and Co-morbidities  Goal: Patient's chronic conditions and co-morbidity symptoms are monitored and maintained or improved  5/18/2025 0031 by Nelli Calzada RN  Outcome: Progressing  5/18/2025 0004 by Nelli Calzada RN  Outcome: Progressing     Problem: Discharge Planning  Goal: Discharge to home or other facility with appropriate resources  Outcome: Progressing     Problem: Pain  Goal: Verbalizes/displays adequate comfort level or baseline comfort level  5/18/2025 0031 by Nelli Calzada RN  Outcome: Progressing  5/18/2025 0004 by Nelli Calzada RN  Outcome: Progressing     Problem: Neurosensory - Adult  Goal: Achieves stable or improved neurological status  5/18/2025 0031 by Nelli Calzada RN  Outcome: Progressing  5/18/2025 0004 by Nelli Calzada RN  Outcome: Progressing  Flowsheets (Taken 5/17/2025 1945)  Achieves stable or improved neurological status: Assess for and report changes in neurological status     Problem: Skin/Tissue Integrity  Goal: Skin integrity remains intact  Description: 1.  Monitor for areas of redness and/or skin breakdown2.  Assess vascular access sites hourly3.  Every 4-6 hours minimum:  Change oxygen saturation probe site4.  Every 4-6 hours:  If on nasal continuous positive airway pressure, respiratory therapy assess nares and determine need for appliance change or resting period  5/18/2025 0031 by Nelli Calzada RN  Outcome: Progressing  5/18/2025 0004 by Nelli Calzada RN  Outcome: Progressing

## 2025-05-18 NOTE — PROGRESS NOTES
Message sent to  that spo2 on lying is 89-90% sitting 91% walking 83% ,he couldnot walk much feels dizziness .Is that ok to discharge?

## 2025-05-18 NOTE — PLAN OF CARE
Problem: Safety - Adult  Goal: Free from fall injury  5/18/2025 1004 by Karen Patterson RN  Outcome: Progressing     Problem: Chronic Conditions and Co-morbidities  Goal: Patient's chronic conditions and co-morbidity symptoms are monitored and maintained or improved  5/18/2025 1004 by Karen Patterson RN  Outcome: Progressing     Problem: Discharge Planning  Goal: Discharge to home or other facility with appropriate resources  5/18/2025 1004 by Karen Patterson RN  Outcome: Progressing     Problem: Pain  Goal: Verbalizes/displays adequate comfort level or baseline comfort level  5/18/2025 1004 by Karen Patterson RN  Outcome: Progressing     Problem: Skin/Tissue Integrity  Goal: Skin integrity remains intact  Description: 1.  Monitor for areas of redness and/or skin breakdown2.  Assess vascular access sites hourly3.  Every 4-6 hours minimum:  Change oxygen saturation probe site4.  Every 4-6 hours:  If on nasal continuous positive airway pressure, respiratory therapy assess nares and determine need for appliance change or resting period  5/18/2025 1004 by Karen Patterson RN  Outcome: Progressing     Problem: Neurosensory - Adult  Goal: Achieves stable or improved neurological status  5/18/2025 1004 by Karen Patterson RN  Outcome: Progressing     Problem: Neurosensory - Adult  Goal: Achieves maximal functionality and self care  Outcome: Progressing     Problem: Musculoskeletal - Adult  Goal: Return mobility to safest level of function  5/18/2025 1004 by Karen Patterson RN  Outcome: Progressing     Problem: Musculoskeletal - Adult  Goal: Return ADL status to a safe level of function  5/18/2025 1004 by Karen Patterson RN  Outcome: Progressing

## 2025-05-18 NOTE — PROGRESS NOTES
Internal Medicine Progress Note    Patient's name: Corky Lawler  : 1945  Chief complaints (on day of admission): Back Pain (Pt L1 and L2 pain. Pt has lumbar cancer. Pt received treatment today . Pt unable to tolerate pain. Pt denies numbness to lower extremities denies loss of bowel or bladder. )  Admission date: 2025  Date of service: 2025   Room: [address] IMCU/NEURO  Primary care physician: Aurelio Hamilton MD  Reason for visit: Follow-up for Back pain    Subjective  Corky was seen and examined at bedside   S/p kyphoplasty on 5/15/25  Wife at bedside, all questions addressed  Was anticipating possible DC today, but was dizzy upon standing, and was desaturating to low 80s. Will hold and monitor for 24hrs    Hospital Medications  Current Facility-Administered Medications   Medication Dose Route Frequency Provider Last Rate Last Admin    senna (SENOKOT) tablet 17.2 mg  2 tablet Oral BID Vinayak Smith MD   17.2 mg at 25    polyethylene glycol (GLYCOLAX) packet 17 g  17 g Oral BID Vinayak Smith MD   17 g at 25 1517    HYDROmorphone HCl PF (DILAUDID) injection 1 mg  1 mg IntraVENous Q3H PRN Ramya Chris PA-C   1 mg at 25 0602    HYDROmorphone HCl PF (DILAUDID) injection 1 mg  1 mg IntraVENous Q4H PRN Ramya Chris PA-C        fentaNYL (DURAGESIC) 50 MCG/HR 1 patch  1 patch TransDERmal Q72H Ramya Chris PA-C   1 patch at 25 1434    nitroGLYCERIN (NITROSTAT) SL tablet 0.4 mg  0.4 mg SubLINGual Q5 Min PRN Ernesto Barba DO   0.4 mg at 25 0107    cyclobenzaprine (FLEXERIL) tablet 10 mg  10 mg Oral TID PRN Vinayak Smith MD   10 mg at 25    oxyCODONE HCl (OXY-IR) immediate release tablet 10 mg  10 mg Oral Q3H PRN Ramya Chris PA-C   10 mg at 25 1226    sodium chloride flush 0.9 % injection 5-40 mL  5-40 mL IntraVENous 2 times per day Vinayak Smith MD   10 mL at 25 0851    sodium chloride flush 0.9 % injection 5-40 mL  5-40 mL

## 2025-05-18 NOTE — PLAN OF CARE
Problem: Safety - Adult  Goal: Free from fall injury  Outcome: Progressing     Problem: Chronic Conditions and Co-morbidities  Goal: Patient's chronic conditions and co-morbidity symptoms are monitored and maintained or improved  Outcome: Progressing     Problem: Discharge Planning  Goal: Discharge to home or other facility with appropriate resources  Outcome: Progressing     Problem: Pain  Goal: Verbalizes/displays adequate comfort level or baseline comfort level  Outcome: Progressing     Problem: Skin/Tissue Integrity  Goal: Skin integrity remains intact  Description: 1.  Monitor for areas of redness and/or skin breakdown2.  Assess vascular access sites hourly3.  Every 4-6 hours minimum:  Change oxygen saturation probe site4.  Every 4-6 hours:  If on nasal continuous positive airway pressure, respiratory therapy assess nares and determine need for appliance change or resting period  Outcome: Progressing     Problem: Neurosensory - Adult  Goal: Achieves stable or improved neurological status  Outcome: Progressing     Problem: Musculoskeletal - Adult  Goal: Return mobility to safest level of function  Outcome: Progressing     Problem: Musculoskeletal - Adult  Goal: Return ADL status to a safe level of function  Outcome: Progressing

## 2025-05-18 NOTE — CARE COORDINATION
CM update: DC order noted. The pt plans to DC home with his wife. He is agreeable to [facility]. Prefers [facility]. Referral placed for PT/OT services. The pts wife reports he does have a walker at home already. Nursing notified and reports no further DC needs. CARLOS BARCLAYN, RN  Case Management   [phone]

## 2025-05-19 ENCOUNTER — APPOINTMENT (OUTPATIENT)
Dept: GENERAL RADIOLOGY | Age: 80
DRG: 542 | End: 2025-05-19
Payer: MEDICARE

## 2025-05-19 ENCOUNTER — APPOINTMENT (OUTPATIENT)
Dept: RADIATION ONCOLOGY | Age: 80
End: 2025-05-19
Payer: MEDICARE

## 2025-05-19 DIAGNOSIS — I63.9 CEREBROVASCULAR ACCIDENT (CVA), UNSPECIFIED MECHANISM (HCC): ICD-10-CM

## 2025-05-19 LAB
ABO/RH: NORMAL
ANION GAP SERPL CALCULATED.3IONS-SCNC: 10 MMOL/L (ref 7–16)
ANTIBODY SCREEN: NEGATIVE
ARM BAND NUMBER: NORMAL
BASOPHILS # BLD: 0.04 K/UL (ref 0–0.2)
BASOPHILS NFR BLD: 0 % (ref 0–2)
BLOOD BANK DISPENSE STATUS: NORMAL
BLOOD BANK SAMPLE EXPIRATION: NORMAL
BNP SERPL-MCNC: 430 PG/ML (ref 0–450)
BPU ID: NORMAL
BUN SERPL-MCNC: 22 MG/DL (ref 8–23)
CALCIUM SERPL-MCNC: 8.7 MG/DL (ref 8.8–10.2)
CHLORIDE SERPL-SCNC: 98 MMOL/L (ref 98–107)
CO2 SERPL-SCNC: 26 MMOL/L (ref 22–29)
COMPONENT: NORMAL
CREAT SERPL-MCNC: 0.7 MG/DL (ref 0.7–1.2)
CROSSMATCH RESULT: NORMAL
EOSINOPHIL # BLD: 0.32 K/UL (ref 0.05–0.5)
EOSINOPHILS RELATIVE PERCENT: 3 % (ref 0–6)
ERYTHROCYTE [DISTWIDTH] IN BLOOD BY AUTOMATED COUNT: 14.6 % (ref 11.5–15)
GFR, ESTIMATED: >90 ML/MIN/1.73M2
GLUCOSE SERPL-MCNC: 146 MG/DL (ref 74–99)
HCT VFR BLD AUTO: 38.1 % (ref 37–54)
HGB BLD-MCNC: 12.4 G/DL (ref 12.5–16.5)
IMM GRANULOCYTES # BLD AUTO: 0.06 K/UL (ref 0–0.58)
IMM GRANULOCYTES NFR BLD: 1 % (ref 0–5)
LYMPHOCYTES NFR BLD: 0.82 K/UL (ref 1.5–4)
LYMPHOCYTES RELATIVE PERCENT: 9 % (ref 20–42)
MCH RBC QN AUTO: 30 PG (ref 26–35)
MCHC RBC AUTO-ENTMCNC: 32.5 G/DL (ref 32–34.5)
MCV RBC AUTO: 92 FL (ref 80–99.9)
MONOCYTES NFR BLD: 0.99 K/UL (ref 0.1–0.95)
MONOCYTES NFR BLD: 10 % (ref 2–12)
NEUTROPHILS NFR BLD: 77 % (ref 43–80)
NEUTS SEG NFR BLD: 7.25 K/UL (ref 1.8–7.3)
PLATELET # BLD AUTO: 240 K/UL (ref 130–450)
PMV BLD AUTO: 10.2 FL (ref 7–12)
POTASSIUM SERPL-SCNC: 4.5 MMOL/L (ref 3.5–5.1)
RBC # BLD AUTO: 4.14 M/UL (ref 3.8–5.8)
SODIUM SERPL-SCNC: 134 MMOL/L (ref 136–145)
TRANSFUSION STATUS: NORMAL
UNIT DIVISION: 0
WBC OTHER # BLD: 9.5 K/UL (ref 4.5–11.5)

## 2025-05-19 PROCEDURE — 6370000000 HC RX 637 (ALT 250 FOR IP): Performed by: STUDENT IN AN ORGANIZED HEALTH CARE EDUCATION/TRAINING PROGRAM

## 2025-05-19 PROCEDURE — 2580000003 HC RX 258: Performed by: STUDENT IN AN ORGANIZED HEALTH CARE EDUCATION/TRAINING PROGRAM

## 2025-05-19 PROCEDURE — 83880 ASSAY OF NATRIURETIC PEPTIDE: CPT

## 2025-05-19 PROCEDURE — 97530 THERAPEUTIC ACTIVITIES: CPT

## 2025-05-19 PROCEDURE — 85025 COMPLETE CBC W/AUTO DIFF WBC: CPT

## 2025-05-19 PROCEDURE — 6370000000 HC RX 637 (ALT 250 FOR IP): Performed by: PHYSICIAN ASSISTANT

## 2025-05-19 PROCEDURE — 99231 SBSQ HOSP IP/OBS SF/LOW 25: CPT | Performed by: PHYSICIAN ASSISTANT

## 2025-05-19 PROCEDURE — 99222 1ST HOSP IP/OBS MODERATE 55: CPT | Performed by: PSYCHIATRY & NEUROLOGY

## 2025-05-19 PROCEDURE — 80048 BASIC METABOLIC PNL TOTAL CA: CPT

## 2025-05-19 PROCEDURE — 6370000000 HC RX 637 (ALT 250 FOR IP): Performed by: INTERNAL MEDICINE

## 2025-05-19 PROCEDURE — 1200000000 HC SEMI PRIVATE

## 2025-05-19 PROCEDURE — 2500000003 HC RX 250 WO HCPCS: Performed by: STUDENT IN AN ORGANIZED HEALTH CARE EDUCATION/TRAINING PROGRAM

## 2025-05-19 PROCEDURE — 2700000000 HC OXYGEN THERAPY PER DAY

## 2025-05-19 PROCEDURE — 36415 COLL VENOUS BLD VENIPUNCTURE: CPT

## 2025-05-19 PROCEDURE — 97535 SELF CARE MNGMENT TRAINING: CPT

## 2025-05-19 PROCEDURE — 71045 X-RAY EXAM CHEST 1 VIEW: CPT

## 2025-05-19 RX ORDER — FUROSEMIDE 10 MG/ML
20 INJECTION INTRAMUSCULAR; INTRAVENOUS ONCE
Status: DISCONTINUED | OUTPATIENT
Start: 2025-05-19 | End: 2025-05-19

## 2025-05-19 RX ORDER — SODIUM CHLORIDE, SODIUM LACTATE, POTASSIUM CHLORIDE, CALCIUM CHLORIDE 600; 310; 30; 20 MG/100ML; MG/100ML; MG/100ML; MG/100ML
INJECTION, SOLUTION INTRAVENOUS CONTINUOUS
Status: ACTIVE | OUTPATIENT
Start: 2025-05-19 | End: 2025-05-20

## 2025-05-19 RX ORDER — HYDROMORPHONE HYDROCHLORIDE 1 MG/ML
1 INJECTION, SOLUTION INTRAMUSCULAR; INTRAVENOUS; SUBCUTANEOUS EVERY 4 HOURS PRN
Status: DISCONTINUED | OUTPATIENT
Start: 2025-05-19 | End: 2025-05-19

## 2025-05-19 RX ORDER — ASPIRIN 81 MG/1
81 TABLET, CHEWABLE ORAL DAILY
Status: DISCONTINUED | OUTPATIENT
Start: 2025-05-19 | End: 2025-05-29 | Stop reason: HOSPADM

## 2025-05-19 RX ADMIN — RANOLAZINE 500 MG: 500 TABLET, FILM COATED, EXTENDED RELEASE ORAL at 09:02

## 2025-05-19 RX ADMIN — SODIUM CHLORIDE, PRESERVATIVE FREE 10 ML: 5 INJECTION INTRAVENOUS at 21:04

## 2025-05-19 RX ADMIN — CYCLOBENZAPRINE 10 MG: 10 TABLET, FILM COATED ORAL at 05:49

## 2025-05-19 RX ADMIN — RANOLAZINE 500 MG: 500 TABLET, FILM COATED, EXTENDED RELEASE ORAL at 21:04

## 2025-05-19 RX ADMIN — PANTOPRAZOLE SODIUM 40 MG: 40 TABLET, DELAYED RELEASE ORAL at 05:49

## 2025-05-19 RX ADMIN — ASPIRIN 81 MG CHEWABLE TABLET 81 MG: 81 TABLET CHEWABLE at 12:18

## 2025-05-19 RX ADMIN — GABAPENTIN 100 MG: 100 CAPSULE ORAL at 09:01

## 2025-05-19 RX ADMIN — ROSUVASTATIN 40 MG: 20 TABLET, FILM COATED ORAL at 09:01

## 2025-05-19 RX ADMIN — OXYCODONE HYDROCHLORIDE 10 MG: 10 TABLET ORAL at 05:49

## 2025-05-19 RX ADMIN — SODIUM CHLORIDE, PRESERVATIVE FREE 10 ML: 5 INJECTION INTRAVENOUS at 09:02

## 2025-05-19 RX ADMIN — ISOSORBIDE MONONITRATE 30 MG: 30 TABLET, EXTENDED RELEASE ORAL at 09:01

## 2025-05-19 RX ADMIN — GABAPENTIN 100 MG: 100 CAPSULE ORAL at 14:00

## 2025-05-19 RX ADMIN — SODIUM CHLORIDE, SODIUM LACTATE, POTASSIUM CHLORIDE, AND CALCIUM CHLORIDE: .6; .31; .03; .02 INJECTION, SOLUTION INTRAVENOUS at 13:56

## 2025-05-19 RX ADMIN — GABAPENTIN 100 MG: 100 CAPSULE ORAL at 21:04

## 2025-05-19 ASSESSMENT — PAIN DESCRIPTION - LOCATION: LOCATION: BACK

## 2025-05-19 ASSESSMENT — PAIN DESCRIPTION - ORIENTATION: ORIENTATION: LOWER

## 2025-05-19 ASSESSMENT — PAIN SCALES - GENERAL
PAINLEVEL_OUTOF10: 0
PAINLEVEL_OUTOF10: 4
PAINLEVEL_OUTOF10: 2

## 2025-05-19 ASSESSMENT — PAIN DESCRIPTION - DESCRIPTORS: DESCRIPTORS: ACHING;DISCOMFORT;SORE

## 2025-05-19 ASSESSMENT — PAIN DESCRIPTION - ONSET: ONSET: ON-GOING

## 2025-05-19 ASSESSMENT — PAIN DESCRIPTION - FREQUENCY: FREQUENCY: INTERMITTENT

## 2025-05-19 ASSESSMENT — PAIN DESCRIPTION - PAIN TYPE: TYPE: ACUTE PAIN

## 2025-05-19 NOTE — PROGRESS NOTES
Palliative Care Department  [phone]  Palliative Care Progress Note  Provider Ramya Chris PA-C     Corky Lawler  [id number]  Hospital Day: 8  Date of Initial Consult: 05/12/2025  Referring Provider: Vinayak Smith MD   Palliative Medicine was consulted for assistance with: History of lung cancer; intractable pain with pathologic fracture of spine; goals of care    HPI:   Corky Lawler is a 80 y.o. with a medical history of metastatic lung cancer to the bone undergoing radiation therapy who was admitted on 5/12/2025 from home with a CHIEF COMPLAINT of intractable back pain.  In the ED, CT of spine showing enlarging lytic lesion in the left lateral L2 vertebral body with associated pathological fracture, new subtle lytic lesion of the left lateral aspect of L3 vertebral body.  Patient admitted for further workup and pain control.  Neurosurgery and oncology consulted.  Palliative care consulted for goals of care and symptom management.    ASSESSMENT/PLAN:     Pertinent Hospital Diagnoses     Metastatic lung cancer to the bone  Pathological fracture of L2  Intractable back pain      Palliative Care Encounter / Counseling Regarding Goals of Care  Please see detailed goals of care discussion as below  At this time, Corky Lawler, Does have capacity for medical decision-making.  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

## 2025-05-19 NOTE — CARE COORDINATION
05/19/25 Update CM Note: patient now with positive orthostatic bps with dizziness and shortness of breath. Unable to participate with therapies. He is currently on 2lnc and he does not wear oxygen at home. Neurology and Pulmonary consult pending. Patient refusing MRI so neuro to do cat scan in the am. Will follow as patient will need repeat therapies and possible snf at discharge. Electronically signed by Gin Acharya RN CM on 5/19/2025 at 3:10 PM

## 2025-05-19 NOTE — PROGRESS NOTES
Occupational Therapy  OT BEDSIDE TREATMENT NOTE   [facility]  [address], [place]        Date:2025  Patient Name: Corky Lawler  MRN: [id number]  : 1945  Room: [facility]     Per OT Eval:    Evaluating OT: CHERI Estrada, OTR/L  # [id number]     Referring Provider:  Vinayak Smith MD   Specific Provider Orders:  \"OT Eval and Treat\"  25     Diagnosis: Intractable back pain [M54.9]  Pathologic lumbar vertebral fracture, initial encounter [M84.48XA]  Pathological fracture of lumbosacral spine [M84.48XA]     Pt was admitted w/ w/ severe back pain r/t L2 pathologic fracture r/t mets to spine.       Pertinent Medical History:  Pt has a past medical history of AAA (abdominal aortic aneurysm), Aortic regurgitation, COPD (chronic obstructive pulmonary disease) ([facility]), Coronary atherosclerosis of native coronary artery, DJD (degenerative joint disease), HTN (hypertension), Hyperlipidemia, Mitral regurgitation, Occipital stroke ([facility]), and TIA (transient ischemic attack).,  has a past surgical history that includes hernia repair; back surgery; Appendectomy; Diagnostic Cardiac Cath Lab Procedure; Coronary artery bypass graft; Shoulder arthroscopy; Cardiac catheterization (2013); Cholecystectomy; eye surgery (2017); transesophageal echocardiogram (2018); Aorta surgery; Coronary angioplasty with stent (2021); Bladder surgery (Left, 2023); and Spine surgery (N/A, 5/15/2025).     Surgeries this admission: 5-15-25:  L1, L2 Kyphoplasty      Precautions:  Fall Risk  Spinal Precautions  TLSO when HOB > 45*     Assessment of current deficits   [x] Functional mobility                [x]ADLs                     [x] Strength                   []Cognition   [x] Functional transfers              [x] IADLs                   [x] Safety Awareness   [x]Endurance   [] Fine Coordination                 [x] Balance               []  don footwear, thread LEs into pants, Min A to pull pants over hips in supine and in standing  Mod VCs, Pt ed for safe/adaptive techs, use of adaptive equip    Dependent  Jeovanny/doff hospital socks  Min A        Bathing Mod A/set up     Min A - UB, Mod-Max A LB  Simulated in supine  Pt ed re: Benefits of Sponge-Bathing temporarily d/t precautions;  use of Shower chair/Tub Bench, resources for obtaining equip; for safe/adaptive techs     maxA  Simulated Task    Pt able to wash of UB supine in bed, assistance to wash of LB, buttocks and joanne area Min A        Toileting Mod A/set up     Hygiene, Clothing adjustment simulated at b/s, unable to ambulate to bathroom  Pt ed for safe/adaptive techs     DNT  modA per previous session Supervision        Bed Mobility  Log-Rolling to facilitate Bed-level Functional Ax:  Mod A  Supine to sit: Mod A  Sit to supine:   Mod A     Pt ed, Mod VCs for safe/adaptive techs for Log-Rolling/Transfers    modA  Supine<>EOB  Supine to sit: Independent   Sit to supine: Independent       Functional Transfers Sit to stand: Mod A   Stand to sit: Mod A     Mod VCs/Pt ed for safety/hand placement     modA  Sit to Stand  Stand to Sit Mod I        Functional Mobility Mod A w/ WW     Side-stepping very short distance along EOB - unable to tolerate further distance d/t having 10/10 pain in standing  Pt ed for safety/improved safety awareness, walker safety    modA  Side steps towards HOB with w.w.    Pt unable to ambulate further due to low BP, +orthostatic  Mod I        Balance Sitting:     Static:   SBA EOB    Dynamic:   SBA w/ functional ax EOB      Standing:     Static:  Min A w/ WW    Dynamic:  Mod A w/ functional ax/mobility w/ WW     Sitting EOB:  SBA/CGA    Standing:  min/modA Sitting:     Static:  Independent     Dynamic:  Independent w/ functional ax     Standing:     Static:  Mod I  w/ AD PRN    Dynamic:  Mod I  w/ functional ax/mobility w/ AD PRN   Activity Tolerance  Fair(-)     Sitting

## 2025-05-19 NOTE — PROGRESS NOTES
Internal Medicine Progress Note    Patient's name: Corky Lawler  : 1945  Chief complaints (on day of admission): Back Pain (Pt L1 and L2 pain. Pt has lumbar cancer. Pt received treatment today . Pt unable to tolerate pain. Pt denies numbness to lower extremities denies loss of bowel or bladder. )  Admission date: 2025  Date of service: 2025   Room: [address] IMCU/NEURO  Primary care physician: Aurelio Hamilton MD  Reason for visit: Follow-up for Back pain    Subjective  Corky was seen and examined at bedside     S/p kyphoplasty on 5/15/25    Denies numbness tingling weakness to me   Denies fevers chills sweats   Nursing at bedside   Having dizziness today again   BP running on the low side   Recrudescence of prior stroke vs new acute stroke   No way to tell unfortunately I think he needs another MRI, last time he was in the hospital he had dizziness after anti platelet drugs were held for a few days and he ended up having an acute stroke   [facility]  Current Facility-Administered Medications   Medication Dose Route Frequency Provider Last Rate Last Admin    senna (SENOKOT) tablet 17.2 mg  2 tablet Oral BID Vinayak Smith MD   17.2 mg at 25 2100    polyethylene glycol (GLYCOLAX) packet 17 g  17 g Oral BID Vinayak Smith MD   17 g at 25 1517    HYDROmorphone HCl PF (DILAUDID) injection 1 mg  1 mg IntraVENous Q3H PRN Ramya Chris PA-C   1 mg at 25 0602    fentaNYL (DURAGESIC) 50 MCG/HR 1 patch  1 patch TransDERmal Q72H Ramya Chris PA-C   1 patch at 25 1434    nitroGLYCERIN (NITROSTAT) SL tablet 0.4 mg  0.4 mg SubLINGual Q5 Min PRN Ernesto Barba DO   0.4 mg at 25 0107    cyclobenzaprine (FLEXERIL) tablet 10 mg  10 mg Oral TID PRN Vinayak Smith MD   10 mg at 25 0549    oxyCODONE HCl (OXY-IR) immediate release tablet 10 mg  10 mg Oral Q3H PRN Ramya Chris PA-C   10 mg at 25 0549    sodium chloride flush 0.9  rashes or lesions  Extremities: atraumatic, no edema    Most Recent Labs  Lab Results   Component Value Date    WBC 9.5 05/19/2025    HGB 12.4 (L) 05/19/2025    HCT 38.1 05/19/2025     05/19/2025     (L) 05/19/2025    K 4.5 05/19/2025    CL 98 05/19/2025    CREATININE 0.7 05/19/2025    BUN 22 05/19/2025    CO2 26 05/19/2025    GLUCOSE 146 (H) 05/19/2025    ALT 8 05/12/2025    AST 15 05/12/2025    INR 1.4 09/18/2013    APTT 32.1 09/18/2013    TSH 3.320 05/19/2023    LABA1C 6.1 (H) 04/23/2025       FLUORO FOR SURGICAL PROCEDURES   Final Result   Intraprocedural fluoroscopic spot images as above.  See separate procedure   report for more information.         XR CHEST PORTABLE   Final Result   Findings suggestive of congestive heart failure with interstitial pulmonary   edema pattern.  Findings of increased since 04/17/2025 comparison         CT LUMBAR SPINE WO CONTRAST   Final Result   1. Enlarging lytic lesion in the left lateral L2 vertebral body, now   associated with a pathological fracture.   2. New subtle lytic lesion in the left lateral aspect of the L3 vertebral   body.   3. The metastatic lesion in the L1 vertebral body, as seen on the previous   MRI, is not evident by CT.               Assessment   Active Hospital Problems    Diagnosis     Closed fracture of second lumbar vertebra (HCC) [S32.029A]     Pathological fracture of lumbosacral spine [M84.48XA]      CT Lumbar Spine   Enlarging lytic lesion in the left lateral L2 vertebral body, now associated with a pathological fracture. . New subtle lytic lesion in the left lateral aspect of the L3 vertebral body.    Plan  Lytic lesions of the L1-L2 vertebrae with intractable back pain and with new pathological fracture  Has received 4 rounds of radiation therapy  S/p L1 and L2 kyphoplasty 5/15/2025  Heme/Onc  Palliative care on board for pain regimen     CHUCK stage II, resolved  Baseline Cr: 0.8  On presentation Cr: 2.1    Hypoxia with worsening R  sided CXR   With findings of possible volume overload on CXR 5/15  Ambulatory pulse ox   Cxr 5/19 worse on R side   Pro bnp ordered  Pulm consult     Dizziness   Possibly recrudescence of recent stroke vs area of new stroke   Concern being of recent stroke with holding Plavix for EBUS now being of AP for Kyphoplasty   Neurology consultation appreciated     Right Lung mass  Pulmonary saw patient last admission   Planing for EBUS as an outpatient Plavix will need to be held for 5 days timing per pulmonary   EBUS likely will need to be held off for at least a month given the findings above spoke with Dr Ca     History of CAD s/p CABG  By Dr. Luna on 11/3/2007-Ranexa 500 mg twice daily, Imdur 60 mg daily, Crestor 40 mg daily, Plavix 75 mg daily, ASA 81 mg daily    Recent Small acute to sub acute stroke Left Occipital periventricular white matter 4/22/25  Past R occipital stroke noted   On ASA and Plavix already and statin - these were held for kypho   Plavix was held for 2 days for EBUS prior to this   CTA head and neck no significant stenosis at that time   ECHO w bubble no shunting     HTN - Lisinopril 20 mg twice daily (held for low BP)    GERD - Protonix 40 mg daily    Prior tobacco abuse     PT/OT 16/24   Consults Neurosurgery, Heme/Onc, Palliative, neurology   DVT prophylaxis Protonix  Code Status Full   Discharge plan: TBD     Electronically signed by Jesse Mcbride MD on 5/19/2025 at 8:28 AM    I can be reached through [employer].

## 2025-05-19 NOTE — PROGRESS NOTES
Messaged Dr. Mcbride regarding pts. Positive Orthostatic Bps, and pt. Reporting feeling dizzy.     positive. lying EA=156/85 pulse=67, sitting WR=892/78 pulse=70, standing BP=90/61 pulse 81. O2 sat dropped to 84% on room air when standing. Put on 2L=95%.     Messaged Dr. Marion regarding New consult reason stroke like symptoms. Ordering provider Dr. Mcbride.

## 2025-05-19 NOTE — PROGRESS NOTES
Physical Therapy  Treatment    Name: Corky Lawler  : 1945  MRN: [id number]      Date of Service: 2025    Evaluating PT: Michele Faulkner, PT, DPT, [id number]      Room #:  5313/8219-A  Diagnosis:  Intractable back pain [M54.9]  Pathologic lumbar vertebral fracture, initial encounter [M84.48XA]  Pathological fracture of lumbosacral spine [M84.48XA]  PMHx/PSHx:   has a past medical history of AAA (abdominal aortic aneurysm), Aortic regurgitation, COPD (chronic obstructive pulmonary disease) ([facility]), Coronary atherosclerosis of native coronary artery, DJD (degenerative joint disease), HTN (hypertension), Hyperlipidemia, Mitral regurgitation, Occipital stroke (HCC), and TIA (transient ischemic attack).   has a past surgical history that includes hernia repair; back surgery; Appendectomy; Diagnostic Cardiac Cath Lab Procedure; Coronary artery bypass graft; Shoulder arthroscopy; Cardiac catheterization (2013); Cholecystectomy; eye surgery (2017); transesophageal echocardiogram (2018); Aorta surgery; Coronary angioplasty with stent (2021); Bladder surgery (Left, 2023); and Spine surgery (N/A, 5/15/2025).  Procedure/Surgery:  L1 & L2 KYPHOPLASTY 5/15  Precautions:  Fall risk, spinal precautions, TLSO, O2, monitor BP  Equipment Needs:  TBD    SUBJECTIVE:    Pt lives with his wife in a 2 story home with 2 stair(s) and 1 rail(s) to enter. Pt independent with functional mobility PTA. Pt ambulated with no AD prior to admission. Pt owns a WW.    OBJECTIVE:   Initial Evaluation  Date: 25 Treatment Date: 2025 Short Term/ Long Term   Goals   AM-PAC 6 Clicks 10/24 13/24    Was pt agreeable to Eval/treatment? yes Yes    Does pt have pain? 4/10 at rest, 9/10 with movement Back    Bed Mobility  Rolling: ModA  Supine to sit: ModA  Sit to supine: ModA  Scooting: ModA Rolling: Juliano  Supine to sit: ModA  Sit to supine: ModA  Scooting: NT Rolling: Independent  Supine to sit: Independent  Sit to  supine: Independent  Scooting: Independent   Transfers Sit to stand: ModA  Stand to sit: ModA  Stand pivot: ModA Sit to stand: Juliano  Stand to sit: Juliano  Stand pivot: NT Sit to stand: Independent  Stand to sit: Independent  Stand pivot: Independent   Ambulation    Few small side steps with modA. Pain limited Few side steps with WW Juliano 200 feet with Independent with LRD   Stair negotiation: ascended and descended NT NT 3 step(s) with 1 rail(s) with Independent   ROM BUE: Refer to OT note  BLE: WFL     Strength BUE: Refer to OT note  BLE: WFL     Balance Sitting EOB: SBA  Dynamic Standing: modA Sitting EOB: SBA  Dynamic Standing: Juliano with WW Sitting EOB: Independent  Dynamic Standing: Independent     Pt is A & O x 4  Sensation:  No reports of numbness/tingling to extremities  Edema:  Unremarkable    Vitals:   HR 67, SpO2 93% (room air), /85 lying.  HR 70, SpO2 88-92% (room air), /78 sitting.  HR 81, SpO2 84% (room air), BP 90/61 standing.  HR 66, SpO2 95% (2 L O2) at end of session.    Nurse notified of vitals.    Patient education  Pt educated on safety during functional mobility, use of call light for assistance.    Patient response to education:   Pt expressed understanding Pt demonstrated skill Pt requires further education in this area   Yes Yes Yes     ASSESSMENT:    Comments:  Session cleared by nurse. Patient in bed upon arrival; agreeable to PT session with OT collaboration. Required increased time, assistance of trunk and BLE to perform bed mobility. Sat EOB for extended period of time. Static standing performed before taking side steps. Patient reported shortness of breath and persistent dizziness that fluctuated in severity; nurse notified. Vitals monitored and noted. Patient left in bed with call light in reach.    Treatment:  Patient practiced and was instructed in the following treatment:    Bed mobility - verbal cues to facilitate proper positioning and sequencing; physical assistance  provided as needed during activity  Static sitting/standing - performed to promote activity tolerance and balance maintenance  Functional transfers - physical assistance provided as needed during activity  Ambulation - physical assistance provided as needed during activity  Skilled monitoring of vitals  Skilled positioning - patient positioned optimally to protect skin/joint integrity and promote comfort    PLAN:    Patient is making fair progress towards established goals.  Will continue with current POC.      Time in  1120  Time out  1200    Total Treatment Time  40 minutes     CPT codes:  [] Gait training 22884 0 minutes  [] Manual therapy 30051 0 minutes  [x] Therapeutic activities 72714 40 minutes  [] Therapeutic exercises 28525 0 minutes  [] Neuromuscular reeducation 70725 0 minutes    Gautam Erazo, PT, DPT  [id number]

## 2025-05-19 NOTE — PLAN OF CARE
Problem: Safety - Adult  Goal: Free from fall injury  5/18/2025 2142 by Adriane Colby RN  Outcome: Progressing     Problem: Chronic Conditions and Co-morbidities  Goal: Patient's chronic conditions and co-morbidity symptoms are monitored and maintained or improved  5/18/2025 2142 by Adriane Colby RN  Outcome: Progressing     Problem: Discharge Planning  Goal: Discharge to home or other facility with appropriate resources  5/18/2025 2142 by Adriane Colby RN  Outcome: Progressing     Problem: Pain  Goal: Verbalizes/displays adequate comfort level or baseline comfort level  5/18/2025 2142 by Adriane Colby RN  Outcome: Progressing     Problem: Skin/Tissue Integrity  Goal: Skin integrity remains intact  Description: 1.  Monitor for areas of redness and/or skin breakdown2.  Assess vascular access sites hourly3.  Every 4-6 hours minimum:  Change oxygen saturation probe site4.  Every 4-6 hours:  If on nasal continuous positive airway pressure, respiratory therapy assess nares and determine need for appliance change or resting period  5/18/2025 2142 by Adriane Colby RN  Outcome: Progressing     Problem: Neurosensory - Adult  Goal: Achieves stable or improved neurological status  5/18/2025 2142 by Adriane Colby RN  Outcome: Progressing     Problem: Neurosensory - Adult  Goal: Achieves maximal functionality and self care  5/18/2025 2142 by Adriane Colby RN  Outcome: Progressing     Problem: Musculoskeletal - Adult  Goal: Return mobility to safest level of function  5/18/2025 2142 by Adriane Colby RN  Outcome: Progressing     Problem: Musculoskeletal - Adult  Goal: Return ADL status to a safe level of function  5/18/2025 2142 by Adriane Colby RN  Outcome: Progressing

## 2025-05-19 NOTE — CONSULTS
[facility] Neurology    Date:  5/19/2025  Patient Name:  Corky Lawler  YOB: 1945  MRN: [id number]     PCP:  Aurelio Hamliton MD   Referring:  No ref. provider found      Chief Complaint: dizziness, recent stroke    History obtained from: patient, chart    Assessment  Corky Lawler is a 80 y.o. male with a recent stroke in the left periventricular occipital white matter, likely related to small vessel ischemic disease, with recrudescence of symptoms in setting of positive orthostatic vitals.  This could represent expansion of prior stroke VS recrudescence of prior symptoms with the latter being favored.      Plan  Continue home dual antiplatelet therapy when okay with neurosurgery  Reports having been on aspirin and Plavix for cardiac reasons prior to recent admission  High intensity statin therapy with a goal LDL<70  Continue risk factor modification for secondary stroke prevention with appropriate BP and glucose control  MRI brain without contrast-patient currently declining MRI due to issues lying flat  Repeat CT head if unable to obtain MRI        History of Present Illness:  Corky Lawler is a 80 y.o. right handed male with a history of metastatic lung cancer to the bone presenting for evaluation of dizziness.  Patient was admitted for kyphoplasty for back pain.  Of note his antiplatelet agents had been held preoperatively.  Prior to discharge he had developed dizziness and was noted to have positive orthostatic vitals with BP going from 127/85 when lying down to 90/61 when standing.    During prior admission in April 2025 he had had similar symptoms and was found to have a small stroke in the left occipital periventricular white matter.    There is concern for new acute stroke VS recrudescence and neurology was consulted.    Medical History:   Past Medical History:   Diagnosis Date    AAA (abdominal aortic aneurysm)     4,5cm    Aortic regurgitation     mild to moderate    COPD    81 mg at 05/19/25 1218    lactated ringers infusion   IntraVENous Continuous Jesse Mcbride MD 75 mL/hr at 05/19/25 1356 New Bag at 05/19/25 1356    senna (SENOKOT) tablet 17.2 mg  2 tablet Oral BID Vinayak Smith MD   17.2 mg at 05/17/25 2100    polyethylene glycol (GLYCOLAX) packet 17 g  17 g Oral BID Vinayak Smith MD   17 g at 05/17/25 1517    fentaNYL (DURAGESIC) 50 MCG/HR 1 patch  1 patch TransDERmal Q72H Ramya Chris PA-C   1 patch at 05/16/25 1434    nitroGLYCERIN (NITROSTAT) SL tablet 0.4 mg  0.4 mg SubLINGual Q5 Min PRN Ernesto Barba DO   0.4 mg at 05/16/25 0107    cyclobenzaprine (FLEXERIL) tablet 10 mg  10 mg Oral TID PRN Vinayak Smith MD   10 mg at 05/19/25 0549    oxyCODONE HCl (OXY-IR) immediate release tablet 10 mg  10 mg Oral Q3H PRN Ramya Chris PA-C   10 mg at 05/19/25 0549    sodium chloride flush 0.9 % injection 5-40 mL  5-40 mL IntraVENous 2 times per day Vinayak Smith MD   10 mL at 05/19/25 0902    sodium chloride flush 0.9 % injection 5-40 mL  5-40 mL IntraVENous PRN Vinayak Smith MD        0.9 % sodium chloride infusion   IntraVENous PRN Vinayak Smith MD        ondansetron (ZOFRAN-ODT) disintegrating tablet 4 mg  4 mg Oral Q8H PRN Vinayak Smith MD        Or    ondansetron (ZOFRAN) injection 4 mg  4 mg IntraVENous Q6H PRN Vinayak Smith MD        acetaminophen (TYLENOL) tablet 650 mg  650 mg Oral Q6H PRN Vinayak Smith MD        Or    acetaminophen (TYLENOL) suppository 650 mg  650 mg Rectal Q6H PRN Vinayak Smith MD        gabapentin (NEURONTIN) capsule 100 mg  100 mg Oral TID Vinayak Smith MD   100 mg at 05/19/25 1400    [Held by provider] lisinopril (PRINIVIL;ZESTRIL) tablet 20 mg  20 mg Oral BID Vinayak Smith MD   20 mg at 05/14/25 2030    pantoprazole (PROTONIX) tablet 40 mg  40 mg Oral QAM AC Vinayak Smith MD   40 mg at 05/19/25 0549    ranolazine (RANEXA) extended release tablet 500 mg  500 mg Oral BID Vinayak Smith MD   500 mg at 05/19/25 0902    rosuvastatin (CRESTOR)

## 2025-05-19 NOTE — CARE COORDINATION
05/19/25 Update CM Note: Patient is on general medical floor. Had a discharge yesterday when his pulse oximeter drop to the 80's with activity. He is a smoker. He does not wear oxygen at home. The discharge was cancelled and he was placed on 2lnc. Patient scheduled for an outpatient bronchoscopy on 5/21/25 confirmed with PAT. Plan is still to discharge to home with [facility]. Will await the pulse oximeter readings and plan for discharge. Electronically signed by Gin Acharya RN CM on 5/19/2025 at 10:29 AM

## 2025-05-19 NOTE — PLAN OF CARE
Problem: Safety - Adult  Goal: Free from fall injury  Outcome: Progressing     Problem: Chronic Conditions and Co-morbidities  Goal: Patient's chronic conditions and co-morbidity symptoms are monitored and maintained or improved  Outcome: Progressing     Problem: Discharge Planning  Goal: Discharge to home or other facility with appropriate resources  Outcome: Progressing     Problem: Pain  Goal: Verbalizes/displays adequate comfort level or baseline comfort level  Outcome: Progressing     Problem: Skin/Tissue Integrity  Goal: Skin integrity remains intact  Description: 1.  Monitor for areas of redness and/or skin breakdown2.  Assess vascular access sites hourly3.  Every 4-6 hours minimum:  Change oxygen saturation probe site4.  Every 4-6 hours:  If on nasal continuous positive airway pressure, respiratory therapy assess nares and determine need for appliance change or resting period  Outcome: Progressing     Problem: Neurosensory - Adult  Goal: Achieves stable or improved neurological status  Outcome: Progressing  Goal: Achieves maximal functionality and self care  Outcome: Progressing     Problem: Musculoskeletal - Adult  Goal: Return mobility to safest level of function  Outcome: Progressing  Goal: Return ADL status to a safe level of function  Outcome: Progressing     Problem: ABCDS Injury Assessment  Goal: Absence of physical injury  Outcome: Progressing

## 2025-05-19 NOTE — PROGRESS NOTES
Messaged Dr. Mcbride Pt. A&Ox4, now stating he is having hallucinations on and off. Pt. is also refusing MRI of brain. He states  \"I will do a CT scan but no MRI\".

## 2025-05-20 ENCOUNTER — APPOINTMENT (OUTPATIENT)
Dept: CT IMAGING | Age: 80
DRG: 542 | End: 2025-05-20
Payer: MEDICARE

## 2025-05-20 ENCOUNTER — TELEPHONE (OUTPATIENT)
Age: 80
End: 2025-05-20

## 2025-05-20 ENCOUNTER — APPOINTMENT (OUTPATIENT)
Dept: RADIATION ONCOLOGY | Age: 80
End: 2025-05-20
Payer: MEDICARE

## 2025-05-20 ENCOUNTER — ANESTHESIA EVENT (OUTPATIENT)
Dept: ENDOSCOPY | Age: 80
End: 2025-05-20
Payer: MEDICARE

## 2025-05-20 LAB
ANION GAP SERPL CALCULATED.3IONS-SCNC: 11 MMOL/L (ref 7–16)
BASOPHILS # BLD: 0.03 K/UL (ref 0–0.2)
BASOPHILS NFR BLD: 0 % (ref 0–2)
BUN SERPL-MCNC: 17 MG/DL (ref 8–23)
CALCIUM SERPL-MCNC: 8.7 MG/DL (ref 8.8–10.2)
CHLORIDE SERPL-SCNC: 95 MMOL/L (ref 98–107)
CO2 SERPL-SCNC: 26 MMOL/L (ref 22–29)
CREAT SERPL-MCNC: 0.8 MG/DL (ref 0.7–1.2)
EOSINOPHIL # BLD: 0.22 K/UL (ref 0.05–0.5)
EOSINOPHILS RELATIVE PERCENT: 2 % (ref 0–6)
ERYTHROCYTE [DISTWIDTH] IN BLOOD BY AUTOMATED COUNT: 14.8 % (ref 11.5–15)
GFR, ESTIMATED: 90 ML/MIN/1.73M2
GLUCOSE SERPL-MCNC: 143 MG/DL (ref 74–99)
HCT VFR BLD AUTO: 38.2 % (ref 37–54)
HGB BLD-MCNC: 12.3 G/DL (ref 12.5–16.5)
IMM GRANULOCYTES # BLD AUTO: 0.06 K/UL (ref 0–0.58)
IMM GRANULOCYTES NFR BLD: 1 % (ref 0–5)
LYMPHOCYTES NFR BLD: 0.69 K/UL (ref 1.5–4)
LYMPHOCYTES RELATIVE PERCENT: 8 % (ref 20–42)
MCH RBC QN AUTO: 29.6 PG (ref 26–35)
MCHC RBC AUTO-ENTMCNC: 32.2 G/DL (ref 32–34.5)
MCV RBC AUTO: 91.8 FL (ref 80–99.9)
MONOCYTES NFR BLD: 0.97 K/UL (ref 0.1–0.95)
MONOCYTES NFR BLD: 11 % (ref 2–12)
NEUTROPHILS NFR BLD: 78 % (ref 43–80)
NEUTS SEG NFR BLD: 7.02 K/UL (ref 1.8–7.3)
PLATELET # BLD AUTO: 225 K/UL (ref 130–450)
PMV BLD AUTO: 10.1 FL (ref 7–12)
POTASSIUM SERPL-SCNC: 4.2 MMOL/L (ref 3.5–5.1)
RBC # BLD AUTO: 4.16 M/UL (ref 3.8–5.8)
SODIUM SERPL-SCNC: 132 MMOL/L (ref 136–145)
WBC OTHER # BLD: 9 K/UL (ref 4.5–11.5)

## 2025-05-20 PROCEDURE — 2700000000 HC OXYGEN THERAPY PER DAY

## 2025-05-20 PROCEDURE — 6370000000 HC RX 637 (ALT 250 FOR IP): Performed by: PHYSICIAN ASSISTANT

## 2025-05-20 PROCEDURE — 97535 SELF CARE MNGMENT TRAINING: CPT

## 2025-05-20 PROCEDURE — 70450 CT HEAD/BRAIN W/O DYE: CPT

## 2025-05-20 PROCEDURE — 36415 COLL VENOUS BLD VENIPUNCTURE: CPT

## 2025-05-20 PROCEDURE — 97530 THERAPEUTIC ACTIVITIES: CPT

## 2025-05-20 PROCEDURE — 99222 1ST HOSP IP/OBS MODERATE 55: CPT | Performed by: INTERNAL MEDICINE

## 2025-05-20 PROCEDURE — 6370000000 HC RX 637 (ALT 250 FOR IP): Performed by: STUDENT IN AN ORGANIZED HEALTH CARE EDUCATION/TRAINING PROGRAM

## 2025-05-20 PROCEDURE — 6370000000 HC RX 637 (ALT 250 FOR IP): Performed by: ANESTHESIOLOGY

## 2025-05-20 PROCEDURE — 6360000002 HC RX W HCPCS: Performed by: STUDENT IN AN ORGANIZED HEALTH CARE EDUCATION/TRAINING PROGRAM

## 2025-05-20 PROCEDURE — 2500000003 HC RX 250 WO HCPCS: Performed by: STUDENT IN AN ORGANIZED HEALTH CARE EDUCATION/TRAINING PROGRAM

## 2025-05-20 PROCEDURE — 1200000000 HC SEMI PRIVATE

## 2025-05-20 PROCEDURE — 80048 BASIC METABOLIC PNL TOTAL CA: CPT

## 2025-05-20 PROCEDURE — 85025 COMPLETE CBC W/AUTO DIFF WBC: CPT

## 2025-05-20 PROCEDURE — 99231 SBSQ HOSP IP/OBS SF/LOW 25: CPT | Performed by: NURSE PRACTITIONER

## 2025-05-20 RX ORDER — LACTULOSE 10 G/15ML
30 SOLUTION ORAL ONCE
Status: COMPLETED | OUTPATIENT
Start: 2025-05-20 | End: 2025-05-20

## 2025-05-20 RX ADMIN — LACTULOSE 30 G: 20 SOLUTION ORAL at 14:29

## 2025-05-20 RX ADMIN — RANOLAZINE 500 MG: 500 TABLET, FILM COATED, EXTENDED RELEASE ORAL at 20:59

## 2025-05-20 RX ADMIN — OXYCODONE HYDROCHLORIDE 5 MG: 10 TABLET ORAL at 09:01

## 2025-05-20 RX ADMIN — ASPIRIN 81 MG CHEWABLE TABLET 81 MG: 81 TABLET CHEWABLE at 10:02

## 2025-05-20 RX ADMIN — GABAPENTIN 100 MG: 100 CAPSULE ORAL at 08:52

## 2025-05-20 RX ADMIN — GABAPENTIN 100 MG: 100 CAPSULE ORAL at 14:29

## 2025-05-20 RX ADMIN — CYCLOBENZAPRINE 10 MG: 10 TABLET, FILM COATED ORAL at 15:13

## 2025-05-20 RX ADMIN — GABAPENTIN 100 MG: 100 CAPSULE ORAL at 20:59

## 2025-05-20 RX ADMIN — STANDARDIZED SENNA CONCENTRATE 17.2 MG: 8.6 TABLET ORAL at 08:51

## 2025-05-20 RX ADMIN — STANDARDIZED SENNA CONCENTRATE 17.2 MG: 8.6 TABLET ORAL at 20:59

## 2025-05-20 RX ADMIN — RANOLAZINE 500 MG: 500 TABLET, FILM COATED, EXTENDED RELEASE ORAL at 08:51

## 2025-05-20 RX ADMIN — NITROGLYCERIN 0.4 MG: 0.4 TABLET, ORALLY DISINTEGRATING SUBLINGUAL at 09:13

## 2025-05-20 RX ADMIN — OXYCODONE HYDROCHLORIDE 10 MG: 10 TABLET ORAL at 19:30

## 2025-05-20 RX ADMIN — SODIUM CHLORIDE, PRESERVATIVE FREE 10 ML: 5 INJECTION INTRAVENOUS at 08:52

## 2025-05-20 RX ADMIN — ONDANSETRON 4 MG: 2 INJECTION, SOLUTION INTRAMUSCULAR; INTRAVENOUS at 20:59

## 2025-05-20 RX ADMIN — ROSUVASTATIN 40 MG: 20 TABLET, FILM COATED ORAL at 08:52

## 2025-05-20 RX ADMIN — SODIUM CHLORIDE, PRESERVATIVE FREE 5 ML: 5 INJECTION INTRAVENOUS at 20:59

## 2025-05-20 RX ADMIN — POLYETHYLENE GLYCOL 3350 17 G: 17 POWDER, FOR SOLUTION ORAL at 08:51

## 2025-05-20 RX ADMIN — PANTOPRAZOLE SODIUM 40 MG: 40 TABLET, DELAYED RELEASE ORAL at 05:47

## 2025-05-20 ASSESSMENT — PAIN DESCRIPTION - LOCATION: LOCATION: BACK

## 2025-05-20 ASSESSMENT — PAIN DESCRIPTION - ORIENTATION
ORIENTATION: LOWER
ORIENTATION: UPPER

## 2025-05-20 ASSESSMENT — PAIN SCALES - GENERAL
PAINLEVEL_OUTOF10: 10
PAINLEVEL_OUTOF10: 6

## 2025-05-20 ASSESSMENT — PAIN DESCRIPTION - DESCRIPTORS: DESCRIPTORS: ACHING;DISCOMFORT;SORE

## 2025-05-20 NOTE — PROGRESS NOTES
Occupational Therapy  OT BEDSIDE TREATMENT NOTE   [facility]  [address], [place]        Date:2025  Patient Name: Corky Lawler  MRN: [id number]  : 1945  Room: [facility]5209     Per OT Eval:    Evaluating OT: CHERI Estrada, OTR/L  # [id number]     Referring Provider:  Vinayak Smith MD   Specific Provider Orders:  \"OT Eval and Treat\"  25     Diagnosis: Intractable back pain [M54.9]  Pathologic lumbar vertebral fracture, initial encounter [M84.48XA]  Pathological fracture of lumbosacral spine [M84.48XA]     Pt was admitted w/ w/ severe back pain r/t L2 pathologic fracture r/t mets to spine.       Pertinent Medical History:  Pt has a past medical history of AAA (abdominal aortic aneurysm), Aortic regurgitation, COPD (chronic obstructive pulmonary disease) ([facility]), Coronary atherosclerosis of native coronary artery, DJD (degenerative joint disease), HTN (hypertension), Hyperlipidemia, Mitral regurgitation, Occipital stroke ([facility]), and TIA (transient ischemic attack).,  has a past surgical history that includes hernia repair; back surgery; Appendectomy; Diagnostic Cardiac Cath Lab Procedure; Coronary artery bypass graft; Shoulder arthroscopy; Cardiac catheterization (2013); Cholecystectomy; eye surgery (2017); transesophageal echocardiogram (2018); Aorta surgery; Coronary angioplasty with stent (2021); Bladder surgery (Left, 2023); and Spine surgery (N/A, 5/15/2025).     Surgeries this admission: 5-15-25:  L1, L2 Kyphoplasty      Precautions:  Fall Risk  Spinal Precautions  TLSO when HOB > 45*     Assessment of current deficits   [x] Functional mobility                [x]ADLs                     [x] Strength                   []Cognition   [x] Functional transfers              [x] IADLs                   [x] Safety Awareness   [x]Endurance   [] Fine Coordination                 [x] Balance               []  Vision/perception     []Sensation                                                                                      []Gross Motor Coordination     [] ROM                     [] Delirium                   [] Motor Control         OT PLAN OF CARE   OT POC based on physician orders, patient diagnosis and results of clinical assessment     Frequency/Duration 3-5 days/wk for 2 weeks PRN   Specific OT Treatment to include:   * Instruction/training on adapted ADL techniques and AE recommendations to increase functional independence within precautions       * Training on energy conservation strategies, correct breathing pattern and techniques to improve independence/tolerance for self-care routine  * Functional transfer/mobility training/DME recommendations for increased independence, safety, and fall prevention  * Patient/Family education to increase follow through with safety techniques and functional independence  * Recommendation of environmental modifications for increased safety with functional transfers/mobility and ADLs  * Cognitive retraining/development of therapeutic activities to improve problem solving, judgement, memory, and attention for increased safety/participation in ADL/IADL tasks  * Therapeutic exercise to improve motor endurance, ROM, and functional strength for ADLs/functional transfers  * Therapeutic activities to facilitate/challenge dynamic balance, stand tolerance for increased safety and independence with ADLs  * Therapeutic activities to facilitate gross/fine motor skills for increased independence with ADLs  * Neuro-muscular re-education: facilitation of righting/equilibrium reactions  * Positioning to improve skin integrity, interaction with environment and functional independence  * Delirium prevention/treatment  * Manual techniques for edema management  Other:     Recommended Adaptive Equipment:  TBD as pt progresses -         Home Living:  Pt lives with his wife, Mirlande, in a 2-story  EOB:  SBA/CGA    Standing:  min/modA Sitting:     Static:  Independent     Dynamic:  Independent w/ functional ax     Standing:     Static:  Mod I  w/ AD PRN    Dynamic:  Mod I  w/ functional ax/mobility w/ AD PRN   Activity Tolerance  Fair(-)     Sitting Tolerance w/ light ax 10 mins  Standing Tolerance w/ light ax 2-4 mins     Fair-  Limited by pain, dizziness, SOB, +orthostatic hypotension    Monitoring of Vitals:  131/78 DS19-Xisizj  111/60 PG07-HLH  89/67 PR74-Standing    Fair +   Visual/  Perceptual        Hearing: WNL   Glasses: Yes        WFL   Hearing Aids:  No          Safety Fair   Good during functional activity/mobility         Hand Dominance: Right    AROM Strength Additional Info:    RUE  WFL WNL - observed WNL ;   WNL FMC/dexterity noted during ADL tasks      LUE WFL WNL - observed WNL ;    WNL FMC/dexterity noted during ADL tasks         Sensation:  Denies numbness or tingling Derian UEs   Tone: WFL Derian UEs   Edema: None Noted Derian UEs        Education:  Pt was educated on role of OT, goals to be reached, importance of OOB activity, precautions to follow, log roll technique with bed mobility, safety and hand placement with transfers, safety/balance and walker management with side steps and proper donning of TLSO brace.      Comments: Upon arrival pt supine in bed, on bed pan, agreeable to therapy, nursing present okaying pt to be seen this session, doctor also present in room. Pt being on bed pan upon arrival, encouraging pt to use of BSC with pt agreeable with encouragement. Rest breaks provided throughout session, pt being limited by pain, dizziness and SOB. Pt encouraged to sit upright in recliner chair at end of session, however pt declining. Pt repeatedly stating \"I just dont feel good\" throughout session, becoming frustrated at current situation/symptoms and assistance needed. At end of session, pt seated upright in bed with TLSO brace donned, all lines and tubes intact, call light within

## 2025-05-20 NOTE — CARE COORDINATION
05/20/25 Update Cm note: patient discharge held again yesterday due to low pulse oximeters and positive orthos. He will be NPO for bronch on 5/21/25.  He is currently on 2lnc and receiving fluids at 75hr. He has tlso brace intact. His Plavix/Imdur are on hold. CT of head repeated by neuro and negative. Will follow for readiness to discharge home with [facility]. Electronically signed by Gin Acharya RN CM on 5/20/2025 at 7:44 AM

## 2025-05-20 NOTE — PROGRESS NOTES
Internal Medicine Progress Note    Patient's name: Corky Lawler  : 1945  Chief complaints (on day of admission): Back Pain (Pt L1 and L2 pain. Pt has lumbar cancer. Pt received treatment today . Pt unable to tolerate pain. Pt denies numbness to lower extremities denies loss of bowel or bladder. )  Admission date: 2025  Date of service: 2025   Room: [address] IMCU/NEURO  Primary care physician: Aurelio Hamilton MD  Reason for visit: Follow-up for Back pain    Subjective  Corky was seen and examined at bedside     S/p kyphoplasty on 5/15/25    5/20  Still dizzy particularly with head movement to the R   Refusing MRI brain states he just does not want to go back in the machine explained we will be unable to evaluate for new stroke he voices understanding and commits to his refusal   Otherwise will re check ortho vitals today   Nursing at bedside   Treat constipation   CT head stable   Bronch tomorrow       Denies numbness tingling weakness to me   Denies fevers chills sweats   Nursing at bedside   Having dizziness today again   BP running on the low side   Recrudescence of prior stroke vs new acute stroke   No way to tell unfortunately I think he needs another MRI, last time he was in the hospital he had dizziness after anti platelet drugs were held for a few days and he ended up having an acute stroke   Spoke with Dr Swain     [facility] Medications  Current Facility-Administered Medications   Medication Dose Route Frequency Provider Last Rate Last Admin    aspirin chewable tablet 81 mg  81 mg Oral Daily Jesse Mcbride MD   81 mg at 25 1218    senna (SENOKOT) tablet 17.2 mg  2 tablet Oral BID Vinayak Smith MD   17.2 mg at 25 2100    polyethylene glycol (GLYCOLAX) packet 17 g  17 g Oral BID Vinayak Smith MD   17 g at 25 1517    fentaNYL (DURAGESIC) 50 MCG/HR 1 patch  1 patch TransDERmal Q72H Ramya Chris PA-C   1 patch at 25 1434    nitroGLYCERIN (NITROSTAT) SL tablet  0.4 mg  0.4 mg SubLINGual Q5 Min PRN Ernesto Barba DO   0.4 mg at 05/16/25 0107    cyclobenzaprine (FLEXERIL) tablet 10 mg  10 mg Oral TID PRN Vinayak Smith MD   10 mg at 05/19/25 0549    oxyCODONE HCl (OXY-IR) immediate release tablet 10 mg  10 mg Oral Q3H PRN Ramya Chris PA-C   10 mg at 05/19/25 0549    sodium chloride flush 0.9 % injection 5-40 mL  5-40 mL IntraVENous 2 times per day Vinayak Smith MD   10 mL at 05/19/25 2104    sodium chloride flush 0.9 % injection 5-40 mL  5-40 mL IntraVENous PRN Vinayak Smith MD        0.9 % sodium chloride infusion   IntraVENous PRN Vinayak Smith MD        ondansetron (ZOFRAN-ODT) disintegrating tablet 4 mg  4 mg Oral Q8H PRN Vinayak Smith MD        Or    ondansetron (ZOFRAN) injection 4 mg  4 mg IntraVENous Q6H PRN Vinayak Smith MD        acetaminophen (TYLENOL) tablet 650 mg  650 mg Oral Q6H PRN Vinayak Smith MD        Or    acetaminophen (TYLENOL) suppository 650 mg  650 mg Rectal Q6H PRN Vinayak Smith MD        gabapentin (NEURONTIN) capsule 100 mg  100 mg Oral TID Vinayak Smith MD   100 mg at 05/19/25 2104    [Held by provider] lisinopril (PRINIVIL;ZESTRIL) tablet 20 mg  20 mg Oral BID Vinayak Smith MD   20 mg at 05/14/25 2030    pantoprazole (PROTONIX) tablet 40 mg  40 mg Oral QAM AC Vinayak Smith MD   40 mg at 05/20/25 0547    ranolazine (RANEXA) extended release tablet 500 mg  500 mg Oral BID Vinayak Smith MD   500 mg at 05/19/25 2104    rosuvastatin (CRESTOR) tablet 40 mg  40 mg Oral Daily Vinayak Smith MD   40 mg at 05/19/25 0901    [Held by provider] isosorbide mononitrate (IMDUR) extended release tablet 30 mg  30 mg Oral Daily MattKemi DO   30 mg at 05/19/25 0901       PRN Medications  nitroGLYCERIN, cyclobenzaprine, oxyCODONE, sodium chloride flush, sodium chloride, ondansetron **OR** ondansetron, acetaminophen **OR** acetaminophen    Objective  Most Recent Recorded Vitals  /70   Pulse 65   Temp 97.6 °F (36.4 °C) (Temporal)   Resp

## 2025-05-20 NOTE — PROGRESS NOTES
Physical Therapy  Treatment    Name: Corky Lawler  : 1945  MRN: [id number]      Date of Service: 2025    Evaluating PT: Michele Faulkner, PT, DPT, [id number]      Room #:  2600/0469-A  Diagnosis:  Intractable back pain [M54.9]  Pathologic lumbar vertebral fracture, initial encounter [M84.48XA]  Pathological fracture of lumbosacral spine [M84.48XA]  PMHx/PSHx:   has a past medical history of AAA (abdominal aortic aneurysm), Aortic regurgitation, COPD (chronic obstructive pulmonary disease) ([facility]), Coronary atherosclerosis of native coronary artery, DJD (degenerative joint disease), HTN (hypertension), Hyperlipidemia, Mitral regurgitation, Occipital stroke ([facility]), and TIA (transient ischemic attack).   has a past surgical history that includes hernia repair; back surgery; Appendectomy; Diagnostic Cardiac Cath Lab Procedure; Coronary artery bypass graft; Shoulder arthroscopy; Cardiac catheterization (2013); Cholecystectomy; eye surgery (2017); transesophageal echocardiogram (2018); Aorta surgery; Coronary angioplasty with stent (2021); Bladder surgery (Left, 2023); and Spine surgery (N/A, 5/15/2025).  Procedure/Surgery:  L1 & L2 KYPHOPLASTY 5/15  Precautions:  Fall risk, spinal precautions, TLSO, O2, monitor BP  Equipment Needs:  TBD    SUBJECTIVE:    Pt lives with his wife in a 2 story home with 2 stair(s) and 1 rail(s) to enter. Pt independent with functional mobility PTA. Pt ambulated with no AD prior to admission. Pt owns a [employer].    OBJECTIVE:   Initial Evaluation  Date: 25 Treatment Date: 2025 Short Term/ Long Term   Goals   AM-PAC 6 Clicks 10/24 11/24    Was pt agreeable to Eval/treatment? yes Yes    Does pt have pain? 4/10 at rest, 9/10 with movement Back, abdomen    Bed Mobility  Rolling: ModA  Supine to sit: ModA  Sit to supine: ModA  Scooting: ModA Rolling: Juliano  Supine to sit: ModA  Sit to supine: ModA  Scooting: NT Rolling: Independent  Supine to sit:  maintenance  Functional transfers - physical assistance provided as needed during activity  Ambulation - physical assistance provided as needed during activity  Skilled monitoring of vitals  Skilled positioning - patient positioned optimally to protect skin/joint integrity and promote comfort    PLAN:    Patient is making fair progress towards established goals.  Will continue with current POC.      Time in  1350  Time out  1428    Total Treatment Time  38 minutes     CPT codes:  [] Gait training 42955 0 minutes  [] Manual therapy 98225 0 minutes  [x] Therapeutic activities 36929 38 minutes  [] Therapeutic exercises 78430 0 minutes  [] Neuromuscular reeducation 02216 0 minutes    Gautam Erazo, PT, DPT  [id number]

## 2025-05-20 NOTE — PLAN OF CARE
Problem: Safety - Adult  Goal: Free from fall injury  Outcome: Progressing     Problem: Chronic Conditions and Co-morbidities  Goal: Patient's chronic conditions and co-morbidity symptoms are monitored and maintained or improved  Outcome: Progressing     Problem: Discharge Planning  Goal: Discharge to home or other facility with appropriate resources  Outcome: Progressing     Problem: Pain  Goal: Verbalizes/displays adequate comfort level or baseline comfort level  Outcome: Progressing     Problem: Skin/Tissue Integrity  Goal: Skin integrity remains intact  Description: 1.  Monitor for areas of redness and/or skin breakdown2.  Assess vascular access sites hourly3.  Every 4-6 hours minimum:  Change oxygen saturation probe site4.  Every 4-6 hours:  If on nasal continuous positive airway pressure, respiratory therapy assess nares and determine need for appliance change or resting period  Outcome: Progressing     Problem: Neurosensory - Adult  Goal: Achieves stable or improved neurological status  Outcome: Progressing

## 2025-05-20 NOTE — PROGRESS NOTES
Patient was grateful for the visit. Receptive and accepted prayer. Shared he could not read so he did not want the Daily Bread

## 2025-05-20 NOTE — TELEPHONE ENCOUNTER
Call returned to wofe re: procedure tomorrow and if there was any time change from original plan for this procedure to be done outpatient. Confirmed with endoscopy  that procedure has been made an inpatient procedure since pt has been in hospital since 5/12/25. Time is still the same for the Bronchoscopy biopsy at 8am. Advised wife to check with nursing unit as to time they will take pt from his room down to endo and they will advise her when to be there tomorrow am. Advised they time is still 8am. She thanked me for the return call.

## 2025-05-20 NOTE — PROGRESS NOTES
Orthostatic bp    Lying bp: 131/78  P: 73    Sitting bp: 111/60  P: 72    Standing bp: 89/67  P:74

## 2025-05-20 NOTE — PROGRESS NOTES
Comprehensive Nutrition Assessment    Type and Reason for Visit:  Reassess    Nutrition Recommendations/Plan:   Continue current diet  Trial Ensure HP BID to further aid in wound healing and promote oral intake  Will montior     Malnutrition Assessment:  Malnutrition Status:  At risk for malnutrition (05/14/25 1349)    Context:  Acute Illness     Findings of the 6 clinical characteristics of malnutrition:  Energy Intake:  Mild decrease in energy intake  Weight Loss:  Unable to assess (d/t lack of recent wt hx on file)     Body Fat Loss:  No body fat loss     Muscle Mass Loss:  No muscle mass loss    Fluid Accumulation:  No fluid accumulation     Strength:  Not Performed    Nutrition Assessment:    Pt adm d/t lower back pain; Pt adm w/ pathological frx of lumbosacral spine; pt w/ lung CA w/ mets to spine s/p kyphoplasty 5/15; pt on CRT for CA- 6 trx left; PMhx: CVA ,COPD, CAD s/p CABG, HTN, TIA, DJD, HLD; dizziness this adm noted; pt anticipating NPO for Bronch 5/21; pt previously declined ONS, however pt now w/ surgical wounds; will trial Ensure HP BID to further aid in wound healing and promote oral intake as pt remains w/ sporadic intakes this adm; will monitor.    Nutrition Related Findings:    hyponatremia; K WNL; A&Ox4; poor dentition; active BS; no edema; I/O WNL Wound Type: Surgical Incision       Current Nutrition Intake & Therapies:    Average Meal Intake: 51-75%, 26-50% (sporadic intakes this adm)  Average Supplements Intake: None Ordered  ADULT DIET; Regular  ADULT ORAL NUTRITION SUPPLEMENT; Breakfast, Lunch; Low Calorie/High Protein Oral Supplement    Anthropometric Measures:  Height: 175.3 cm (5' 9.02\")  Ideal Body Weight (IBW): 160 lbs (73 kg)    Admission Body Weight: 98 kg (216 lb) (actual 5/12)  Current Body Weight: 97.7 kg (215 lb 6.2 oz) [facility] 134.6 % IBW. Weight Source: Bed scale  Current BMI (kg/m2): 31.8  Usual Body Weight: 98.4 kg (216 lb 14.9 oz) (4/23/25-BS)     % Weight  Change (Calculated): -3.8  Weight Adjustment For: No Adjustment                 BMI Categories: Obese Class 1 (BMI 30.0-34.9)    Estimated Daily Nutrient Needs:  Energy Requirements Based On: Formula  Weight Used for Energy Requirements: Current  Energy (kcal/day): 5796-3727 kcal (MSJx1.3SF)  Weight Used for Protein Requirements: Ideal  Protein (g/day): 1.5-1.8g/euqGKY=960-427v  Method Used for Fluid Requirements: 1 ml/kcal  Fluid (ml/day): 7527-5436    Nutrition Diagnosis:   Increased nutrient needs related to increase demand for energy/nutrients as evidenced by wounds (surgical)    Nutrition Interventions:   Food and/or Nutrient Delivery: Continue Current Diet, Start Oral Nutrition Supplement (Ensure HP BID)  Nutrition Education/Counseling: Education/Counseling initiated (ONS options/benefits)  Coordination of Nutrition Care: Continue to monitor while inpatient       Goals:  Goals: PO intake 75% or greater  Type of Goal: Continue current goal  Previous Goal Met: Progressing toward Goal(s)    Nutrition Monitoring and Evaluation:   Behavioral-Environmental Outcomes: None Identified  Food/Nutrient Intake Outcomes: Food and Nutrient Intake, Supplement Intake  Physical Signs/Symptoms Outcomes: Biochemical Data, Nutrition Focused Physical Findings, Skin, Chewing or Swallowing, Weight, GI Status, Fluid Status or Edema    Discharge Planning:    Too soon to determine     Prema Kearney RD, LD  Contact: [phone]

## 2025-05-20 NOTE — PLAN OF CARE
Received consult. Chart reviewed. Continue to hold plavix for bronchoscopy on Wednsesday.   Full consult to follow.   Joyce Rae, DO

## 2025-05-20 NOTE — PROGRESS NOTES
[facility] Neurology follow up      Corky Lawler is a 80 y.o. right handed male     Neurology following for stroke    PMH of metastatic lung cancer to the bone     Presenting for evaluation of dizziness.  Patient was admitted for kyphoplasty for back pain.  Of note his antiplatelet agents had been held preoperatively.  Prior to discharge he had developed dizziness and was noted to have positive orthostatic vitals with BP going from 127/85 when lying down to 90/61 when standing.     During prior admission in April 2025 he had had similar symptoms and was found to have a small stroke in the left occipital periventricular white matter.     There is concern for new acute stroke VS recrudescence and neurology was consulted.    5/20/25   Pt lying in bed with no family at bedside.  Advised him of repeat CTH did not show acute stroke just the same subacute stroke with no evolution.  Addressed questions and concerns.     Objective:     /70   Pulse 65   Temp 97.6 °F (36.4 °C) (Temporal)   Resp 16   Ht 1.753 m (5' 9.02\")   Wt 97.7 kg (215 lb 6.4 oz)   SpO2 95%   BMI 31.79 kg/m²     General appearance: alert, appears stated age, cooperative and no distress  Head: normocephalic, without obvious abnormality, atraumatic  Eyes: conjunctivae/corneas clear  Neck: supple, symmetrical, trachea midline   Lungs: respirations non labored   Extremities:  normal, atraumatic, no cyanosis   Skin: color, texture, turgor normal      Mental Status: Alert, oriented, thought content appropriate     Appropriate attention/concentration  Intact fundus of knowledge  Repetition intact  Intact memories      Speech: No dysarthria  Language: No aphasia    Cranial Nerves:  I: smell NA   II: visual acuity  NA   II: visual fields Full to confrontation   II: pupils SIRI   III,VII: ptosis None   III,IV,VI: extraocular muscles  Full ROM   V: mastication Normal   V: facial light touch sensation  Normal   V,VII: corneal reflex     VII: facial

## 2025-05-20 NOTE — CONSULTS
[facility]  Department of Internal Medicine  Division of Pulmonary, Critical Care and Sleep Medicine  Consult Note    DANIELA Headley MD, MD, MD, MD, MD    Patient: Corky Lawler  MRN: [id number]  : 1945    Encounter Time: 4:03 PM     Date of Admission: 2025 11:54 AM    Primary Care Physician: Aurelio Hamilton MD    Primary Pulmonologist: [facility]     Reason for Consultation: Right sided infiltrate vs edema     HISTORY OF PRESENT ILLNESS : Corky Lawler 80 y.o. male was seen in consultation regarding the above chief compliant. Patient seen and examined. Known to our group from prior admission, he presented to the emergency room on the  due to sudden worsening of his back pain. S/P L1 & L2 Kyphoplasty on 5/15/25 with Dr. Cottrell, plan was for possible discharge on  but spo2 noted to be decreased 89-90%. Was also noted to be orthostatic. At time of my evaluation today the patient remains on 2L NC. He currently denies symptoms of shortness of breath, still with some back pain. Planning for bronchoscopy tomorrow. Repeat MRI did not show a new stroke      PAST MEDICAL HISTORY:  has a past medical history of AAA (abdominal aortic aneurysm), Aortic regurgitation, COPD (chronic obstructive pulmonary disease) (HCC), Coronary atherosclerosis of native coronary artery, DJD (degenerative joint disease), HTN (hypertension), Hyperlipidemia, Mitral regurgitation, Occipital stroke (HCC), and TIA (transient ischemic attack).    SURGICAL HISTORY:  has a past surgical history that includes hernia repair; back surgery; Appendectomy; Diagnostic Cardiac Cath Lab Procedure; Coronary artery bypass graft; Shoulder arthroscopy; Cardiac catheterization (2013); Cholecystectomy; eye surgery (2017);  WO CONTRAST  Result Date: 5/12/2025  EXAMINATION: CT OF THE LUMBAR SPINE WITHOUT CONTRAST  5/12/2025 TECHNIQUE: CT of the lumbar spine was performed without the administration of intravenous contrast. Multiplanar reformatted images are provided for review. Adjustment of mA and/or kV according to patient size was utilized.  Automated exposure control, iterative reconstruction, and/or weight based adjustment of the mA/kV was utilized to reduce the radiation dose to as low as reasonably achievable. COMPARISON: CT of the lumbar spine, 04/17/2025. 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  Compared to 04/17/2025, there is now a pathological fracture along the left lateral aspect of the L2 vertebral body, at the site of the metastasis. A subtle lytic lesion is seen along the left lateral aspect of the L3 vertebral body (axial sequence, image 61).  The metastatic lesion identified in the L1 vertebral body on the previous MRI is not evident by CT. DEGENERATIVE CHANGES: Small disc bulges at multiple levels.  No significant central canal stenosis.  Moderate neural foraminal stenoses at the left L2-3 and bilateral L4-5 and L5-S1 levels. SOFT TISSUES/RETROPERITONEUM: No paraspinal mass is seen. An aorto iliac stent is seen traversing the abdominal aortic aneurysm which measures approximately 4 cm in maximal transverse dimension.     1. Enlarging lytic lesion in the left lateral L2 vertebral body, now associated with a pathological fracture. 2. New subtle  lytic lesion in the left lateral aspect of the L3 vertebral body. 3. The metastatic lesion in the L1 vertebral body, as seen on the previous MRI, is not evident by CT.     CTA HEAD W CONTRAST  Result Date: 4/23/2025  EXAMINATION: CTA OF THE HEAD WITH CONTRAST; CTA OF THE NECK 4/23/2025 12:59 pm: TECHNIQUE: CTA of the head/brain was performed with the administration of intravenous contrast. Multiplanar reformatted images are provided for review.  MIP images are provided for review. CT of the head was performed without the administration of intravenous contrast. CTA of the neck was performed with the administration of intravenous contrast. Multiplanar reformatted images are provided for review.  MIP images are provided for review. Stenosis of the internal carotid arteries measured using NASCET criteria. Automated exposure control, iterative reconstruction, and/or weight based adjustment of the mA/kV was utilized to reduce the radiation dose to as low as reasonably achievable. COMPARISON: MRI of the brain from yesterday. CT pulmonary angiogram from 04/17/2025. HISTORY: ORDERING SYSTEM PROVIDED HISTORY: occipital stroke TECHNOLOGIST PROVIDED HISTORY: Reason for exam:->occipital stroke Has a \"code stroke\" or \"stroke alert\" been called?-> What reading provider will be dictating this exam?->CRC FINDINGS: CT HEAD: BRAIN/VENTRICLES:  The small area of restricted diffusion seen in the left periventricular posterior parietooccipital region is not seen to have a corresponding abnormality on today's CT. There is no change in a small area cortical and subcortical encephalomalacia of the posteromedial right parietooccipital region, consistent with an old small distal posterior right PCA infarct. No acute intracranial hemorrhage or extraaxial fluid collection. Grey-white differentiation is maintained.  No evidence of mass, mass effect or midline shift.  No evidence of hydrocephalus. ORBITS: The visualized portion of the orbits  noncalcified plaque.  The rest of the left vertebral artery is widely patent. SOFT TISSUES: There is no change in the large, lobulated mass filling the right hilum and invading the right mediastinum, with continued moderate encasement of the right upper lobe bronchus. There is a new mild right pleural effusion. There is increasing patchy infiltrate around the mass in the posterior right lower lobe, consistent with developing postobstructive pneumonia. The visualized left upper lung is clear. No cervical lymphadenopathy.  The larynx and pharynx are unremarkable.  No acute abnormality of the salivary and thyroid glands. BONES: No acute osseous abnormality. CTA HEAD: ANTERIOR CIRCULATION: No significant stenosis of the intracranial internal carotid, anterior cerebral, or middle cerebral arteries. No aneurysm. The anterior communicating artery is patent. POSTERIOR CIRCULATION: No significant stenosis of the codominant vertebral, basilar, or posterior cerebral arteries. No aneurysm. I cannot identify either of the posterior communicating arteries, a variant of normal. OTHER: No dural venous sinus thrombosis on this non-dedicated study.     CT HEAD: 1. The small area of restricted diffusion seen in the left periventricular posterior parietooccipital region on yesterday's MRI is not seen to have a corresponding abnormality on today's CT. 2. No acute intracranial hemorrhage. 3. 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 CTA NECK: 1. No carotid artery stenosis. 2. 50% stenosis of the origin of the codominant left vertebral artery from moderate calcified plaque. 3. 50-70% stenosis of the origin of the codominant right vertebral artery from noncalcified plaque. 4. No change in the large, lobulated mass filling the right hilum and invading the right mediastinum, with continued moderate encasement of the right upper lobe bronchus. 5. New mild right pleural effusion. 6. Increasing patchy infiltrate around the  sellar/suprasellar regions appear unremarkable.  The normal signal voids within the major intracranial vessels appear maintained. ORBITS: The visualized portion of the orbits demonstrate no acute abnormality. SINUSES: There is scattered minimal mucosal thickening in the paranasal sinuses.  There is minimal left mastoid effusion. BONES/SOFT TISSUES: The bone marrow signal intensity appears normal. The soft tissues demonstrate no acute abnormality.     Small acute to subacute infarction in left occipital periventricular white matter. Old infarction in the right occipital lobe. Mild parenchymal volume loss. Mild chronic microvascular disease.     MRI THORACIC SPINE WO CONTRAST  Result Date: 4/21/2025  EXAMINATION: MRI OF THE THORACIC SPINE WITHOUT CONTRAST  4/21/2025 1:57 pm TECHNIQUE: Multiplanar multisequence MRI of the thoracic spine was performed without the administration of intravenous contrast. COMPARISON: CT chest and CT thoracic spine performed 04/17/2025 HISTORY: ORDERING SYSTEM PROVIDED HISTORY: r.o metastasis TECHNOLOGIST PROVIDED HISTORY: Reason for exam:->r.o metastasis FINDINGS: BONES/ALIGNMENT: There is normal alignment of the spine.  Background marrow signal is heterogeneous with multifocal T1 hyperintense signal which could represent fatty infiltration.  No evidence of acute fracture.  No definite suspicious focal STIR hyperintense marrow signal lesion is identified. SPINAL CORD: No abnormal cord signal is seen. SOFT TISSUES: No definite paraspinal mass identified.  Right lung mass and mediastinal lymphadenopathy are partially visualized, better characterized on previous CT. DEGENERATIVE CHANGES: There is multilevel disc desiccation with mild posterior disc bulges within the mid to lower thoracic spine, from T6-T7 through T10-T11.  No significant spinal canal stenosis or neural foraminal narrowing of the thoracic spine.     No definite evidence of metastatic disease in the thoracic spine. Postcontrast

## 2025-05-21 ENCOUNTER — APPOINTMENT (OUTPATIENT)
Dept: RADIATION ONCOLOGY | Age: 80
End: 2025-05-21
Payer: MEDICARE

## 2025-05-21 ENCOUNTER — APPOINTMENT (OUTPATIENT)
Dept: GENERAL RADIOLOGY | Age: 80
DRG: 542 | End: 2025-05-21
Payer: MEDICARE

## 2025-05-21 ENCOUNTER — ANESTHESIA (OUTPATIENT)
Dept: ENDOSCOPY | Age: 80
End: 2025-05-21
Payer: MEDICARE

## 2025-05-21 ENCOUNTER — APPOINTMENT (OUTPATIENT)
Dept: CT IMAGING | Age: 80
DRG: 542 | End: 2025-05-21
Attending: INTERNAL MEDICINE
Payer: MEDICARE

## 2025-05-21 PROBLEM — Z51.5 PALLIATIVE CARE ENCOUNTER: Status: ACTIVE | Noted: 2025-05-21

## 2025-05-21 PROBLEM — Z71.89 GOALS OF CARE, COUNSELING/DISCUSSION: Status: ACTIVE | Noted: 2025-05-21

## 2025-05-21 LAB
ALBUMIN SERPL-MCNC: 3.3 G/DL (ref 3.5–5.2)
ALBUMIN SERPL-MCNC: 9.9 G/DL (ref 3.5–5.2)
ALP SERPL-CCNC: 57 U/L (ref 40–129)
ALP SERPL-CCNC: 92 U/L (ref 40–129)
ALT SERPL-CCNC: 14 U/L (ref 0–50)
ALT SERPL-CCNC: 7 U/L (ref 0–50)
ANION GAP SERPL CALCULATED.3IONS-SCNC: 13 MMOL/L (ref 7–16)
ANION GAP SERPL CALCULATED.3IONS-SCNC: 19 MMOL/L (ref 7–16)
ANION GAP SERPL CALCULATED.3IONS-SCNC: 38 MMOL/L (ref 7–16)
APPEARANCE FLD: NORMAL
AST SERPL-CCNC: 14 U/L (ref 0–50)
AST SERPL-CCNC: 24 U/L (ref 0–50)
B PARAP IS1001 DNA NPH QL NAA+NON-PROBE: NOT DETECTED
B PERT DNA SPEC QL NAA+PROBE: NOT DETECTED
B.E.: 3.2 MMOL/L (ref -3–3)
B.E.: 3.5 MMOL/L (ref -3–3)
BACTERIA URNS QL MICRO: ABNORMAL
BASOPHILS # BLD: 0 K/UL (ref 0–0.2)
BASOPHILS # BLD: 0.05 K/UL (ref 0–0.2)
BASOPHILS NFR BLD: 0 % (ref 0–2)
BASOPHILS NFR BLD: 0 % (ref 0–2)
BILIRUB DIRECT SERPL-MCNC: 0.3 MG/DL (ref 0–0.2)
BILIRUB INDIRECT SERPL-MCNC: 0.5 MG/DL (ref 0–1)
BILIRUB SERPL-MCNC: 0.6 MG/DL (ref 0–1.2)
BILIRUB SERPL-MCNC: 0.8 MG/DL (ref 0–1.2)
BILIRUB UR QL STRIP: ABNORMAL
BODY FLD TYPE: NORMAL
BUN SERPL-MCNC: 21 MG/DL (ref 8–23)
BUN SERPL-MCNC: 23 MG/DL (ref 8–23)
BUN SERPL-MCNC: 25 MG/DL (ref 8–23)
C PNEUM DNA NPH QL NAA+NON-PROBE: NOT DETECTED
CALCIUM SERPL-MCNC: 6.2 MG/DL (ref 8.8–10.2)
CALCIUM SERPL-MCNC: 8.8 MG/DL (ref 8.8–10.2)
CALCIUM SERPL-MCNC: 9.1 MG/DL (ref 8.8–10.2)
CASTS #/AREA URNS LPF: ABNORMAL /LPF
CHLORIDE SERPL-SCNC: 83 MMOL/L (ref 98–107)
CHLORIDE SERPL-SCNC: 91 MMOL/L (ref 98–107)
CHLORIDE SERPL-SCNC: 93 MMOL/L (ref 98–107)
CLARITY UR: ABNORMAL
CLOT CHECK: NORMAL
CO2 SERPL-SCNC: 19 MMOL/L (ref 22–29)
CO2 SERPL-SCNC: 26 MMOL/L (ref 22–29)
CO2 SERPL-SCNC: 29 MMOL/L (ref 22–29)
COHB: 1.1 % (ref 0–1.5)
COHB: 1.1 % (ref 0–1.5)
COLOR FLD: NORMAL
COLOR UR: ABNORMAL
CREAT SERPL-MCNC: 0.9 MG/DL (ref 0.7–1.2)
CREAT SERPL-MCNC: 1.1 MG/DL (ref 0.7–1.2)
CREAT SERPL-MCNC: 1.4 MG/DL (ref 0.7–1.2)
CRITICAL: ABNORMAL
CRITICAL: ABNORMAL
CRP SERPL HS-MCNC: 156 MG/L (ref 0–5)
DATE ANALYZED: ABNORMAL
DATE ANALYZED: ABNORMAL
DATE OF COLLECTION: ABNORMAL
DATE OF COLLECTION: ABNORMAL
EOSINOPHIL # BLD: 0 K/UL (ref 0.05–0.5)
EOSINOPHIL # BLD: 0 K/UL (ref 0.05–0.5)
EOSINOPHILS RELATIVE PERCENT: 0 % (ref 0–6)
EOSINOPHILS RELATIVE PERCENT: 0 % (ref 0–6)
ERYTHROCYTE [DISTWIDTH] IN BLOOD BY AUTOMATED COUNT: 15.3 % (ref 11.5–15)
ERYTHROCYTE [DISTWIDTH] IN BLOOD BY AUTOMATED COUNT: 15.5 % (ref 11.5–15)
ERYTHROCYTE [SEDIMENTATION RATE] IN BLOOD BY WESTERGREN METHOD: 50 MM/HR (ref 0–15)
FLUAV RNA NPH QL NAA+NON-PROBE: NOT DETECTED
FLUBV RNA NPH QL NAA+NON-PROBE: NOT DETECTED
GFR, ESTIMATED: 54 ML/MIN/1.73M2
GFR, ESTIMATED: 71 ML/MIN/1.73M2
GFR, ESTIMATED: 88 ML/MIN/1.73M2
GLUCOSE BLD-MCNC: 205 MG/DL (ref 74–99)
GLUCOSE FLD-MCNC: 187 MG/DL
GLUCOSE SERPL-MCNC: 182 MG/DL (ref 74–99)
GLUCOSE SERPL-MCNC: 190 MG/DL (ref 74–99)
GLUCOSE SERPL-MCNC: 251 MG/DL (ref 74–99)
GLUCOSE UR STRIP-MCNC: NEGATIVE MG/DL
HADV DNA NPH QL NAA+NON-PROBE: NOT DETECTED
HCO3: 26 MMOL/L (ref 22–26)
HCO3: 26.3 MMOL/L (ref 22–26)
HCOV 229E RNA NPH QL NAA+NON-PROBE: NOT DETECTED
HCOV HKU1 RNA NPH QL NAA+NON-PROBE: NOT DETECTED
HCOV NL63 RNA NPH QL NAA+NON-PROBE: NOT DETECTED
HCOV OC43 RNA NPH QL NAA+NON-PROBE: NOT DETECTED
HCT VFR BLD AUTO: 41.8 % (ref 37–54)
HCT VFR BLD AUTO: 48.5 % (ref 37–54)
HGB BLD-MCNC: 13.9 G/DL (ref 12.5–16.5)
HGB BLD-MCNC: 15.7 G/DL (ref 12.5–16.5)
HGB UR QL STRIP.AUTO: ABNORMAL
HHB: 2.1 % (ref 0–5)
HHB: 4.5 % (ref 0–5)
HMPV RNA NPH QL NAA+NON-PROBE: NOT DETECTED
HPIV1 RNA NPH QL NAA+NON-PROBE: NOT DETECTED
HPIV2 RNA NPH QL NAA+NON-PROBE: NOT DETECTED
HPIV3 RNA NPH QL NAA+NON-PROBE: NOT DETECTED
HPIV4 RNA NPH QL NAA+NON-PROBE: NOT DETECTED
IMM GRANULOCYTES # BLD AUTO: 0.47 K/UL (ref 0–0.58)
IMM GRANULOCYTES NFR BLD: 2 % (ref 0–5)
INR PPP: 1.4
KETONES UR STRIP-MCNC: NEGATIVE MG/DL
LAB: ABNORMAL
LAB: ABNORMAL
LACTATE BLDV-SCNC: 3.8 MMOL/L (ref 0.5–2.2)
LACTATE BLDV-SCNC: 7.7 MMOL/L (ref 0.5–2.2)
LDH FLD L TO P-CCNC: 489 U/L
LEUKOCYTE ESTERASE UR QL STRIP: ABNORMAL
LIPASE SERPL-CCNC: 17 U/L (ref 13–60)
LYMPHOCYTES NFR BLD: 0.59 K/UL (ref 1.5–4)
LYMPHOCYTES NFR BLD: 0.64 K/UL (ref 1.5–4)
LYMPHOCYTES RELATIVE PERCENT: 2 % (ref 20–42)
LYMPHOCYTES RELATIVE PERCENT: 3 % (ref 20–42)
Lab: 1100
Lab: 1210
M PNEUMO DNA NPH QL NAA+NON-PROBE: NOT DETECTED
MCH RBC QN AUTO: 29.6 PG (ref 26–35)
MCH RBC QN AUTO: 29.8 PG (ref 26–35)
MCHC RBC AUTO-ENTMCNC: 32.4 G/DL (ref 32–34.5)
MCHC RBC AUTO-ENTMCNC: 33.3 G/DL (ref 32–34.5)
MCV RBC AUTO: 89.1 FL (ref 80–99.9)
MCV RBC AUTO: 92 FL (ref 80–99.9)
METHB: 0.2 % (ref 0–1.5)
METHB: 0.3 % (ref 0–1.5)
MODE: ABNORMAL
MODE: ABNORMAL
MONOCYTES NFR BLD: 1.43 K/UL (ref 0.1–0.95)
MONOCYTES NFR BLD: 10 % (ref 2–12)
MONOCYTES NFR BLD: 2.36 K/UL (ref 0.1–0.95)
MONOCYTES NFR BLD: 5 % (ref 2–12)
MONOCYTES NFR FLD: 83 %
MUCOUS THREADS URNS QL MICRO: PRESENT
NEUTROPHILS NFR BLD: 87 % (ref 43–80)
NEUTROPHILS NFR BLD: 92 % (ref 43–80)
NEUTROPHILS NFR FLD: 17 %
NEUTS SEG NFR BLD: 19.65 K/UL (ref 1.8–7.3)
NEUTS SEG NFR BLD: 28.63 K/UL (ref 1.8–7.3)
NITRITE UR QL STRIP: NEGATIVE
O2 SATURATION: 95.4 % (ref 92–98.5)
O2 SATURATION: 97.9 % (ref 92–98.5)
O2HB: 94.1 % (ref 94–97)
O2HB: 96.6 % (ref 94–97)
OPERATOR ID: 2067
OPERATOR ID: 2067
PATIENT TEMP: 37 C
PATIENT TEMP: 37 C
PCO2: 34.1 MMHG (ref 35–45)
PCO2: 34.4 MMHG (ref 35–45)
PH BLOOD GAS: 7.5 (ref 7.35–7.45)
PH BLOOD GAS: 7.5 (ref 7.35–7.45)
PH UR STRIP: 6 [PH] (ref 5–8)
PLATELET # BLD AUTO: 286 K/UL (ref 130–450)
PLATELET # BLD AUTO: 457 K/UL (ref 130–450)
PMV BLD AUTO: 10.3 FL (ref 7–12)
PMV BLD AUTO: 10.3 FL (ref 7–12)
PO2: 75.8 MMHG (ref 75–100)
PO2: 96.3 MMHG (ref 75–100)
POTASSIUM SERPL-SCNC: 2.8 MMOL/L (ref 3.5–5.1)
POTASSIUM SERPL-SCNC: 3.6 MMOL/L (ref 3.5–5.1)
POTASSIUM SERPL-SCNC: 4.2 MMOL/L (ref 3.5–5.1)
PROCALCITONIN SERPL-MCNC: 0.11 NG/ML (ref 0–0.08)
PROT FLD-MCNC: 4 G/DL
PROT SERPL-MCNC: 11.5 G/DL (ref 6.4–8.3)
PROT SERPL-MCNC: 6.9 G/DL (ref 6.4–8.3)
PROT UR STRIP-MCNC: 30 MG/DL
PROTHROMBIN TIME: 15.8 SEC (ref 9.3–12.4)
RBC # BLD AUTO: 4.69 M/UL (ref 3.8–5.8)
RBC # BLD AUTO: 5.27 M/UL (ref 3.8–5.8)
RBC # BLD: ABNORMAL 10*6/UL
RBC # FLD: NORMAL CELLS/UL
RBC #/AREA URNS HPF: ABNORMAL /HPF
RSV RNA NPH QL NAA+NON-PROBE: NOT DETECTED
RV+EV RNA NPH QL NAA+NON-PROBE: NOT DETECTED
SARS-COV-2 RNA NPH QL NAA+NON-PROBE: NOT DETECTED
SODIUM SERPL-SCNC: 135 MMOL/L (ref 136–145)
SODIUM SERPL-SCNC: 136 MMOL/L (ref 136–145)
SODIUM SERPL-SCNC: 139 MMOL/L (ref 136–145)
SOURCE, BLOOD GAS: ABNORMAL
SOURCE, BLOOD GAS: ABNORMAL
SP GR UR STRIP: 1.02 (ref 1–1.03)
SPECIMEN DESCRIPTION: NORMAL
SPECIMEN TYPE: NORMAL
THB: 14.4 G/DL (ref 11.5–16.5)
THB: 15 G/DL (ref 11.5–16.5)
TIME ANALYZED: 1107
TIME ANALYZED: 1244
TROPONIN I SERPL HS-MCNC: 54 NG/L (ref 0–22)
UROBILINOGEN UR STRIP-ACNC: 2 EU/DL (ref 0–1)
WBC # FLD: 939 CELLS/UL
WBC #/AREA URNS HPF: ABNORMAL /HPF
WBC OTHER # BLD: 22.6 K/UL (ref 4.5–11.5)
WBC OTHER # BLD: 31.2 K/UL (ref 4.5–11.5)

## 2025-05-21 PROCEDURE — 6370000000 HC RX 637 (ALT 250 FOR IP): Performed by: STUDENT IN AN ORGANIZED HEALTH CARE EDUCATION/TRAINING PROGRAM

## 2025-05-21 PROCEDURE — 87449 NOS EACH ORGANISM AG IA: CPT

## 2025-05-21 PROCEDURE — 2580000003 HC RX 258: Performed by: STUDENT IN AN ORGANIZED HEALTH CARE EDUCATION/TRAINING PROGRAM

## 2025-05-21 PROCEDURE — 71045 X-RAY EXAM CHEST 1 VIEW: CPT

## 2025-05-21 PROCEDURE — 36415 COLL VENOUS BLD VENIPUNCTURE: CPT

## 2025-05-21 PROCEDURE — 87040 BLOOD CULTURE FOR BACTERIA: CPT

## 2025-05-21 PROCEDURE — 87205 SMEAR GRAM STAIN: CPT

## 2025-05-21 PROCEDURE — 93005 ELECTROCARDIOGRAM TRACING: CPT | Performed by: STUDENT IN AN ORGANIZED HEALTH CARE EDUCATION/TRAINING PROGRAM

## 2025-05-21 PROCEDURE — 86920 COMPATIBILITY TEST SPIN: CPT

## 2025-05-21 PROCEDURE — 84145 PROCALCITONIN (PCT): CPT

## 2025-05-21 PROCEDURE — 6360000002 HC RX W HCPCS: Performed by: STUDENT IN AN ORGANIZED HEALTH CARE EDUCATION/TRAINING PROGRAM

## 2025-05-21 PROCEDURE — 86850 RBC ANTIBODY SCREEN: CPT

## 2025-05-21 PROCEDURE — 82805 BLOOD GASES W/O2 SATURATION: CPT

## 2025-05-21 PROCEDURE — 0202U NFCT DS 22 TRGT SARS-COV-2: CPT

## 2025-05-21 PROCEDURE — 6370000000 HC RX 637 (ALT 250 FOR IP): Performed by: PHYSICIAN ASSISTANT

## 2025-05-21 PROCEDURE — P9047 ALBUMIN (HUMAN), 25%, 50ML: HCPCS | Performed by: STUDENT IN AN ORGANIZED HEALTH CARE EDUCATION/TRAINING PROGRAM

## 2025-05-21 PROCEDURE — 82248 BILIRUBIN DIRECT: CPT

## 2025-05-21 PROCEDURE — 86140 C-REACTIVE PROTEIN: CPT

## 2025-05-21 PROCEDURE — 2700000000 HC OXYGEN THERAPY PER DAY

## 2025-05-21 PROCEDURE — 51702 INSERT TEMP BLADDER CATH: CPT

## 2025-05-21 PROCEDURE — 86901 BLOOD TYPING SEROLOGIC RH(D): CPT

## 2025-05-21 PROCEDURE — 83605 ASSAY OF LACTIC ACID: CPT

## 2025-05-21 PROCEDURE — 85610 PROTHROMBIN TIME: CPT

## 2025-05-21 PROCEDURE — 84157 ASSAY OF PROTEIN OTHER: CPT

## 2025-05-21 PROCEDURE — P9047 ALBUMIN (HUMAN), 25%, 50ML: HCPCS | Performed by: INTERNAL MEDICINE

## 2025-05-21 PROCEDURE — 89051 BODY FLUID CELL COUNT: CPT

## 2025-05-21 PROCEDURE — 84484 ASSAY OF TROPONIN QUANT: CPT

## 2025-05-21 PROCEDURE — 0D9670Z DRAINAGE OF STOMACH WITH DRAINAGE DEVICE, VIA NATURAL OR ARTIFICIAL OPENING: ICD-10-PCS | Performed by: STUDENT IN AN ORGANIZED HEALTH CARE EDUCATION/TRAINING PROGRAM

## 2025-05-21 PROCEDURE — 88112 CYTOPATH CELL ENHANCE TECH: CPT

## 2025-05-21 PROCEDURE — 2500000003 HC RX 250 WO HCPCS: Performed by: STUDENT IN AN ORGANIZED HEALTH CARE EDUCATION/TRAINING PROGRAM

## 2025-05-21 PROCEDURE — 87899 AGENT NOS ASSAY W/OPTIC: CPT

## 2025-05-21 PROCEDURE — 87070 CULTURE OTHR SPECIMN AEROBIC: CPT

## 2025-05-21 PROCEDURE — 81001 URINALYSIS AUTO W/SCOPE: CPT

## 2025-05-21 PROCEDURE — 83615 LACTATE (LD) (LDH) ENZYME: CPT

## 2025-05-21 PROCEDURE — 80053 COMPREHEN METABOLIC PANEL: CPT

## 2025-05-21 PROCEDURE — 86900 BLOOD TYPING SEROLOGIC ABO: CPT

## 2025-05-21 PROCEDURE — 74018 RADEX ABDOMEN 1 VIEW: CPT

## 2025-05-21 PROCEDURE — 74176 CT ABD & PELVIS W/O CONTRAST: CPT

## 2025-05-21 PROCEDURE — 6360000002 HC RX W HCPCS: Performed by: SPECIALIST

## 2025-05-21 PROCEDURE — 87086 URINE CULTURE/COLONY COUNT: CPT

## 2025-05-21 PROCEDURE — 85025 COMPLETE CBC W/AUTO DIFF WBC: CPT

## 2025-05-21 PROCEDURE — 0W993ZZ DRAINAGE OF RIGHT PLEURAL CAVITY, PERCUTANEOUS APPROACH: ICD-10-PCS | Performed by: STUDENT IN AN ORGANIZED HEALTH CARE EDUCATION/TRAINING PROGRAM

## 2025-05-21 PROCEDURE — 88305 TISSUE EXAM BY PATHOLOGIST: CPT

## 2025-05-21 PROCEDURE — 85652 RBC SED RATE AUTOMATED: CPT

## 2025-05-21 PROCEDURE — 86922 COMPATIBILITY TEST ANTIGLOB: CPT

## 2025-05-21 PROCEDURE — 99232 SBSQ HOSP IP/OBS MODERATE 35: CPT | Performed by: PHYSICIAN ASSISTANT

## 2025-05-21 PROCEDURE — 2000000000 HC ICU R&B

## 2025-05-21 PROCEDURE — 83690 ASSAY OF LIPASE: CPT

## 2025-05-21 PROCEDURE — 99233 SBSQ HOSP IP/OBS HIGH 50: CPT | Performed by: INTERNAL MEDICINE

## 2025-05-21 PROCEDURE — 82945 GLUCOSE OTHER FLUID: CPT

## 2025-05-21 PROCEDURE — 2500000003 HC RX 250 WO HCPCS: Performed by: ANESTHESIOLOGY

## 2025-05-21 PROCEDURE — 80048 BASIC METABOLIC PNL TOTAL CA: CPT

## 2025-05-21 PROCEDURE — 99291 CRITICAL CARE FIRST HOUR: CPT | Performed by: STUDENT IN AN ORGANIZED HEALTH CARE EDUCATION/TRAINING PROGRAM

## 2025-05-21 PROCEDURE — 82962 GLUCOSE BLOOD TEST: CPT

## 2025-05-21 PROCEDURE — 6360000002 HC RX W HCPCS: Performed by: INTERNAL MEDICINE

## 2025-05-21 RX ORDER — LIDOCAINE HYDROCHLORIDE 20 MG/ML
JELLY TOPICAL ONCE
Status: DISCONTINUED | OUTPATIENT
Start: 2025-05-21 | End: 2025-05-29 | Stop reason: HOSPADM

## 2025-05-21 RX ORDER — SODIUM CHLORIDE, SODIUM LACTATE, POTASSIUM CHLORIDE, AND CALCIUM CHLORIDE .6; .31; .03; .02 G/100ML; G/100ML; G/100ML; G/100ML
1000 INJECTION, SOLUTION INTRAVENOUS ONCE
Status: COMPLETED | OUTPATIENT
Start: 2025-05-21 | End: 2025-05-21

## 2025-05-21 RX ORDER — NALOXONE HYDROCHLORIDE 0.4 MG/ML
INJECTION, SOLUTION INTRAMUSCULAR; INTRAVENOUS; SUBCUTANEOUS PRN
Status: DISCONTINUED | OUTPATIENT
Start: 2025-05-21 | End: 2025-05-22 | Stop reason: HOSPADM

## 2025-05-21 RX ORDER — MEPERIDINE HYDROCHLORIDE 25 MG/ML
12.5 INJECTION INTRAMUSCULAR; INTRAVENOUS; SUBCUTANEOUS EVERY 5 MIN PRN
Status: DISCONTINUED | OUTPATIENT
Start: 2025-05-21 | End: 2025-05-22 | Stop reason: HOSPADM

## 2025-05-21 RX ORDER — SODIUM CHLORIDE 0.9 % (FLUSH) 0.9 %
5-40 SYRINGE (ML) INJECTION EVERY 12 HOURS SCHEDULED
Status: DISCONTINUED | OUTPATIENT
Start: 2025-05-21 | End: 2025-05-22 | Stop reason: HOSPADM

## 2025-05-21 RX ORDER — IPRATROPIUM BROMIDE AND ALBUTEROL SULFATE 2.5; .5 MG/3ML; MG/3ML
1 SOLUTION RESPIRATORY (INHALATION)
Status: DISCONTINUED | OUTPATIENT
Start: 2025-05-21 | End: 2025-05-22 | Stop reason: HOSPADM

## 2025-05-21 RX ORDER — HYDRALAZINE HYDROCHLORIDE 20 MG/ML
10 INJECTION INTRAMUSCULAR; INTRAVENOUS
Status: DISCONTINUED | OUTPATIENT
Start: 2025-05-21 | End: 2025-05-22 | Stop reason: HOSPADM

## 2025-05-21 RX ORDER — ALBUMIN (HUMAN) 12.5 G/50ML
25 SOLUTION INTRAVENOUS ONCE
Status: COMPLETED | OUTPATIENT
Start: 2025-05-22 | End: 2025-05-22

## 2025-05-21 RX ORDER — NOREPINEPHRINE BITARTRATE 0.06 MG/ML
1-100 INJECTION, SOLUTION INTRAVENOUS CONTINUOUS
Status: DISCONTINUED | OUTPATIENT
Start: 2025-05-22 | End: 2025-05-29 | Stop reason: HOSPADM

## 2025-05-21 RX ORDER — LABETALOL HYDROCHLORIDE 5 MG/ML
10 INJECTION, SOLUTION INTRAVENOUS
Status: DISCONTINUED | OUTPATIENT
Start: 2025-05-21 | End: 2025-05-22 | Stop reason: HOSPADM

## 2025-05-21 RX ORDER — HYDROMORPHONE HYDROCHLORIDE 1 MG/ML
0.25 INJECTION, SOLUTION INTRAMUSCULAR; INTRAVENOUS; SUBCUTANEOUS EVERY 5 MIN PRN
Status: DISCONTINUED | OUTPATIENT
Start: 2025-05-21 | End: 2025-05-22 | Stop reason: HOSPADM

## 2025-05-21 RX ORDER — HYDROMORPHONE HYDROCHLORIDE 1 MG/ML
0.5 INJECTION, SOLUTION INTRAMUSCULAR; INTRAVENOUS; SUBCUTANEOUS EVERY 5 MIN PRN
Status: DISCONTINUED | OUTPATIENT
Start: 2025-05-21 | End: 2025-05-22 | Stop reason: HOSPADM

## 2025-05-21 RX ORDER — SODIUM CHLORIDE 9 MG/ML
INJECTION, SOLUTION INTRAVENOUS PRN
Status: DISCONTINUED | OUTPATIENT
Start: 2025-05-21 | End: 2025-05-22 | Stop reason: HOSPADM

## 2025-05-21 RX ORDER — PROCHLORPERAZINE EDISYLATE 5 MG/ML
10 INJECTION INTRAMUSCULAR; INTRAVENOUS EVERY 6 HOURS PRN
Status: DISCONTINUED | OUTPATIENT
Start: 2025-05-21 | End: 2025-05-29 | Stop reason: HOSPADM

## 2025-05-21 RX ORDER — ONDANSETRON 2 MG/ML
4 INJECTION INTRAMUSCULAR; INTRAVENOUS
Status: DISCONTINUED | OUTPATIENT
Start: 2025-05-21 | End: 2025-05-22 | Stop reason: HOSPADM

## 2025-05-21 RX ORDER — LINEZOLID 2 MG/ML
600 INJECTION, SOLUTION INTRAVENOUS EVERY 12 HOURS
Status: DISCONTINUED | OUTPATIENT
Start: 2025-05-21 | End: 2025-05-23

## 2025-05-21 RX ORDER — ALBUMIN (HUMAN) 12.5 G/50ML
25 SOLUTION INTRAVENOUS ONCE
Status: COMPLETED | OUTPATIENT
Start: 2025-05-21 | End: 2025-05-21

## 2025-05-21 RX ORDER — MIDAZOLAM HYDROCHLORIDE 2 MG/2ML
2 INJECTION, SOLUTION INTRAMUSCULAR; INTRAVENOUS
Status: DISCONTINUED | OUTPATIENT
Start: 2025-05-21 | End: 2025-05-22 | Stop reason: HOSPADM

## 2025-05-21 RX ORDER — SODIUM CHLORIDE, SODIUM LACTATE, POTASSIUM CHLORIDE, CALCIUM CHLORIDE 600; 310; 30; 20 MG/100ML; MG/100ML; MG/100ML; MG/100ML
INJECTION, SOLUTION INTRAVENOUS CONTINUOUS
Status: DISCONTINUED | OUTPATIENT
Start: 2025-05-21 | End: 2025-05-22

## 2025-05-21 RX ORDER — OXYMETAZOLINE HYDROCHLORIDE 0.05 G/100ML
2 SPRAY NASAL ONCE
Status: DISPENSED | OUTPATIENT
Start: 2025-05-21 | End: 2025-05-24

## 2025-05-21 RX ORDER — SODIUM CHLORIDE 9 MG/ML
INJECTION, SOLUTION INTRAVENOUS CONTINUOUS
Status: DISCONTINUED | OUTPATIENT
Start: 2025-05-21 | End: 2025-05-21

## 2025-05-21 RX ORDER — ALBUTEROL SULFATE 0.83 MG/ML
2.5 SOLUTION RESPIRATORY (INHALATION) ONCE
Status: COMPLETED | OUTPATIENT
Start: 2025-05-21 | End: 2025-05-22

## 2025-05-21 RX ORDER — SODIUM CHLORIDE 0.9 % (FLUSH) 0.9 %
5-40 SYRINGE (ML) INJECTION PRN
Status: DISCONTINUED | OUTPATIENT
Start: 2025-05-21 | End: 2025-05-22 | Stop reason: HOSPADM

## 2025-05-21 RX ADMIN — PIPERACILLIN AND TAZOBACTAM 3375 MG: 3; .375 INJECTION, POWDER, LYOPHILIZED, FOR SOLUTION INTRAVENOUS at 16:21

## 2025-05-21 RX ADMIN — ONDANSETRON 4 MG: 2 INJECTION, SOLUTION INTRAMUSCULAR; INTRAVENOUS at 08:01

## 2025-05-21 RX ADMIN — PROCHLORPERAZINE EDISYLATE 10 MG: 5 INJECTION INTRAMUSCULAR; INTRAVENOUS at 09:11

## 2025-05-21 RX ADMIN — SODIUM CHLORIDE, POTASSIUM CHLORIDE, SODIUM LACTATE AND CALCIUM CHLORIDE: 600; 310; 30; 20 INJECTION, SOLUTION INTRAVENOUS at 18:56

## 2025-05-21 RX ADMIN — POTASSIUM BICARBONATE 40 MEQ: 782 TABLET, EFFERVESCENT ORAL at 18:54

## 2025-05-21 RX ADMIN — ONDANSETRON 4 MG: 2 INJECTION, SOLUTION INTRAMUSCULAR; INTRAVENOUS at 04:36

## 2025-05-21 RX ADMIN — WATER 125 MG: 1 INJECTION INTRAMUSCULAR; INTRAVENOUS; SUBCUTANEOUS at 10:46

## 2025-05-21 RX ADMIN — RANOLAZINE 500 MG: 500 TABLET, FILM COATED, EXTENDED RELEASE ORAL at 20:48

## 2025-05-21 RX ADMIN — SODIUM CHLORIDE, POTASSIUM CHLORIDE, SODIUM LACTATE AND CALCIUM CHLORIDE 100 ML/HR: 600; 310; 30; 20 INJECTION, SOLUTION INTRAVENOUS at 14:01

## 2025-05-21 RX ADMIN — NITROGLYCERIN 0.4 MG: 0.4 TABLET, ORALLY DISINTEGRATING SUBLINGUAL at 01:15

## 2025-05-21 RX ADMIN — POTASSIUM PHOSPHATE, MONOBASIC AND POTASSIUM PHOSPHATE, DIBASIC 30 MMOL: 224; 236 INJECTION, SOLUTION, CONCENTRATE INTRAVENOUS at 18:53

## 2025-05-21 RX ADMIN — SODIUM CHLORIDE, PRESERVATIVE FREE 10 ML: 5 INJECTION INTRAVENOUS at 20:50

## 2025-05-21 RX ADMIN — CYCLOBENZAPRINE 10 MG: 10 TABLET, FILM COATED ORAL at 20:47

## 2025-05-21 RX ADMIN — GABAPENTIN 100 MG: 100 CAPSULE ORAL at 20:45

## 2025-05-21 RX ADMIN — LINEZOLID 600 MG: 600 INJECTION, SOLUTION INTRAVENOUS at 16:21

## 2025-05-21 RX ADMIN — PIPERACILLIN AND TAZOBACTAM 3375 MG: 3; .375 INJECTION, POWDER, LYOPHILIZED, FOR SOLUTION INTRAVENOUS at 23:29

## 2025-05-21 RX ADMIN — VANCOMYCIN HYDROCHLORIDE 2000 MG: 10 INJECTION, POWDER, LYOPHILIZED, FOR SOLUTION INTRAVENOUS at 13:34

## 2025-05-21 RX ADMIN — ALBUMIN (HUMAN) 25 G: 0.25 INJECTION, SOLUTION INTRAVENOUS at 23:53

## 2025-05-21 RX ADMIN — OXYCODONE HYDROCHLORIDE 10 MG: 10 TABLET ORAL at 16:14

## 2025-05-21 RX ADMIN — SODIUM CHLORIDE, SODIUM LACTATE, POTASSIUM CHLORIDE, AND CALCIUM CHLORIDE 1000 ML: .6; .31; .03; .02 INJECTION, SOLUTION INTRAVENOUS at 17:30

## 2025-05-21 RX ADMIN — ALBUMIN (HUMAN) 25 G: 0.25 INJECTION, SOLUTION INTRAVENOUS at 10:59

## 2025-05-21 ASSESSMENT — PAIN DESCRIPTION - FREQUENCY
FREQUENCY: INTERMITTENT

## 2025-05-21 ASSESSMENT — PAIN DESCRIPTION - PAIN TYPE
TYPE: CHRONIC PAIN
TYPE: ACUTE PAIN
TYPE: CHRONIC PAIN

## 2025-05-21 ASSESSMENT — PAIN DESCRIPTION - ORIENTATION
ORIENTATION: MID

## 2025-05-21 ASSESSMENT — PAIN DESCRIPTION - DESCRIPTORS
DESCRIPTORS: DISCOMFORT;HEAVINESS
DESCRIPTORS: ACHING;DISCOMFORT
DESCRIPTORS: ACHING;DISCOMFORT
DESCRIPTORS: DISCOMFORT;HEAVINESS

## 2025-05-21 ASSESSMENT — PAIN DESCRIPTION - LOCATION
LOCATION: BACK
LOCATION: CHEST
LOCATION: BACK
LOCATION: CHEST

## 2025-05-21 ASSESSMENT — PAIN SCALES - GENERAL
PAINLEVEL_OUTOF10: 0
PAINLEVEL_OUTOF10: 6
PAINLEVEL_OUTOF10: 0
PAINLEVEL_OUTOF10: 1
PAINLEVEL_OUTOF10: 0

## 2025-05-21 ASSESSMENT — PAIN DESCRIPTION - ONSET
ONSET: GRADUAL
ONSET: SUDDEN
ONSET: GRADUAL

## 2025-05-21 ASSESSMENT — LIFESTYLE VARIABLES: SMOKING_STATUS: 0

## 2025-05-21 ASSESSMENT — PAIN - FUNCTIONAL ASSESSMENT
PAIN_FUNCTIONAL_ASSESSMENT: PREVENTS OR INTERFERES SOME ACTIVE ACTIVITIES AND ADLS

## 2025-05-21 ASSESSMENT — COPD QUESTIONNAIRES: CAT_SEVERITY: MODERATE

## 2025-05-21 ASSESSMENT — PAIN DESCRIPTION - DIRECTION: RADIATING_TOWARDS: LOWER BACK

## 2025-05-21 ASSESSMENT — PAIN SCALES - WONG BAKER
WONGBAKER_NUMERICALRESPONSE: NO HURT
WONGBAKER_NUMERICALRESPONSE: NO HURT

## 2025-05-21 NOTE — PROGRESS NOTES
[facility]  PULMONARY/CRITICAL CARE PROGRESS NOTE    Patient: Corky Lawler  MRN: [id number]  : 1945    Encounter Date: 2025  Encounter Time: 8:16 AM     Date of Admission: .2025 11:54 AM    Consulting Physician:  Primary Care Physician:     Aurelio Hamilton MD     [phone]    Reason for Consultation: Lung Mass    Problem List:  Patient Active Problem List   Diagnosis    Coronary atherosclerosis of native coronary artery    COPD (chronic obstructive pulmonary disease) (HCC)    DJD spine    Trace mitral regurgitation    Aortic regurgitation    HTN (hypertension)    Acute chest pain    Chest pain    Pathologic lumbar vertebral fracture    Lytic lesion of bone on x-ray    Lung mass    Acute stroke due to ischemia (HCC)    Pathological fracture of lumbosacral spine    Closed fracture of second lumbar vertebra (HCC)     SUBJECTIVE: Patient seen and examined.  Overnight the patient had no shortness of breath, cough, increased work of breathing.    Overnight 2025 patient had multiple episodes of nausea and vomiting.  He remains on 2L O2 NC at this time.    Patient has abdominal pains that are 10/10 with rebound and guarding.    HOME MEDICATIONS:  Prior to Admission medications    Medication Sig Start Date End Date Taking? Authorizing Provider   isosorbide mononitrate (IMDUR) 30 MG extended release tablet Take 1 tablet by mouth daily   Yes Ronaldo Ansari MD   oxyCODONE-acetaminophen (PERCOCET)  MG per tablet Take 1 tablet by mouth every 6 hours as needed for Pain. 25 Yes Ronaldo Ansari MD   tamsulosin (FLOMAX) 0.4 MG capsule Take 1 capsule by mouth daily   Yes ProviderRonaldo MD   Multiple Vitamins-Minerals (ICAPS) TABS Take 1 tablet by mouth Daily   Yes ProviderRonaldo MD   gabapentin (NEURONTIN) 100 MG capsule Take 1 capsule by mouth 3 times daily for 60 days. 25 Yes Jesse Mcbride MD   ranolazine (RANEXA)  Result   Findings suggestive of congestive heart failure with interstitial pulmonary   edema pattern.  Findings of increased since 04/17/2025 comparison         CT LUMBAR SPINE WO CONTRAST   Final Result   1. Enlarging lytic lesion in the left lateral L2 vertebral body, now   associated with a pathological fracture.   2. New subtle lytic lesion in the left lateral aspect of the L3 vertebral   body.   3. The metastatic lesion in the L1 vertebral body, as seen on the previous   MRI, is not evident by CT.           - CTA Chest 4/17/2025:  1. New mass is seen in the medial right upper lobe extending to right hilum  and encasing right hilar vasculature and bronchi suggestive of neoplasm  measuring approximately 6.9 x 5.9 cm.  2. Enlarged mediastinal lymph nodes are present notable in right paratracheal  location.  3. Partial visualization of a lytic lesion involving the visualized body of  L1.  4. No evidence of pulmonary arterial embolism.      - CXR 5/19/2025:          Assessment:   Large right sided lung mass  Right Pleural Effusion   Abdominal Pains   Post-Obstructive Pneumonia  Lytic lesion of L1-L2 vertebrae now s/p L1 and L2 kyphoplasty  CHUCK resolved  Dizziness  Orthostatic hypotension  Former tobacco use     Plan:   CXR reviewed likely enlarging lung mass, trace right sided effusion on right with fluid in the fissure.  Wean FIO2 as tolerated  Plan for bronchoscopy with EBUS, FNA, BAL on 5/22/2025 as procedure 5/21/2025 was cancelled due to abdominal pains, abdominal rebound, abdominal guarding  Vancomycin and Zosyn   Remains on fentanyl patch for pain  Plavix to remain on hold until after procedure  IS/Flutter valve    - Stat Lipase  - Stat CMP  - Stat CT Abd/Pelvis  - consultation to ID, general surgery   - NG Tube with NG tube to suction  - family and patient updated that procedure was cancelled due to intractable abdominal pains with nausea and vomiting    I have spent a total time of 51 minutes of this  patient encounter reviewing chart, labs, coordinating care with interdisciplinary teams, face to face encounter with patient, providing counseling/education to patient/family.     Thank you Jesse Mcbride MD very much for allowing me to see this patient in consultation and follow up.    Care reviewed with nursing staff, medical and surgical specialty care, primary care and the patient's family as available. Restraints are ordered when the patient can do harm to him/herself by pulling out devices.    Sav Ca MD, MDIYA.

## 2025-05-21 NOTE — PROGRESS NOTES
Dr berrios for Dr. Pozo called and stated to give pt 4 mg IV Zofran. To processed with bronch as planned.

## 2025-05-21 NOTE — CARE COORDINATION
Family discussion at great length wife and 2 sons   They do not want him to have chest compressions shocks or be placed on the ventilator   They are ok with central line and vasoactive medication administration   They are ok with NIV     Palliative care has been asked to come back and continue goals of care discussions and provide emotional support     Patient to transfer to MICU spoke with Dr Medina     Family support given best I could     Patient awake and talking but confused and hypoxic, pressure a bit better after IVF bolus     Likely aspirated this morning and also concern for post obstructive pna started xochitl and zosyn     Call me with any issues     Addendum   Brought wife Mirlande into room to speak with Corky, they talked about goals of care, I ensured that the patient had understanding of the situation as best as he could, he confirmed with his wife that he would not want to be on the ventilator or have chest compressions at this time     Updated staff and intensivist     Addendum   Spoke with palliative care over phone they will go and see patient and family and confirm the goals of care and code status plans.     Addendum   Spoke with Ramya from palliative whom confirmed the code status and DNR DNI

## 2025-05-21 NOTE — CARE COORDINATION
05/21/25 Update CM Note: RRT completed and patient to transfer to the ICU per Dr Mcbride.  Wife at the bedside. Dr Mcbride spoke with the wife who wants a limited code status per Dr Mcbride. Wife is agreeable to non invasive treatments and medications/central line. Update given to receiving CM. Electronically signed by Gin Acharya RN CM on 5/21/2025 at 11:47 AM

## 2025-05-21 NOTE — PROCEDURES
PROCEDURE NOTE  Date: 5/21/2025   Name: Corky Lawler  YOB: 1945    Procedures  Thoracentesis Procedure Note    Pre-operative Diagnosis: right Pleural Effusion    Post-operative Diagnosis: normal    Indications: Corky Lawler is a 80 y.o. male with a  has a past medical history of AAA (abdominal aortic aneurysm), Aortic regurgitation, COPD (chronic obstructive pulmonary disease) ([facility]), Coronary atherosclerosis of native coronary artery, DJD (degenerative joint disease), HTN (hypertension), Hyperlipidemia, Mitral regurgitation, Occipital stroke (HCC), and TIA (transient ischemic attack). found to have a Pleural Effusion  ultrasound guided    Consent: After informed consent & appropriate time out protocol was noted & obtained. Risks/Benefits/Alternatives of the procedure were discussed including: infection, bleeding, pain, pneumothorax.    Procedure Details:    Under sterile conditions  the patient was placed in the sitting position. The skin was prepped and draped with Chloraprep solution and sterile drapes were utilized.  1% plain lidocaine was used to anesthetize the skin, subcutaneous tissue and parietal pleura. Then the pleural catheter 5 Bengali 7 cm   was advanced into the pleural space. The fluid was obtained without any difficulties and minimal blood loss.  A total of 1850 fluid was removed. The fluid was serosanguinous in color. A dressing was applied to the wound and wound care instructions were provided. The fluid was sent for analysis.      Findings  1850 ml of bloody pleural fluid was obtained. A sample was sent for protein, cholesterol, and LDH,  Pathology for cytolology, cytogenetics, flow, and cell counts, as well as for infection analysis.    Complications:  None; patient tolerated the procedure well.          Condition: stable    Plan  A follow up chest x-ray was ordered.  Electronically signed by Ernesto Guan MD on 5/21/2025 at 3:59 PM

## 2025-05-21 NOTE — CONSULTS
Critical Care Admit/Consult Note         Patient - Corky Lawler   MRN -  [id number]   Acct # - [id number]   - 1945      Date of Admission -  2025 11:54 AM  Date of evaluation -  2025  4405/4405-A   Hospital Day - 9          ADMIT/CONSULT DETAILS     Reason for Admit/Consult   RRT for hypotension and increased WOB     Consulting Service/Physician   Consulting - Jesse Mcbride MD  Primary Care Physician - Aurelio Hamilton MD HPI   The patient is a 80 y.o. male with significant past medical history of COPD, CAD, HTN, HLD, CVA, and known metastatic lung CA with spread to spine who was admitted for back pain and underwent L1 L2 kyphoplasty on 5/15/25 but his post-op course has been complicated by hypoxia. Pulmonology was following with plans for EBUS today however this was cancelled after patient developed severe abdominal pain, nausea, and vomiting. STAT CT abdomen was ordered, labs drawn, and patient placed on NRB and transferred to MICU. Upon arrival, BP improved slightly with fluid boluses given during RRT and abdominal pain somewhat improved. Still with increased WOB. CT abdomen without any acute intraabdominal pathology but did reveal a large right pleural effusion as well as an expansile mass lesion of the right upper thigh although this is also present on CT imaging from last month. Lactate resulted at 7.7, WBC up from 9 to 22 to 31. Slight CHUCK with Cr 1.4. Patient is slightly confused on initial presentation.  Code status discussed at length and documented by Dr. Mcbride, patient is limited code and okay for meds and some procedures such as central lines or thoracentesis.       Past Medical History         Diagnosis Date    AAA (abdominal aortic aneurysm)     4,5cm    Aortic regurgitation     mild to moderate    COPD (chronic obstructive pulmonary disease) (HCC)     Coronary atherosclerosis of native coronary artery     DJD (degenerative joint disease)     HTN (hypertension)      previous mid sternotomy.  Calcifications are seen in the coronary arteries.  Heart is normal size.  There is no pericardial effusion. Patient had previous vertebroplasty for L2 and L1 vertebral bodies which is a new finding since the previous study of April 17.  There is a compression deformity of these 2 vertebral bodies more of L2.  Degenerative changes are seen in the disc space of L2-3 are degenerative changes in the right hip joint with subchondral cysts in the acetabular side. In the right upper thigh interposed between the right tensor fascia muscle/the upper right anterior rectus femorals muscle/the proximal right vastus lateralis there is a expansile slightly nonhomogeneous mass like lesion measuring 4.4 x 3.7 x 3 cm approximately, located just anterolateral to the proximal right femoral shaft in the infra trochanteric region partially covered on this study.  Could represent a hematoma but cannot exclude a solid mass lesion.  Further evaluation with MRI study of the right thigh without and with IV contrast is recommended.     1.  No indication for free intraperitoneal air or extraluminal retroperitoneal air in the abdomen or in the pelvis.  No indication for acute inflammatory in the intraperitoneal retroperitoneal spaces of the abdomen and or in the pelvis. 2.  Pattern of moderate constipation. 3.  Presently, there are no renal calculus or obstructive uropathy.  However there multiple subcentimeter size bladder calculi as observed on the study of April 17. 4.  New since the previous study of April 17 is a moderate large right-sided pleural effusion. 5.  Also as new findings since the study of April 17 is the presence of a serpiginous structures in the right anterior epicardial space which can be relate vessels.  Further evaluation with CT chest IV contrast recommended. This also will evaluate an apparently bulkiness in the right hilar region. 6.  Patient had 2 levels of vertebroplasty in the lumbar spine:  with kyphoplasty.  No obvious complication on the images provided. Please see subspecialty report for full details and interpretation of real time imaging.     Intraprocedural fluoroscopic spot images as above.  See separate procedure report for more information.     XR CHEST PORTABLE  Result Date: 5/15/2025  EXAMINATION: ONE XRAY VIEW OF THE CHEST 5/15/2025 10:13 am COMPARISON: Chest x-ray dated 04/17/2025 HISTORY: ORDERING SYSTEM PROVIDED HISTORY: increased o2 requirement TECHNOLOGIST PROVIDED HISTORY: Reason for exam:->increased o2 requirement FINDINGS: Increased cardiac prominence with increased perihilar and mid lung predominant interstitial pulmonary edema pattern opacifications without separate consolidation.  No pneumothorax or large effusion.     Findings suggestive of congestive heart failure with interstitial pulmonary edema pattern.  Findings of increased since 04/17/2025 comparison     CT LUMBAR SPINE WO CONTRAST  Result Date: 5/12/2025  EXAMINATION: CT OF THE LUMBAR SPINE WITHOUT CONTRAST  5/12/2025 TECHNIQUE: CT of the lumbar spine was performed without the administration of intravenous contrast. Multiplanar reformatted images are provided for review. Adjustment of mA and/or kV according to patient size was utilized.  Automated exposure control, iterative reconstruction, and/or weight based adjustment of the mA/kV was utilized to reduce the radiation dose to as low as reasonably achievable. COMPARISON: CT of the lumbar spine, 04/17/2025. HISTORY: ORDERING SYSTEM PROVIDED HISTORY: history of cancerous lesion on L1, L2 sudden worsening of pain TECHNOLOGIST PROVIDED HISTORY: Reason for exam:->history of cancerous lesion on L1, L2 sudden worsening of pain Decision Support Exception - unselect if not a suspected or confirmed emergency medical condition->Emergency Medical Condition (MA) What reading provider will be dictating this exam?->CRC FINDINGS: BONES/ALIGNMENT: A lytic metastatic lesion is seen along  4/22/2025  EXAMINATION: MRI OF THE BRAIN WITHOUT CONTRAST  4/22/2025 8:30 pm TECHNIQUE: Multiplanar multisequence MRI of the brain was performed without the administration of intravenous contrast. COMPARISON: None. HISTORY: ORDERING SYSTEM PROVIDED HISTORY: Dizziness persistent and worsening TECHNOLOGIST PROVIDED HISTORY: Reason for exam:->Dizziness persistent and worsening What is the sedation requirement?->None FINDINGS: INTRACRANIAL STRUCTURES/VENTRICLES: There is small acute to subacute infarction in left occipital periventricular white matter.  There is old infarction in the right occipital lobe.  There is mild parenchymal volume loss.  There is mild T2/FLAIR hyperintensity in the periventricular and subcortical white matter, likely related to mild chronic microvascular disease. No mass effect or midline shift. No evidence of an acute intracranial hemorrhage. There is no evidence of hydrocephalus. The sellar/suprasellar regions appear unremarkable.  The normal signal voids within the major intracranial vessels appear maintained. ORBITS: The visualized portion of the orbits demonstrate no acute abnormality. SINUSES: There is scattered minimal mucosal thickening in the paranasal sinuses.  There is minimal left mastoid effusion. BONES/SOFT TISSUES: The bone marrow signal intensity appears normal. The soft tissues demonstrate no acute abnormality.     Small acute to subacute infarction in left occipital periventricular white matter. Old infarction in the right occipital lobe. Mild parenchymal volume loss. Mild chronic microvascular disease.         ASSESSMENT  Sepsis, possible post-obstructive PNA  Acute hypoxic respiratory failure  Large right lung mass, metastatic lung CA  Large right pleural effusion  Lytic lesion of L1-L2 vertebrae now s/p L1 and L2 kyphoplasty  HAGMA, lactic acidosis  CHUCK      ______________________________________________________________________    PLAN   Cont broad spec abx, IVF bolus for

## 2025-05-21 NOTE — PROGRESS NOTES
Occupational Therapy  OT BEDSIDE TREATMENT NOTE   [facility]  [address], [place]        Date:2025  Patient Name: Corky Lawler  MRN: [id number]  : 1945  Room: [address] [place] [address]-A     Therapy on hold this date, pt RRT this morning and being transferred off unit. Will re-attempt at later date/time.     Veronica PATINO/YO [id number]

## 2025-05-21 NOTE — PROGRESS NOTES
After pt was being transferred to ICU lab called regarding the pt's lactic acid of 7.7. This RN called unit spoke with RN on unit

## 2025-05-21 NOTE — CARE COORDINATION
05/21/25 Update CM Note: Patient remains on general floor s/p kyphoplasty of 6 days. He has been vomiting during the night with abdominal distention. He is pending a bronch today if ct abdomen/pelvis results ok. He is with dizziness with any movement and is positive orthos. He is on 2lnc. He is pending ID consult due to pneumonia/leukocytosis. Currently he is in Bronchoscopy. Respiratory panel/bc pending. Discharge plan of home with home care per wife who was adamant prior to the change in condition last pm.Will speak again to the patients wife as discharge plan will likely change. Will continue to follow. Electronically signed by Gin Acharya RN CM on 5/21/2025 at 8:45 AM

## 2025-05-21 NOTE — PROGRESS NOTES
Patient had 3 large episodes of clear brown emesis and is having difficulty passing stool. Abdomen is distended, bowel sounds present. CIARA Christy notified and STAT KUB ordered. Also notified him of patient's chest pain that resolved with one dose of nitro.

## 2025-05-21 NOTE — ANESTHESIA PRE PROCEDURE
Department of Anesthesiology  Preprocedure Note       Name:  Corky Lawler   Age:  80 y.o.  :  1945                                          MRN:  [id number]         Date:  2025      Surgeon: Surgeon(s):  Sav Ca MD    Procedure: Procedure(s):  BRONCHOSCOPY ENDOBRONCHIAL ULTRASOUND    Medications prior to admission:   Prior to Admission medications    Medication Sig Start Date End Date Taking? Authorizing Provider   isosorbide mononitrate (IMDUR) 30 MG extended release tablet Take 1 tablet by mouth daily   Yes ProviderRonaldo MD   oxyCODONE-acetaminophen (PERCOCET)  MG per tablet Take 1 tablet by mouth every 6 hours as needed for Pain. 25 Yes Ronaldo Ansari MD   tamsulosin (FLOMAX) 0.4 MG capsule Take 1 capsule by mouth daily   Yes ProviderRonaldo MD   Multiple Vitamins-Minerals (ICAPS) TABS Take 1 tablet by mouth Daily   Yes ProviderRonaldo MD   gabapentin (NEURONTIN) 100 MG capsule Take 1 capsule by mouth 3 times daily for 60 days. 25 Yes Jesse Mcbride MD   ranolazine (RANEXA) 500 MG extended release tablet Take 1 tablet by mouth 2 times daily 24  Yes Ronaldo Ansari MD   rosuvastatin (CRESTOR) 40 MG tablet Take 1 tablet by mouth daily 21  Yes ProviderRonaldo MD   lisinopril (PRINIVIL;ZESTRIL) 20 MG tablet Take 1 tablet by mouth 2 times daily 21  Yes Jesse Sewell MD   clopidogrel (PLAVIX) 75 MG tablet Take 1 tablet by mouth daily 21  Yes Jesse Sewell MD   pantoprazole (PROTONIX) 40 MG tablet Take 1 tablet by mouth every morning (before breakfast) 21  Yes Jesse Sewell MD   polyethylene glycol (GLYCOLAX) 17 g packet Take 1 packet by mouth daily  Patient not taking: Reported on 2025  Jesse Mcbride MD   nitroGLYCERIN (NITROSTAT) 0.4 MG SL tablet DISSOLVE 1 TABLET UNDER THE TONGUE AS NEEDED 22   ProviderRonaldo MD   aspirin 81 MG tablet Take

## 2025-05-21 NOTE — PROGRESS NOTES
Ct called the floor saying pt is throwing up and unable to say flat for the test. Messaged Dr. Mcbride

## 2025-05-21 NOTE — PROGRESS NOTES
Pharmacy Consultation Note  (Antibiotic Dosing and Monitoring)    Initial consult date: 5/21/25  Consulting physician/provider: Jesse Mcbride MD  Drug: Vancomycin  Indication: Pneumonia (Nosocomial)    Age/  Gender Height Weight IBW  Allergy Information   80 y.o./male 175.3 cm (5' 9\") 98 kg (216 lb 0.8 oz)     Ideal body weight: 70.7 kg (155 lb 15.1 oz)  Adjusted ideal body weight: 81.5 kg (179 lb 11.6 oz)   Tramadol      Renal Function:  Recent Labs     05/19/25  0548 05/20/25  0535 05/21/25  0548   BUN 22 17 21   CREATININE 0.7 0.8 0.9       Intake/Output Summary (Last 24 hours) at 5/21/2025 0906  Last data filed at 5/21/2025 0419  Gross per 24 hour   Intake 540 ml   Output 200 ml   Net 340 ml       Vancomycin Monitoring:  Trough:  No results for input(s): \"VANCOTROUGH\" in the last 72 hours.  Random:  No results for input(s): \"VANCORANDOM\" in the last 72 hours.    Vancomycin Administration Times:  Recent vancomycin administrations        No vancomycin IV orders with administrations found.                    Assessment:  Patient is a 80 y.o. male who has been initiated on vancomycin for pneumonia (nosocomial)  Estimated Creatinine Clearance: 78 mL/min (based on SCr of 0.9 mg/dL).  To dose vancomycin, pharmacy will be utilizing vancomycin trough levels  5/21: Scr 0.9 (~baseline).  WBC elevated at 22.6 (9.0 yesterday).  Afebrile    Plan:  Will start vancomycin 1250 mg IV every 12 hours  Will check vancomycin levels when appropriate  Will continue to monitor renal function   Pharmacy to follow      Jesse Jack RPH, 5/21/2025 9:06 AM    YULIET: [phone]  SEY: [phone]  SJW: [phone]    ADDENDUM:     Due to change in patient status and increase in Scr from 0.9  to 1.4, change to vancomycin 2000 mg IV x 1 dose   Random level tomorrow am on 5/22/25    Venkata Hardy, PharmD, BCPS, BCCCP 5/21/2025 12:08 PM  Phone: [phone]

## 2025-05-21 NOTE — CONSULTS
[facility]  [facility]    Consultation Note     Admit Date: 5/12/2025 11:54 AM    Reason for Consult:   Aspiration pneumonia/leukocytosis    Attending Physician:  Jesse Mcbride MD     Chief Complaint: Shortness of breath    HISTORY OF PRESENT ILLNESS:   The patient is a 80 y.o.  male man not known to the Infectious Diseases service. The patient is admitted to the intensive care unit after having difficulty and attempt to do a bronchoscopy for a new right lung effusion and possible postobstructive pneumonia.  Patient is known to hematology oncology and has a diagnosis of a right upper lobe mass 6.9 x 5.9 cm.  There is mediastinal adenopathy as well.  He had lytic lesions in L1 and L2 and severe back pain which subsequently required kyphoplasty which was performed on 5/15/2025.  The patient had an EBUS scheduled for today but complications associated with that have resulted in a banding in the procedure and admitting to intensive care.  On the way down to the procedure room the patient was developing abdominal discomfort and subsequently had emesis.  Currently is on 15 L nonrebreather mask and afebrile.  Labs show significant change in his status with his BUN 25 and 1.4 from previously is was 21 and 0.9.  In addition his white count went from normal on 5/19/2020 25-9.0 and jumped to 22.6 on 5/28/2025 and currently is 31.2 on 5/21/2025.  CT abdomen pelvis pending.  Family is at bedside patient is very lethargic but will open his eyes to verbal    Past Medical History:        Diagnosis Date    AAA (abdominal aortic aneurysm)     4,5cm    Aortic regurgitation     mild to moderate    COPD (chronic obstructive pulmonary disease) ([facility])     Coronary atherosclerosis of native coronary artery     DJD (degenerative joint disease)     HTN (hypertension)     Hyperlipidemia     Mitral regurgitation     Occipital stroke (HCC) 2021    R occipital stroke    TIA (transient ischemic attack) 07/01/2013  GFRAA >60 04/06/2021 05:18 AM    LABGLOM 54 05/21/2025 10:44 AM    LABGLOM >60 05/23/2023 07:42 AM    GLUCOSE 251 05/21/2025 10:44 AM    GLUCOSE 101 02/20/2011 10:30 AM    CALCIUM 8.8 05/21/2025 10:44 AM    BILITOT 0.8 05/21/2025 10:44 AM    ALKPHOS 92 05/21/2025 10:44 AM    AST 24 05/21/2025 10:44 AM    ALT 14 05/21/2025 10:44 AM       Lab Results   Component Value Date/Time    PROTIME 15.8 05/21/2025 08:00 AM    PROTIME 12.0 02/20/2011 10:30 AM    INR 1.4 05/21/2025 08:00 AM       Lab Results   Component Value Date/Time    TSH 3.320 05/19/2023 06:38 AM       Lab Results   Component Value Date/Time    COLORU Yellow 05/17/2023 04:08 PM    PHUR 5.0 05/17/2023 04:08 PM    WBCUA 0-1 05/17/2023 04:08 PM    RBCUA >20 05/17/2023 04:08 PM    BACTERIA RARE 05/17/2023 04:08 PM    CLARITYU Clear 05/17/2023 04:08 PM    LEUKOCYTESUR Negative 05/17/2023 04:08 PM    UROBILINOGEN 0.2 05/17/2023 04:08 PM    BILIRUBINUR Negative 05/17/2023 04:08 PM    BLOODU LARGE 05/17/2023 04:08 PM    GLUCOSEU Negative 05/17/2023 04:08 PM       No results found for: \"POK3YRV\", \"BEART\", \"H7AMDGZM\", \"PHART\", \"THGBART\", \"AWL0WLJ\", \"PO2ART\", \"EUM3QMQ\"  Radiology:  XR ABDOMEN (KUB) (SINGLE AP VIEW)   Final Result   Nonobstructive bowel gas pattern.         CT HEAD WO CONTRAST   Final Result   No acute intracranial abnormality.         XR CHEST PORTABLE   Final Result   Some worsening identified of the markings throughout the right perihilar   region with increase seen in size of the right pleural effusion to suggest   possible asymmetric edema versus pneumonia         FLUORO FOR SURGICAL PROCEDURES   Final Result   Intraprocedural fluoroscopic spot images as above.  See separate procedure   report for more information.         XR CHEST PORTABLE   Final Result   Findings suggestive of congestive heart failure with interstitial pulmonary   edema pattern.  Findings of increased since 04/17/2025 comparison         CT LUMBAR SPINE WO CONTRAST   Final Result    1. Enlarging lytic lesion in the left lateral L2 vertebral body, now   associated with a pathological fracture.   2. New subtle lytic lesion in the left lateral aspect of the L3 vertebral   body.   3. The metastatic lesion in the L1 vertebral body, as seen on the previous   MRI, is not evident by CT.         CT ABDOMEN PELVIS WO CONTRAST Additional Contrast? None    (Results Pending)   XR CHEST PORTABLE    (Results Pending)       Microbiology:  Pending  No results for input(s): \"BC\" in the last 72 hours.  No results for input(s): \"ORG\" in the last 72 hours.  No results for input(s): \"BLOODCULT2\" in the last 72 hours.  No results for input(s): \"STREPNEUMAGU\" in the last 72 hours.  No results for input(s): \"LP1UAG\" in the last 72 hours.  No results for input(s): \"ASO\" in the last 72 hours.  No results for input(s): \"CULTRESP\" in the last 72 hours.    Assessment:  Postobstructive pneumonia  Ca of the lung with mets  New onset of GI problems; rule out any intra-abdominal catastrophe with known history of aortoiliac stents  Leukocytosis related to all of the above    Plan:    Cont Zosyn; stop vancomycin and continue with Zyvox IV  Check cultures  Discussed with the family and nursing  Monitor labs  Will follow with you    Thank you for having us see this patient in consultation. I will be discussing this case with the treating physicians.      Electronically signed by Sav Up MD on 5/21/2025 at 1:49 PM

## 2025-05-21 NOTE — CONSULTS
GENERAL SURGERY  CONSULT NOTE    Patient's Name/Date of Birth: Corky Lawler / 1945    Date: May 21, 2025     PCP: Aurelio Hamilton MD     Chief Complaint:   Chief Complaint   Patient presents with    Back Pain     Pt L1 and L2 pain. Pt has lumbar cancer. Pt received treatment today . Pt unable to tolerate pain. Pt denies numbness to lower extremities denies loss of bowel or bladder.        Physician Consulted: Dr. Lindsey for Dr. Pinto   Reason for Consult: pSBO   Referring Physician: Dr. Rae ALLEN  Corky Lawler is a 80 y.o. male with a history of metastatic lung cancer who is currently admitted for back pain. He underwent L1 and l2 kyphoplasty on 5/15/2025 with Dr. Cottrell however the hospital course was complicated by hypoxia. An RRT was called today for hypotension, tachycardia and hypoxia. He was transferred to the MICU for further management. He was reportedly having abdominal pain and having multiple episodes of emesis. General surgery was consulted for further management. The patient was seen and examined in the MICU. He is currently on 15L high flow. He currently reports no abdominal pain at this time and no further nausea. The patient was awake but not very responsive from a verbal standpoint. Per his nurse at bedside the patient has been having liquid bowel movements since arriving to the ICU.     CT abdomen pelvis showing massively distended stomach, some mildly dilated loops of small bowel and stool filled colon. No clear evidence of obstruction. No free fluid or free air. Large right pleural effusion.     Hx of inguinal hernia repair x 4, cholecystectomy, appendectomy.       Past Medical History:   Diagnosis Date    AAA (abdominal aortic aneurysm)     4,5cm    Aortic regurgitation     mild to moderate    COPD (chronic obstructive pulmonary disease) (HCC)     Coronary atherosclerosis of native coronary artery     DJD (degenerative joint disease)     HTN (hypertension)     Hyperlipidemia      Mitral regurgitation     Occipital stroke (HCC) 2021    R occipital stroke    TIA (transient ischemic attack) 07/01/2013       Past Surgical History:   Procedure Laterality Date    AORTA SURGERY      Aortoiliac stent at [facility] 2016    APPENDECTOMY      BACK SURGERY      BLADDER SURGERY Left 05/22/2023    CYSTOSCOPY RETROGRADE PYELOGRAM, LASER LITHOTRIPSY WITH LEFT STENT INSERTION AND GUADARRAMA PLACEMENT performed by Corky Giang MD at [facility] [place]    CARDIAC CATHETERIZATION  09/19/2013    DR Hernandez    CHOLECYSTECTOMY      CORONARY ANGIOPLASTY WITH STENT PLACEMENT  04/05/2021    Dr. Del Rio (3) stents...Resolute Ludwig-- SVG to PDA, SVG to prox.OM, SVG to mid SVG     CORONARY ARTERY BYPASS GRAFT      DIAGNOSTIC CARDIAC CATH LAB PROCEDURE      EYE SURGERY  09/2017    HERNIA REPAIR      x4    SHOULDER ARTHROSCOPY      SPINE SURGERY N/A 05/15/2025    L1 & L2 KYPHOPLASTY performed by Valeria Cottrell MD at [facility] [place]    TRANSESOPHAGEAL ECHOCARDIOGRAM  03/05/2018    MADHURI with Dr. Hernandez       Medications Prior to Admission:    Prior to Admission medications    Medication Sig Start Date End Date Taking? Authorizing Provider   isosorbide mononitrate (IMDUR) 30 MG extended release tablet Take 1 tablet by mouth daily   Yes Ronaldo Ansari MD   oxyCODONE-acetaminophen (PERCOCET)  MG per tablet Take 1 tablet by mouth every 6 hours as needed for Pain. 5/5/25 6/5/25 Yes Ronaldo Ansari MD   tamsulosin (FLOMAX) 0.4 MG capsule Take 1 capsule by mouth daily   Yes Ronaldo Ansari MD   Multiple Vitamins-Minerals (ICAPS) TABS Take 1 tablet by mouth Daily   Yes Ronaldo Ansari MD   gabapentin (NEURONTIN) 100 MG capsule Take 1 capsule by mouth 3 times daily for 60 days. 4/23/25 6/22/25 Yes Jesse Mcbride MD   ranolazine (RANEXA) 500 MG extended release tablet Take 1 tablet by mouth 2 times daily 8/1/24  Yes Ronaldo Ansari MD   rosuvastatin (CRESTOR) 40 MG tablet Take 1 tablet by mouth daily

## 2025-05-21 NOTE — PROGRESS NOTES
Palliative Care Department  [phone]  Palliative Care Progress Note  Provider Ramya Chris PA-C     Corky Lawler  [id number]  Hospital Day: 10  Date of Initial Consult: 05/12/2025  Referring Provider: Vinayak Smith MD   Palliative Medicine was consulted for assistance with: History of lung cancer; intractable pain with pathologic fracture of spine; goals of care    HPI:   Corky Lawler is a 80 y.o. with a medical history of metastatic lung cancer to the bone undergoing radiation therapy who was admitted on 5/12/2025 from home with a CHIEF COMPLAINT of intractable back pain.  In the ED, CT of spine showing enlarging lytic lesion in the left lateral L2 vertebral body with associated pathological fracture, new subtle lytic lesion of the left lateral aspect of L3 vertebral body.  Patient admitted for further workup and pain control.  Neurosurgery and oncology consulted.  Palliative care consulted for goals of care and symptom management.    5/21: RRT for confusion hypotension and hypoxia. Transferred to MICU on NRB  ASSESSMENT/PLAN:     Pertinent Hospital Diagnoses     Metastatic lung cancer to the bone  Pathological fracture of L2  Intractable back pain      Palliative Care Encounter / Counseling Regarding Goals of Care  Please see detailed goals of care discussion as below  At this time, Corky Lawler, Does not have capacity for medical decision-making.  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

## 2025-05-21 NOTE — PROGRESS NOTES
Patient scheduled for bronchoscopy this AM, secure message sent out to Dr. Pozo to inform of active vomiting.

## 2025-05-21 NOTE — PROGRESS NOTES
[facility]  Hematology/Oncology  Consult      Patient Name: Corky Lawler  YOB: 1945  PCP: Aurelio Hamilton MD   Referring Provider:      Reason for Consultation:   Chief Complaint   Patient presents with    Back Pain     Pt L1 and L2 pain. Pt has lumbar cancer. Pt received treatment today . Pt unable to tolerate pain. Pt denies numbness to lower extremities denies loss of bowel or bladder.         History of Present Illness: This is a 80-year-old male patient who was seen by our service when he was recently hospitalized and found to have bulky right upper lobe mass measuring 6.9 x 5.9 cm with associated hilar and mediastinal lymphadenopathy and an L1, L2 lytic lesion.  He thought to have a lung neoplasm clinically stage IV, [id number].  NM bone scan showed increased radiotracer at L2.  Patient's hospital stay was complicated by acute stroke.  MRI brain without contrast showed small acute to subacute infarct in the left occipital periventricular white matter.  Patient refused to have MRI brain with contrast.  He was started on ASA, Plavix, high intensity statin.  His EBUS has been delayed and is scheduled for 5/21/2025.  Following with radiation oncology for palliative radiation therapy and patient is status post 2 out of 10 fractions receiving 600 cGy with plans for 3000 cGy radiotherapy to L2/L3.  Patient was to follow-up with Dr. Nieves on 5/29/2025 however presented to the ED for evaluation of intractable back pain. CT of spine showing enlarging lytic lesion in the left lateral L2 vertebral body with associated pathological fracture, new subtle lytic lesion of the left lateral aspect of L3 vertebral body. Patient admitted for further workup and pain control.  Palliative has been consulted for pain control. Neurosurgery consulted.  Conservative measures for pain control to be attempted however if not achieved plan for L1 and L2 kyphoplasty.  Blood work is otherwise unremarkable.   There is no change in a small area cortical and subcortical encephalomalacia of the posteromedial right parietooccipital region, consistent with an old small distal posterior right PCA infarct. No acute intracranial hemorrhage or extraaxial fluid collection. Grey-white differentiation is maintained.  No evidence of mass, mass effect or midline shift.  No evidence of hydrocephalus. ORBITS: The visualized portion of the orbits demonstrate no acute abnormality. SINUSES:  The visualized paranasal sinuses and mastoid air cells demonstrate no acute abnormality. SOFT TISSUES/SKULL: No acute abnormality of the visualized skull or soft tissues. CTA NECK: AORTIC ARCH/ARCH VESSELS: No dissection or arterial injury.  There is mild aneurysmal dilatation of the ascending thoracic aorta measuring 4.0 cm. No significant stenosis of the brachiocephalic or subclavian arteries. CAROTID ARTERIES: No dissection, arterial injury, or hemodynamically significant stenosis by NASCET criteria.  Moderate right and mild left carotid bifurcation calcification without stenosis using NASCET criteria. VERTEBRAL ARTERIES: The vertebral arteries are codominant. There is 50% stenosis of the origin of the codominant left vertebral artery from moderate calcified plaque.  The rest of the left vertebral artery is widely patent. There is 50-70% stenosis of the origin of the codominant right vertebral artery from noncalcified plaque.  The rest of the left vertebral artery is widely patent. SOFT TISSUES: There is no change in the large, lobulated mass filling the right hilum and invading the right mediastinum, with continued moderate encasement of the right upper lobe bronchus. There is a new mild right pleural effusion. There is increasing patchy infiltrate around the mass in the posterior right lower lobe, consistent with developing postobstructive pneumonia. The visualized left upper lung is clear. No cervical lymphadenopathy.  The larynx and pharynx are  unremarkable.  No acute abnormality of the salivary and thyroid glands. BONES: No acute osseous abnormality. CTA HEAD: ANTERIOR CIRCULATION: No significant stenosis of the intracranial internal carotid, anterior cerebral, or middle cerebral arteries. No aneurysm. The anterior communicating artery is patent. POSTERIOR CIRCULATION: No significant stenosis of the codominant vertebral, basilar, or posterior cerebral arteries. No aneurysm. I cannot identify either of the posterior communicating arteries, a variant of normal. OTHER: No dural venous sinus thrombosis on this non-dedicated study.     CT HEAD: 1. The small area of restricted diffusion seen in the left periventricular posterior parietooccipital region on yesterday's MRI is not seen to have a corresponding abnormality on today's CT. 2. No acute intracranial hemorrhage. 3. 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 CTA NECK: 1. No carotid artery stenosis. 2. 50% stenosis of the origin of the codominant left vertebral artery from moderate calcified plaque. 3. 50-70% stenosis of the origin of the codominant right vertebral artery from noncalcified plaque. 4. No change in the large, lobulated mass filling the right hilum and invading the right mediastinum, with continued moderate encasement of the right upper lobe bronchus. 5. New mild right pleural effusion. 6. Increasing patchy infiltrate around the mass in the posterior right lower lobe, consistent with developing postobstructive pneumonia. CTA HEAD: No stenosis, occlusion, or aneurysm of the proximal intracranial arteries.     CTA NECK W CONTRAST  Result Date: 4/23/2025  EXAMINATION: CTA OF THE HEAD WITH CONTRAST; CTA OF THE NECK 4/23/2025 12:59 pm: TECHNIQUE: CTA of the head/brain was performed with the administration of intravenous contrast. Multiplanar reformatted images are provided for review.  MIP images are provided for review. CT of the head was performed without the administration of  intravenous contrast. CTA of the neck was performed with the administration of intravenous contrast. Multiplanar reformatted images are provided for review.  MIP images are provided for review. Stenosis of the internal carotid arteries measured using NASCET criteria. Automated exposure control, iterative reconstruction, and/or weight based adjustment of the mA/kV was utilized to reduce the radiation dose to as low as reasonably achievable. COMPARISON: MRI of the brain from yesterday. CT pulmonary angiogram from 04/17/2025. HISTORY: ORDERING SYSTEM PROVIDED HISTORY: occipital stroke TECHNOLOGIST PROVIDED HISTORY: Reason for exam:->occipital stroke Has a \"code stroke\" or \"stroke alert\" been called?-> What reading provider will be dictating this exam?->CRC FINDINGS: CT HEAD: BRAIN/VENTRICLES:  The small area of restricted diffusion seen in the left periventricular posterior parietooccipital region is not seen to have a corresponding abnormality on today's CT. There is no change in a small area cortical and subcortical encephalomalacia of the posteromedial right parietooccipital region, consistent with an old small distal posterior right PCA infarct. No acute intracranial hemorrhage or extraaxial fluid collection. Grey-white differentiation is maintained.  No evidence of mass, mass effect or midline shift.  No evidence of hydrocephalus. ORBITS: The visualized portion of the orbits demonstrate no acute abnormality. SINUSES:  The visualized paranasal sinuses and mastoid air cells demonstrate no acute abnormality. SOFT TISSUES/SKULL: No acute abnormality of the visualized skull or soft tissues. CTA NECK: AORTIC ARCH/ARCH VESSELS: No dissection or arterial injury.  There is mild aneurysmal dilatation of the ascending thoracic aorta measuring 4.0 cm. No significant stenosis of the brachiocephalic or subclavian arteries. CAROTID ARTERIES: No dissection, arterial injury, or hemodynamically significant stenosis by NASCET  vertebra solitary focus of increased uptake with corresponding osteolytic lesion by recent CT exam.  Otherwise negative whole-body bone scan. 2.  MRI thoracic and lumbar spine to follow and reported separately.         ASSESSMENT/PLAN :  80-year-old male   - Recently hospitalized and found to have bulky right upper lobe mass measuring 6.9 x 5.9 cm with associated hilar and mediastinal lymphadenopathy and an L1, L2 lytic lesion.  He thought to have a lung neoplasm clinically stage IV, [id number].  NM bone scan showed increased radiotracer at L2.  Patient's hospital stay was complicated by acute stroke.  MRI brain without contrast showed small acute to subacute infarct in the left occipital periventricular white matter.  Patient refused to have my brain with contrast.  He was started on ASA, Plavix, high intensity statin.  His EBUS has been delayed and is scheduled for 5/21/2025.    - Following with radiation oncology for palliative radiation therapy and patient is status post 2 out of 10 fractions receiving 600 cGy with plans for 3000 cGy radiotherapy to L2/L3.  Patient was to follow-up with Dr. Nieves on 5/29/2025.     - Intractable back pain.   - CT of spine showing enlarging lytic lesion in the left lateral L2 vertebral body with associated pathological fracture, new subtle lytic lesion of the left lateral aspect of L3 vertebral body.  - Admitted for further workup and pain control.   - Palliative has been consulted for pain control.  - Neurosurgery consulted.  Conservative measures for pain control to be attempted however if not achieved plan for L1 and L2 kyphoplasty.    - Blood work is otherwise unremarkable.      Consultation for known lung cancer with mets to the lumbar spine.    Attending addendum :  Intractable low back pain with an enlarging lytic lesion at L2 with a subtle lytic lesion at L3.  Started already palliative radiation therapy and would recommend ongoing palliative radiation while in  house.  Neurosurgery consulted with conservative measures planned and if pain not well-controlled consideration for L1-L2 kyphoplasty will be addressed  Awaiting EBUS for tissue diagnosis then once tissue diagnosis established systemic therapy plan will be addressed.  Consult palliative care for pain management.    5/16/25  - Lung neoplasm clinically stage IV, [id number]. Still awaiting EBUS, scheduled 5/21/25.   - Intractable low back pain with an enlarging lytic lesion at L2 with a subtle lytic lesion at L3.  On palliative radiation therapy and would recommend ongoing palliative radiation while in house.  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   - Awaiting EBUS for tissue diagnosis then once tissue diagnosis established systemic therapy plan will be addressed.  - We will follow.     5/21/25  - Lung neoplasm clinically stage IV, [id number]. EBUS today canccelled because of rrt for hypoxic respiratory failure.  New right pleural effusion status post thoracentesis. Patient DNRCCA.   - Intractable low back pain with an enlarging lytic lesion at L2 with a subtle lytic lesion at L3.  On palliative radiation therapy and would recommend ongoing palliative radiation while in house.  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   - General surgery has been consulted for intractable abdominal pains with nausea and vomiting. Abdominal xray with nonobstructive bowel gas pattern. CT A/P pending.   - ID also consulted. WBC's today 31.2, platelets 457. Zosyn and vancomycin has been initiated. Further recommendations are pending.   - ct a/p with no acute findings.  Right thigh 4 cm expansile mass noted.  - We will follow.     DANIELA Turk - CNP  Electronically signed 5/21/2025 at 11:35 AM  Patient seen and examined, note edited  Micha Stuart MD

## 2025-05-21 NOTE — CARE COORDINATION
05/21/25 Update CM note: Patient back from endo and did not perform bronch. Patient with low bp/elevated heart rate/low oxygen sat and placed on non-rebreather mask. RRT called. Dr Cottrell/Dr Torres in the room. Patient is alert. Wife is present. Spent time with wife giving an update. She is worried about his \"rapid change\". She states, \"he has heart issues\". She is concerned and states, \" I can't take him home now\". She is upset and worried at this point. Will continue to keep wife updated. RRT in progress. Electronically signed by Gin Acharya RN CM on 5/21/2025 at 10:45 AM

## 2025-05-21 NOTE — PROGRESS NOTES
Spoke with Kellie from [facility] to request that patient's KUB is read. She stated the request will be submitted.

## 2025-05-21 NOTE — PROGRESS NOTES
Pharmacy Note - Extended Infusion Beta-Lactam Adjustment    Piperacillin/Tazobactam 3375mg Q8h for treatment of Hospital acquired pneumonia. Per [facility] Extended Infusion Beta-Lactam Policy, piperacillin/tazobactam will be changed to 4500mg loading dose followed by 3375mg Q8h extended infusion    Estimated Creatinine Clearance: Estimated Creatinine Clearance: 78 mL/min (based on SCr of 0.9 mg/dL).    BMI: Body mass index is 31.79 kg/m².    Please call with any questions.    Thank you,    Monica Drake, [facility]

## 2025-05-21 NOTE — SIGNIFICANT EVENT
Rapid Response Team Note  Date of event: 5/21/2025   Time of event: 10:27 am  Corky Lawler 80 y.o. year old male   YOB: 1945   Admit date:  5/12/2025   Location: [address] [place] [address]-A   Witnessed? : []Yes  [] No  Monitored? : []Yes  [] No  Code status: [] Full  [] DNR-CCA  []DNR-CC  ______________________________________________________________________  Reason for RRT:    [] RR < 8     [] RR > 28   [x] SpO2 <90%   [] HR < 40 bpm   [x] HR > 130 bpm  [x] SBP < 90 mmHg    [] LOC    [] Seizures   [] Significant Bleeding Event    [] Other:     Subjective:   RRT called for  hypotension tachycardia and hypoxia  Upon arrival the pt was AAOx3; BP was 70s/40s; with heart rate between 100s-120s;saturaing 80% on 10 L; saturation improved to 97% on 15 L by NRB  Patient was complaining of chest and abdominal pain; patient could not get ct of abdomen as patient vomited at the ct   Stat EKG and cxr were obtained  Patient was given 1 L NS bolus and albumin   Also patient was given solumedrol 125 mg iv once     Objective: as above  Vital signs: BP:    /RR:    /HR:     /Temp:    Initial Condition:  Conscious   [] Yes  [] No     Breathing [] Yes  [] No     Pulse  [] Yes  [] No    Airway:   [] Open/ Clear     Intervention: [] None  [] Pooled secretions     [] Suctioned  [] Stridor      [] Intubation    Lungs:   [] Symmetrical chest rise/ CTABL Intervention: [] None  [] Use of accessory muscles    [] NIV (CPAP/BiPAP)  [] Cyanosis      [] Nasal Oxygen/Mask  [] Wheezing       [] ABG             [] CXR  [] Other:     Circulation:   Rhythm:  [] Sinus [] Other:   Intervention: [] None            [] IV Access  [] Peripheral              [] Central            [] EKG            [] Cardioversion            [] Defibrillation     Capillary Refill:  [] > 2 seconds [] < 2 seconds    Neurologic:   [] NIHSS      [] Pupillary Response:     Response to pain:   [] Yes  [] No  Follow commands:  [] Yes  [] No  Facial asymmetry:  [] Yes  [] No  Motor  strength:  [] Equal  [] Focal deficit:   Diagnostic Test:  Blood Sugar:  205 mg/dL  EKG:      ABG:      Lab Results   Component Value Date/Time    PH 7.502 05/21/2025 12:10 PM    PCO2 34.4 05/21/2025 12:10 PM    PO2 96.3 05/21/2025 12:10 PM    HCO3 26.3 05/21/2025 12:10 PM    O2SAT 97.9 05/21/2025 12:10 PM     Medication(s) Given:  []  Narcan (Naloxone)   []  Romazicon (Flumazenil)    []  Breathing Treatment    []  Steroids (Prednisone, Solu-Medrol)  []  Adenosine  [] Cardiac Medicines:     Infusion(s):   [] Normal Saline   Rate:   cc/hr      [] Lactate Ringers      [] Other:     Imaging:   [] CXR:   [] Normal         [] Pneumothorax         [] Pulmonary Edema  [] Infiltrate          [] CT Head  [] Normal          [] ICB          [] SAH          [] Other:     Laboratory Tests:   CBC with Differential:    Lab Results   Component Value Date/Time    WBC 31.2 05/21/2025 10:44 AM    RBC 5.27 05/21/2025 10:44 AM    HGB 15.7 05/21/2025 10:44 AM    HCT 48.5 05/21/2025 10:44 AM     05/21/2025 10:44 AM    MCV 92.0 05/21/2025 10:44 AM    MCH 29.8 05/21/2025 10:44 AM    MCHC 32.4 05/21/2025 10:44 AM    RDW 15.5 05/21/2025 10:44 AM    LYMPHOPCT 2 05/21/2025 10:44 AM    MONOPCT 5 05/21/2025 10:44 AM    MYELOPCT 1 05/16/2025 06:06 AM    EOSPCT 0 05/21/2025 10:44 AM    BASOPCT 0 05/21/2025 10:44 AM    MONOSABS 1.43 05/21/2025 10:44 AM    LYMPHSABS 0.64 05/21/2025 10:44 AM    EOSABS 0.00 05/21/2025 10:44 AM    BASOSABS 0.05 05/21/2025 10:44 AM         Teams Assessment and Plan:  1. Acute hypoxic respiratory failure 2/2 right lung mass /pleural effusion  2. Chest pain  3. Concern for bowel obstruction  4. Abdominal pain    5. Hypotension  6.Lactic acidosis    Plan  S/p 1 L fluid bolus  Albumin  Solumedrol 125 mg iv once  D/w patient's spouse; family plans to change code status; palliative care consulted  Transfer to MICU ; d/w icu attending  ?  ?  ?  Disposition:  [] No transfer   [] Transfer to monitor floor  [] Transfer to:  [x] MICU [] NICU [] CCU [] SICU    Patient’s family updated: [] Yes  [] No   Discussed with:  [] Critical Care Intensivist:         [] Primary Care Provider: Aurelio Hamilton MD      [] Other: ?    Kimmie Torres MD , MD  3:03 PM

## 2025-05-22 ENCOUNTER — ANESTHESIA EVENT (OUTPATIENT)
Dept: ENDOSCOPY | Age: 80
End: 2025-05-22
Payer: MEDICARE

## 2025-05-22 ENCOUNTER — ANESTHESIA (OUTPATIENT)
Dept: ENDOSCOPY | Age: 80
End: 2025-05-22
Payer: MEDICARE

## 2025-05-22 ENCOUNTER — APPOINTMENT (OUTPATIENT)
Dept: GENERAL RADIOLOGY | Age: 80
DRG: 542 | End: 2025-05-22
Payer: MEDICARE

## 2025-05-22 LAB
ANION GAP SERPL CALCULATED.3IONS-SCNC: 12 MMOL/L (ref 7–16)
BASOPHILS # BLD: 0 K/UL (ref 0–0.2)
BASOPHILS NFR BLD: 0 % (ref 0–2)
BUN SERPL-MCNC: 33 MG/DL (ref 8–23)
CALCIUM SERPL-MCNC: 7.7 MG/DL (ref 8.8–10.2)
CHLORIDE SERPL-SCNC: 94 MMOL/L (ref 98–107)
CO2 SERPL-SCNC: 28 MMOL/L (ref 22–29)
CREAT SERPL-MCNC: 1.8 MG/DL (ref 0.7–1.2)
DATE LAST DOSE: NORMAL
EKG ATRIAL RATE: 82 BPM
EKG Q-T INTERVAL: 386 MS
EKG QRS DURATION: 140 MS
EKG QTC CALCULATION (BAZETT): 500 MS
EKG R AXIS: -77 DEGREES
EKG T AXIS: 66 DEGREES
EKG VENTRICULAR RATE: 101 BPM
EOSINOPHIL # BLD: 0 K/UL (ref 0.05–0.5)
EOSINOPHILS RELATIVE PERCENT: 0 % (ref 0–6)
ERYTHROCYTE [DISTWIDTH] IN BLOOD BY AUTOMATED COUNT: 15.6 % (ref 11.5–15)
GFR, ESTIMATED: 38 ML/MIN/1.73M2
GLUCOSE SERPL-MCNC: 201 MG/DL (ref 74–99)
HCT VFR BLD AUTO: 33.1 % (ref 37–54)
HGB BLD-MCNC: 10.8 G/DL (ref 12.5–16.5)
L PNEUMO1 AG UR QL IA.RAPID: NEGATIVE
LYMPHOCYTES NFR BLD: 0.35 K/UL (ref 1.5–4)
LYMPHOCYTES RELATIVE PERCENT: 2 % (ref 20–42)
MCH RBC QN AUTO: 29.7 PG (ref 26–35)
MCHC RBC AUTO-ENTMCNC: 32.6 G/DL (ref 32–34.5)
MCV RBC AUTO: 90.9 FL (ref 80–99.9)
MICROORGANISM SPEC CULT: NO GROWTH
MONOCYTES NFR BLD: 0.88 K/UL (ref 0.1–0.95)
MONOCYTES NFR BLD: 4 % (ref 2–12)
NEUTROPHILS NFR BLD: 94 % (ref 43–80)
NEUTS SEG NFR BLD: 18.97 K/UL (ref 1.8–7.3)
PLATELET # BLD AUTO: 210 K/UL (ref 130–450)
PMV BLD AUTO: 10.6 FL (ref 7–12)
POTASSIUM SERPL-SCNC: 4.1 MMOL/L (ref 3.5–5.1)
RBC # BLD AUTO: 3.64 M/UL (ref 3.8–5.8)
RBC # BLD: ABNORMAL 10*6/UL
S PNEUM AG SPEC QL: NEGATIVE
SODIUM SERPL-SCNC: 134 MMOL/L (ref 136–145)
SPECIMEN DESCRIPTION: NORMAL
SPECIMEN SOURCE: NORMAL
SURGICAL PATHOLOGY REPORT: NORMAL
TME LAST DOSE: NORMAL H
VANCOMYCIN DOSE: NORMAL MG
VANCOMYCIN SERPL-MCNC: 10.2 UG/ML (ref 5–40)
WBC OTHER # BLD: 20.2 K/UL (ref 4.5–11.5)

## 2025-05-22 PROCEDURE — 88374 M/PHMTRC ALYS ISHQUANT/SEMIQ: CPT

## 2025-05-22 PROCEDURE — 89051 BODY FLUID CELL COUNT: CPT

## 2025-05-22 PROCEDURE — 3609027000 HC BRONCHOSCOPY: Performed by: INTERNAL MEDICINE

## 2025-05-22 PROCEDURE — 88313 SPECIAL STAINS GROUP 2: CPT

## 2025-05-22 PROCEDURE — 6360000002 HC RX W HCPCS: Performed by: STUDENT IN AN ORGANIZED HEALTH CARE EDUCATION/TRAINING PROGRAM

## 2025-05-22 PROCEDURE — 6370000000 HC RX 637 (ALT 250 FOR IP): Performed by: STUDENT IN AN ORGANIZED HEALTH CARE EDUCATION/TRAINING PROGRAM

## 2025-05-22 PROCEDURE — 88173 CYTOPATH EVAL FNA REPORT: CPT

## 2025-05-22 PROCEDURE — 87015 SPECIMEN INFECT AGNT CONCNTJ: CPT

## 2025-05-22 PROCEDURE — 3700000001 HC ADD 15 MINUTES (ANESTHESIA): Performed by: INTERNAL MEDICINE

## 2025-05-22 PROCEDURE — 2580000003 HC RX 258: Performed by: STUDENT IN AN ORGANIZED HEALTH CARE EDUCATION/TRAINING PROGRAM

## 2025-05-22 PROCEDURE — 2700000000 HC OXYGEN THERAPY PER DAY

## 2025-05-22 PROCEDURE — 2580000003 HC RX 258

## 2025-05-22 PROCEDURE — 88305 TISSUE EXAM BY PATHOLOGIST: CPT

## 2025-05-22 PROCEDURE — 6360000002 HC RX W HCPCS

## 2025-05-22 PROCEDURE — 3700000000 HC ANESTHESIA ATTENDED CARE: Performed by: INTERNAL MEDICINE

## 2025-05-22 PROCEDURE — 87070 CULTURE OTHR SPECIMN AEROBIC: CPT

## 2025-05-22 PROCEDURE — 81210 BRAF GENE: CPT

## 2025-05-22 PROCEDURE — 2500000003 HC RX 250 WO HCPCS

## 2025-05-22 PROCEDURE — 80202 ASSAY OF VANCOMYCIN: CPT

## 2025-05-22 PROCEDURE — 3609020000 HC BRONCHOSCOPY W/EBUS FNA 3/> NODE: Performed by: INTERNAL MEDICINE

## 2025-05-22 PROCEDURE — 88172 CYTP DX EVAL FNA 1ST EA SITE: CPT

## 2025-05-22 PROCEDURE — 87205 SMEAR GRAM STAIN: CPT

## 2025-05-22 PROCEDURE — 6370000000 HC RX 637 (ALT 250 FOR IP): Performed by: PHYSICIAN ASSISTANT

## 2025-05-22 PROCEDURE — 2500000003 HC RX 250 WO HCPCS: Performed by: ANESTHESIOLOGY

## 2025-05-22 PROCEDURE — 2720000010 HC SURG SUPPLY STERILE: Performed by: INTERNAL MEDICINE

## 2025-05-22 PROCEDURE — 88360 TUMOR IMMUNOHISTOCHEM/MANUAL: CPT

## 2025-05-22 PROCEDURE — 3609010800 HC BRONCHOSCOPY ALVEOLAR LAVAGE: Performed by: INTERNAL MEDICINE

## 2025-05-22 PROCEDURE — 94640 AIRWAY INHALATION TREATMENT: CPT

## 2025-05-22 PROCEDURE — 88342 IMHCHEM/IMCYTCHM 1ST ANTB: CPT

## 2025-05-22 PROCEDURE — 94669 MECHANICAL CHEST WALL OSCILL: CPT

## 2025-05-22 PROCEDURE — 88377 M/PHMTRC ALYS ISHQUANT/SEMIQ: CPT

## 2025-05-22 PROCEDURE — 85025 COMPLETE CBC W/AUTO DIFF WBC: CPT

## 2025-05-22 PROCEDURE — 87102 FUNGUS ISOLATION CULTURE: CPT

## 2025-05-22 PROCEDURE — 87106 FUNGI IDENTIFICATION YEAST: CPT

## 2025-05-22 PROCEDURE — 74018 RADEX ABDOMEN 1 VIEW: CPT

## 2025-05-22 PROCEDURE — 93010 ELECTROCARDIOGRAM REPORT: CPT | Performed by: INTERNAL MEDICINE

## 2025-05-22 PROCEDURE — 99233 SBSQ HOSP IP/OBS HIGH 50: CPT | Performed by: INTERNAL MEDICINE

## 2025-05-22 PROCEDURE — 2580000003 HC RX 258: Performed by: ANESTHESIOLOGY

## 2025-05-22 PROCEDURE — 88341 IMHCHEM/IMCYTCHM EA ADD ANTB: CPT

## 2025-05-22 PROCEDURE — 0B9C8ZX DRAINAGE OF RIGHT UPPER LUNG LOBE, VIA NATURAL OR ARTIFICIAL OPENING ENDOSCOPIC, DIAGNOSTIC: ICD-10-PCS | Performed by: STUDENT IN AN ORGANIZED HEALTH CARE EDUCATION/TRAINING PROGRAM

## 2025-05-22 PROCEDURE — 2709999900 HC NON-CHARGEABLE SUPPLY: Performed by: INTERNAL MEDICINE

## 2025-05-22 PROCEDURE — 81235 EGFR GENE COM VARIANTS: CPT

## 2025-05-22 PROCEDURE — 80048 BASIC METABOLIC PNL TOTAL CA: CPT

## 2025-05-22 PROCEDURE — 0BB28ZX EXCISION OF CARINA, VIA NATURAL OR ARTIFICIAL OPENING ENDOSCOPIC, DIAGNOSTIC: ICD-10-PCS | Performed by: STUDENT IN AN ORGANIZED HEALTH CARE EDUCATION/TRAINING PROGRAM

## 2025-05-22 PROCEDURE — C1725 CATH, TRANSLUMIN NON-LASER: HCPCS | Performed by: INTERNAL MEDICINE

## 2025-05-22 PROCEDURE — 6360000002 HC RX W HCPCS: Performed by: SPECIALIST

## 2025-05-22 PROCEDURE — 81479 UNLISTED MOLECULAR PATHOLOGY: CPT

## 2025-05-22 PROCEDURE — 2000000000 HC ICU R&B

## 2025-05-22 PROCEDURE — 88112 CYTOPATH CELL ENHANCE TECH: CPT

## 2025-05-22 RX ORDER — VASOPRESSIN 20 [USP'U]/ML
INJECTION, SOLUTION INTRAVENOUS
Status: DISCONTINUED | OUTPATIENT
Start: 2025-05-22 | End: 2025-05-22 | Stop reason: SDUPTHER

## 2025-05-22 RX ORDER — PHENYLEPHRINE HCL IN 0.9% NACL 1 MG/10 ML
SYRINGE (ML) INTRAVENOUS
Status: DISCONTINUED | OUTPATIENT
Start: 2025-05-22 | End: 2025-05-22 | Stop reason: SDUPTHER

## 2025-05-22 RX ORDER — FENTANYL CITRATE 50 UG/ML
INJECTION, SOLUTION INTRAMUSCULAR; INTRAVENOUS
Status: DISCONTINUED | OUTPATIENT
Start: 2025-05-22 | End: 2025-05-22 | Stop reason: SDUPTHER

## 2025-05-22 RX ORDER — SODIUM CHLORIDE 0.9 % (FLUSH) 0.9 %
5-40 SYRINGE (ML) INJECTION PRN
Status: DISCONTINUED | OUTPATIENT
Start: 2025-05-22 | End: 2025-05-22

## 2025-05-22 RX ORDER — HYDROMORPHONE HYDROCHLORIDE 1 MG/ML
0.5 INJECTION, SOLUTION INTRAMUSCULAR; INTRAVENOUS; SUBCUTANEOUS EVERY 5 MIN PRN
Status: DISCONTINUED | OUTPATIENT
Start: 2025-05-22 | End: 2025-05-22

## 2025-05-22 RX ORDER — PROPOFOL 10 MG/ML
INJECTION, EMULSION INTRAVENOUS
Status: DISCONTINUED | OUTPATIENT
Start: 2025-05-22 | End: 2025-05-22 | Stop reason: SDUPTHER

## 2025-05-22 RX ORDER — SODIUM CHLORIDE 0.9 % (FLUSH) 0.9 %
5-40 SYRINGE (ML) INJECTION EVERY 12 HOURS SCHEDULED
Status: DISCONTINUED | OUTPATIENT
Start: 2025-05-22 | End: 2025-05-29 | Stop reason: HOSPADM

## 2025-05-22 RX ORDER — SODIUM CHLORIDE, SODIUM LACTATE, POTASSIUM CHLORIDE, CALCIUM CHLORIDE 600; 310; 30; 20 MG/100ML; MG/100ML; MG/100ML; MG/100ML
INJECTION, SOLUTION INTRAVENOUS
Status: DISCONTINUED | OUTPATIENT
Start: 2025-05-22 | End: 2025-05-22 | Stop reason: SDUPTHER

## 2025-05-22 RX ORDER — SODIUM CHLORIDE 9 MG/ML
INJECTION, SOLUTION INTRAVENOUS PRN
Status: DISCONTINUED | OUTPATIENT
Start: 2025-05-22 | End: 2025-05-29 | Stop reason: HOSPADM

## 2025-05-22 RX ORDER — ETOMIDATE 2 MG/ML
INJECTION INTRAVENOUS
Status: DISCONTINUED | OUTPATIENT
Start: 2025-05-22 | End: 2025-05-22 | Stop reason: SDUPTHER

## 2025-05-22 RX ORDER — LIDOCAINE HYDROCHLORIDE 20 MG/ML
INJECTION, SOLUTION INTRAVENOUS
Status: DISCONTINUED | OUTPATIENT
Start: 2025-05-22 | End: 2025-05-22 | Stop reason: SDUPTHER

## 2025-05-22 RX ORDER — NALOXONE HYDROCHLORIDE 0.4 MG/ML
INJECTION, SOLUTION INTRAMUSCULAR; INTRAVENOUS; SUBCUTANEOUS PRN
Status: DISCONTINUED | OUTPATIENT
Start: 2025-05-22 | End: 2025-05-22

## 2025-05-22 RX ORDER — ONDANSETRON 2 MG/ML
INJECTION INTRAMUSCULAR; INTRAVENOUS
Status: DISCONTINUED | OUTPATIENT
Start: 2025-05-22 | End: 2025-05-22 | Stop reason: SDUPTHER

## 2025-05-22 RX ORDER — MEPERIDINE HYDROCHLORIDE 25 MG/ML
12.5 INJECTION INTRAMUSCULAR; INTRAVENOUS; SUBCUTANEOUS EVERY 5 MIN PRN
Status: DISCONTINUED | OUTPATIENT
Start: 2025-05-22 | End: 2025-05-22

## 2025-05-22 RX ORDER — SODIUM CHLORIDE 0.9 % (FLUSH) 0.9 %
5-40 SYRINGE (ML) INJECTION EVERY 12 HOURS SCHEDULED
Status: DISCONTINUED | OUTPATIENT
Start: 2025-05-22 | End: 2025-05-22

## 2025-05-22 RX ORDER — DEXAMETHASONE SODIUM PHOSPHATE 10 MG/ML
INJECTION, SOLUTION INTRA-ARTICULAR; INTRALESIONAL; INTRAMUSCULAR; INTRAVENOUS; SOFT TISSUE
Status: DISCONTINUED | OUTPATIENT
Start: 2025-05-22 | End: 2025-05-22 | Stop reason: SDUPTHER

## 2025-05-22 RX ORDER — SODIUM CHLORIDE 9 MG/ML
INJECTION, SOLUTION INTRAVENOUS PRN
Status: DISCONTINUED | OUTPATIENT
Start: 2025-05-22 | End: 2025-05-22

## 2025-05-22 RX ORDER — SODIUM CHLORIDE 0.9 % (FLUSH) 0.9 %
5-40 SYRINGE (ML) INJECTION PRN
Status: DISCONTINUED | OUTPATIENT
Start: 2025-05-22 | End: 2025-05-29 | Stop reason: HOSPADM

## 2025-05-22 RX ORDER — SUCCINYLCHOLINE/SOD CL,ISO/PF 200MG/10ML
SYRINGE (ML) INTRAVENOUS
Status: DISCONTINUED | OUTPATIENT
Start: 2025-05-22 | End: 2025-05-22 | Stop reason: SDUPTHER

## 2025-05-22 RX ORDER — ROCURONIUM BROMIDE 10 MG/ML
INJECTION, SOLUTION INTRAVENOUS
Status: DISCONTINUED | OUTPATIENT
Start: 2025-05-22 | End: 2025-05-22 | Stop reason: SDUPTHER

## 2025-05-22 RX ADMIN — Medication 160 MG: at 10:13

## 2025-05-22 RX ADMIN — Medication 200 MCG: at 10:40

## 2025-05-22 RX ADMIN — VASOPRESSIN 1 UNITS: 20 INJECTION INTRAVENOUS at 10:47

## 2025-05-22 RX ADMIN — Medication 100 MCG: at 10:37

## 2025-05-22 RX ADMIN — Medication 200 MCG: at 10:42

## 2025-05-22 RX ADMIN — ETOMIDATE 20 MG: 2 INJECTION, SOLUTION INTRAVENOUS at 10:13

## 2025-05-22 RX ADMIN — ROSUVASTATIN 40 MG: 20 TABLET, FILM COATED ORAL at 12:15

## 2025-05-22 RX ADMIN — FENTANYL CITRATE 100 MCG: 50 INJECTION, SOLUTION INTRAMUSCULAR; INTRAVENOUS at 10:13

## 2025-05-22 RX ADMIN — PIPERACILLIN AND TAZOBACTAM 3375 MG: 3; .375 INJECTION, POWDER, LYOPHILIZED, FOR SOLUTION INTRAVENOUS at 23:14

## 2025-05-22 RX ADMIN — LINEZOLID 600 MG: 600 INJECTION, SOLUTION INTRAVENOUS at 13:22

## 2025-05-22 RX ADMIN — SODIUM CHLORIDE, POTASSIUM CHLORIDE, SODIUM LACTATE AND CALCIUM CHLORIDE: 600; 310; 30; 20 INJECTION, SOLUTION INTRAVENOUS at 03:50

## 2025-05-22 RX ADMIN — SODIUM CHLORIDE, POTASSIUM CHLORIDE, SODIUM LACTATE AND CALCIUM CHLORIDE: 600; 310; 30; 20 INJECTION, SOLUTION INTRAVENOUS at 11:21

## 2025-05-22 RX ADMIN — GABAPENTIN 100 MG: 100 CAPSULE ORAL at 20:54

## 2025-05-22 RX ADMIN — LIDOCAINE HYDROCHLORIDE 70 MG: 20 INJECTION, SOLUTION INTRAVENOUS at 10:13

## 2025-05-22 RX ADMIN — SODIUM CHLORIDE, PRESERVATIVE FREE 10 ML: 5 INJECTION INTRAVENOUS at 07:38

## 2025-05-22 RX ADMIN — RANOLAZINE 500 MG: 500 TABLET, FILM COATED, EXTENDED RELEASE ORAL at 12:15

## 2025-05-22 RX ADMIN — ROCURONIUM BROMIDE 10 MG: 10 INJECTION, SOLUTION INTRAVENOUS at 10:13

## 2025-05-22 RX ADMIN — LINEZOLID 600 MG: 600 INJECTION, SOLUTION INTRAVENOUS at 02:33

## 2025-05-22 RX ADMIN — PIPERACILLIN AND TAZOBACTAM 3375 MG: 3; .375 INJECTION, POWDER, LYOPHILIZED, FOR SOLUTION INTRAVENOUS at 14:54

## 2025-05-22 RX ADMIN — RANOLAZINE 500 MG: 500 TABLET, FILM COATED, EXTENDED RELEASE ORAL at 20:56

## 2025-05-22 RX ADMIN — SODIUM CHLORIDE, POTASSIUM CHLORIDE, SODIUM LACTATE AND CALCIUM CHLORIDE: 600; 310; 30; 20 INJECTION, SOLUTION INTRAVENOUS at 10:11

## 2025-05-22 RX ADMIN — ALBUTEROL SULFATE 2.5 MG: 0.83 SOLUTION RESPIRATORY (INHALATION) at 15:17

## 2025-05-22 RX ADMIN — PROPOFOL 100 MCG/KG/MIN: 10 INJECTION, EMULSION INTRAVENOUS at 10:19

## 2025-05-22 RX ADMIN — ONDANSETRON 4 MG: 2 INJECTION, SOLUTION INTRAMUSCULAR; INTRAVENOUS at 10:17

## 2025-05-22 RX ADMIN — SODIUM CHLORIDE: 0.9 INJECTION, SOLUTION INTRAVENOUS at 13:20

## 2025-05-22 RX ADMIN — SODIUM CHLORIDE, PRESERVATIVE FREE 10 ML: 5 INJECTION INTRAVENOUS at 20:57

## 2025-05-22 RX ADMIN — DEXAMETHASONE SODIUM PHOSPHATE 10 MG: 10 INJECTION INTRAMUSCULAR; INTRAVENOUS at 10:17

## 2025-05-22 RX ADMIN — ASPIRIN 81 MG CHEWABLE TABLET 81 MG: 81 TABLET CHEWABLE at 12:16

## 2025-05-22 RX ADMIN — GABAPENTIN 100 MG: 100 CAPSULE ORAL at 12:15

## 2025-05-22 RX ADMIN — PIPERACILLIN AND TAZOBACTAM 3375 MG: 3; .375 INJECTION, POWDER, LYOPHILIZED, FOR SOLUTION INTRAVENOUS at 07:10

## 2025-05-22 ASSESSMENT — LIFESTYLE VARIABLES: SMOKING_STATUS: 0

## 2025-05-22 ASSESSMENT — COPD QUESTIONNAIRES: CAT_SEVERITY: MODERATE

## 2025-05-22 NOTE — PROGRESS NOTES
Coordination of care discussion and chart review with PM team.Met at bedside with pts wife and grandson. Support provided. Wife is hopeful he will improve.

## 2025-05-22 NOTE — ANESTHESIA POSTPROCEDURE EVALUATION
Department of Anesthesiology  Postprocedure Note    Patient: Corky Lawler  MRN: [id number]  YOB: 1945  Date of evaluation: 5/22/2025    Procedure Summary       Date: 05/22/25 Room / Location: [place] [address] / [facility]    Anesthesia Start: 1011 Anesthesia Stop:     Procedure: BRONCHOSCOPY ENDOBRONCHIAL ULTRASOUND Diagnosis:       Lung mass      (Lung mass [R91.8])    Surgeons: oJyce Rae DO Responsible Provider: Herber Platt MD    Anesthesia Type: general ASA Status: 3            Anesthesia Type: No value filed.    Horace Phase I: Horace Score: 9    Horace Phase II:      Anesthesia Post Evaluation    Patient location during evaluation: PACU  Patient participation: complete - patient participated  Level of consciousness: awake  Pain score: 3  Airway patency: patent  Nausea & Vomiting: no nausea and no vomiting  Cardiovascular status: blood pressure returned to baseline  Respiratory status: acceptable  Hydration status: euvolemic      No notable events documented.

## 2025-05-22 NOTE — PROGRESS NOTES
[facility]  Hematology/Oncology  Consult      Patient Name: Corky Lawler  YOB: 1945  PCP: Aurelio Hamilton MD   Referring Provider:      Reason for Consultation:   Chief Complaint   Patient presents with    Back Pain     Pt L1 and L2 pain. Pt has lumbar cancer. Pt received treatment today . Pt unable to tolerate pain. Pt denies numbness to lower extremities denies loss of bowel or bladder.         History of Present Illness: This is a 80-year-old male patient who was seen by our service when he was recently hospitalized and found to have bulky right upper lobe mass measuring 6.9 x 5.9 cm with associated hilar and mediastinal lymphadenopathy and an L1, L2 lytic lesion.  He thought to have a lung neoplasm clinically stage IV, [id number].  NM bone scan showed increased radiotracer at L2.  Patient's hospital stay was complicated by acute stroke.  MRI brain without contrast showed small acute to subacute infarct in the left occipital periventricular white matter.  Patient refused to have MRI brain with contrast.  He was started on ASA, Plavix, high intensity statin.  His EBUS has been delayed and is scheduled for 5/21/2025.  Following with radiation oncology for palliative radiation therapy and patient is status post 2 out of 10 fractions receiving 600 cGy with plans for 3000 cGy radiotherapy to L2/L3.  Patient was to follow-up with Dr. Nieves on 5/29/2025 however presented to the ED for evaluation of intractable back pain. CT of spine showing enlarging lytic lesion in the left lateral L2 vertebral body with associated pathological fracture, new subtle lytic lesion of the left lateral aspect of L3 vertebral body. Patient admitted for further workup and pain control.  Palliative has been consulted for pain control. Neurosurgery consulted.  Conservative measures for pain control to be attempted however if not achieved plan for L1 and L2 kyphoplasty.  Blood work is otherwise unremarkable.   Consultation for known lung cancer with mets to the lumbar spine.      Diagnostic Data:     Past Medical History:   Diagnosis Date    AAA (abdominal aortic aneurysm)     4,5cm    Aortic regurgitation     mild to moderate    COPD (chronic obstructive pulmonary disease) (HCC)     Coronary atherosclerosis of native coronary artery     DJD (degenerative joint disease)     HTN (hypertension)     Hyperlipidemia     Mitral regurgitation     Occipital stroke (HCC) 2021    R occipital stroke    TIA (transient ischemic attack) 07/01/2013       Patient Active Problem List    Diagnosis Date Noted    Goals of care, counseling/discussion 05/21/2025    Palliative care encounter 05/21/2025    Closed fracture of second lumbar vertebra ([facility]) 05/15/2025    Pathological fracture of lumbosacral spine 05/12/2025    Acute stroke due to ischemia ([facility]) 04/23/2025    Pathologic lumbar vertebral fracture 04/18/2025    Lytic lesion of bone on x-ray 04/18/2025    Lung mass 04/18/2025    Chest pain 05/19/2023    Acute chest pain 03/31/2021    HTN (hypertension)     Aortic regurgitation     Coronary atherosclerosis of native coronary artery     COPD (chronic obstructive pulmonary disease) ([facility])     DJD spine     Trace mitral regurgitation         Past Surgical History:   Procedure Laterality Date    AORTA SURGERY      Aortoiliac stent at [facility] 2016    APPENDECTOMY      BACK SURGERY      BLADDER SURGERY Left 05/22/2023    CYSTOSCOPY RETROGRADE PYELOGRAM, LASER LITHOTRIPSY WITH LEFT STENT INSERTION AND GUADARRAMA PLACEMENT [place] Giang [place] [facility] [place]    CARDIAC CATHETERIZATION  09/19/2013    DR Hernandez    CHOLECYSTECTOMY      CORONARY ANGIOPLASTY WITH STENT PLACEMENT  04/05/2021    Dr. Del Rio (3) stents...[employer] [place]-- SVG to PDA, SVG to prox.OM, SVG to mid SVG     CORONARY ARTERY BYPASS GRAFT      DIAGNOSTIC CARDIAC CATH LAB PROCEDURE      EYE SURGERY  09/2017    HERNIA REPAIR      x4    SHOULDER ARTHROSCOPY      SPINE  None HISTORY: ORDERING SYSTEM PROVIDED HISTORY: L1-L2 kyphoplasty TECHNOLOGIST PROVIDED HISTORY: Reason for exam:->L1-L2 kyphoplasty Intraprocedural imaging. FINDINGS: Fluoroscopic support provided to subspecialty service for assistance with kyphoplasty.  No obvious complication on the images provided. Please see subspecialty report for full details and interpretation of real time imaging.     Intraprocedural fluoroscopic spot images as above.  See separate procedure report for more information.     XR CHEST PORTABLE  Result Date: 5/15/2025  EXAMINATION: ONE XRAY VIEW OF THE CHEST 5/15/2025 10:13 am COMPARISON: Chest x-ray dated 04/17/2025 HISTORY: ORDERING SYSTEM PROVIDED HISTORY: increased o2 requirement TECHNOLOGIST PROVIDED HISTORY: Reason for exam:->increased o2 requirement FINDINGS: Increased cardiac prominence with increased perihilar and mid lung predominant interstitial pulmonary edema pattern opacifications without separate consolidation.  No pneumothorax or large effusion.     Findings suggestive of congestive heart failure with interstitial pulmonary edema pattern.  Findings of increased since 04/17/2025 comparison     CT LUMBAR SPINE WO CONTRAST  Result Date: 5/12/2025  EXAMINATION: CT OF THE LUMBAR SPINE WITHOUT CONTRAST  5/12/2025 TECHNIQUE: CT of the lumbar spine was performed without the administration of intravenous contrast. Multiplanar reformatted images are provided for review. Adjustment of mA and/or kV according to patient size was utilized.  Automated exposure control, iterative reconstruction, and/or weight based adjustment of the mA/kV was utilized to reduce the radiation dose to as low as reasonably achievable. COMPARISON: CT of the lumbar spine, 04/17/2025. HISTORY: ORDERING SYSTEM PROVIDED HISTORY: history of cancerous lesion on L1, L2 sudden worsening of pain TECHNOLOGIST PROVIDED HISTORY: Reason for exam:->history of cancerous lesion on L1, L2 sudden worsening of pain Decision Support  fluid collection. Grey-white differentiation is maintained.  No evidence of mass, mass effect or midline shift.  No evidence of hydrocephalus. ORBITS: The visualized portion of the orbits demonstrate no acute abnormality. SINUSES:  The visualized paranasal sinuses and mastoid air cells demonstrate no acute abnormality. SOFT TISSUES/SKULL: No acute abnormality of the visualized skull or soft tissues. CTA NECK: AORTIC ARCH/ARCH VESSELS: No dissection or arterial injury.  There is mild aneurysmal dilatation of the ascending thoracic aorta measuring 4.0 cm. No significant stenosis of the brachiocephalic or subclavian arteries. CAROTID ARTERIES: No dissection, arterial injury, or hemodynamically significant stenosis by NASCET criteria.  Moderate right and mild left carotid bifurcation calcification without stenosis using NASCET criteria. VERTEBRAL ARTERIES: The vertebral arteries are codominant. There is 50% stenosis of the origin of the codominant left vertebral artery from moderate calcified plaque.  The rest of the left vertebral artery is widely patent. There is 50-70% stenosis of the origin of the codominant right vertebral artery from noncalcified plaque.  The rest of the left vertebral artery is widely patent. SOFT TISSUES: There is no change in the large, lobulated mass filling the right hilum and invading the right mediastinum, with continued moderate encasement of the right upper lobe bronchus. There is a new mild right pleural effusion. There is increasing patchy infiltrate around the mass in the posterior right lower lobe, consistent with developing postobstructive pneumonia. The visualized left upper lung is clear. No cervical lymphadenopathy.  The larynx and pharynx are unremarkable.  No acute abnormality of the salivary and thyroid glands. BONES: No acute osseous abnormality. CTA HEAD: ANTERIOR CIRCULATION: No significant stenosis of the intracranial internal carotid, anterior cerebral, or middle cerebral

## 2025-05-22 NOTE — PROGRESS NOTES
Critical Care Team - Daily Progress Note         Date and time: 2025 1:04 PM  Patient's name:  Corky Lawler  Medical Record Number: [id number]  Patient's account/billing number: [id number]  Patient's YOB: 1945  Age: 80 y.o.  Date of Admission: 2025 11:54 AM  Length of stay during current admission: 10      Primary Care Physician: Aurelio Hamilton MD  ICU Attending Physician: Ernesto Guan MD    Code Status: Limited    Reason for ICU admission: hypotension, worsening respiratory status and hypoxia      SUBJECTIVE:     OVERNIGHT EVENTS:       No acute events overnight, vastly improved clinically since bedside thoracentesis performed yesterday afternoon with 1850 cc removed from the right. Breathing comfortably on 3 L NC. Patient feels improved. Planning for EBUS today with Dr. Rae.      CURRENT VENTILATION STATUS:     [] Ventilator  [] BIPAP  [x] Nasal Cannula [] Room Air      SECRETIONS Amount:  [] Small [] Moderate  [] Large       [x] None    Color:     [] White [] Colored  [] Bloody    SEDATION:   [] Propofol gtt  [] Versed gtt  [] Fentanyl gtt  [] Precedex gtt [x] No Sedation    PARALYZED:  [x] No    [] Yes    VASOPRESSORS:  [x] No    [] Yes    If yes -   [] Levophed       [] Dopamine     [] Vasopressin       [] Dobutamine  [] Phenylephrine         [] Epinephrine    CENTRAL LINES:     [x] No   [] Yes   (Date of Insertion:   )           If yes -     [] Right IJ     [] Left IJ [] Right Femoral [] Left Femoral                   [] Right Subclavian [] Left Subclavian    GUADARRAMA'S CATHETER:   [x] No   [] Yes  (Date of Insertion:   )     URINE OUTPUT:            [x] Good   [] Low              [] Anuric      OBJECTIVE:     VITAL SIGNS:  BP (!) 95/59   Pulse 86   Temp 97.4 °F (36.3 °C) (Oral)   Resp 26   Ht 1.753 m (5' 9.02\")   Wt 97.7 kg (215 lb 6.4 oz)   SpO2 91%   BMI 31.79 kg/m²   Tmax over 24 hours:  Temp (24hrs), Av °F (36.7 °C), Min:97.4 °F (36.3 °C), Max:98.4 °F  peripancreatic fat planes. The spleen appears unremarkable. Adrenal glands are not enlarged. Kidneys are preserved size and cortical thickness.  There is no renal calculus or obstruction.  Perirenal fat planes are preserved. 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  The appearance aneurysm, graft are similar as observed on previous studies back to March 2021.  This is a non IV contrast study. There is no midline retroperitoneal adenopathy. The bladder is moderate distended.  There is a cluster of several low subcentimeter size bladder calculi that were observed on the previous study of April 17.  As mention presently there is no renal calculus. The prostate gland is mild to moderate enlarged, causes some impression in the base of the bladder.  The prostate gland measures 4 x 5.1 x 5.4 cm. Seminal vesicles symmetrical appearance. New development since the previous examination of April 17 is the presence of a moderate large size pleural effusion causes significant compression atelectasis in the right lower lobe with consolidation and air bronchogram, there are patency of the central airways.  There is an additional confluent density in the right parahilar region not covered on this study. Also observed is a serpentine use cluster of tubular like structures in the right anterior epicardial space above the right diaphragma which was not seen on the previous examination.  These could represent abnormal vessels.  Can consider further evaluation with CT scan of the chest with IV contrast. There is no left-sided pleural effusion. Patient had previous mid sternotomy.  Calcifications are seen in the coronary arteries.  Heart is normal size.  There is no pericardial effusion. Patient had previous vertebroplasty for L2 and L1 vertebral bodies which is a new finding since the previous study of April 17.  There is a compression deformity of these 2  rectus femorals muscle/vastus lateral muscle as above described.  Could represent a primary muscle lesion or even a organized hematoma on subacute or ongoing process. Recommended further evaluation with MRI study of the right thigh/hip area without and with IV contrast.     XR ABDOMEN (KUB) (SINGLE AP VIEW)  Result Date: 5/21/2025  EXAMINATION: ONE SUPINE XRAY VIEW(S) OF THE ABDOMEN 5/21/2025 1:54 am COMPARISON: Radiographs May 22, 2023 HISTORY: ORDERING SYSTEM PROVIDED HISTORY: vomiting, constipation, abdominal distention TECHNOLOGIST PROVIDED HISTORY: Reason for exam:->vomiting, constipation, abdominal distention FINDINGS: Multiple air-filled loops of bowel in a nonobstructive pattern.  Severe degenerative changes with multiple prior vertebral fractures and kyphoplasties.  Aortic stent is partially visualized.  Atherosclerotic vascular disease.     Nonobstructive bowel gas pattern.     CT HEAD WO CONTRAST  Result Date: 5/20/2025  EXAMINATION: CT OF THE HEAD WITHOUT CONTRAST  5/20/2025 3:15 am TECHNIQUE: CT of the head was performed without the administration of intravenous contrast. Automated exposure control, iterative reconstruction, and/or weight based adjustment of the mA/kV was utilized to reduce the radiation dose to as low as reasonably achievable. COMPARISON: April 23, 2025 HISTORY: ORDERING SYSTEM PROVIDED HISTORY: dizziness, recent smal occipital stroke TECHNOLOGIST PROVIDED HISTORY: Has a \"code stroke\" or \"stroke alert\" been called?->No Reason for exam:->dizziness, recent smal occipital stroke What reading provider will be dictating this exam?->CRC FINDINGS: BRAIN/VENTRICLES: There is no acute intracranial hemorrhage, mass effect or midline shift.  No abnormal extra-axial fluid collection.  Small region chronic right occipital encephalomalacia.  The gray-white differentiation is otherwise maintained without evidence of an acute infarct.  There is no evidence of hydrocephalus. Atrophy and periventricular  sequence, image 61).  The metastatic lesion identified in the L1 vertebral body on the previous MRI is not evident by CT. DEGENERATIVE CHANGES: Small disc bulges at multiple levels.  No significant central canal stenosis.  Moderate neural foraminal stenoses at the left L2-3 and bilateral L4-5 and L5-S1 levels. SOFT TISSUES/RETROPERITONEUM: No paraspinal mass is seen. An aorto iliac stent is seen traversing the abdominal aortic aneurysm which measures approximately 4 cm in maximal transverse dimension.     1. Enlarging lytic lesion in the left lateral L2 vertebral body, now associated with a pathological fracture. 2. New subtle lytic lesion in the left lateral aspect of the L3 vertebral body. 3. The metastatic lesion in the L1 vertebral body, as seen on the previous MRI, is not evident by CT.     CTA HEAD W CONTRAST  Result Date: 4/23/2025  EXAMINATION: CTA OF THE HEAD WITH CONTRAST; CTA OF THE NECK 4/23/2025 12:59 pm: TECHNIQUE: CTA of the head/brain was performed with the administration of intravenous contrast. Multiplanar reformatted images are provided for review.  MIP images are provided for review. CT of the head was performed without the administration of intravenous contrast. CTA of the neck was performed with the administration of intravenous contrast. Multiplanar reformatted images are provided for review.  MIP images are provided for review. Stenosis of the internal carotid arteries measured using NASCET criteria. Automated exposure control, iterative reconstruction, and/or weight based adjustment of the mA/kV was utilized to reduce the radiation dose to as low as reasonably achievable. COMPARISON: MRI of the brain from yesterday. CT pulmonary angiogram from 04/17/2025. HISTORY: ORDERING SYSTEM PROVIDED HISTORY: occipital stroke TECHNOLOGIST PROVIDED HISTORY: Reason for exam:->occipital stroke Has a \"code stroke\" or \"stroke alert\" been called?-> What reading provider will be dictating this exam?->CRC  accuracy; however, inadvertent computerized transcription errors may be present. Please excuse any transcriptional grammatical or spelling errors that may have escaped my editorial review.    Total critical care time caring for this patient with life threatening, unstable organ failure, including direct patient contact, management of life support systems, review of data including imaging and labs, discussions with other team members and physicians is at least 38 Min so far today, excluding procedures.

## 2025-05-22 NOTE — PROGRESS NOTES
Spiritual Health History and Assessment/Progress Note  [facility]    (P) Post-Procedural, Palliative Care, Spiritual/Emotional Needs, Initial Encounter,  ,  ,      Name: Corky Lawler MRN: [id number]    Age: 80 y.o.     Sex: male   Language: English   Sikh: Catholic   Pathological fracture of lumbosacral spine     Date: 5/22/2025                           Spiritual Assessment began in [facility]        Referral/Consult From: (P) Rounding   Encounter Overview/Reason: (P) Post-Procedural, Palliative Care, Spiritual/Emotional Needs, Initial Encounter  Service Provided For: (P) Patient    Mary, Belief, Meaning:   Patient identifies as spiritual, is connected with a mary tradition or spiritual practice, and has beliefs or practices that help with coping during difficult times  Family/Friends No family/friends present      Importance and Influence:  Patient has spiritual/personal beliefs that influence decisions regarding their health  Family/Friends No family/friends present    Community:  Patient feels well-supported. Support system includes: Spouse/Partner, Children, and Extended family  Family/Friends No family/friends present    Assessment and Plan of Care:     Patient Interventions include: Facilitated expression of thoughts and feelings, Explored spiritual coping/struggle/distress, Engaged in theological reflection, Affirmed coping skills/support systems, and Facilitated life review and/ or legacy  Family/Friends Interventions include: No family/friends present    Patient Plan of Care: Other: The  will follow as needed.  Family/Friends Plan of Care: No family/friends present    Electronically signed by Chaplain Alexandrea on 5/22/2025 at 1:00 PM

## 2025-05-22 NOTE — PROGRESS NOTES
Pharmacy Consultation Note  (Antibiotic Dosing and Monitoring)    Initial consult date: 5/21/25  Consulting physician/provider: Jesse Mcbride MD  Drug: Vancomycin  Indication: Pneumonia (Nosocomial)    Vancomycin has been discontinued   Clinical Pharmacy to sign-off  Physician to re-consult pharmacy if future dosing is needed    Thank you for the consult,    Venkata Abbas, PharmD, BCPS, BCCCP 5/22/2025 8:12 AM  Phone: [phone]

## 2025-05-22 NOTE — PROGRESS NOTES
4 Eyes Skin Assessment     NAME:  Corky Lawler  YOB: 1945  MEDICAL RECORD NUMBER:  [id number]    The patient is being assessed for  Admission    I agree that at least one RN has performed a thorough Head to Toe Skin Assessment on the patient. ALL assessment sites listed below have been assessed.      Areas assessed by both nurses:    Head, Face, Ears, Shoulders, Back, Chest, Arms, Elbows, Hands, Sacrum. Buttock, Coccyx, Ischium, Legs. Feet and Heels, and Under Medical Devices         Does the Patient have a Wound? No noted wound(s)       Willard Prevention initiated by RN: No  Wound Care Orders initiated by RN: No    Pressure Injury (Stage 3,4, Unstageable, DTI, NWPT, and Complex wounds) if present, place Wound referral order by RN under : No    New Ostomies, if present place, Ostomy referral order under : No     Nurse 1 eSignature: Electronically signed by Tulio Burk RN on 5/22/25 at 2:44 AM EDT    **SHARE this note so that the co-signing nurse can place an eSignature**    Nurse 2 eSignature: Electronically signed by Denver Summers RN on 5/22/25 at 2:49 AM EDT

## 2025-05-22 NOTE — PLAN OF CARE
Problem: Chronic Conditions and Co-morbidities  Goal: Patient's chronic conditions and co-morbidity symptoms are monitored and maintained or improved  5/21/2025 2210 by Tulio Burk, RN  Outcome: Not Progressing  5/21/2025 1302 by Luis M Campos RN  Outcome: Progressing     Problem: Musculoskeletal - Adult  Goal: Return mobility to safest level of function  5/21/2025 2210 by Tulio Burk, RN  Outcome: Not Progressing  5/21/2025 1302 by Luis M Campos RN  Outcome: Progressing     Problem: Chronic Conditions and Co-morbidities  Goal: Patient's chronic conditions and co-morbidity symptoms are monitored and maintained or improved  5/21/2025 2210 by Tulio Burk, RN  Outcome: Not Progressing  5/21/2025 1302 by Luis M Campos RN  Outcome: Progressing     Problem: Musculoskeletal - Adult  Goal: Return mobility to safest level of function  5/21/2025 2210 by Tulio uBrk, RN  Outcome: Not Progressing  5/21/2025 1302 by Luis M Campos RN  Outcome: Progressing

## 2025-05-22 NOTE — ANESTHESIA PRE PROCEDURE
Department of Anesthesiology  Preprocedure Note       Name:  Corky Lawler   Age:  80 y.o.  :  1945                                          MRN:  [id number]         Date:  2025      Surgeon: Surgeon(s):  Joyce Rae DO    Procedure: Procedure(s):  BRONCHOSCOPY ENDOBRONCHIAL ULTRASOUND    Medications prior to admission:   Prior to Admission medications    Medication Sig Start Date End Date Taking? Authorizing Provider   isosorbide mononitrate (IMDUR) 30 MG extended release tablet Take 1 tablet by mouth daily   Yes ProviderRonaldo MD   oxyCODONE-acetaminophen (PERCOCET)  MG per tablet Take 1 tablet by mouth every 6 hours as needed for Pain. 25 Yes Ronaldo Ansari MD   tamsulosin (FLOMAX) 0.4 MG capsule Take 1 capsule by mouth daily   Yes ProviderRonaldo MD   Multiple Vitamins-Minerals (ICAPS) TABS Take 1 tablet by mouth Daily   Yes ProviderRonaldo MD   gabapentin (NEURONTIN) 100 MG capsule Take 1 capsule by mouth 3 times daily for 60 days. 25 Yes Jesse Mcbride MD   ranolazine (RANEXA) 500 MG extended release tablet Take 1 tablet by mouth 2 times daily 24  Yes Ronaldo Ansari MD   rosuvastatin (CRESTOR) 40 MG tablet Take 1 tablet by mouth daily 21  Yes ProviderRonaldo MD   lisinopril (PRINIVIL;ZESTRIL) 20 MG tablet Take 1 tablet by mouth 2 times daily 21  Yes Jesse Sewell MD   clopidogrel (PLAVIX) 75 MG tablet Take 1 tablet by mouth daily 21  Yes Jesse Sewell MD   pantoprazole (PROTONIX) 40 MG tablet Take 1 tablet by mouth every morning (before breakfast) 21  Yes Jesse Sewell MD   polyethylene glycol (GLYCOLAX) 17 g packet Take 1 packet by mouth daily  Patient not taking: Reported on 2025  Jesse Mcbride MD   nitroGLYCERIN (NITROSTAT) 0.4 MG SL tablet DISSOLVE 1 TABLET UNDER THE TONGUE AS NEEDED 22   ProviderRonaldo MD   aspirin 81 MG tablet Take

## 2025-05-22 NOTE — PROGRESS NOTES
GENERAL SURGERY  DAILY PROGRESS NOTE  2025    Subjective:  Feeling much better this AM. Conversant. On 6LNC  Denies any abdominal pain   No further nausea or emesis  Having bowel movements       Objective:  Last 24Hrs  Temp  Av.3 °F (36.8 °C)  Min: 98 °F (36.7 °C)  Max: 98.4 °F (36.9 °C)  Resp  Av.9  Min: 15  Max: 35  Pulse  Av.1  Min: 60  Max: 147  Systolic (24hrs), Av , Min:73 , Max:108     Diastolic (24hrs), Av, Min:43, Max:77    SpO2  Av.6 %  Min: 88 %  Max: 98 %    I/O last 3 completed shifts:  In: 1738.5 [P.O.:120; I.V.:511.6; IV Piggyback:1106.9]  Out: 3565 [Urine:1765; Other:1800]      General: Lying in bed, NAD  Cardiovascular: Warm throughout  Respiratory: Equal chest rise bilaterally, no retractions, no audible wheezing  Abdomen: soft, mild distention; tender on deep palpation    Skin: no obvious rashes or lesions appreciated  Extremities: atraumatic, no focal motor deficits, no open wounds      CBC  Recent Labs     25  0548 25  1044 25  0353   WBC 22.6* 31.2* 20.2*   RBC 4.69 5.27 3.64*   HGB 13.9 15.7 10.8*   HCT 41.8 48.5 33.1*   MCV 89.1 92.0 90.9   MCH 29.6 29.8 29.7   MCHC 33.3 32.4 32.6   RDW 15.3* 15.5* 15.6*    457* 210   MPV 10.3 10.3 10.6       CMP  Recent Labs     25  1044 25  1311 25  0353    139 134*   K 3.6 2.8* 4.1   CL 91* 83* 94*   CO2 26 19* 28   BUN 25* 23 33*   CREATININE 1.4* 1.1 1.8*   GLUCOSE 251* 190* 201*   CALCIUM 8.8 6.2* 7.7*   BILITOT 0.8 0.6  --    ALKPHOS 92 57  --    AST 24 14  --    ALT 14 7  --          Assessment/Plan:  80 y.o. male with metastatic lung cancer, pathology lumbar fx, now respiratory failure -improving sp R thoracentesis     Will obtain KUB this AM  Clinically looks much better this morning   Will hold off on NG at this time   Monitor exam       Kailee Acosta MD  General Surgery Resident, PGY-3    Electronically signed on 2025 at 8:14 AM

## 2025-05-22 NOTE — PLAN OF CARE
Problem: Safety - Adult  Goal: Free from fall injury  5/22/2025 0842 by Henna Leger RN  Outcome: Progressing  Flowsheets (Taken 5/22/2025 0842)  Free From Fall Injury:   Instruct family/caregiver on patient safety   Based on caregiver fall risk screen, instruct family/caregiver to ask for assistance with transferring infant if caregiver noted to have fall risk factors  5/21/2025 2210 by Tulio Burk, RN  Outcome: Progressing     Problem: Pain  Goal: Verbalizes/displays adequate comfort level or baseline comfort level  5/22/2025 0842 by Henna Leger RN  Outcome: Progressing  Flowsheets (Taken 5/22/2025 0842)  Verbalizes/displays adequate comfort level or baseline comfort level:   Encourage patient to monitor pain and request assistance   Assess pain using appropriate pain scale   Administer analgesics based on type and severity of pain and evaluate response   Implement non-pharmacological measures as appropriate and evaluate response  5/21/2025 2210 by Tulio Burk, RN  Outcome: Progressing  Flowsheets (Taken 5/21/2025 2000)  Verbalizes/displays adequate comfort level or baseline comfort level:   Encourage patient to monitor pain and request assistance   Assess pain using appropriate pain scale   Administer analgesics based on type and severity of pain and evaluate response   Implement non-pharmacological measures as appropriate and evaluate response   Notify Licensed Independent Practitioner if interventions unsuccessful or patient reports new pain   Consider cultural and social influences on pain and pain management     Problem: Skin/Tissue Integrity  Goal: Skin integrity remains intact  Description: 1.  Monitor for areas of redness and/or skin breakdown2.  Assess vascular access sites hourly3.  Every 4-6 hours minimum:  Change oxygen saturation probe site4.  Every 4-6 hours:  If on nasal continuous positive airway pressure, respiratory therapy assess nares and determine need for  as needed   Assist and instruct patient to increase activity and self care as tolerated  5/21/2025 2210 by Tulio Burk, RN  Outcome: Progressing  Flowsheets (Taken 5/21/2025 2000)  Return ADL Status to a Safe Level of Function:   Administer medication as ordered   Assess activities of daily living deficits and provide assistive devices as needed   Obtain physical therapy/occupational therapy consults as needed   Assist and instruct patient to increase activity and self care as tolerated     Problem: ABCDS Injury Assessment  Goal: Absence of physical injury  5/22/2025 0842 by Henna Leger RN  Outcome: Progressing  Flowsheets (Taken 5/22/2025 0842)  Absence of Physical Injury: Implement safety measures based on patient assessment  5/21/2025 2210 by Tulio Burk, RN  Outcome: Progressing     Problem: Nutrition Deficit:  Goal: Optimize nutritional status  5/22/2025 0842 by Henna Leger RN  Outcome: Progressing  Flowsheets (Taken 5/22/2025 0842)  Nutrient intake appropriate for improving, restoring, or maintaining nutritional needs: Monitor oral intake, labs, and treatment plans  5/21/2025 2210 by Tulio Burk, RN  Outcome: Progressing     Problem: Gastrointestinal - Adult  Goal: Maintains or returns to baseline bowel function  Outcome: Progressing  Flowsheets (Taken 5/22/2025 0843)  Maintains or returns to baseline bowel function:   Assess bowel function   Administer ordered medications as needed  Goal: Maintains adequate nutritional intake  Outcome: Progressing  Flowsheets (Taken 5/22/2025 0843)  Maintains adequate nutritional intake:   Monitor percentage of each meal consumed   Monitor intake and output, weight and lab values     Problem: Chronic Conditions and Co-morbidities  Goal: Patient's chronic conditions and co-morbidity symptoms are monitored and maintained or improved  5/22/2025 0842 by Henna Leger RN  Outcome: Not Progressing  Flowsheets (Taken 5/22/2025  complex needs related to functional status, cognitive ability or social support system     Problem: Musculoskeletal - Adult  Goal: Return mobility to safest level of function  5/22/2025 0842 by Henna Leger, RN  Outcome: Not Progressing  Flowsheets (Taken 5/22/2025 0842)  Return Mobility to Safest Level of Function:   Assess patient stability and activity tolerance for standing, transferring and ambulating with or without assistive devices   Instruct patient/family in ordered activity level   Obtain physical therapy/occupational therapy consults as needed  5/21/2025 2210 by Tulio Burk, RN  Outcome: Not Progressing  Flowsheets (Taken 5/21/2025 2000)  Return Mobility to Safest Level of Function:   Assess patient stability and activity tolerance for standing, transferring and ambulating with or without assistive devices   Assist with transfers and ambulation using safe patient handling equipment as needed   Ensure adequate protection for wounds/incisions during mobilization   Obtain physical therapy/occupational therapy consults as needed   Apply continuous passive motion per provider or physical therapy orders to increase flexion toward goal   Instruct patient/family in ordered activity level     Problem: Respiratory - Adult  Goal: Achieves optimal ventilation and oxygenation  Outcome: Not Progressing  Flowsheets (Taken 5/22/2025 0843)  Achieves optimal ventilation and oxygenation:   Assess for changes in respiratory status   Assess for changes in mentation and behavior   Position to facilitate oxygenation and minimize respiratory effort   Oxygen supplementation based on oxygen saturation or arterial blood gases   Encourage broncho-pulmonary hygiene including cough, deep breathe, incentive spirometry   Assess and instruct to report shortness of breath or any respiratory difficulty   Respiratory therapy support as indicated

## 2025-05-23 LAB
ANION GAP SERPL CALCULATED.3IONS-SCNC: 11 MMOL/L (ref 7–16)
APPEARANCE BRONCH: CLEAR
BAL DIFF COMMENT: NORMAL
BASOPHILS # BLD: 0 K/UL (ref 0–0.2)
BASOPHILS NFR BLD: 0 % (ref 0–2)
BUN SERPL-MCNC: 31 MG/DL (ref 8–23)
CALCIUM SERPL-MCNC: 7.9 MG/DL (ref 8.8–10.2)
CHLORIDE SERPL-SCNC: 95 MMOL/L (ref 98–107)
CLOT CHECK: NORMAL
CO2 SERPL-SCNC: 26 MMOL/L (ref 22–29)
COLOR BRONCH: NORMAL
CREAT SERPL-MCNC: 1.5 MG/DL (ref 0.7–1.2)
EOSINOPHIL # BLD: 0 K/UL (ref 0.05–0.5)
EOSINOPHILS RELATIVE PERCENT: 0 % (ref 0–6)
ERYTHROCYTE [DISTWIDTH] IN BLOOD BY AUTOMATED COUNT: 15.3 % (ref 11.5–15)
GFR, ESTIMATED: 48 ML/MIN/1.73M2
GLUCOSE SERPL-MCNC: 209 MG/DL (ref 74–99)
HCT VFR BLD AUTO: 35.1 % (ref 37–54)
HGB BLD-MCNC: 11.6 G/DL (ref 12.5–16.5)
LYMPHOCYTES NFR BLD: 0.29 K/UL (ref 1.5–4)
LYMPHOCYTES RELATIVE PERCENT: 2 % (ref 20–42)
LYMPHOCYTES, BAL: 27 %
MACROPHAGES, BAL: 12 %
MCH RBC QN AUTO: 29.9 PG (ref 26–35)
MCHC RBC AUTO-ENTMCNC: 33 G/DL (ref 32–34.5)
MCV RBC AUTO: 90.5 FL (ref 80–99.9)
MICROORGANISM SPEC CULT: NO GROWTH
MICROORGANISM/AGENT SPEC: NORMAL
MONOCYTES NFR BLD: 0.87 K/UL (ref 0.1–0.95)
MONOCYTES NFR BLD: 5 % (ref 2–12)
NEUTROPHILS NFR BLD: 93 % (ref 43–80)
NEUTS SEG NFR BLD: 15.45 K/UL (ref 1.8–7.3)
PLATELET # BLD AUTO: 201 K/UL (ref 130–450)
PMV BLD AUTO: 10.2 FL (ref 7–12)
POTASSIUM SERPL-SCNC: 4 MMOL/L (ref 3.5–5.1)
RBC # BLD AUTO: 3.88 M/UL (ref 3.8–5.8)
RBC # BLD: ABNORMAL 10*6/UL
RBC, BAL: 3510 CELLS/UL
SEGMENTED NEUTROPHILS, BAL: 61 %
SERVICE CMNT-IMP: NORMAL
SODIUM SERPL-SCNC: 133 MMOL/L (ref 136–145)
SPECIMEN DESCRIPTION: NORMAL
SPECIMEN TYPE: NORMAL
TOTAL CELLS COUNTED BRONCH: 21 CELLS/UL
WBC OTHER # BLD: 16.6 K/UL (ref 4.5–11.5)

## 2025-05-23 PROCEDURE — 97164 PT RE-EVAL EST PLAN CARE: CPT

## 2025-05-23 PROCEDURE — 97530 THERAPEUTIC ACTIVITIES: CPT

## 2025-05-23 PROCEDURE — 2500000003 HC RX 250 WO HCPCS: Performed by: ANESTHESIOLOGY

## 2025-05-23 PROCEDURE — 99233 SBSQ HOSP IP/OBS HIGH 50: CPT | Performed by: INTERNAL MEDICINE

## 2025-05-23 PROCEDURE — 1200000000 HC SEMI PRIVATE

## 2025-05-23 PROCEDURE — 6370000000 HC RX 637 (ALT 250 FOR IP): Performed by: STUDENT IN AN ORGANIZED HEALTH CARE EDUCATION/TRAINING PROGRAM

## 2025-05-23 PROCEDURE — 97535 SELF CARE MNGMENT TRAINING: CPT

## 2025-05-23 PROCEDURE — 80048 BASIC METABOLIC PNL TOTAL CA: CPT

## 2025-05-23 PROCEDURE — 6360000002 HC RX W HCPCS: Performed by: STUDENT IN AN ORGANIZED HEALTH CARE EDUCATION/TRAINING PROGRAM

## 2025-05-23 PROCEDURE — 99231 SBSQ HOSP IP/OBS SF/LOW 25: CPT | Performed by: PHYSICIAN ASSISTANT

## 2025-05-23 PROCEDURE — 97168 OT RE-EVAL EST PLAN CARE: CPT

## 2025-05-23 PROCEDURE — 2700000000 HC OXYGEN THERAPY PER DAY

## 2025-05-23 PROCEDURE — 6360000002 HC RX W HCPCS: Performed by: SPECIALIST

## 2025-05-23 PROCEDURE — 6370000000 HC RX 637 (ALT 250 FOR IP): Performed by: SPECIALIST

## 2025-05-23 PROCEDURE — 2580000003 HC RX 258: Performed by: STUDENT IN AN ORGANIZED HEALTH CARE EDUCATION/TRAINING PROGRAM

## 2025-05-23 PROCEDURE — 85025 COMPLETE CBC W/AUTO DIFF WBC: CPT

## 2025-05-23 RX ORDER — CLOPIDOGREL BISULFATE 75 MG/1
75 TABLET ORAL DAILY
Status: DISCONTINUED | OUTPATIENT
Start: 2025-05-23 | End: 2025-05-29 | Stop reason: HOSPADM

## 2025-05-23 RX ORDER — AMOXICILLIN AND CLAVULANATE POTASSIUM 600; 42.9 MG/5ML; MG/5ML
1800 POWDER, FOR SUSPENSION ORAL EVERY 12 HOURS SCHEDULED
Status: DISCONTINUED | OUTPATIENT
Start: 2025-05-23 | End: 2025-05-29 | Stop reason: HOSPADM

## 2025-05-23 RX ORDER — OXYCODONE HYDROCHLORIDE 10 MG/1
10 TABLET ORAL EVERY 6 HOURS PRN
Refills: 0 | Status: DISCONTINUED | OUTPATIENT
Start: 2025-05-23 | End: 2025-05-29 | Stop reason: HOSPADM

## 2025-05-23 RX ORDER — SODIUM CHLORIDE, SODIUM LACTATE, POTASSIUM CHLORIDE, AND CALCIUM CHLORIDE .6; .31; .03; .02 G/100ML; G/100ML; G/100ML; G/100ML
500 INJECTION, SOLUTION INTRAVENOUS ONCE
Status: COMPLETED | OUTPATIENT
Start: 2025-05-23 | End: 2025-05-23

## 2025-05-23 RX ADMIN — PANTOPRAZOLE SODIUM 40 MG: 40 TABLET, DELAYED RELEASE ORAL at 06:31

## 2025-05-23 RX ADMIN — PIPERACILLIN AND TAZOBACTAM 3375 MG: 3; .375 INJECTION, POWDER, LYOPHILIZED, FOR SOLUTION INTRAVENOUS at 06:33

## 2025-05-23 RX ADMIN — LINEZOLID 600 MG: 600 INJECTION, SOLUTION INTRAVENOUS at 03:26

## 2025-05-23 RX ADMIN — RANOLAZINE 500 MG: 500 TABLET, FILM COATED, EXTENDED RELEASE ORAL at 07:58

## 2025-05-23 RX ADMIN — LINEZOLID 600 MG: 600 INJECTION, SOLUTION INTRAVENOUS at 13:25

## 2025-05-23 RX ADMIN — CLOPIDOGREL BISULFATE 75 MG: 75 TABLET, FILM COATED ORAL at 11:13

## 2025-05-23 RX ADMIN — GABAPENTIN 100 MG: 100 CAPSULE ORAL at 21:27

## 2025-05-23 RX ADMIN — POLYETHYLENE GLYCOL 3350 17 G: 17 POWDER, FOR SOLUTION ORAL at 07:58

## 2025-05-23 RX ADMIN — STANDARDIZED SENNA CONCENTRATE 17.2 MG: 8.6 TABLET ORAL at 21:27

## 2025-05-23 RX ADMIN — ROSUVASTATIN 40 MG: 20 TABLET, FILM COATED ORAL at 07:58

## 2025-05-23 RX ADMIN — SODIUM CHLORIDE, SODIUM LACTATE, POTASSIUM CHLORIDE, AND CALCIUM CHLORIDE 500 ML: .6; .31; .03; .02 INJECTION, SOLUTION INTRAVENOUS at 12:25

## 2025-05-23 RX ADMIN — RANOLAZINE 500 MG: 500 TABLET, FILM COATED, EXTENDED RELEASE ORAL at 22:09

## 2025-05-23 RX ADMIN — SODIUM CHLORIDE, PRESERVATIVE FREE 10 ML: 5 INJECTION INTRAVENOUS at 21:28

## 2025-05-23 RX ADMIN — STANDARDIZED SENNA CONCENTRATE 17.2 MG: 8.6 TABLET ORAL at 07:58

## 2025-05-23 RX ADMIN — ASPIRIN 81 MG CHEWABLE TABLET 81 MG: 81 TABLET CHEWABLE at 07:58

## 2025-05-23 RX ADMIN — GABAPENTIN 100 MG: 100 CAPSULE ORAL at 13:19

## 2025-05-23 RX ADMIN — AMOXICILLIN AND CLAVULANATE POTASSIUM 1800 MG: 600; 42.9 POWDER, FOR SUSPENSION ORAL at 22:09

## 2025-05-23 RX ADMIN — SODIUM CHLORIDE, PRESERVATIVE FREE 10 ML: 5 INJECTION INTRAVENOUS at 07:58

## 2025-05-23 RX ADMIN — GABAPENTIN 100 MG: 100 CAPSULE ORAL at 07:58

## 2025-05-23 NOTE — PROGRESS NOTES
OCCUPATIONAL THERAPY RE-EVALUATION    [facility]  [address], [place]      Date:2025                                                  Patient Name: Corky Lawler  MRN: [id number]  : 1945  Room: [address] [place] [address]    Evaluating OT: Simran Crenshaw, MOT, OTR/L  # [id number]  Re-evaluating OT: Elaine Zieglerty OTD, OTR/L, [id number]    Re-evaluation indicated following transfer to ICU with tachycardia, hypotension, and hypoxia.    Referring Provider:  Vinayak Smith MD   Specific Provider Orders:  \"OT Eval and Treat\"  25    Diagnosis: Intractable back pain [M54.9]  Pathologic lumbar vertebral fracture, initial encounter [M84.48XA]  Pathological fracture of lumbosacral spine [M84.48XA]    Pt was admitted w/ w/ severe back pain r/t L2 pathologic fracture r/t mets to spine.      Pertinent Medical History:  Pt has a past medical history of AAA (abdominal aortic aneurysm), Aortic regurgitation, COPD (chronic obstructive pulmonary disease) ([facility]), Coronary atherosclerosis of native coronary artery, DJD (degenerative joint disease), HTN (hypertension), Hyperlipidemia, Mitral regurgitation, Occipital stroke (HCC), and TIA (transient ischemic attack).,  has a past surgical history that includes hernia repair; back surgery; Appendectomy; Diagnostic Cardiac Cath Lab Procedure; Coronary artery bypass graft; Shoulder arthroscopy; Cardiac catheterization (2013); Cholecystectomy; eye surgery (2017); transesophageal echocardiogram (2018); Aorta surgery; Coronary angioplasty with stent (2021); Bladder surgery (Left, 2023); Spine surgery (N/A, 05/15/2025); bronchoscopy (N/A, 2025); bronchoscopy (2025); and bronchoscopy (2025).    Surgeries this admission: 5-15-25:  L1, L2 Kyphoplasty   : bronchoscopy; R thoracentesis      Precautions: Fall Risk  O2  Spinal Precautions  TLSO when HOB > 45*    Assessment of current deficits    [x] Functional mobility                [x]ADLs  [x] Strength                 []Cognition   [x] Functional transfers              [x] IADLs         [x] Safety Awareness   [x]Endurance   [] Fine Coordination                 [x] Balance               [] Vision/perception     []Sensation    []Gross Motor Coordination     [] ROM  [] Delirium                   [] Motor Control       OT PLAN OF CARE   OT POC based on physician orders, patient diagnosis and results of clinical assessment    Frequency/Duration 3-5 days/wk for 2 weeks PRN   Specific OT Treatment to include:   * Instruction/training on adapted ADL techniques and AE recommendations to increase functional independence within precautions       * Training on energy conservation strategies, correct breathing pattern and techniques to improve independence/tolerance for self-care routine  * Functional transfer/mobility training/DME recommendations for increased independence, safety, and fall prevention  * Patient/Family education to increase follow through with safety techniques and functional independence  * Recommendation of environmental modifications for increased safety with functional transfers/mobility and ADLs  * Cognitive retraining/development of therapeutic activities to improve problem solving, judgement, memory, and attention for increased safety/participation in ADL/IADL tasks  * Therapeutic exercise to improve motor endurance, ROM, and functional strength for ADLs/functional transfers  * Therapeutic activities to facilitate/challenge dynamic balance, stand tolerance for increased safety and independence with ADLs  * Therapeutic activities to facilitate gross/fine motor skills for increased independence with ADLs  * Neuro-muscular re-education: facilitation of righting/equilibrium reactions  * Positioning to improve skin integrity, interaction with environment and functional independence  * Delirium prevention/treatment  * Manual techniques for edema

## 2025-05-23 NOTE — CARE COORDINATION
Care Coordination: LOS 11 day.  Met with pt in room, up in chair, with brace. Wife present. Plan is home at discharge. States he follows with Dr Hamilton and he is in process of ordering bed through [facility].  She also said that previous CM as made a referral to [facility].  Plan is home at discharge and she will transport.  I did call Arcelia at [facility], [phone] and order is in process, it has bene submitted and they have reached out to Dr Hamilton and asked for documentation needed for insurance.   I do not see a referral for [facility] in [facility], I have placed referral. I have updated the patient and wife    Electronically signed by Tawanna Santana RN on 5/23/2025 at 12:41 PM     ADDENDUM: Steph can see pt for SOC on 5/27/25. Will need orders for SN, PTx and otx    Electronically signed by Tawanna Santana RN on 5/23/2025 at 12:47 PM

## 2025-05-23 NOTE — PLAN OF CARE
Problem: Safety - Adult  Goal: Free from fall injury  Outcome: Progressing  Flowsheets (Taken 5/23/2025 0822)  Free From Fall Injury:   Instruct family/caregiver on patient safety   Based on caregiver fall risk screen, instruct family/caregiver to ask for assistance with transferring infant if caregiver noted to have fall risk factors     Problem: Chronic Conditions and Co-morbidities  Goal: Patient's chronic conditions and co-morbidity symptoms are monitored and maintained or improved  Outcome: Progressing  Flowsheets (Taken 5/23/2025 0822)  Care Plan - Patient's Chronic Conditions and Co-Morbidity Symptoms are Monitored and Maintained or Improved:   Monitor and assess patient's chronic conditions and comorbid symptoms for stability, deterioration, or improvement   Collaborate with multidisciplinary team to address chronic and comorbid conditions and prevent exacerbation or deterioration   Update acute care plan with appropriate goals if chronic or comorbid symptoms are exacerbated and prevent overall improvement and discharge     Problem: Discharge Planning  Goal: Discharge to home or other facility with appropriate resources  Outcome: Progressing  Flowsheets (Taken 5/23/2025 0822)  Discharge to home or other facility with appropriate resources: Identify barriers to discharge with patient and caregiver     Problem: Pain  Goal: Verbalizes/displays adequate comfort level or baseline comfort level  Outcome: Progressing  Flowsheets (Taken 5/23/2025 0822)  Verbalizes/displays adequate comfort level or baseline comfort level:   Encourage patient to monitor pain and request assistance   Assess pain using appropriate pain scale   Administer analgesics based on type and severity of pain and evaluate response   Implement non-pharmacological measures as appropriate and evaluate response     Problem: Skin/Tissue Integrity  Goal: Skin integrity remains intact  Description: 1.  Monitor for areas of redness and/or skin  (Taken 5/23/2025 0822)  Return Mobility to Safest Level of Function:   Assist with transfers and ambulation using safe patient handling equipment as needed   Assess patient stability and activity tolerance for standing, transferring and ambulating with or without assistive devices   Obtain physical therapy/occupational therapy consults as needed   Instruct patient/family in ordered activity level  Goal: Return ADL status to a safe level of function  Outcome: Progressing  Flowsheets (Taken 5/23/2025 0822)  Return ADL Status to a Safe Level of Function:   Administer medication as ordered   Obtain physical therapy/occupational therapy consults as needed   Assess activities of daily living deficits and provide assistive devices as needed   Assist and instruct patient to increase activity and self care as tolerated     Problem: ABCDS Injury Assessment  Goal: Absence of physical injury  Outcome: Progressing  Flowsheets (Taken 5/23/2025 0822)  Absence of Physical Injury: Implement safety measures based on patient assessment     Problem: Nutrition Deficit:  Goal: Optimize nutritional status  Outcome: Progressing  Flowsheets (Taken 5/23/2025 0822)  Nutrient intake appropriate for improving, restoring, or maintaining nutritional needs: Monitor oral intake, labs, and treatment plans     Problem: Respiratory - Adult  Goal: Achieves optimal ventilation and oxygenation  Outcome: Progressing  Flowsheets (Taken 5/23/2025 0822)  Achieves optimal ventilation and oxygenation:   Assess for changes in respiratory status   Assess for changes in mentation and behavior   Position to facilitate oxygenation and minimize respiratory effort   Oxygen supplementation based on oxygen saturation or arterial blood gases   Encourage broncho-pulmonary hygiene including cough, deep breathe, incentive spirometry   Assess and instruct to report shortness of breath or any respiratory difficulty   Respiratory therapy support as indicated     Problem:

## 2025-05-23 NOTE — PROGRESS NOTES
[facility]  PULMONARY/CRITICAL CARE PROGRESS NOTE    Patient: Corky Lawler  MRN: [id number]  : 1945    Encounter Date: 2025  Encounter Time: 10:15 PM     Date of Admission: .2025 11:54 AM    Consulting Physician:  Primary Care Physician:     Aurelio Hamilton MD     [phone]    Reason for Consultation: Lung Mass    Problem List:  Patient Active Problem List   Diagnosis    Coronary atherosclerosis of native coronary artery    COPD (chronic obstructive pulmonary disease) (HCC)    DJD spine    Trace mitral regurgitation    Aortic regurgitation    HTN (hypertension)    Acute chest pain    Chest pain    Pathologic lumbar vertebral fracture    Lytic lesion of bone on x-ray    Lung mass    Acute stroke due to ischemia (HCC)    Pathological fracture of lumbosacral spine    Closed fracture of second lumbar vertebra ([facility])    Goals of care, counseling/discussion    Palliative care encounter     SUBJECTIVE: Patient seen and examined.  Overnight the patient had no shortness of breath, cough, increased work of breathing.    Overnight 2025 patient had multiple episodes of nausea and vomiting.  He remains on 2L O2 NC at this time.    Patient has abdominal pains that are 10/10 with rebound and guarding.    Updated 25: Patient seen and examined. Reports he is much less short of breath after thoracentesis yesterday. Ok for bronchoscopy this am.     HOME MEDICATIONS:  Prior to Admission medications    Medication Sig Start Date End Date Taking? Authorizing Provider   isosorbide mononitrate (IMDUR) 30 MG extended release tablet Take 1 tablet by mouth daily   Yes ProviderRonaldo MD   oxyCODONE-acetaminophen (PERCOCET)  MG per tablet Take 1 tablet by mouth every 6 hours as needed for Pain. 25 Yes ProviderRonaldo MD   tamsulosin (FLOMAX) 0.4 MG capsule Take 1 capsule by mouth daily   Yes ProviderRonaldo MD   Multiple Vitamins-Minerals (ICAPS)  bladder calculi as observed on the study of   April 17.      4.  New since the previous study of April 17 is a moderate large right-sided   pleural effusion.      5.  Also as new findings since the study of April 17 is the presence of a   serpiginous structures in the right anterior epicardial space which can be   relate vessels.  Further evaluation with CT chest IV contrast recommended.   This also will evaluate an apparently bulkiness in the right hilar region.      6.  Patient had 2 levels of vertebroplasty in the lumbar spine: L1 and L2   since study April 17.      7.  Presence of an expansile mass lesion measuring 4.4 x 3.7 x 3 cm   approximately, located in the right upper thigh located just anterior and   lateral to the infra torque at the region of the proximal right femur,   interposed between the right tensor fascia rivas/anterior rectus femorals   muscle/vastus lateral muscle as above described.  Could represent a primary   muscle lesion or even a organized hematoma on subacute or ongoing process.   Recommended further evaluation with MRI study of the right thigh/hip area   without and with IV contrast.         XR ABDOMEN (KUB) (SINGLE AP VIEW)   Final Result   Nonobstructive bowel gas pattern.         CT HEAD WO CONTRAST   Final Result   No acute intracranial abnormality.         XR CHEST PORTABLE   Final Result   Some worsening identified of the markings throughout the right perihilar   region with increase seen in size of the right pleural effusion to suggest   possible asymmetric edema versus pneumonia         FLUORO FOR SURGICAL PROCEDURES   Final Result   Intraprocedural fluoroscopic spot images as above.  See separate procedure   report for more information.         XR CHEST PORTABLE   Final Result   Findings suggestive of congestive heart failure with interstitial pulmonary   edema pattern.  Findings of increased since 04/17/2025 comparison         CT LUMBAR SPINE WO CONTRAST   Final Result   1.

## 2025-05-23 NOTE — PROGRESS NOTES
Physical Therapy    Physical Therapy Re-Assessment     Name: Corky Lawler  : 1945  MRN: [id number]      Date of Service: 2025    Re-Evaluating PT:  Denis Bhakta, PT, DPT  [id number]     Room #:  4405/4405-A  Diagnosis:  Intractable back pain [M54.9]  Pathologic lumbar vertebral fracture, initial encounter [M84.48XA]  Pathological fracture of lumbosacral spine [M84.48XA]  PMHx/PSHx:   has a past medical history of AAA (abdominal aortic aneurysm), Aortic regurgitation, COPD (chronic obstructive pulmonary disease) ([facility]), Coronary atherosclerosis of native coronary artery, DJD (degenerative joint disease), HTN (hypertension), Hyperlipidemia, Mitral regurgitation, Occipital stroke ([facility]), and TIA (transient ischemic attack).   Procedure/Surgery:  L1 & L2 kyphoplasty 5/15   Precautions:  Falls, Spinal precautions, TLSO, O2, Dizziness with positional changes, Alarms   Equipment Needs:  TBD    Reason for re-evaluation:  Change in status, transfer to intensive care, new orders   Date of re-evaluation:      SUBJECTIVE:    Pt lives with his wife in a 2 story home with 2 stair(s) and 1 rail(s) to enter. Pt independent with functional mobility PTA. Pt ambulated with no AD prior to admission. Pt owns a WW.     OBJECTIVE:   Re-Evaluation  Date: 25 Treatment Short Term/ Long Term   Goals   AM-PAC 6 Clicks      Was pt agreeable to Re-Eval/treatment? Yes      Does pt have pain? No c/o pain     Bed Mobility  Rolling: Min A  Supine to sit: Mod A  Sit to supine: NT  Scooting: Min A to EOB   Rolling: Supervision    Supine to sit: Supervision    Sit to supine: Supervision    Scooting: Supervision     Transfers Sit to stand: Mod A  Stand to sit: Mod A  Stand pivot: Mod A with FWW  Sit to stand: SBA  Stand to sit: SBA  Stand pivot: SBA with FWW   Ambulation    Few feet with FWW bed>BSC Mod A;  Few feet with FWW BSC>chair Mod A  >150 feet with FWW SBA   Stair negotiation: ascended and descended  NT  >4 steps with 1  contractures  [x] Safety and Education Training   [x] Patient/Caregiver Education   [x] HEP  [] Other     PT long term treatment goals are located in above grid    Frequency of treatments: 2-5x/week x 1-2 weeks.    Time in  1005  Time out  1105    Total Treatment Time  45 minutes     Re-Evaluation Time includes thorough review of current medical information, gathering information on past medical history/social history and prior level of function, completion of standardized testing/informal observation of tasks, assessment of data and education on plan of care and goals.    CPT codes:  [] Low Complexity PT evaluation 87629  [] Moderate Complexity PT evaluation 76403  [] High Complexity PT evaluation 03070  [x] PT Re-evaluation 78683  [] Gait training 51181 -- minutes  [] Manual therapy 98996 -- minutes  [x] Therapeutic activities 22161 45 minutes  [] Therapeutic exercises 40311 - minutes  [] Neuromuscular reeducation 14412 -- minutes     Denis Bhakta, PT, DPT  [id number]

## 2025-05-23 NOTE — PROGRESS NOTES
4 Eyes Skin Assessment     NAME:  Corky Lawler  YOB: 1945  MEDICAL RECORD NUMBER:  [id number]    The patient is being assessed for  Transfer to New Unit    I agree that at least one RN has performed a thorough Head to Toe Skin Assessment on the patient. ALL assessment sites listed below have been assessed.      Areas assessed by both nurses:    Head, Face, Ears, Shoulders, Back, Chest, Arms, Elbows, Hands, Sacrum. Buttock, Coccyx, Ischium, Legs. Feet and Heels, and Under Medical Devices     Ecchymosis BUE  Redness heels, both elbows, buttocks, excoriation groin, surgical scar healed-chest, surgical scar back        Does the Patient have a Wound? No noted wound(s)       Willard Prevention initiated by RN: Yes  Wound Care Orders initiated by RN: No    Pressure Injury (Stage 3,4, Unstageable, DTI, NWPT, and Complex wounds) if present, place Wound referral order by RN under : No    New Ostomies, if present place, Ostomy referral order under : No     Nurse 1 eSignature: Electronically signed by Roslyn Day RN on 5/23/25 at 7:01 PM EDT    **SHARE this note so that the co-signing nurse can place an eSignature**    Nurse 2 eSignature: Electronically signed by Lea Palmer RN on 5/23/25 at 7:33 PM EDT

## 2025-05-23 NOTE — PROGRESS NOTES
Palliative Care Department  [phone]  Palliative Care Progress Note  Provider Ramya Chris PA-C     Corky Lawler  [id number]  Hospital Day: 12  Date of Initial Consult: 05/12/2025  Referring Provider: Vinayak Smith MD   Palliative Medicine was consulted for assistance with: History of lung cancer; intractable pain with pathologic fracture of spine; goals of care    HPI:   Corky Lawler is a 80 y.o. with a medical history of metastatic lung cancer to the bone undergoing radiation therapy who was admitted on 5/12/2025 from home with a CHIEF COMPLAINT of intractable back pain.  In the ED, CT of spine showing enlarging lytic lesion in the left lateral L2 vertebral body with associated pathological fracture, new subtle lytic lesion of the left lateral aspect of L3 vertebral body.  Patient admitted for further workup and pain control.  Neurosurgery and oncology consulted.  Palliative care consulted for goals of care and symptom management.    5/21: RRT for confusion hypotension and hypoxia. Transferred to MICU on NRB  ASSESSMENT/PLAN:     Pertinent Hospital Diagnoses     Metastatic lung cancer to the bone  Pathological fracture of L2  Intractable back pain      Palliative Care Encounter / Counseling Regarding Goals of Care  Please see detailed goals of care discussion as below  At this time, Corky Lawler, Does not have capacity for medical decision-making.  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  # Acute pain crisis from pathologic lumbar vertebral fractures  # Status post kyphoplasty  # Pain improved       Spiritual assessment:  no spiritual distress identified  Bereavement and grief: to be determined  Referrals to: none today  SUBJECTIVE:     Current medical issues leading to Palliative Medicine involvement include   Active Hospital Problems    Diagnosis Date Noted    Goals of care, counseling/discussion [Z71.89] 05/21/2025    Palliative care encounter [Z51.5] 05/21/2025    Closed fracture of second lumbar vertebra (HCC) [S32.029A] 05/15/2025    Pathological fracture of lumbosacral spine [M84.48XA] 05/12/2025       Details of Conversation:      Patient seen in MICU.  Respiratory status improving status post thoracentesis with almost 2000 cc removed from right lung.  Patient's mental status has improved.  He is alert and oriented.  We reviewed his CODE STATUS.  He confirmed DNR CCA/DNI. Denies any pain. Wean off fentanyl patch in outpatient setting. Decrease frequency of oxycodone to every 6 hrs PRN.     OBJECTIVE:   Prognosis: Guarded    Physical Exam:  BP 95/69   Pulse 63   Temp 97.6 °F (36.4 °C) (Oral)   Resp 15   Ht 1.753 m (5' 9.02\")   Wt 94.2 kg (207 lb 9.6 oz)   SpO2 95%   BMI 30.64 kg/m²   Constitutional: Awake/alert  Eyes: no scleral icterus  ENMT:  Normocephalic, atraumatic  Lungs: Nonlabored on nasal cannula  Heart:: Regular rate  Skin:  Warm and dry, no rashes on visible skin  Psych: non-anxious affect  Neuro: Alert and oriented.  No focal deficits.    Objective data reviewed: labs, images, records, medication use, vitals, and chart    Discussed patient and the plan of care with the other IDT members: Floor Nurse and Patient    Time/Communication  Greater than 50% of time spent, total 25 minutes in counseling and coordination of care at the bedside regarding goals of care, symptom management, diagnosis and prognosis, and see above.    Thank you for allowing Palliative Medicine to participate in the care of Corky Lawler.

## 2025-05-23 NOTE — PROGRESS NOTES
[facility]  PULMONARY/CRITICAL CARE PROGRESS NOTE    Patient: Corky Lawler  MRN: [id number]  : 1945    Encounter Date: 2025  Encounter Time: 6:59 PM     Date of Admission: .2025 11:54 AM    Consulting Physician:  Primary Care Physician:     Aurelio Hamilton MD     [phone]    Reason for Consultation: Lung Mass    Problem List:  Patient Active Problem List   Diagnosis    Coronary atherosclerosis of native coronary artery    COPD (chronic obstructive pulmonary disease) (HCC)    DJD spine    Trace mitral regurgitation    Aortic regurgitation    HTN (hypertension)    Acute chest pain    Chest pain    Pathologic lumbar vertebral fracture    Lytic lesion of bone on x-ray    Lung mass    Acute stroke due to ischemia (HCC)    Pathological fracture of lumbosacral spine    Closed fracture of second lumbar vertebra ([facility])    Goals of care, counseling/discussion    Palliative care encounter     SUBJECTIVE: Patient seen and examined.  Overnight the patient had no shortness of breath, cough, increased work of breathing.    Overnight 2025 patient had multiple episodes of nausea and vomiting.  He remains on 2L O2 NC at this time.    Patient has abdominal pains that are 10/10 with rebound and guarding.    Updated 25: Patient seen and examined. Reports he is much less short of breath after thoracentesis yesterday. Ok for bronchoscopy this am.     Update 25: Patient seen and examined. S/P bronchoscopy with EBUS yesterday. Reports sore throat, no other complaints.     HOME MEDICATIONS:  Prior to Admission medications    Medication Sig Start Date End Date Taking? Authorizing Provider   isosorbide mononitrate (IMDUR) 30 MG extended release tablet Take 1 tablet by mouth daily   Yes ProviderRonaldo MD   oxyCODONE-acetaminophen (PERCOCET)  MG per tablet Take 1 tablet by mouth every 6 hours as needed for Pain. 25 Yes Ronaldo Ansari MD  Nostril, Once  lidocaine (XYLOCAINE) 2 % uro-jet, , Topical, Once  norepinephrine (LEVOPHED) 16 mg in sodium chloride 0.9 % 250 mL infusion (premix), 1-100 mcg/min, IntraVENous, Continuous  aspirin chewable tablet 81 mg, 81 mg, Oral, Daily  senna (SENOKOT) tablet 17.2 mg, 2 tablet, Oral, BID  polyethylene glycol (GLYCOLAX) packet 17 g, 17 g, Oral, BID  fentaNYL (DURAGESIC) 50 MCG/HR 1 patch, 1 patch, TransDERmal, Q72H  nitroGLYCERIN (NITROSTAT) SL tablet 0.4 mg, 0.4 mg, SubLINGual, Q5 Min PRN  cyclobenzaprine (FLEXERIL) tablet 10 mg, 10 mg, Oral, TID PRN  ondansetron (ZOFRAN-ODT) disintegrating tablet 4 mg, 4 mg, Oral, Q8H PRN **OR** ondansetron (ZOFRAN) injection 4 mg, 4 mg, IntraVENous, Q6H PRN  acetaminophen (TYLENOL) tablet 650 mg, 650 mg, Oral, Q6H PRN **OR** acetaminophen (TYLENOL) suppository 650 mg, 650 mg, Rectal, Q6H PRN  gabapentin (NEURONTIN) capsule 100 mg, 100 mg, Oral, TID  [Held by provider] lisinopril (PRINIVIL;ZESTRIL) tablet 20 mg, 20 mg, Oral, BID  pantoprazole (PROTONIX) tablet 40 mg, 40 mg, Oral, QAM AC  ranolazine (RANEXA) extended release tablet 500 mg, 500 mg, Oral, BID  rosuvastatin (CRESTOR) tablet 40 mg, 40 mg, Oral, Daily  [Held by provider] isosorbide mononitrate (IMDUR) extended release tablet 30 mg, 30 mg, Oral, Daily    ALLERGIES:  Allergies   Allergen Reactions    Tramadol Nausea And Vomiting       REVIEW OF SYSTEMS:  General ROS: Negative For: chills, fatigue, fever, malaise, night sweats or sleep disturbance   ENT ROS: Negative For: epistaxis, headaches, sinus pain, sneezing or sore throat   Respiratory ROS: Negative For: hemoptysis, pleuritic type chest pains  Cardiovascular ROS: Negative For: chest pain, dyspnea on exertion, irregular heartbeat, loss of consciousness, murmur, orthopnea or palpitations   Gastrointestinal ROS: Negative For: abdominal pain, appetite loss, blood in stools, change in bowel habits, change in stools, constipation, diarrhea, hematemesis, melena,

## 2025-05-23 NOTE — PROGRESS NOTES
Internal Medicine Progress Note    Patient's name: Corky Lawler  : 1945  Chief complaints (on day of admission): Back Pain (Pt L1 and L2 pain. Pt has lumbar cancer. Pt received treatment today . Pt unable to tolerate pain. Pt denies numbness to lower extremities denies loss of bowel or bladder. )  Admission date: 2025  Date of service: 25  Room: [address] IMCU/NEURO  Primary care physician: Aurelio Hamilton MD  Reason for visit: Follow-up for Back pain    Subjective  Corky was seen and examined at bedside       Doing much much better thankfully   More comfortable   NAD   On NC O2 again   Family at bedside   Answered all of their questions   Goal is to get him home at some point per family   Feels better after thoracentesis       Abdominal pain this morning   Went down for bronch abdominal pain worsened and was having intractable vomiting   Sent for stat CT abdomen pelvis which is still not formally read ? Patient on NRB and NC when I saw him after RRT transferred to MICU. Spoke with family. Spoke with ICU and palliative care. Palliative will ultimately confirm the patients wishes at this time, very difficult situation for family they are very emotional. Spent a great deal of time with them today.       Still dizzy particularly with head movement to the R   Refusing MRI brain states he just does not want to go back in the machine explained we will be unable to evaluate for new stroke he voices understanding and commits to his refusal   Otherwise will re check ortho vitals today   Nursing at bedside   Treat constipation   CT head stable   Bronch tomorrow       Denies numbness tingling weakness to me   Denies fevers chills sweats   Nursing at bedside   Having dizziness today again   BP running on the low side   Recrudescence of prior stroke vs new acute stroke   No way to tell unfortunately I think he needs another MRI, last time he was in the hospital he had dizziness after anti  to person/place/time/purpose, NAD, no labored breathing. In pain  Skin: color/texture/turgor normal, no rashes or lesions  Extremities: atraumatic, no edema    Most Recent Labs  Lab Results   Component Value Date    WBC 16.6 (H) 05/23/2025    HGB 11.6 (L) 05/23/2025    HCT 35.1 (L) 05/23/2025     05/23/2025     (L) 05/23/2025    K 4.0 05/23/2025    CL 95 (L) 05/23/2025    CREATININE 1.5 (H) 05/23/2025    BUN 31 (H) 05/23/2025    CO2 26 05/23/2025    GLUCOSE 209 (H) 05/23/2025    ALT 7 05/21/2025    AST 14 05/21/2025    INR 1.4 05/21/2025    APTT 32.1 09/18/2013    TSH 3.320 05/19/2023    LABA1C 6.1 (H) 04/23/2025       XR ABDOMEN (KUB) (SINGLE AP VIEW)   Final Result   At the time the present study the stomach is not distended.      The small bowel pattern is nonspecific.      No significant degree of ileus observed.      There is no indication for bowel obstruction.         XR CHEST PORTABLE   Final Result   1. Near complete interval resolution of right pleural effusion, consistent   with provided history of thoracentesis.  Improved aeration of the right lung.   2. Persistent right suprahilar mass.   3. Bilateral hazy opacities may represent persistent atelectasis versus   pulmonary edema.  Superimposed infection cannot be excluded in the   appropriate clinical context.         XR CHEST PORTABLE   Final Result   Combination of moderate to large size right-sided pleural effusion as   demonstrate on CT abdomen of May 21st same-day, with areas of compression   atelectasis in the right lower lobe and the presence of a right   hilar/suprahilar mass lesion.         CT ABDOMEN PELVIS WO CONTRAST Additional Contrast? None   Final Result   1.  No indication for free intraperitoneal air or extraluminal   retroperitoneal air in the abdomen or in the pelvis.  No indication for acute   inflammatory in the intraperitoneal retroperitoneal spaces of the abdomen and   or in the pelvis.      2.  Pattern of moderate  pulmonary   edema pattern.  Findings of increased since 04/17/2025 comparison         CT LUMBAR SPINE WO CONTRAST   Final Result   1. Enlarging lytic lesion in the left lateral L2 vertebral body, now   associated with a pathological fracture.   2. New subtle lytic lesion in the left lateral aspect of the L3 vertebral   body.   3. The metastatic lesion in the L1 vertebral body, as seen on the previous   MRI, is not evident by CT.               Assessment   Active Hospital Problems    Diagnosis     Goals of care, counseling/discussion [Z71.89]     Palliative care encounter [Z51.5]     Closed fracture of second lumbar vertebra (HCC) [S32.029A]     Pathological fracture of lumbosacral spine [M84.48XA]      CT Lumbar Spine   Enlarging lytic lesion in the left lateral L2 vertebral body, now associated with a pathological fracture. . New subtle lytic lesion in the left lateral aspect of the L3 vertebral body.    Plan  Lytic lesions of the L1-L2 vertebrae with intractable back pain and with new pathological fracture  Has received 4 rounds of radiation therapy  S/p L1 and L2 kyphoplasty 5/15/2025  NSG recs appreciated   Palliative care on board for pain regimen     CHUCK stage II, resolved  Baseline Cr: 0.8  On presentation Cr: 2.1    Acute Hypoxia with worsening R sided CXR   With findings of possible volume overload on CXR 5/15  Ambulatory pulse ox   Cxr 5/19 worse on R side   Pulm consult, bronch planned 5/21 now on hold   Holding plavix for this     Leucocytosis   Probably post obstructive PNA and now likely aspiration event this morning   Blood cx UA ucx lactic acid inflammatory markers urine ags viral pcr   ID consultation    Dizziness   Possibly recrudescence of recent stroke vs area of new stroke   Neurology consultation appreciated   Refusing brain MRI after multiple discussion   CT head repeat stable     Orthostatic hypotension   Hold imdur   Encourage PO intake   Fall precautions     Right Lung mass worsening    Pulmonary on board now     History of CAD s/p CABG    Recent Small acute to sub acute stroke Left Occipital periventricular white matter 4/22/25  Past R occipital stroke noted   On ASA and Plavix already and statin for cardiac reasons per patient - these were held for kypho   ASA and Plavix was held for 2 days for EBUS prior to this last admission   CTA head and neck no significant stenosis at that time last admission   ECHO w bubble no shunting last admission     HTN   BP running on the low side   Holding anti htn agents     GERD  Protonix 40 mg daily    Prior tobacco abuse     PT/OT 16/24   Consults Neurosurgery, Heme/Onc, Palliative, neurology   DVT prophylaxis Protonix  Code Status Full, prognostically at this point I fear he is not going to do well moving forward, palliative care consult is appreciated   Discharge plan: TBD     Electronically signed by Jesse Mcbride MD on 5/23/2025 at 8:17 AM    I can be reached through [email or web address].

## 2025-05-23 NOTE — PROGRESS NOTES
Internal Medicine Progress Note    Patient's name: Corky Lawler  : 1945  Chief complaints (on day of admission): Back Pain (Pt L1 and L2 pain. Pt has lumbar cancer. Pt received treatment today . Pt unable to tolerate pain. Pt denies numbness to lower extremities denies loss of bowel or bladder. )  Admission date: 2025  Date of service: 2025   Room: [address] IMCU/NEURO  Primary care physician: Aurelio Hamilton MD  Reason for visit: Follow-up for Back pain    Subjective  Corky was seen and examined at bedside       Doing much improved today which is great to see  Wife Mirlande at bedside   On room air   Breathing NAD  Transferring out to floor   BP a bit low and he is having what I presume to be recrudescence of his stroke symptoms I.e. dizziness   He has declined MRI again today for further evaluation        Doing much much better thankfully   More comfortable   NAD   On NC O2 again   Family at bedside   Answered all of their questions   Goal is to get him home at some point per family   Feels better after thoracentesis       Abdominal pain this morning   Went down for bronch abdominal pain worsened and was having intractable vomiting   Sent for stat CT abdomen pelvis which is still not formally read ? Patient on NRB and NC when I saw him after RRT transferred to MICU. Spoke with family. Spoke with ICU and palliative care. Palliative will ultimately confirm the patients wishes at this time, very difficult situation for family they are very emotional. Spent a great deal of time with them today.       Still dizzy particularly with head movement to the R   Refusing MRI brain states he just does not want to go back in the machine explained we will be unable to evaluate for new stroke he voices understanding and commits to his refusal   Otherwise will re check ortho vitals today   Nursing at bedside   Treat constipation   CT head stable   Bronch tomorrow       Denies numbness tingling  consultation    Dizziness   Possibly recrudescence of recent stroke vs area of new stroke   Neurology consultation appreciated   Refusing brain MRI after multiple discussion   CT head repeat stable     Orthostatic hypotension   Hold imdur   Encourage PO intake   Fall precautions     Right Lung mass worsening   Pulmonary on board now     History of CAD s/p CABG    Recent Small acute to sub acute stroke Left Occipital periventricular white matter 4/22/25  Past R occipital stroke noted   On ASA and Plavix already and statin for cardiac reasons per patient - these were held for kypho   ASA and Plavix was held for 2 days for EBUS prior to this last admission   CTA head and neck no significant stenosis at that time last admission   ECHO w bubble no shunting last admission     HTN   BP running on the low side   Holding anti htn agents     GERD  Protonix 40 mg daily    Prior tobacco abuse     PT/OT 16/24   Consults Neurosurgery, Heme/Onc, Palliative, neurology   DVT prophylaxis Protonix  Code Status Full, prognostically at this point I fear he is not going to do well moving forward, palliative care consult is appreciated   Discharge plan: TBD     Electronically signed by Jesse Mcbride MD on 5/23/2025 at 8:18 AM    I can be reached through [email or web address].

## 2025-05-23 NOTE — PROGRESS NOTES
sternotomy changes.  No acute bony findings.     1. Near complete interval resolution of right pleural effusion, consistent with provided history of thoracentesis.  Improved aeration of the right lung. 2. Persistent right suprahilar mass. 3. Bilateral hazy opacities may represent persistent atelectasis versus pulmonary edema.  Superimposed infection cannot be excluded in the appropriate clinical context.     XR CHEST PORTABLE  Result Date: 5/21/2025  EXAMINATION: ONE XRAY VIEW OF THE CHEST 5/21/2025 11:07 am COMPARISON: Reviewed the CT chest of April 17, plain radiographs of the chest of April 17, May 15, May 19. HISTORY: ORDERING SYSTEM PROVIDED HISTORY: eval and compare TECHNOLOGIST PROVIDED HISTORY: Reason for exam:->eval and compare FINDINGS: There is further increased density in the right chest relate with moderate to large size right-sided pleural effusion as demonstrate on the recent CT abdomen/pelvis performed in the same day, and the presence of a bulky mass lesion the right hilar/suprahilar area. There is a tortuous thoracic aorta.  Heart is normal size.  Patient had previous mid sternotomy. Left lung is well expanded.  Left-sided pleural space are free.     Combination of moderate to large size right-sided pleural effusion as demonstrate on CT abdomen of May 21st same-day, with areas of compression atelectasis in the right lower lobe and the presence of a right hilar/suprahilar mass lesion.     CT ABDOMEN PELVIS WO CONTRAST Additional Contrast? None  Result Date: 5/21/2025  EXAMINATION: CT OF THE ABDOMEN AND PELVIS WITHOUT CONTRAST 5/21/2025 8:48 am TECHNIQUE: CT of the abdomen and pelvis was performed without the administration of intravenous contrast. Multiplanar reformatted images are provided for review. Automated exposure control, iterative reconstruction, and/or weight based adjustment of the mA/kV was utilized to reduce the radiation dose to as low as reasonably achievable. COMPARISON: None.  HISTORY: ORDERING SYSTEM PROVIDED HISTORY: abdominal pain, assess for viscus perforation TECHNOLOGIST PROVIDED HISTORY: Reason for exam:->abdominal pain, assess for viscus perforation Additional Contrast?->None What reading provider will be dictating this exam?->CRC FINDINGS: There are no free intraperitoneal air or extraluminal retroperitoneal air in the abdomen or in the pelvis. There are air and fluid distension with mild dilatation of the stomach but there is no indication for gastric outlet obstruction.  There are air distension of the duodenal.  There is area and fluid distension of the small bowel without dilatation point of obstruction. Fair amount of fecal contents seen throughout the entire colon represent a pattern moderate constipation.  There are no pericolonic inflammatory process.  There no signs for diverticulitis or colitis.  Cannot conspicuously identified the appendix are separate appendix from adjacent bowel structures of vascular structures, but there is no pericecal inflammatory process.  The ileocecal valve has unremarkable appearance. The liver has a normal size.  There are multiple cysts in the liver.  The dominant cyst is located in the left lobe of the liver with simple cyst density.  The others of smaller size more difficult to be characterized but they are stable over time. Area of hypodensity in the segment 4 B of the left lobe of the liver in the margin of the falciform ligament relates likely with an area of fat infiltration. Patient had previous cholecystectomy.  The biliary tree is not dilated the. Common bile duct can be traced down to the level papilla in the area of the head of the pancreas. There are fat replacement of the pancreatic parenchyma.  The pancreatic system is not dilated.  There is no peripancreatic fat planes. The spleen appears unremarkable. Adrenal glands are not enlarged. Kidneys are preserved size and cortical thickness.  There is no renal calculus or  obstruction.  Perirenal fat planes are preserved. 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  The appearance aneurysm, graft are similar as observed on previous studies back to March 2021.  This is a non IV contrast study. There is no midline retroperitoneal adenopathy. The bladder is moderate distended.  There is a cluster of several low subcentimeter size bladder calculi that were observed on the previous study of April 17.  As mention presently there is no renal calculus. The prostate gland is mild to moderate enlarged, causes some impression in the base of the bladder.  The prostate gland measures 4 x 5.1 x 5.4 cm. Seminal vesicles symmetrical appearance. New development since the previous examination of April 17 is the presence of a moderate large size pleural effusion causes significant compression atelectasis in the right lower lobe with consolidation and air bronchogram, there are patency of the central airways.  There is an additional confluent density in the right parahilar region not covered on this study. Also observed is a serpentine use cluster of tubular like structures in the right anterior epicardial space above the right diaphragma which was not seen on the previous examination.  These could represent abnormal vessels.  Can consider further evaluation with CT scan of the chest with IV contrast. There is no left-sided pleural effusion. Patient had previous mid sternotomy.  Calcifications are seen in the coronary arteries.  Heart is normal size.  There is no pericardial effusion. Patient had previous vertebroplasty for L2 and L1 vertebral bodies which is a new finding since the previous study of April 17.  There is a compression deformity of these 2 vertebral bodies more of L2.  Degenerative changes are seen in the disc space of L2-3 are degenerative changes in the right hip joint with subchondral cysts in the acetabular  side. In the right upper thigh interposed between the right tensor fascia muscle/the upper right anterior rectus femorals muscle/the proximal right vastus lateralis there is a expansile slightly nonhomogeneous mass like lesion measuring 4.4 x 3.7 x 3 cm approximately, located just anterolateral to the proximal right femoral shaft in the infra trochanteric region partially covered on this study.  Could represent a hematoma but cannot exclude a solid mass lesion.  Further evaluation with MRI study of the right thigh without and with IV contrast is recommended.     1.  No indication for free intraperitoneal air or extraluminal retroperitoneal air in the abdomen or in the pelvis.  No indication for acute inflammatory in the intraperitoneal retroperitoneal spaces of the abdomen and or in the pelvis. 2.  Pattern of moderate constipation. 3.  Presently, there are no renal calculus or obstructive uropathy.  However there multiple subcentimeter size bladder calculi as observed on the study of April 17. 4.  New since the previous study of April 17 is a moderate large right-sided pleural effusion. 5.  Also as new findings since the study of April 17 is the presence of a serpiginous structures in the right anterior epicardial space which can be relate vessels.  Further evaluation with CT chest IV contrast recommended. This also will evaluate an apparently bulkiness in the right hilar region. 6.  Patient had 2 levels of vertebroplasty in the lumbar spine: L1 and L2 since study April 17. 7.  Presence of an expansile mass lesion measuring 4.4 x 3.7 x 3 cm approximately, located in the right upper thigh located just anterior and lateral to the infra torque at the region of the proximal right femur, interposed between the right tensor fascia rivas/anterior rectus femorals muscle/vastus lateral muscle as above described.  Could represent a primary muscle lesion or even a organized hematoma on subacute or ongoing process. Recommended  significant central canal stenosis.  Moderate neural foraminal stenoses at the left L2-3 and bilateral L4-5 and L5-S1 levels. SOFT TISSUES/RETROPERITONEUM: No paraspinal mass is seen. An aorto iliac stent is seen traversing the abdominal aortic aneurysm which measures approximately 4 cm in maximal transverse dimension.     1. Enlarging lytic lesion in the left lateral L2 vertebral body, now associated with a pathological fracture. 2. New subtle lytic lesion in the left lateral aspect of the L3 vertebral body. 3. The metastatic lesion in the L1 vertebral body, as seen on the previous MRI, is not evident by CT.     CTA HEAD W CONTRAST  Result Date: 4/23/2025  EXAMINATION: CTA OF THE HEAD WITH CONTRAST; CTA OF THE NECK 4/23/2025 12:59 pm: TECHNIQUE: CTA of the head/brain was performed with the administration of intravenous contrast. Multiplanar reformatted images are provided for review.  MIP images are provided for review. CT of the head was performed without the administration of intravenous contrast. CTA of the neck was performed with the administration of intravenous contrast. Multiplanar reformatted images are provided for review.  MIP images are provided for review. Stenosis of the internal carotid arteries measured using NASCET criteria. Automated exposure control, iterative reconstruction, and/or weight based adjustment of the mA/kV was utilized to reduce the radiation dose to as low as reasonably achievable. COMPARISON: MRI of the brain from yesterday. CT pulmonary angiogram from 04/17/2025. HISTORY: ORDERING SYSTEM PROVIDED HISTORY: occipital stroke TECHNOLOGIST PROVIDED HISTORY: Reason for exam:->occipital stroke Has a \"code stroke\" or \"stroke alert\" been called?-> What reading provider will be dictating this exam?->CRC FINDINGS: CT HEAD: BRAIN/VENTRICLES:  The small area of restricted diffusion seen in the left periventricular posterior parietooccipital region is not seen to have a corresponding abnormality  and pharynx are unremarkable.  No acute abnormality of the salivary and thyroid glands. BONES: No acute osseous abnormality. CTA HEAD: ANTERIOR CIRCULATION: No significant stenosis of the intracranial internal carotid, anterior cerebral, or middle cerebral arteries. No aneurysm. The anterior communicating artery is patent. POSTERIOR CIRCULATION: No significant stenosis of the codominant vertebral, basilar, or posterior cerebral arteries. No aneurysm. I cannot identify either of the posterior communicating arteries, a variant of normal. OTHER: No dural venous sinus thrombosis on this non-dedicated study.     CT HEAD: 1. The small area of restricted diffusion seen in the left periventricular posterior parietooccipital region on yesterday's MRI is not seen to have a corresponding abnormality on today's CT. 2. No acute intracranial hemorrhage. 3. 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 CTA NECK: 1. No carotid artery stenosis. 2. 50% stenosis of the origin of the codominant left vertebral artery from moderate calcified plaque. 3. 50-70% stenosis of the origin of the codominant right vertebral artery from noncalcified plaque. 4. No change in the large, lobulated mass filling the right hilum and invading the right mediastinum, with continued moderate encasement of the right upper lobe bronchus. 5. New mild right pleural effusion. 6. Increasing patchy infiltrate around the mass in the posterior right lower lobe, consistent with developing postobstructive pneumonia. CTA HEAD: No stenosis, occlusion, or aneurysm of the proximal intracranial arteries.     CTA NECK W CONTRAST  Result Date: 4/23/2025  EXAMINATION: CTA OF THE HEAD WITH CONTRAST; CTA OF THE NECK 4/23/2025 12:59 pm: TECHNIQUE: CTA of the head/brain was performed with the administration of intravenous contrast. Multiplanar reformatted images are provided for review.  MIP images are provided for review. CT of the head was performed without the

## 2025-05-23 NOTE — PROGRESS NOTES
[facility]  [facility]  Progress Note      Chief Complaint   Patient presents with    Back Pain     Pt L1 and L2 pain. Pt has lumbar cancer. Pt received treatment today . Pt unable to tolerate pain. Pt denies numbness to lower extremities denies loss of bowel or bladder.        SUBJECTIVE:  Patient is tolerating medications. No reported adverse drug reactions.  No nausea, vomiting, diarrhea.  Status post BAL and biopsy  Awake alert no complaints  Afebrile  Room air  Review of systems:  As stated above in the chief complaint, otherwise negative.    Medications:  Scheduled Meds:   clopidogrel  75 mg Oral Daily    amoxicillin-clavulanate  1,800 mg Oral 2 times per day    sodium chloride flush  5-40 mL IntraVENous 2 times per day    oxymetazoline  2 spray Each Nostril Once    lidocaine   Topical Once    aspirin  81 mg Oral Daily    senna  2 tablet Oral BID    polyethylene glycol  17 g Oral BID    fentaNYL  1 patch TransDERmal Q72H    gabapentin  100 mg Oral TID    [Held by provider] lisinopril  20 mg Oral BID    pantoprazole  40 mg Oral QAM AC    ranolazine  500 mg Oral BID    rosuvastatin  40 mg Oral Daily    [Held by provider] isosorbide mononitrate  30 mg Oral Daily     Continuous Infusions:   sodium chloride 10 mL/hr at 25 1038    norepinephrine       PRN Meds:oxyCODONE, sodium chloride flush, sodium chloride, prochlorperazine, nitroGLYCERIN, cyclobenzaprine, ondansetron **OR** ondansetron, acetaminophen **OR** acetaminophen    OBJECTIVE:  /68   Pulse 76   Temp 98.4 °F (36.9 °C) (Oral)   Resp 17   Ht 1.753 m (5' 9.02\")   Wt 94.2 kg (207 lb 9.6 oz)   SpO2 95%   BMI 30.64 kg/m²   Temp  Av.4 °F (36.3 °C)  Min: 96.6 °F (35.9 °C)  Max: 98.4 °F (36.9 °C)  Constitutional: The patient is awake, alert, and oriented.   Skin: Warm and dry. No rashes were noted.   HEENT: Round and reactive pupils.  Moist mucous membranes.  No ulcerations or thrush.  Neck: Supple to    Component Value Date/Time    MPNEUMO Not Detected 05/21/2025 1330     No results found for: \"CULTRESP\"  No results found for: \"CXCATHTIP\"  No results found for: \"BFCS\"  No results found for: \"CXSURG\"  Urine Culture, Routine   Date Value Ref Range Status   05/22/2023 Growth not present  Final     No results found for: \"MRSAC\"    ASSESSMENT:  Postobstructive pneumonia-follow-up chest x-ray is significantly improved  Ca of the lung with mets  New onset of GI problems; rule out any intra-abdominal catastrophe with known history of aortoiliac stents  Leukocytosis related to all of the above-some improved      PLAN:  Continue with Augmentin ES for 5 more days-7  Follow-up cultures  Discussed with the family  Check final cultures  Monitor labs    Sav Up MD  2:38 PM  5/23/2025

## 2025-05-23 NOTE — PROGRESS NOTES
Critical Care Team - Daily Progress Note         Date and time: 2025 9:03 AM  Patient's name:  Corky Lawler  Medical Record Number: [id number]  Patient's account/billing number: [id number]  Patient's YOB: 1945  Age: 80 y.o.  Date of Admission: 2025 11:54 AM  Length of stay during current admission: 11      Primary Care Physician: Aurelio Hamilton MD  ICU Attending Physician: Ernesto Guan MD    Code Status: Limited    Reason for ICU admission: hypotension, worsening respiratory status and hypoxia      SUBJECTIVE:     OVERNIGHT EVENTS:       No acute events, remains improved clinically. Breathing comfortably.      CURRENT VENTILATION STATUS:     [] Ventilator  [] BIPAP  [x] Nasal Cannula [] Room Air      SECRETIONS Amount:  [] Small [] Moderate  [] Large       [x] None    Color:     [] White [] Colored  [] Bloody    SEDATION:   [] Propofol gtt  [] Versed gtt  [] Fentanyl gtt  [] Precedex gtt [x] No Sedation    PARALYZED:  [x] No    [] Yes    VASOPRESSORS:  [x] No    [] Yes    If yes -   [] Levophed       [] Dopamine     [] Vasopressin       [] Dobutamine  [] Phenylephrine         [] Epinephrine    CENTRAL LINES:     [x] No   [] Yes   (Date of Insertion:   )           If yes -     [] Right IJ     [] Left IJ [] Right Femoral [] Left Femoral                   [] Right Subclavian [] Left Subclavian    GUADARRAMA'S CATHETER:   [x] No   [] Yes  (Date of Insertion:   )     URINE OUTPUT:            [x] Good   [] Low              [] Anuric      OBJECTIVE:     VITAL SIGNS:  BP (!) 109/58   Pulse 72   Temp 97.6 °F (36.4 °C) (Oral)   Resp 16   Ht 1.753 m (5' 9.02\")   Wt 94.2 kg (207 lb 9.6 oz)   SpO2 94%   BMI 30.64 kg/m²   Tmax over 24 hours:  Temp (24hrs), Av.3 °F (36.3 °C), Min:96.6 °F (35.9 °C), Max:97.8 °F (36.6 °C)      Patient Vitals for the past 6 hrs:   BP Temp Temp src Pulse Resp SpO2 Weight   25 0800 (!) 109/58 97.6 °F (36.4 °C) Oral 72 16 94 % --   25 0700  is mild to moderate enlarged, causes some impression in the base of the bladder.  The prostate gland measures 4 x 5.1 x 5.4 cm. Seminal vesicles symmetrical appearance. New development since the previous examination of April 17 is the presence of a moderate large size pleural effusion causes significant compression atelectasis in the right lower lobe with consolidation and air bronchogram, there are patency of the central airways.  There is an additional confluent density in the right parahilar region not covered on this study. Also observed is a serpentine use cluster of tubular like structures in the right anterior epicardial space above the right diaphragma which was not seen on the previous examination.  These could represent abnormal vessels.  Can consider further evaluation with CT scan of the chest with IV contrast. There is no left-sided pleural effusion. Patient had previous mid sternotomy.  Calcifications are seen in the coronary arteries.  Heart is normal size.  There is no pericardial effusion. Patient had previous vertebroplasty for L2 and L1 vertebral bodies which is a new finding since the previous study of April 17.  There is a compression deformity of these 2 vertebral bodies more of L2.  Degenerative changes are seen in the disc space of L2-3 are degenerative changes in the right hip joint with subchondral cysts in the acetabular side. In the right upper thigh interposed between the right tensor fascia muscle/the upper right anterior rectus femorals muscle/the proximal right vastus lateralis there is a expansile slightly nonhomogeneous mass like lesion measuring 4.4 x 3.7 x 3 cm approximately, located just anterolateral to the proximal right femoral shaft in the infra trochanteric region partially covered on this study.  Could represent a hematoma but cannot exclude a solid mass lesion.  Further evaluation with MRI study of the right thigh without and with IV contrast is recommended.     1.  No  spine was performed without the administration of intravenous contrast. Multiplanar reformatted images are provided for review. Adjustment of mA and/or kV according to patient size was utilized.  Automated exposure control, iterative reconstruction, and/or weight based adjustment of the mA/kV was utilized to reduce the radiation dose to as low as reasonably achievable. COMPARISON: CT of the lumbar spine, 04/17/2025. 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  Compared to 04/17/2025, there is now a pathological fracture along the left lateral aspect of the L2 vertebral body, at the site of the metastasis. A subtle lytic lesion is seen along the left lateral aspect of the L3 vertebral body (axial sequence, image 61).  The metastatic lesion identified in the L1 vertebral body on the previous MRI is not evident by CT. DEGENERATIVE CHANGES: Small disc bulges at multiple levels.  No significant central canal stenosis.  Moderate neural foraminal stenoses at the left L2-3 and bilateral L4-5 and L5-S1 levels. SOFT TISSUES/RETROPERITONEUM: No paraspinal mass is seen. An aorto iliac stent is seen traversing the abdominal aortic aneurysm which measures approximately 4 cm in maximal transverse dimension.     1. Enlarging lytic lesion in the left lateral L2 vertebral body, now associated with a pathological fracture. 2. New subtle lytic lesion in the left lateral aspect of the L3 vertebral body. 3. The metastatic lesion in the L1 vertebral body, as seen on  mass filling the right hilum and invading the right mediastinum, with continued moderate encasement of the right upper lobe bronchus. There is a new mild right pleural effusion. There is increasing patchy infiltrate around the mass in the posterior right lower lobe, consistent with developing postobstructive pneumonia. The visualized left upper lung is clear. No cervical lymphadenopathy.  The larynx and pharynx are unremarkable.  No acute abnormality of the salivary and thyroid glands. BONES: No acute osseous abnormality. CTA HEAD: ANTERIOR CIRCULATION: No significant stenosis of the intracranial internal carotid, anterior cerebral, or middle cerebral arteries. No aneurysm. The anterior communicating artery is patent. POSTERIOR CIRCULATION: No significant stenosis of the codominant vertebral, basilar, or posterior cerebral arteries. No aneurysm. I cannot identify either of the posterior communicating arteries, a variant of normal. OTHER: No dural venous sinus thrombosis on this non-dedicated study.     CT HEAD: 1. The small area of restricted diffusion seen in the left periventricular posterior parietooccipital region on yesterday's MRI is not seen to have a corresponding abnormality on today's CT. 2. No acute intracranial hemorrhage. 3. 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 CTA NECK: 1. No carotid artery stenosis. 2. 50% stenosis of the origin of the codominant left vertebral artery from moderate calcified plaque. 3. 50-70% stenosis of the origin of the codominant right vertebral artery from noncalcified plaque. 4. No change in the large, lobulated mass filling the right hilum and invading the right mediastinum, with continued moderate encasement of the right upper lobe bronchus. 5. New mild right pleural effusion. 6. Increasing patchy infiltrate around the mass in the posterior right lower lobe, consistent with developing postobstructive pneumonia. CTA HEAD: No stenosis, occlusion, or  aneurysm of the proximal intracranial arteries.     Echo (TTE) complete (PRN contrast/bubble/strain/3D)  Result Date: 4/23/2025    Left Ventricle: Normal left ventricular systolic function with a visually estimated EF of 50 - 55%. Left ventricle size is normal. Findings consistent with moderate concentric hypertrophy. Normal wall motion. Indeterminate diastolic function.   Right Ventricle: Right ventricle size is normal. Normal systolic function.   Aortic Valve: Mild to moderate regurgitation. Mild stenosis of the aortic valve. AV mean gradient is 12 mmHg. AV area by continuity VTI is 2.1 cm2.   Tricuspid Valve: Unable to assess RVSP.   Left Atrium: Left atrium size is normal.   Interatrial Septum: Agitated saline study was negative with and without provocation.   Right Atrium: Right atrium is mildly dilated.   Image quality is adequate.           ASSESSMENT:     Principal Problem:    Pathological fracture of lumbosacral spine  Active Problems:    Closed fracture of second lumbar vertebra (HCC)    Goals of care, counseling/discussion    Palliative care encounter  Resolved Problems:    * No resolved hospital problems. *      Additional assessment:    Sepsis, possible post-obstructive PNA  Acute hypoxic respiratory failure, improved  Large right lung mass, metastatic lung CA  Large right pleural effusion s/p thoracentesis  Lytic lesion of L1-L2 vertebrae now s/p L1 and L2 kyphoplasty  HAGMA, lactic acidosis  CHUCK        PLAN:     WEAN PER PROTOCOL:  [] No   [] Yes  [x] N/A    DISCONTINUE ANY LABS:   [x] No   [] Yes    ICU PROPHYLAXIS:  Stress ulcer:  [x] PPI Agent  [] F3Ygtov [] Sucralfate  [] Other:  VTE:   [] Enoxaparin  [] Unfract. Heparin Subcut  [x] EPC Cuffs    NUTRITION:  [] NPO [] Tube Feeding (Specify: ) [] TPN  [x] PO (Diet: ADULT DIET; Regular; Low Fat/Low Chol/High Fiber/2 gm Na)    INSULIN DRIP:   [x] No   [] Yes    CONSULTATION NEEDED:  [] No   [x] Yes    FAMILY UPDATED:    [] No   [x] Yes    TRANSFER OUT

## 2025-05-23 NOTE — PROGRESS NOTES
GENERAL SURGERY  DAILY PROGRESS NOTE  2025    Subjective:  Continues to improve. Ate some applesauce yesterday. Having multiple bowel movements. Pain \"no where near where it was before\". No nausea or emesis.  Down to 3L NC.       Objective:  Last 24Hrs  Temp  Av.3 °F (36.3 °C)  Min: 96.6 °F (35.9 °C)  Max: 97.8 °F (36.6 °C)  Resp  Av.8  Min: 14  Max: 35  Pulse  Av.1  Min: 67  Max: 95  Systolic (24hrs), Av , Min:80 , Max:115     Diastolic (24hrs), Av, Min:49, Max:75    SpO2  Av.9 %  Min: 90 %  Max: 97 %    I/O last 3 completed shifts:  In: 5474.5 [P.O.:500; I.V.:2349.8; IV Piggyback:2624.7]  Out: 4115 [Urine:2315; Other:1800]      General: Lying in bed, NAD  Cardiovascular: Warm throughout  Respiratory: Equal chest rise bilaterally, no retractions, no audible wheezing. #L NC   Abdomen: soft, mild distention. Minimally tender   Skin: no obvious rashes or lesions appreciated        CBC  Recent Labs     25  1044 25  0353 25  0447   WBC 31.2* 20.2* 16.6*   RBC 5.27 3.64* 3.88   HGB 15.7 10.8* 11.6*   HCT 48.5 33.1* 35.1*   MCV 92.0 90.9 90.5   MCH 29.8 29.7 29.9   MCHC 32.4 32.6 33.0   RDW 15.5* 15.6* 15.3*   * 210 201   MPV 10.3 10.6 10.2       CMP  Recent Labs     25  1044 25  1311 25  0353 25  0447    139 134* 133*   K 3.6 2.8* 4.1 4.0   CL 91* 83* 94* 95*   CO2 26 19* 28 26   BUN 25* 23 33* 31*   CREATININE 1.4* 1.1 1.8* 1.5*   GLUCOSE 251* 190* 201* 209*   CALCIUM 8.8 6.2* 7.7* 7.9*   BILITOT 0.8 0.6  --   --    ALKPHOS 92 57  --   --    AST 24 14  --   --    ALT 14 7  --   --          Assessment/Plan:  80 y.o. male with metastatic lung cancer, pathology lumbar fx, now respiratory failure -improving sp R thoracentesis     Doing much better since thoracentesis   Diet as tolerated   No acute surgical intervention     Kailee Acosta MD  General Surgery Resident, PGY-3    Electronically signed on 2025 at 6:18 AM     ATTENDING PHYSICIAN  PROGRESS NOTE      I have examined the patient and performed the key aspects of physical exam.  I have independently reviewed the record including all pertinent and new radiology images and laboratory findings as well as reviewed all pertinent provider documentation), and discussed the case with the surgical team.  I agree with the assessment and plan with the following additions, corrections, and changes. 14pt review of symptoms completed and negative except as mentioned.    Pt seen and examined; agree w above  No acute surgical intervention  Please re notify if needed    ZBIGNIEW Lindsey MD FACS  Minimally Invasive General Surgery and Endoscopy  [facility]  [address], [address]  [place] [address]  O: [phone]  F: [phone]     Electronically signed by Bryan Lindsey MD on 5/23/2025 at 6:27 AM

## 2025-05-24 LAB
ANION GAP SERPL CALCULATED.3IONS-SCNC: 10 MMOL/L (ref 7–16)
BASOPHILS # BLD: 0.02 K/UL (ref 0–0.2)
BASOPHILS NFR BLD: 0 % (ref 0–2)
BUN SERPL-MCNC: 25 MG/DL (ref 8–23)
CALCIUM SERPL-MCNC: 8 MG/DL (ref 8.8–10.2)
CHLORIDE SERPL-SCNC: 99 MMOL/L (ref 98–107)
CO2 SERPL-SCNC: 24 MMOL/L (ref 22–29)
CREAT SERPL-MCNC: 1.3 MG/DL (ref 0.7–1.2)
EOSINOPHIL # BLD: 0.02 K/UL (ref 0.05–0.5)
EOSINOPHILS RELATIVE PERCENT: 0 % (ref 0–6)
ERYTHROCYTE [DISTWIDTH] IN BLOOD BY AUTOMATED COUNT: 15.3 % (ref 11.5–15)
GFR, ESTIMATED: 56 ML/MIN/1.73M2
GLUCOSE SERPL-MCNC: 146 MG/DL (ref 74–99)
HCT VFR BLD AUTO: 38.5 % (ref 37–54)
HGB BLD-MCNC: 12.5 G/DL (ref 12.5–16.5)
IMM GRANULOCYTES # BLD AUTO: 0.08 K/UL (ref 0–0.58)
IMM GRANULOCYTES NFR BLD: 1 % (ref 0–5)
LYMPHOCYTES NFR BLD: 0.57 K/UL (ref 1.5–4)
LYMPHOCYTES RELATIVE PERCENT: 4 % (ref 20–42)
MCH RBC QN AUTO: 29.4 PG (ref 26–35)
MCHC RBC AUTO-ENTMCNC: 32.5 G/DL (ref 32–34.5)
MCV RBC AUTO: 90.6 FL (ref 80–99.9)
MICROORGANISM SPEC CULT: ABNORMAL
MICROORGANISM SPEC CULT: ABNORMAL
MICROORGANISM/AGENT SPEC: ABNORMAL
MONOCYTES NFR BLD: 0.94 K/UL (ref 0.1–0.95)
MONOCYTES NFR BLD: 7 % (ref 2–12)
NEUTROPHILS NFR BLD: 88 % (ref 43–80)
NEUTS SEG NFR BLD: 11.89 K/UL (ref 1.8–7.3)
PLATELET # BLD AUTO: 248 K/UL (ref 130–450)
PMV BLD AUTO: 10.1 FL (ref 7–12)
POTASSIUM SERPL-SCNC: 3.8 MMOL/L (ref 3.5–5.1)
RBC # BLD AUTO: 4.25 M/UL (ref 3.8–5.8)
RBC # BLD: ABNORMAL 10*6/UL
SERVICE CMNT-IMP: ABNORMAL
SODIUM SERPL-SCNC: 134 MMOL/L (ref 136–145)
SPECIMEN DESCRIPTION: ABNORMAL
WBC OTHER # BLD: 13.5 K/UL (ref 4.5–11.5)

## 2025-05-24 PROCEDURE — 6370000000 HC RX 637 (ALT 250 FOR IP): Performed by: STUDENT IN AN ORGANIZED HEALTH CARE EDUCATION/TRAINING PROGRAM

## 2025-05-24 PROCEDURE — 6370000000 HC RX 637 (ALT 250 FOR IP): Performed by: SPECIALIST

## 2025-05-24 PROCEDURE — 85025 COMPLETE CBC W/AUTO DIFF WBC: CPT

## 2025-05-24 PROCEDURE — 2500000003 HC RX 250 WO HCPCS: Performed by: ANESTHESIOLOGY

## 2025-05-24 PROCEDURE — 80048 BASIC METABOLIC PNL TOTAL CA: CPT

## 2025-05-24 PROCEDURE — 99233 SBSQ HOSP IP/OBS HIGH 50: CPT | Performed by: INTERNAL MEDICINE

## 2025-05-24 PROCEDURE — 1200000000 HC SEMI PRIVATE

## 2025-05-24 PROCEDURE — 36415 COLL VENOUS BLD VENIPUNCTURE: CPT

## 2025-05-24 RX ORDER — AMOXICILLIN AND CLAVULANATE POTASSIUM 600; 42.9 MG/5ML; MG/5ML
1800 POWDER, FOR SUSPENSION ORAL EVERY 12 HOURS SCHEDULED
Qty: 195 ML | Refills: 0 | Status: SHIPPED | OUTPATIENT
Start: 2025-05-24 | End: 2025-05-31

## 2025-05-24 RX ORDER — OXYCODONE HYDROCHLORIDE 10 MG/1
10 TABLET ORAL EVERY 6 HOURS PRN
Qty: 12 TABLET | Refills: 0 | Status: SHIPPED | OUTPATIENT
Start: 2025-05-24 | End: 2025-05-29

## 2025-05-24 RX ADMIN — STANDARDIZED SENNA CONCENTRATE 17.2 MG: 8.6 TABLET ORAL at 21:28

## 2025-05-24 RX ADMIN — ASPIRIN 81 MG CHEWABLE TABLET 81 MG: 81 TABLET CHEWABLE at 08:26

## 2025-05-24 RX ADMIN — CLOPIDOGREL BISULFATE 75 MG: 75 TABLET, FILM COATED ORAL at 08:26

## 2025-05-24 RX ADMIN — GABAPENTIN 100 MG: 100 CAPSULE ORAL at 08:27

## 2025-05-24 RX ADMIN — GABAPENTIN 100 MG: 100 CAPSULE ORAL at 14:01

## 2025-05-24 RX ADMIN — GABAPENTIN 100 MG: 100 CAPSULE ORAL at 21:28

## 2025-05-24 RX ADMIN — SODIUM CHLORIDE, PRESERVATIVE FREE 10 ML: 5 INJECTION INTRAVENOUS at 21:29

## 2025-05-24 RX ADMIN — ROSUVASTATIN 40 MG: 20 TABLET, FILM COATED ORAL at 08:27

## 2025-05-24 RX ADMIN — RANOLAZINE 500 MG: 500 TABLET, FILM COATED, EXTENDED RELEASE ORAL at 21:28

## 2025-05-24 RX ADMIN — AMOXICILLIN AND CLAVULANATE POTASSIUM 1800 MG: 600; 42.9 POWDER, FOR SUSPENSION ORAL at 21:28

## 2025-05-24 RX ADMIN — PANTOPRAZOLE SODIUM 40 MG: 40 TABLET, DELAYED RELEASE ORAL at 05:38

## 2025-05-24 RX ADMIN — POLYETHYLENE GLYCOL 3350 17 G: 17 POWDER, FOR SOLUTION ORAL at 21:28

## 2025-05-24 RX ADMIN — STANDARDIZED SENNA CONCENTRATE 17.2 MG: 8.6 TABLET ORAL at 08:26

## 2025-05-24 RX ADMIN — AMOXICILLIN AND CLAVULANATE POTASSIUM 1800 MG: 600; 42.9 POWDER, FOR SUSPENSION ORAL at 08:35

## 2025-05-24 RX ADMIN — POLYETHYLENE GLYCOL 3350 17 G: 17 POWDER, FOR SOLUTION ORAL at 08:26

## 2025-05-24 RX ADMIN — RANOLAZINE 500 MG: 500 TABLET, FILM COATED, EXTENDED RELEASE ORAL at 08:28

## 2025-05-24 RX ADMIN — Medication 3 MG: at 21:28

## 2025-05-24 RX ADMIN — SODIUM CHLORIDE, PRESERVATIVE FREE 10 ML: 5 INJECTION INTRAVENOUS at 08:30

## 2025-05-24 ASSESSMENT — PAIN SCALES - GENERAL: PAINLEVEL_OUTOF10: 0

## 2025-05-24 NOTE — PROGRESS NOTES
[facility]  [facility]  Progress Note      Chief Complaint   Patient presents with    Back Pain     Pt L1 and L2 pain. Pt has lumbar cancer. Pt received treatment today . Pt unable to tolerate pain. Pt denies numbness to lower extremities denies loss of bowel or bladder.        SUBJECTIVE:  Patient is tolerating medications. No reported adverse drug reactions.  No nausea, vomiting, diarrhea.  Occasional cough  No sob  Awake alert no complaints  Afebrile  Room air  Review of systems:  As stated above in the chief complaint, otherwise negative.    Medications:  Scheduled Meds:   clopidogrel  75 mg Oral Daily    amoxicillin-clavulanate  1,800 mg Oral 2 times per day    sodium chloride flush  5-40 mL IntraVENous 2 times per day    oxymetazoline  2 spray Each Nostril Once    lidocaine   Topical Once    aspirin  81 mg Oral Daily    senna  2 tablet Oral BID    polyethylene glycol  17 g Oral BID    fentaNYL  1 patch TransDERmal Q72H    gabapentin  100 mg Oral TID    [Held by provider] lisinopril  20 mg Oral BID    pantoprazole  40 mg Oral QAM AC    ranolazine  500 mg Oral BID    rosuvastatin  40 mg Oral Daily    [Held by provider] isosorbide mononitrate  30 mg Oral Daily     Continuous Infusions:   sodium chloride 10 mL/hr at 25 1038    norepinephrine       PRN Meds:oxyCODONE, sodium chloride flush, sodium chloride, prochlorperazine, nitroGLYCERIN, cyclobenzaprine, ondansetron **OR** ondansetron, acetaminophen **OR** acetaminophen    OBJECTIVE:  /67   Pulse 82   Temp (!) 96.6 °F (35.9 °C) (Temporal)   Resp 20   Ht 1.753 m (5' 9.02\")   Wt 94.2 kg (207 lb 9.6 oz)   SpO2 94%   BMI 30.64 kg/m²   Temp  Av.3 °F (36.3 °C)  Min: 96.6 °F (35.9 °C)  Max: 98.4 °F (36.9 °C)  Constitutional: The patient is awake, alert, and oriented.   Skin: Warm and dry. No rashes were noted.   HEENT: .  Moist mucous membranes.  No ulcerations or thrush.  Neck: Supple to movements.   Chest: No use

## 2025-05-24 NOTE — PROGRESS NOTES
Internal Medicine Progress Note    Patient's name: Corky Lawler  : 1945  Chief complaints (on day of admission): Back Pain (Pt L1 and L2 pain. Pt has lumbar cancer. Pt received treatment today . Pt unable to tolerate pain. Pt denies numbness to lower extremities denies loss of bowel or bladder. )  Admission date: 2025  Date of service: 2025   Room: [address] IMCU/NEURO  Primary care physician: Aurelio Hamilton MD  Reason for visit: Follow-up for Back pain    Subjective  Corky was seen and examined at bedside       Doing well   On room air   NAD  Looks great   Feels good   Wants to go home   Arrangements need to be made and clearance to be had       Doing much improved today which is great to see  Wife Mirlande at bedside   On room air   Breathing NAD  Transferring out to floor   BP a bit low and he is having what I presume to be recrudescence of his stroke symptoms I.e. dizziness   He has declined MRI again today for further evaluation        Doing much much better thankfully   More comfortable   NAD   On NC O2 again   Family at bedside   Answered all of their questions   Goal is to get him home at some point per family   Feels better after thoracentesis       Abdominal pain this morning   Went down for bronch abdominal pain worsened and was having intractable vomiting   Sent for stat CT abdomen pelvis which is still not formally read ? Patient on NRB and NC when I saw him after RRT transferred to MICU. Spoke with family. Spoke with ICU and palliative care. Palliative will ultimately confirm the patients wishes at this time, very difficult situation for family they are very emotional. Spent a great deal of time with them today.       Still dizzy particularly with head movement to the R   Refusing MRI brain states he just does not want to go back in the machine explained we will be unable to evaluate for new stroke he voices understanding and commits to his refusal   Otherwise    Neurology consultation appreciated   Refusing brain MRI after multiple discussion   CT head repeat stable     Orthostatic hypotension   Hold imdur   Encourage PO intake   Fall precautions     Right Lung mass worsening   Pulmonary on board now     History of CAD s/p CABG    Recent Small acute to sub acute stroke Left Occipital periventricular white matter 4/22/25  Past R occipital stroke noted   On ASA and Plavix already and statin for cardiac reasons per patient - these were held for kypho   ASA and Plavix was held for 2 days for EBUS prior to this last admission   CTA head and neck no significant stenosis at that time last admission   ECHO w bubble no shunting last admission     HTN   BP running on the low side   Holding anti htn agents     GERD  Protonix 40 mg daily    Prior tobacco abuse     PT/OT 16/24   Consults Neurosurgery, Heme/Onc, Palliative, neurology   DVT prophylaxis Protonix  Code Status Full, prognostically at this point I fear he is not going to do well moving forward, palliative care consult is appreciated   Discharge plan: Home when cleared by all consults and all arrangements are in place     Electronically signed by Jesse Mcbride MD on 5/24/2025 at 3:30 PM    I can be reached through [employer].

## 2025-05-24 NOTE — PROGRESS NOTES
Pulmonary Critical Care Medicine           PULMONARY  CRITICAL CARE   SERVICE DAILY PROGRESS  NOTE     2025   Hospital LOS:  LOS: 12 days     Impression / Recommendations:    Large right hilar lung mass measuring up to 6 cm with right pleural effusion  Clinical picture suggestive of metastatic primary lung cancer  Postobstructive pneumonia  Former smoker    -Awaiting final cytology from the bronchoscopy as well as thoracentesis cytology  - Currently patient is on room air and asymptomatic at rest  - Continue continues telemetry and flutter valve  - PT OT, out of bed to chair  - Optimize pain control  - Supplemental oxygen has been weaned down to room air, patient saturating well at rest    Interval History/Event Changes:    Sitting up on the bed  No overnight events  Saturating well on room air  No new complaints  Awaiting pathology      Allergies  Allergies   Allergen Reactions    Tramadol Nausea And Vomiting       Review of Systems    A pertinent review of systems was performed and was otherwise non-contributory.    Vitals-     /67   Pulse 82   Temp (!) 96.6 °F (35.9 °C) (Temporal)   Resp 20   Ht 1.753 m (5' 9.02\")   Wt 94.2 kg (207 lb 9.6 oz)   SpO2 94%   BMI 30.64 kg/m²    Tmax: Temp (24hrs), Av °F (36.1 °C), Min:96.6 °F (35.9 °C), Max:97.5 °F (36.4 °C)      Hemodynamics:  Cuff:   Systolic (24hrs), Av , Min:96 , Max:121   /Diastolic (24hrs), Av, Min:56, Max:67    Cuff MAP:MAP (mmHg)  Av.2  Min: 53  Max: 109  P:   Pulse  Av.6  Min: 66  Max: 82      Airway:     Observed RR: Resp  Av.6  Min: 17  Max: 20   Observed O2 sats: SpO2  Av.7 %  Min: 88 %  Max: 100 %      Intake/Output Summary (Last 24 hours) at 2025 1335  Last data filed at 2025 0812  Gross per 24 hour   Intake 560 ml   Output 300 ml   Net 260 ml         Physical exam-  General appearance: Not ill appearing, alert, no converstional dyspnea  Head: Normocephalic, without obvious abnormality,  atraumatic   Eyes:Pupils bilateral equal and reactive, EOM intact, conjunctiva - no icterus , no injection   Throat: Clear, no lesions, Mallampti =2, no tonsillar eythema or edema  Neck: Supple, symmetrical, trachea midline, no lymphadenopathy, no JVD, no carotid bruits, no thyromegaly   Lungs: Bilateral  Air movement, decreased in bases, normal femitus, resonant to percussion  Heart: RRR, S1, S2 normal, no murmur, click, rub or gallop   Abdomen: soft, non-tender, nondistended. Bowel sounds normal, no hepatomeagly  Extremities: extremities normal, atraumatic, no cyanosis, edema  Musculoskeletal - No deformities   Skin: Skin color, texture, turgor normal. No rashes or lesions   Neurological: No focal deficits, cranial nerves grossly intact, sensations intact   Psychiatric : Mood and effect normal , alert and oriented times 4        Routine labs:    Recent Labs     05/22/25  0353 05/23/25  0447 05/24/25  0707   WBC 20.2* 16.6* 13.5*   HGB 10.8* 11.6* 12.5   HCT 33.1* 35.1* 38.5    201 248     Recent Labs     05/22/25  0353 05/23/25  0447 05/24/25  0707   * 133* 134*   K 4.1 4.0 3.8   CL 94* 95* 99   CO2 28 26 24   BUN 33* 31* 25*   CREATININE 1.8* 1.5* 1.3*         Other Laboratory - Imaging Studies:     Reviewed and as per electronic record. CxR/CT images reviewed by me when available.  All imaging findings were reviewed    Total time spent for this encounter was 51 minutes including but not limited to patient exam , family discussion , discussion with consultants , chart review and documentation.         Rao Correa MD  05/24/25

## 2025-05-24 NOTE — PLAN OF CARE
Problem: Nutrition Deficit:  Goal: Optimize nutritional status  5/24/2025 1036 by Roney He RN  Outcome: Not Progressing  5/23/2025 2354 by Renita Dupree RN  Outcome: Progressing     Problem: Safety - Adult  Goal: Free from fall injury  5/24/2025 1036 by Roney He RN  Outcome: Progressing  5/23/2025 2354 by Renita Dupree RN  Outcome: Progressing     Problem: Chronic Conditions and Co-morbidities  Goal: Patient's chronic conditions and co-morbidity symptoms are monitored and maintained or improved  5/24/2025 1036 by Roney He RN  Outcome: Progressing  5/23/2025 2354 by Renita Dupree RN  Outcome: Progressing  Flowsheets (Taken 5/23/2025 2127)  Care Plan - Patient's Chronic Conditions and Co-Morbidity Symptoms are Monitored and Maintained or Improved: Monitor and assess patient's chronic conditions and comorbid symptoms for stability, deterioration, or improvement     Problem: Discharge Planning  Goal: Discharge to home or other facility with appropriate resources  5/24/2025 1036 by Roney He RN  Outcome: Progressing  5/23/2025 2354 by Renita Dupree RN  Outcome: Progressing  Flowsheets (Taken 5/23/2025 2127)  Discharge to home or other facility with appropriate resources: Identify barriers to discharge with patient and caregiver     Problem: Pain  Goal: Verbalizes/displays adequate comfort level or baseline comfort level  5/24/2025 1036 by Roney He RN  Outcome: Progressing  5/23/2025 2354 by Renita Dupree RN  Outcome: Progressing     Problem: Skin/Tissue Integrity  Goal: Skin integrity remains intact  Description: 1.  Monitor for areas of redness and/or skin breakdown2.  Assess vascular access sites hourly3.  Every 4-6 hours minimum:  Change oxygen saturation probe site4.  Every 4-6 hours:  If on nasal continuous positive airway pressure, respiratory therapy assess nares and determine need for appliance change or resting period  5/24/2025 1036 by  2354 by Renita Dupree, RN  Outcome: Progressing

## 2025-05-24 NOTE — CARE COORDINATION
5/24/2025dc order noted. Sw messaged [facility] and sent over orders via careport of [place] today.  Electronically signed by MIRZA Roblero on 5/24/2025 at 10:05 AM

## 2025-05-25 LAB
ABO/RH: NORMAL
ANION GAP SERPL CALCULATED.3IONS-SCNC: 10 MMOL/L (ref 7–16)
ANTIBODY SCREEN: NEGATIVE
ARM BAND NUMBER: NORMAL
BASOPHILS # BLD: 0.02 K/UL (ref 0–0.2)
BASOPHILS NFR BLD: 0 % (ref 0–2)
BLOOD BANK DISPENSE STATUS: NORMAL
BLOOD BANK SAMPLE EXPIRATION: NORMAL
BPU ID: NORMAL
BUN SERPL-MCNC: 21 MG/DL (ref 8–23)
CALCIUM SERPL-MCNC: 8.3 MG/DL (ref 8.8–10.2)
CHLORIDE SERPL-SCNC: 100 MMOL/L (ref 98–107)
CO2 SERPL-SCNC: 24 MMOL/L (ref 22–29)
COMPONENT: NORMAL
CREAT SERPL-MCNC: 1.2 MG/DL (ref 0.7–1.2)
CROSSMATCH RESULT: NORMAL
EOSINOPHIL # BLD: 0.13 K/UL (ref 0.05–0.5)
EOSINOPHILS RELATIVE PERCENT: 1 % (ref 0–6)
ERYTHROCYTE [DISTWIDTH] IN BLOOD BY AUTOMATED COUNT: 15.3 % (ref 11.5–15)
GFR, ESTIMATED: 61 ML/MIN/1.73M2
GLUCOSE SERPL-MCNC: 157 MG/DL (ref 74–99)
HCT VFR BLD AUTO: 40.3 % (ref 37–54)
HGB BLD-MCNC: 13.1 G/DL (ref 12.5–16.5)
IMM GRANULOCYTES # BLD AUTO: 0.1 K/UL (ref 0–0.58)
IMM GRANULOCYTES NFR BLD: 1 % (ref 0–5)
LYMPHOCYTES NFR BLD: 0.77 K/UL (ref 1.5–4)
LYMPHOCYTES RELATIVE PERCENT: 5 % (ref 20–42)
MCH RBC QN AUTO: 29.3 PG (ref 26–35)
MCHC RBC AUTO-ENTMCNC: 32.5 G/DL (ref 32–34.5)
MCV RBC AUTO: 90.2 FL (ref 80–99.9)
MONOCYTES NFR BLD: 1.29 K/UL (ref 0.1–0.95)
MONOCYTES NFR BLD: 9 % (ref 2–12)
NEUTROPHILS NFR BLD: 84 % (ref 43–80)
NEUTS SEG NFR BLD: 12.18 K/UL (ref 1.8–7.3)
PLATELET # BLD AUTO: 259 K/UL (ref 130–450)
PMV BLD AUTO: 10.1 FL (ref 7–12)
POTASSIUM SERPL-SCNC: 4 MMOL/L (ref 3.5–5.1)
RBC # BLD AUTO: 4.47 M/UL (ref 3.8–5.8)
SODIUM SERPL-SCNC: 134 MMOL/L (ref 136–145)
TRANSFUSION STATUS: NORMAL
UNIT DIVISION: 0
WBC OTHER # BLD: 14.5 K/UL (ref 4.5–11.5)

## 2025-05-25 PROCEDURE — 80048 BASIC METABOLIC PNL TOTAL CA: CPT

## 2025-05-25 PROCEDURE — 36415 COLL VENOUS BLD VENIPUNCTURE: CPT

## 2025-05-25 PROCEDURE — 85025 COMPLETE CBC W/AUTO DIFF WBC: CPT

## 2025-05-25 PROCEDURE — 1200000000 HC SEMI PRIVATE

## 2025-05-25 PROCEDURE — 2500000003 HC RX 250 WO HCPCS: Performed by: ANESTHESIOLOGY

## 2025-05-25 PROCEDURE — 6370000000 HC RX 637 (ALT 250 FOR IP): Performed by: STUDENT IN AN ORGANIZED HEALTH CARE EDUCATION/TRAINING PROGRAM

## 2025-05-25 PROCEDURE — 6370000000 HC RX 637 (ALT 250 FOR IP): Performed by: SPECIALIST

## 2025-05-25 PROCEDURE — 99232 SBSQ HOSP IP/OBS MODERATE 35: CPT | Performed by: INTERNAL MEDICINE

## 2025-05-25 PROCEDURE — 6370000000 HC RX 637 (ALT 250 FOR IP): Performed by: PHYSICIAN ASSISTANT

## 2025-05-25 RX ADMIN — POLYETHYLENE GLYCOL 3350 17 G: 17 POWDER, FOR SOLUTION ORAL at 19:51

## 2025-05-25 RX ADMIN — Medication 3 MG: at 19:51

## 2025-05-25 RX ADMIN — GABAPENTIN 100 MG: 100 CAPSULE ORAL at 19:50

## 2025-05-25 RX ADMIN — GABAPENTIN 100 MG: 100 CAPSULE ORAL at 14:44

## 2025-05-25 RX ADMIN — SODIUM CHLORIDE, PRESERVATIVE FREE 10 ML: 5 INJECTION INTRAVENOUS at 09:24

## 2025-05-25 RX ADMIN — AMOXICILLIN AND CLAVULANATE POTASSIUM 1800 MG: 600; 42.9 POWDER, FOR SUSPENSION ORAL at 19:50

## 2025-05-25 RX ADMIN — AMOXICILLIN AND CLAVULANATE POTASSIUM 1800 MG: 600; 42.9 POWDER, FOR SUSPENSION ORAL at 09:31

## 2025-05-25 RX ADMIN — RANOLAZINE 500 MG: 500 TABLET, FILM COATED, EXTENDED RELEASE ORAL at 09:25

## 2025-05-25 RX ADMIN — SODIUM CHLORIDE, PRESERVATIVE FREE 10 ML: 5 INJECTION INTRAVENOUS at 19:51

## 2025-05-25 RX ADMIN — STANDARDIZED SENNA CONCENTRATE 17.2 MG: 8.6 TABLET ORAL at 09:24

## 2025-05-25 RX ADMIN — CLOPIDOGREL BISULFATE 75 MG: 75 TABLET, FILM COATED ORAL at 09:23

## 2025-05-25 RX ADMIN — STANDARDIZED SENNA CONCENTRATE 17.2 MG: 8.6 TABLET ORAL at 19:50

## 2025-05-25 RX ADMIN — PANTOPRAZOLE SODIUM 40 MG: 40 TABLET, DELAYED RELEASE ORAL at 06:20

## 2025-05-25 RX ADMIN — GABAPENTIN 100 MG: 100 CAPSULE ORAL at 09:24

## 2025-05-25 RX ADMIN — POLYETHYLENE GLYCOL 3350 17 G: 17 POWDER, FOR SOLUTION ORAL at 09:23

## 2025-05-25 RX ADMIN — ROSUVASTATIN 40 MG: 20 TABLET, FILM COATED ORAL at 09:24

## 2025-05-25 RX ADMIN — ASPIRIN 81 MG CHEWABLE TABLET 81 MG: 81 TABLET CHEWABLE at 09:24

## 2025-05-25 RX ADMIN — RANOLAZINE 500 MG: 500 TABLET, FILM COATED, EXTENDED RELEASE ORAL at 19:50

## 2025-05-25 ASSESSMENT — PAIN SCALES - GENERAL
PAINLEVEL_OUTOF10: 5
PAINLEVEL_OUTOF10: 0

## 2025-05-25 ASSESSMENT — PAIN DESCRIPTION - ORIENTATION: ORIENTATION: RIGHT;LEFT;MID

## 2025-05-25 ASSESSMENT — PAIN DESCRIPTION - LOCATION: LOCATION: BACK

## 2025-05-25 NOTE — CARE COORDINATION
Discharge order noted. Patient pending PT/OT ernie, previous PT [facility] two days ago 11/24. Spoke with patient at bedside regarding discharge plan, he reports he has not been out of bed since PT/OT ernie. Discussed RAMON recommendation and explained benefits. Patient reports it is just him and his wife at home. Patient states he needs to speak with his wife, RAMON list provided and patient to select top 5 choices. [facility] following.    The Plan for Transition of Care is related to the following treatment goals: discharge planning    The Patient and/or patient representative Corky Lawler was provided with a choice of provider and agrees with the discharge plan. [x] Yes [] No    Freedom of choice list was provided with basic dialogue that supports the patient's individualized plan of care/goals, treatment preferences and shares the quality data associated with the providers. [x] Yes [] No     Electronically signed by CARLOS Monge on 5/25/2025 at 3:12 PM

## 2025-05-25 NOTE — PROGRESS NOTES
Pulmonary Critical Care Medicine           PULMONARY  CRITICAL CARE   SERVICE DAILY PROGRESS  NOTE     2025   Hospital LOS:  LOS: 13 days     Impression / Recommendations:    Large right hilar lung mass measuring up to 6 cm with right pleural effusion  Clinical picture suggestive of metastatic primary lung cancer  Postobstructive pneumonia  Former smoker    -Awaiting final cytology from the bronchoscopy as well as thoracentesis cytology  - Currently patient is on room air and asymptomatic at rest  - Continue continues telemetry and flutter valve  - PT OT, out of bed to chair  - Optimize pain control  - Supplemental oxygen has been weaned down to room air, patient saturating well at rest    Interval History/Event Changes:  Family at bedside , questions answered   Comfortably lying on the bed  No overnight events  Saturating well on room air  No new complaints  Awaiting pathology      Allergies  Allergies   Allergen Reactions    Tramadol Nausea And Vomiting       Review of Systems    A pertinent review of systems was performed and was otherwise non-contributory.    Vitals-     /63   Pulse 76   Temp 97.5 °F (36.4 °C) (Temporal)   Resp 18   Ht 1.753 m (5' 9.02\")   Wt 94.2 kg (207 lb 9.6 oz)   SpO2 95%   BMI 30.64 kg/m²    Tmax: Temp (24hrs), Av.4 °F (36.3 °C), Min:97.3 °F (36.3 °C), Max:97.5 °F (36.4 °C)      Hemodynamics:  Cuff:   Systolic (24hrs), Av , Min:101 , Max:133   /Diastolic (24hrs), Av, Min:63, Max:76    Cuff MAP:MAP (mmHg)  Av.2  Min: 53  Max: 109  P:   Pulse  Av.5  Min: 76  Max: 79      Airway:     Observed RR: Resp  Av  Min: 18  Max: 18   Observed O2 sats: SpO2  Av.7 %  Min: 88 %  Max: 100 %      Intake/Output Summary (Last 24 hours) at 2025 1337  Last data filed at 2025 1050  Gross per 24 hour   Intake 585 ml   Output 1100 ml   Net -515 ml         Physical exam-  General appearance: Not ill appearing, alert, no converstional dyspnea  Head:  Normocephalic, without obvious abnormality, atraumatic   Eyes:Pupils bilateral equal and reactive, EOM intact, conjunctiva - no icterus , no injection   Throat: Clear, no lesions, Mallampti =2, no tonsillar eythema or edema  Neck: Supple, symmetrical, trachea midline, no lymphadenopathy, no JVD, no carotid bruits, no thyromegaly   Lungs: Bilateral  Air movement, decreased in bases, normal femitus, resonant to percussion  Heart: RRR, S1, S2 normal, no murmur, click, rub or gallop   Abdomen: soft, non-tender, nondistended. Bowel sounds normal, no hepatomeagly  Extremities: extremities normal, atraumatic, no cyanosis, edema  Musculoskeletal - No deformities   Skin: Skin color, texture, turgor normal. No rashes or lesions   Neurological: No focal deficits, cranial nerves grossly intact, sensations intact   Psychiatric : Mood and effect normal , alert and oriented times 4        Routine labs:    Recent Labs     05/23/25  0447 05/24/25  0707 05/25/25  0703   WBC 16.6* 13.5* 14.5*   HGB 11.6* 12.5 13.1   HCT 35.1* 38.5 40.3    248 259     Recent Labs     05/23/25  0447 05/24/25  0707 05/25/25  0703   * 134* 134*   K 4.0 3.8 4.0   CL 95* 99 100   CO2 26 24 24   BUN 31* 25* 21   CREATININE 1.5* 1.3* 1.2         Other Laboratory - Imaging Studies:     Reviewed and as per electronic record. CxR/CT images reviewed by me when available.  All imaging findings were reviewed    Total time spent for this encounter was 35 minutes including but not limited to patient exam , family discussion , discussion with consultants , chart review and documentation.         Rao Correa MD  05/25/25

## 2025-05-25 NOTE — PROGRESS NOTES
[facility]  [facility]  Progress Note      Chief Complaint   Patient presents with    Back Pain     Pt L1 and L2 pain. Pt has lumbar cancer. Pt received treatment today . Pt unable to tolerate pain. Pt denies numbness to lower extremities denies loss of bowel or bladder.        SUBJECTIVE:  Patient is tolerating medications. No reported adverse drug reactions.  No nausea, vomiting, diarrhea.  Occasional  dry cough  No sob  Awake alert no complaints  Afebrile  Room air  Review of systems:  As stated above in the chief complaint, otherwise negative.    Medications:  Scheduled Meds:   clopidogrel  75 mg Oral Daily    amoxicillin-clavulanate  1,800 mg Oral 2 times per day    sodium chloride flush  5-40 mL IntraVENous 2 times per day    lidocaine   Topical Once    aspirin  81 mg Oral Daily    senna  2 tablet Oral BID    polyethylene glycol  17 g Oral BID    fentaNYL  1 patch TransDERmal Q72H    gabapentin  100 mg Oral TID    [Held by provider] lisinopril  20 mg Oral BID    pantoprazole  40 mg Oral QAM AC    ranolazine  500 mg Oral BID    rosuvastatin  40 mg Oral Daily    [Held by provider] isosorbide mononitrate  30 mg Oral Daily     Continuous Infusions:   sodium chloride 10 mL/hr at 25 1038    norepinephrine       PRN Meds:melatonin, oxyCODONE, sodium chloride flush, sodium chloride, prochlorperazine, nitroGLYCERIN, cyclobenzaprine, ondansetron **OR** ondansetron, acetaminophen **OR** acetaminophen    OBJECTIVE:  /76   Pulse 79   Temp 97.3 °F (36.3 °C) (Temporal)   Resp 18   Ht 1.753 m (5' 9.02\")   Wt 94.2 kg (207 lb 9.6 oz)   SpO2 95%   BMI 30.64 kg/m²   Temp  Av °F (36.1 °C)  Min: 96.6 °F (35.9 °C)  Max: 97.3 °F (36.3 °C)  Constitutional: The patient is awake, alert, and oriented.   Skin: Warm and dry. No rashes were noted.   HEENT: .  Moist mucous membranes.  No ulcerations or thrush.  Neck: Supple to movements.   Chest: No use of accessory muscles to breathe.  Symmetrical expansion.  + faint wheezing, no crackles or rhonchi.    Cardiovascular: S1 and S2 are rhythmic and regular. No murmurs appreciated.   Abdomen: Positive bowel sounds to auscultation. Benign to palpation. No masses felt. No hepatosplenomegaly.  Genitourinary: Male  Extremities: No clubbing, no cyanosis, no edema.  Lines: peripheral    Laboratory and Tests Review:  Lab Results   Component Value Date    WBC 14.5 (H) 05/25/2025    WBC 13.5 (H) 05/24/2025    WBC 16.6 (H) 05/23/2025    HGB 13.1 05/25/2025    HCT 40.3 05/25/2025    MCV 90.2 05/25/2025     05/25/2025     Lab Results   Component Value Date    NEUTROABS 12.18 (H) 05/25/2025    NEUTROABS 11.89 (H) 05/24/2025    NEUTROABS 15.45 (H) 05/23/2025     No results found for: \"CRPHS\"  Lab Results   Component Value Date    ALT 7 05/21/2025    AST 14 05/21/2025    ALKPHOS 57 05/21/2025    BILITOT 0.6 05/21/2025     Lab Results   Component Value Date/Time     05/25/2025 07:03 AM    K 4.0 05/25/2025 07:03 AM    K 3.3 05/23/2023 07:42 AM     05/25/2025 07:03 AM    CO2 24 05/25/2025 07:03 AM    BUN 21 05/25/2025 07:03 AM    CREATININE 1.2 05/25/2025 07:03 AM    CREATININE 1.3 05/24/2025 07:07 AM    CREATININE 1.5 05/23/2025 04:47 AM    GFRAA >60 04/06/2021 05:18 AM    LABGLOM 61 05/25/2025 07:03 AM    LABGLOM >60 05/23/2023 07:42 AM    GLUCOSE 157 05/25/2025 07:03 AM    GLUCOSE 101 02/20/2011 10:30 AM    CALCIUM 8.3 05/25/2025 07:03 AM    BILITOT 0.6 05/21/2025 01:11 PM    ALKPHOS 57 05/21/2025 01:11 PM    AST 14 05/21/2025 01:11 PM    ALT 7 05/21/2025 01:11 PM     Lab Results   Component Value Date    .0 (H) 05/21/2025     Lab Results   Component Value Date    SEDRATE 50 (H) 05/21/2025     Radiology:  Reviewed    Microbiology:   No results found for: \"BC\", \"ORG\"  No results found for: \"BLOODCULT2\", \"ORG\"  No results found for: \"WNDABS\"  No results found for: \"RESPSMEAR\"      Component Value Date/Time    MPNEUMO Not Detected 05/21/2025  1330     No results found for: \"CULTRESP\"  No results found for: \"CXCATHTIP\"  No results found for: \"BFCS\"  No results found for: \"CXSURG\"  Urine Culture, Routine   Date Value Ref Range Status   05/22/2023 Growth not present  Final     No results found for: \"MRSAC\"    ASSESSMENT:  Postobstructive pneumonia-follow-up chest x-ray is significantly improved  Ca of the lung with mets  New onset of GI problems; rule out any intra-abdominal catastrophe with known history of aortoiliac stents  Leukocytosis related to all of the above-some improved  Chart reviewed   WBC 68677      PLAN:  Continue with Augmentin ES for 7 days total  Follow-up cultures  Check final cultures  Med rec complete  Okay to discharge from ID POV  Monitor labs  Says he doesn't feel weelll can't pinpoint    Katty David, APRN - CNP  9:12 AM  5/25/2025  Pt seen and examined. Above discussed agree with advanced practice nurse. Labs, cultures, and radiographs reviewed.  Face to Face encounter occurred. Changes made as necessary.     DAWSON WOOD MD

## 2025-05-25 NOTE — PROGRESS NOTES
Internal Medicine Progress Note    Patient's name: Corky Lawler  : 1945  Chief complaints (on day of admission): Back Pain (Pt L1 and L2 pain. Pt has lumbar cancer. Pt received treatment today . Pt unable to tolerate pain. Pt denies numbness to lower extremities denies loss of bowel or bladder. )  Admission date: 2025  Date of service: 2025   Room: [address] IMCU/NEURO  Primary care physician: Aurelio Hamilton MD  Reason for visit: Follow-up for Back pain    Subjective  Corky was seen and examined at bedside       Feels a bit off  No family at bedside   Did not sleep well   No chest pain or shortness of breath   Room air       Doing well   On room air   NAD  Looks great   Feels good   Wants to go home   Arrangements need to be made and clearance to be had       Doing much improved today which is great to see  Wife Mirlande at bedside   On room air   Breathing NAD  Transferring out to floor   BP a bit low and he is having what I presume to be recrudescence of his stroke symptoms I.e. dizziness   He has declined MRI again today for further evaluation        Doing much much better thankfully   More comfortable   NAD   On NC O2 again   Family at bedside   Answered all of their questions   Goal is to get him home at some point per family   Feels better after thoracentesis       Abdominal pain this morning   Went down for bronch abdominal pain worsened and was having intractable vomiting   Sent for stat CT abdomen pelvis which is still not formally read ? Patient on NRB and NC when I saw him after RRT transferred to MICU. Spoke with family. Spoke with ICU and palliative care. Palliative will ultimately confirm the patients wishes at this time, very difficult situation for family they are very emotional. Spent a great deal of time with them today.       Still dizzy particularly with head movement to the R   Refusing MRI brain states he just does not want to go back in the machine

## 2025-05-25 NOTE — PLAN OF CARE
Problem: Safety - Adult  Goal: Free from fall injury  5/24/2025 2315 by Isaura Ansari RN  Outcome: Progressing  5/24/2025 1036 by Roney He RN  Outcome: Progressing     Problem: Chronic Conditions and Co-morbidities  Goal: Patient's chronic conditions and co-morbidity symptoms are monitored and maintained or improved  5/24/2025 2315 by Isaura Ansari RN  Outcome: Progressing  5/24/2025 1036 by Roney He RN  Outcome: Progressing     Problem: Discharge Planning  Goal: Discharge to home or other facility with appropriate resources  5/24/2025 2315 by Isaura Ansari RN  Outcome: Progressing  5/24/2025 1036 by Roney He RN  Outcome: Progressing     Problem: Pain  Goal: Verbalizes/displays adequate comfort level or baseline comfort level  5/24/2025 2315 by Isaura Ansari RN  Outcome: Progressing  5/24/2025 1036 by Roney He RN  Outcome: Progressing     Problem: Skin/Tissue Integrity  Goal: Skin integrity remains intact  Description: 1.  Monitor for areas of redness and/or skin breakdown2.  Assess vascular access sites hourly3.  Every 4-6 hours minimum:  Change oxygen saturation probe site4.  Every 4-6 hours:  If on nasal continuous positive airway pressure, respiratory therapy assess nares and determine need for appliance change or resting period  1.  Monitor for areas of redness and/or skin breakdown2.  Assess vascular access sites hourly3.  Every 4-6 hours minimum:  Change oxygen saturation probe site4.  Every 4-6 hours:  If on nasal continuous positive airway pressure, respiratory therapy assess nares and determine need for appliance change or resting period  5/24/2025 2315 by Isaura Ansari RN  Outcome: Progressing  5/24/2025 1036 by Roney He RN  Outcome: Progressing     Problem: Neurosensory - Adult  Goal: Achieves stable or improved neurological status  5/24/2025 2315 by Isaura Ansari RN  Outcome: Progressing  5/24/2025 1036 by

## 2025-05-25 NOTE — PLAN OF CARE
Problem: Safety - Adult  Goal: Free from fall injury  5/25/2025 0835 by Roney He RN  Outcome: Adequate for Discharge  5/24/2025 2315 by Isaura Ansari RN  Outcome: Progressing     Problem: Chronic Conditions and Co-morbidities  Goal: Patient's chronic conditions and co-morbidity symptoms are monitored and maintained or improved  5/25/2025 0835 by Roney He RN  Outcome: Adequate for Discharge  5/24/2025 2315 by Isaura Ansari RN  Outcome: Progressing     Problem: Discharge Planning  Goal: Discharge to home or other facility with appropriate resources  5/25/2025 0835 by Roney He RN  Outcome: Adequate for Discharge  5/24/2025 2315 by Isaura Ansari RN  Outcome: Progressing     Problem: Pain  Goal: Verbalizes/displays adequate comfort level or baseline comfort level  5/25/2025 0835 by Roney He RN  Outcome: Adequate for Discharge  5/24/2025 2315 by Isaura Ansari RN  Outcome: Progressing     Problem: Skin/Tissue Integrity  Goal: Skin integrity remains intact  Description: 1.  Monitor for areas of redness and/or skin breakdown2.  Assess vascular access sites hourly3.  Every 4-6 hours minimum:  Change oxygen saturation probe site4.  Every 4-6 hours:  If on nasal continuous positive airway pressure, respiratory therapy assess nares and determine need for appliance change or resting period  1.  Monitor for areas of redness and/or skin breakdown2.  Assess vascular access sites hourly3.  Every 4-6 hours minimum:  Change oxygen saturation probe site4.  Every 4-6 hours:  If on nasal continuous positive airway pressure, respiratory therapy assess nares and determine need for appliance change or resting period  5/25/2025 0835 by Roney He RN  Outcome: Adequate for Discharge  5/24/2025 2315 by Isaura Ansari RN  Outcome: Progressing     Problem: Neurosensory - Adult  Goal: Achieves stable or improved neurological status  5/25/2025 0835 by Neri  GIANCARLO Sim  Outcome: Adequate for Discharge  5/24/2025 2315 by Isaura Ansari RN  Outcome: Progressing  Goal: Achieves maximal functionality and self care  5/25/2025 0835 by Roney He RN  Outcome: Adequate for Discharge  5/24/2025 2315 by Isaura Ansari RN  Outcome: Progressing     Problem: Musculoskeletal - Adult  Goal: Return mobility to safest level of function  5/25/2025 0835 by Roney He RN  Outcome: Adequate for Discharge  5/24/2025 2315 by Isaura Ansari RN  Outcome: Progressing  Goal: Return ADL status to a safe level of function  5/25/2025 0835 by Roney He RN  Outcome: Adequate for Discharge  5/24/2025 2315 by Isaura Ansari RN  Outcome: Progressing     Problem: ABCDS Injury Assessment  Goal: Absence of physical injury  5/25/2025 0835 by Roney He RN  Outcome: Adequate for Discharge  5/24/2025 2315 by Isaura Ansari RN  Outcome: Progressing     Problem: Nutrition Deficit:  Goal: Optimize nutritional status  5/25/2025 0835 by Roney He RN  Outcome: Adequate for Discharge  5/24/2025 2315 by Isaura Ansari RN  Outcome: Progressing     Problem: Respiratory - Adult  Goal: Achieves optimal ventilation and oxygenation  5/25/2025 0835 by Roney He RN  Outcome: Adequate for Discharge  5/24/2025 2315 by Isaura Ansari RN  Outcome: Progressing     Problem: Gastrointestinal - Adult  Goal: Maintains or returns to baseline bowel function  5/25/2025 0835 by Roney He RN  Outcome: Adequate for Discharge  5/24/2025 2315 by Isaura Ansari RN  Outcome: Progressing  Goal: Maintains adequate nutritional intake  5/25/2025 0835 by Roney He RN  Outcome: Adequate for Discharge  5/24/2025 2315 by Isaura Ansari RN  Outcome: Progressing

## 2025-05-26 ENCOUNTER — APPOINTMENT (OUTPATIENT)
Dept: GENERAL RADIOLOGY | Age: 80
DRG: 542 | End: 2025-05-26
Payer: MEDICARE

## 2025-05-26 LAB
ANION GAP SERPL CALCULATED.3IONS-SCNC: 11 MMOL/L (ref 7–16)
BASOPHILS # BLD: 0.03 K/UL (ref 0–0.2)
BASOPHILS NFR BLD: 0 % (ref 0–2)
BUN SERPL-MCNC: 20 MG/DL (ref 8–23)
CALCIUM SERPL-MCNC: 8.5 MG/DL (ref 8.8–10.2)
CHLORIDE SERPL-SCNC: 99 MMOL/L (ref 98–107)
CO2 SERPL-SCNC: 25 MMOL/L (ref 22–29)
CREAT SERPL-MCNC: 1.3 MG/DL (ref 0.7–1.2)
EOSINOPHIL # BLD: 0.15 K/UL (ref 0.05–0.5)
EOSINOPHILS RELATIVE PERCENT: 1 % (ref 0–6)
ERYTHROCYTE [DISTWIDTH] IN BLOOD BY AUTOMATED COUNT: 15.3 % (ref 11.5–15)
GFR, ESTIMATED: 58 ML/MIN/1.73M2
GLUCOSE SERPL-MCNC: 155 MG/DL (ref 74–99)
HCT VFR BLD AUTO: 41.7 % (ref 37–54)
HGB BLD-MCNC: 13.6 G/DL (ref 12.5–16.5)
IMM GRANULOCYTES # BLD AUTO: 0.18 K/UL (ref 0–0.58)
IMM GRANULOCYTES NFR BLD: 1 % (ref 0–5)
LYMPHOCYTES NFR BLD: 0.88 K/UL (ref 1.5–4)
LYMPHOCYTES RELATIVE PERCENT: 5 % (ref 20–42)
MCH RBC QN AUTO: 29.7 PG (ref 26–35)
MCHC RBC AUTO-ENTMCNC: 32.6 G/DL (ref 32–34.5)
MCV RBC AUTO: 91 FL (ref 80–99.9)
MICROORGANISM SPEC CULT: NORMAL
MICROORGANISM SPEC CULT: NORMAL
MONOCYTES NFR BLD: 1.45 K/UL (ref 0.1–0.95)
MONOCYTES NFR BLD: 9 % (ref 2–12)
NEUTROPHILS NFR BLD: 84 % (ref 43–80)
NEUTS SEG NFR BLD: 14.35 K/UL (ref 1.8–7.3)
PLATELET # BLD AUTO: 240 K/UL (ref 130–450)
PMV BLD AUTO: 10.9 FL (ref 7–12)
POTASSIUM SERPL-SCNC: 4.1 MMOL/L (ref 3.5–5.1)
RBC # BLD AUTO: 4.58 M/UL (ref 3.8–5.8)
SERVICE CMNT-IMP: NORMAL
SERVICE CMNT-IMP: NORMAL
SODIUM SERPL-SCNC: 136 MMOL/L (ref 136–145)
SPECIMEN DESCRIPTION: NORMAL
SPECIMEN DESCRIPTION: NORMAL
WBC OTHER # BLD: 17 K/UL (ref 4.5–11.5)

## 2025-05-26 PROCEDURE — 71045 X-RAY EXAM CHEST 1 VIEW: CPT

## 2025-05-26 PROCEDURE — 99232 SBSQ HOSP IP/OBS MODERATE 35: CPT | Performed by: INTERNAL MEDICINE

## 2025-05-26 PROCEDURE — 1200000000 HC SEMI PRIVATE

## 2025-05-26 PROCEDURE — 6370000000 HC RX 637 (ALT 250 FOR IP): Performed by: STUDENT IN AN ORGANIZED HEALTH CARE EDUCATION/TRAINING PROGRAM

## 2025-05-26 PROCEDURE — 80048 BASIC METABOLIC PNL TOTAL CA: CPT

## 2025-05-26 PROCEDURE — 85025 COMPLETE CBC W/AUTO DIFF WBC: CPT

## 2025-05-26 PROCEDURE — 2500000003 HC RX 250 WO HCPCS: Performed by: ANESTHESIOLOGY

## 2025-05-26 PROCEDURE — 6370000000 HC RX 637 (ALT 250 FOR IP): Performed by: SPECIALIST

## 2025-05-26 PROCEDURE — 36415 COLL VENOUS BLD VENIPUNCTURE: CPT

## 2025-05-26 RX ORDER — MECLIZINE HCL 12.5 MG 12.5 MG/1
12.5 TABLET ORAL 3 TIMES DAILY PRN
Status: DISCONTINUED | OUTPATIENT
Start: 2025-05-26 | End: 2025-05-29 | Stop reason: HOSPADM

## 2025-05-26 RX ADMIN — RANOLAZINE 500 MG: 500 TABLET, FILM COATED, EXTENDED RELEASE ORAL at 20:44

## 2025-05-26 RX ADMIN — PANTOPRAZOLE SODIUM 40 MG: 40 TABLET, DELAYED RELEASE ORAL at 05:00

## 2025-05-26 RX ADMIN — AMOXICILLIN AND CLAVULANATE POTASSIUM 1800 MG: 600; 42.9 POWDER, FOR SUSPENSION ORAL at 21:20

## 2025-05-26 RX ADMIN — CLOPIDOGREL BISULFATE 75 MG: 75 TABLET, FILM COATED ORAL at 09:53

## 2025-05-26 RX ADMIN — Medication 3 MG: at 23:48

## 2025-05-26 RX ADMIN — RANOLAZINE 500 MG: 500 TABLET, FILM COATED, EXTENDED RELEASE ORAL at 09:54

## 2025-05-26 RX ADMIN — ACETAMINOPHEN 650 MG: 325 TABLET ORAL at 12:13

## 2025-05-26 RX ADMIN — SODIUM CHLORIDE, PRESERVATIVE FREE 10 ML: 5 INJECTION INTRAVENOUS at 20:43

## 2025-05-26 RX ADMIN — ASPIRIN 81 MG CHEWABLE TABLET 81 MG: 81 TABLET CHEWABLE at 09:53

## 2025-05-26 RX ADMIN — AMOXICILLIN AND CLAVULANATE POTASSIUM 1800 MG: 600; 42.9 POWDER, FOR SUSPENSION ORAL at 11:11

## 2025-05-26 RX ADMIN — GABAPENTIN 100 MG: 100 CAPSULE ORAL at 09:53

## 2025-05-26 RX ADMIN — ROSUVASTATIN 40 MG: 20 TABLET, FILM COATED ORAL at 09:53

## 2025-05-26 RX ADMIN — SODIUM CHLORIDE, PRESERVATIVE FREE 10 ML: 5 INJECTION INTRAVENOUS at 09:54

## 2025-05-26 RX ADMIN — GABAPENTIN 100 MG: 100 CAPSULE ORAL at 20:42

## 2025-05-26 RX ADMIN — GABAPENTIN 100 MG: 100 CAPSULE ORAL at 14:01

## 2025-05-26 RX ADMIN — MECLIZINE 12.5 MG: 12.5 TABLET ORAL at 12:14

## 2025-05-26 ASSESSMENT — PAIN DESCRIPTION - FREQUENCY: FREQUENCY: INTERMITTENT

## 2025-05-26 ASSESSMENT — PAIN SCALES - GENERAL
PAINLEVEL_OUTOF10: 4
PAINLEVEL_OUTOF10: 1

## 2025-05-26 ASSESSMENT — PAIN DESCRIPTION - PAIN TYPE: TYPE: ACUTE PAIN

## 2025-05-26 ASSESSMENT — PAIN DESCRIPTION - ONSET: ONSET: SUDDEN

## 2025-05-26 ASSESSMENT — PAIN - FUNCTIONAL ASSESSMENT: PAIN_FUNCTIONAL_ASSESSMENT: PREVENTS OR INTERFERES WITH MANY ACTIVE NOT PASSIVE ACTIVITIES

## 2025-05-26 ASSESSMENT — PAIN DESCRIPTION - ORIENTATION: ORIENTATION: MID

## 2025-05-26 ASSESSMENT — PAIN DESCRIPTION - LOCATION: LOCATION: BACK

## 2025-05-26 NOTE — PLAN OF CARE
Problem: Safety - Adult  Goal: Free from fall injury  5/25/2025 2209 by Isaura Ansari RN  Outcome: Progressing  5/25/2025 0835 by Roney He RN  Outcome: Adequate for Discharge     Problem: Chronic Conditions and Co-morbidities  Goal: Patient's chronic conditions and co-morbidity symptoms are monitored and maintained or improved  5/25/2025 2209 by Isaura Ansari RN  Outcome: Progressing  5/25/2025 0835 by Roney He RN  Outcome: Adequate for Discharge     Problem: Discharge Planning  Goal: Discharge to home or other facility with appropriate resources  5/25/2025 2209 by Isaura Ansari RN  Outcome: Progressing  5/25/2025 0835 by Roney He RN  Outcome: Adequate for Discharge     Problem: Pain  Goal: Verbalizes/displays adequate comfort level or baseline comfort level  5/25/2025 2209 by Isaura Ansari RN  Outcome: Progressing  5/25/2025 0835 by Roney He RN  Outcome: Adequate for Discharge     Problem: Skin/Tissue Integrity  Goal: Skin integrity remains intact  Description: 1.  Monitor for areas of redness and/or skin breakdown2.  Assess vascular access sites hourly3.  Every 4-6 hours minimum:  Change oxygen saturation probe site4.  Every 4-6 hours:  If on nasal continuous positive airway pressure, respiratory therapy assess nares and determine need for appliance change or resting period  1.  Monitor for areas of redness and/or skin breakdown2.  Assess vascular access sites hourly3.  Every 4-6 hours minimum:  Change oxygen saturation probe site4.  Every 4-6 hours:  If on nasal continuous positive airway pressure, respiratory therapy assess nares and determine need for appliance change or resting period  5/25/2025 2209 by Isaura Ansari RN  Outcome: Progressing  5/25/2025 0835 by Roney He RN  Outcome: Adequate for Discharge     Problem: Neurosensory - Adult  Goal: Achieves stable or improved neurological status  5/25/2025 2209 by Coty  Isaura Mata RN  Outcome: Progressing  5/25/2025 0835 by Roney He RN  Outcome: Adequate for Discharge  Goal: Achieves maximal functionality and self care  5/25/2025 2209 by Isaura Ansari RN  Outcome: Progressing  5/25/2025 0835 by Roney He RN  Outcome: Adequate for Discharge     Problem: Musculoskeletal - Adult  Goal: Return mobility to safest level of function  5/25/2025 2209 by Isaura Ansari RN  Outcome: Progressing  5/25/2025 0835 by Roney He RN  Outcome: Adequate for Discharge  Goal: Return ADL status to a safe level of function  5/25/2025 2209 by Isaura Ansari RN  Outcome: Progressing  5/25/2025 0835 by Roney He RN  Outcome: Adequate for Discharge     Problem: ABCDS Injury Assessment  Goal: Absence of physical injury  5/25/2025 2209 by Isaura Ansari RN  Outcome: Progressing  5/25/2025 0835 by Roney He RN  Outcome: Adequate for Discharge     Problem: Nutrition Deficit:  Goal: Optimize nutritional status  5/25/2025 2209 by Isaura Ansari RN  Outcome: Progressing  5/25/2025 0835 by Roney He RN  Outcome: Adequate for Discharge     Problem: Respiratory - Adult  Goal: Achieves optimal ventilation and oxygenation  5/25/2025 2209 by Isaura Ansari RN  Outcome: Progressing  5/25/2025 0835 by Roney He RN  Outcome: Adequate for Discharge     Problem: Gastrointestinal - Adult  Goal: Maintains or returns to baseline bowel function  5/25/2025 2209 by Isaura Ansari RN  Outcome: Progressing  5/25/2025 0835 by Roney He RN  Outcome: Adequate for Discharge  Goal: Maintains adequate nutritional intake  5/25/2025 2209 by Isaura Ansari RN  Outcome: Progressing  5/25/2025 0835 by Roney He RN  Outcome: Adequate for Discharge

## 2025-05-26 NOTE — PLAN OF CARE
Problem: Musculoskeletal - Adult  Goal: Return mobility to safest level of function  Outcome: Not Progressing  Goal: Return ADL status to a safe level of function  Outcome: Not Progressing     Problem: Nutrition Deficit:  Goal: Optimize nutritional status  Outcome: Not Progressing     Problem: Safety - Adult  Goal: Free from fall injury  Outcome: Progressing     Problem: Chronic Conditions and Co-morbidities  Goal: Patient's chronic conditions and co-morbidity symptoms are monitored and maintained or improved  Outcome: Progressing     Problem: Discharge Planning  Goal: Discharge to home or other facility with appropriate resources  Outcome: Progressing     Problem: Pain  Goal: Verbalizes/displays adequate comfort level or baseline comfort level  Outcome: Progressing     Problem: Skin/Tissue Integrity  Goal: Skin integrity remains intact  Description: 1.  Monitor for areas of redness and/or skin breakdown2.  Assess vascular access sites hourly3.  Every 4-6 hours minimum:  Change oxygen saturation probe site4.  Every 4-6 hours:  If on nasal continuous positive airway pressure, respiratory therapy assess nares and determine need for appliance change or resting period  1.  Monitor for areas of redness and/or skin breakdown2.  Assess vascular access sites hourly3.  Every 4-6 hours minimum:  Change oxygen saturation probe site4.  Every 4-6 hours:  If on nasal continuous positive airway pressure, respiratory therapy assess nares and determine need for appliance change or resting period  Outcome: Progressing     Problem: Neurosensory - Adult  Goal: Achieves stable or improved neurological status  Outcome: Progressing  Goal: Achieves maximal functionality and self care  Outcome: Progressing     Problem: ABCDS Injury Assessment  Goal: Absence of physical injury  Outcome: Progressing     Problem: Respiratory - Adult  Goal: Achieves optimal ventilation and oxygenation  Outcome: Progressing     Problem: Gastrointestinal -

## 2025-05-26 NOTE — PROGRESS NOTES
[facility]  [facility]  Progress Note      Chief Complaint   Patient presents with    Back Pain     Pt L1 and L2 pain. Pt has lumbar cancer. Pt received treatment today . Pt unable to tolerate pain. Pt denies numbness to lower extremities denies loss of bowel or bladder.        SUBJECTIVE:  Patient is tolerating medications. No reported adverse drug reactions.  No nausea, vomiting, diarrhea.  Occasional  dry cough  Awake alert no complaints  Afebrile  Room air  Review of systems:  As stated above in the chief complaint, otherwise negative.    Medications:  Scheduled Meds:   clopidogrel  75 mg Oral Daily    amoxicillin-clavulanate  1,800 mg Oral 2 times per day    sodium chloride flush  5-40 mL IntraVENous 2 times per day    lidocaine   Topical Once    aspirin  81 mg Oral Daily    senna  2 tablet Oral BID    polyethylene glycol  17 g Oral BID    fentaNYL  1 patch TransDERmal Q72H    gabapentin  100 mg Oral TID    [Held by provider] lisinopril  20 mg Oral BID    pantoprazole  40 mg Oral QAM AC    ranolazine  500 mg Oral BID    rosuvastatin  40 mg Oral Daily    [Held by provider] isosorbide mononitrate  30 mg Oral Daily     Continuous Infusions:   sodium chloride 10 mL/hr at 25 1038    norepinephrine       PRN Meds:meclizine, melatonin, oxyCODONE, sodium chloride flush, sodium chloride, prochlorperazine, nitroGLYCERIN, cyclobenzaprine, ondansetron **OR** ondansetron, acetaminophen **OR** acetaminophen    OBJECTIVE:  /73   Pulse 73   Temp 97.6 °F (36.4 °C)   Resp 18   Ht 1.753 m (5' 9.02\")   Wt 94.2 kg (207 lb 9.6 oz)   SpO2 99%   BMI 30.64 kg/m²   Temp  Av.9 °F (36.6 °C)  Min: 97.6 °F (36.4 °C)  Max: 98.1 °F (36.7 °C)  Constitutional: The patient is awake, alert, and oriented.   Skin: Warm and dry. No rashes were noted.   HEENT: .  Moist mucous membranes.  No ulcerations or thrush.  Neck: Supple to movements.   Chest: No use of accessory muscles to breathe.

## 2025-05-26 NOTE — PROGRESS NOTES
[facility]  Department of Pulmonary, Critical Care and Sleep Medicine  [facility] [place]  Department of Internal Medicine  Progress Note    SUBJECTIVE:    No CP,   SOB improved  States he feels weak still overall, PT/OT to see  Discussed with patient and wife on possible needs of placement, they are understanding    OBJECTIVE:  Vitals:    05/24/25 2115 05/25/25 0800 05/25/25 1930 05/26/25 0714   BP: 133/76 101/63 113/73 128/73   Pulse: 79 76 79 73   Resp: 18 18 18    Temp: 97.3 °F (36.3 °C) 97.5 °F (36.4 °C) 98.1 °F (36.7 °C) 97.6 °F (36.4 °C)   TempSrc: Temporal Temporal Temporal    SpO2: 95% 95% 95% 99%   Weight:       Height:         Constitutional: Alert,     EENT: EOMI SAMPSON. MMM. No icterus. No thrush.     Neck: No thyromegaly. No elevated JVP. Trachea was midline.   Respiratory: Symmetrical.  Breath sounds were clear.    Cardiovascular: Regular, No murmur. No rubs.      Pulses:  Equal bilaterally.    Abdomen: Soft without organomegaly. No rebound, rigidity.  No guarding.  Lymphatic: No lymphadenopathy.  Musculoskeletal: Without weakness or gross deficits  Extremities:  No lower extremity edema. Reflexes appear adequate.   Skin:  Warm and dry.  No skin rashes.   Neurological/Psychiatric: No acute psychosis. Cranial nerves are intact.        DATA:  The data collected below information that was obtained, reviewed, analyzed and interpreted today. Imaging test are reviewed with the radiologist during weekly conference rounds. Comparison to previous images are always explored.     Monitor Strips:  My interpretation and reviewed of the cardiac monitor and further discussion with technical team reveals no changes noted and the patient is in sinus rhythm     RADIOLOGY:  My interpretation and review of the current films reveals  lung mass        CBC:   Lab Results   Component Value Date/Time    WBC 17.0 05/26/2025 05:04 AM    RBC 4.58 05/26/2025 05:04 AM    HGB  13.6 05/26/2025 05:04 AM    HCT 41.7 05/26/2025 05:04 AM    MCV 91.0 05/26/2025 05:04 AM    MCH 29.7 05/26/2025 05:04 AM    MCHC 32.6 05/26/2025 05:04 AM    RDW 15.3 05/26/2025 05:04 AM     05/26/2025 05:04 AM    MPV 10.9 05/26/2025 05:04 AM     CMP:    Lab Results   Component Value Date/Time     05/26/2025 05:04 AM    K 4.1 05/26/2025 05:04 AM    K 3.3 05/23/2023 07:42 AM    CL 99 05/26/2025 05:04 AM    CO2 25 05/26/2025 05:04 AM    BUN 20 05/26/2025 05:04 AM    CREATININE 1.3 05/26/2025 05:04 AM    GFRAA >60 04/06/2021 05:18 AM    LABGLOM 58 05/26/2025 05:04 AM    LABGLOM >60 05/23/2023 07:42 AM    GLUCOSE 155 05/26/2025 05:04 AM    GLUCOSE 101 02/20/2011 10:30 AM    CALCIUM 8.5 05/26/2025 05:04 AM    BILITOT 0.6 05/21/2025 01:11 PM    ALKPHOS 57 05/21/2025 01:11 PM    AST 14 05/21/2025 01:11 PM    ALT 7 05/21/2025 01:11 PM       CLINICAL ASSESMENT:  Large right hilar lung mass measuring up to 6 cm with right pleural effusion  Clinical picture suggestive of metastatic primary lung cancer  Postobstructive pneumonia  Former smoker    PLAN: If needed the case was discussed with the care team  Awaiting final cytology from the bronchoscopy as well as thoracentesis cytology  - Currently patient is on room air and asymptomatic at rest  - Continue continues telemetry and flutter valve  - PT OT, out of bed to chair  - Optimize pain control  - Supplemental oxygen has been weaned down to room air, patient saturating well at rest  Continue with Augmentin ES for 7 days total  Follow-up cultures  Enforce incentive and spirometry  Check final culture      Ermias Pozo DO, MPH, FCCP, TONY, FACP  Professor of Internal Medicine

## 2025-05-26 NOTE — PROGRESS NOTES
Internal Medicine Progress Note    Patient's name: Corky Lawler  : 1945  Chief complaints (on day of admission): Back Pain (Pt L1 and L2 pain. Pt has lumbar cancer. Pt received treatment today . Pt unable to tolerate pain. Pt denies numbness to lower extremities denies loss of bowel or bladder. )  Admission date: 2025  Date of service: 2025   Room: [address] IMCU/NEURO  Primary care physician: Aurelio Hamilton MD  Reason for visit: Follow-up for Back pain    Subjective  Corky was seen and examined at bedside       Wife at bedside  States he feels a bit better  States he feels weak still overall, PT/OT to see  Discussed with patient and wife on possible needs of placement, they are understanding      Feels a bit off  No family at bedside   Did not sleep well   No chest pain or shortness of breath   Room air       Doing well   On room air   NAD  Looks great   Feels good   Wants to go home   Arrangements need to be made and clearance to be had       Doing much improved today which is great to see  Wife Mirlande at bedside   On room air   Breathing NAD  Transferring out to floor   BP a bit low and he is having what I presume to be recrudescence of his stroke symptoms I.e. dizziness   He has declined MRI again today for further evaluation        Doing much much better thankfully   More comfortable   NAD   On NC O2 again   Family at bedside   Answered all of their questions   Goal is to get him home at some point per family   Feels better after thoracentesis       Abdominal pain this morning   Went down for bronch abdominal pain worsened and was having intractable vomiting   Sent for stat CT abdomen pelvis which is still not formally read ? Patient on NRB and NC when I saw him after RRT transferred to MICU. Spoke with family. Spoke with ICU and palliative care. Palliative will ultimately confirm the patients wishes at this time, very difficult situation for family they are very  emotional. Spent a great deal of time with them today.     5/20  Still dizzy particularly with head movement to the R   Refusing MRI brain states he just does not want to go back in the machine explained we will be unable to evaluate for new stroke he voices understanding and commits to his refusal   Otherwise will re check ortho vitals today   Nursing at bedside   Treat constipation   CT head stable   Bronch tomorrow     5/19  Denies numbness tingling weakness to me   Denies fevers chills sweats   Nursing at bedside   Having dizziness today again   BP running on the low side   Recrudescence of prior stroke vs new acute stroke   No way to tell unfortunately I think he needs another MRI, last time he was in the hospital he had dizziness after anti platelet drugs were held for a few days and he ended up having an acute stroke   Spoke with Dr Swain     [facility] Medications  Current Facility-Administered Medications   Medication Dose Route Frequency Provider Last Rate Last Admin    melatonin tablet 3 mg  3 mg Oral Nightly PRN Jesse Mcbride MD   3 mg at 05/25/25 1951    clopidogrel (PLAVIX) tablet 75 mg  75 mg Oral Daily Ernesto Guan MD   75 mg at 05/25/25 0923    oxyCODONE HCl (OXY-IR) immediate release tablet 10 mg  10 mg Oral Q6H PRN Ramya Chris PA-C        amoxicillin-clavulanate (AUGMENTIN-ES) 600-42.9 MG/5ML suspension 1,800 mg  1,800 mg Oral 2 times per day Sav Up MD   1,800 mg at 05/25/25 1950    sodium chloride flush 0.9 % injection 5-40 mL  5-40 mL IntraVENous 2 times per day Herber Platt MD   10 mL at 05/25/25 1951    sodium chloride flush 0.9 % injection 5-40 mL  5-40 mL IntraVENous PRN Herber Platt MD        0.9 % sodium chloride infusion   IntraVENous PRN Herber Platt MD 10 mL/hr at 05/23/25 1038 Restarted at 05/23/25 1038    prochlorperazine (COMPAZINE) injection 10 mg  10 mg IntraVENous Q6H PRN Jesse Mcbride MD   10 mg at 05/21/25 0911    lidocaine  (XYLOCAINE) 2 % uro-jet   Topical Once Kailee Acosta MD        norepinephrine (LEVOPHED) 16 mg in sodium chloride 0.9 % 250 mL infusion (premix)  1-100 mcg/min IntraVENous Continuous Elroy Vazquez DO        aspirin chewable tablet 81 mg  81 mg Oral Daily Jesse Mcbride MD   81 mg at 05/25/25 0924    senna (SENOKOT) tablet 17.2 mg  2 tablet Oral BID Vinayak Smith MD   17.2 mg at 05/25/25 1950    polyethylene glycol (GLYCOLAX) packet 17 g  17 g Oral BID Vinayak Smith MD   17 g at 05/25/25 1951    fentaNYL (DURAGESIC) 50 MCG/HR 1 patch  1 patch TransDERmal Q72H Ramya Chris PA-C   1 patch at 05/25/25 1459    nitroGLYCERIN (NITROSTAT) SL tablet 0.4 mg  0.4 mg SubLINGual Q5 Min PRN Ernesto Barba DO   0.4 mg at 05/21/25 0115    cyclobenzaprine (FLEXERIL) tablet 10 mg  10 mg Oral TID PRN Vinayak Smith MD   10 mg at 05/21/25 2047    ondansetron (ZOFRAN-ODT) disintegrating tablet 4 mg  4 mg Oral Q8H PRN Vinayak Smith MD        Or    ondansetron (ZOFRAN) injection 4 mg  4 mg IntraVENous Q6H PRN Vinayak Smith MD   4 mg at 05/21/25 0801    acetaminophen (TYLENOL) tablet 650 mg  650 mg Oral Q6H PRN Vinayak Smith MD        Or    acetaminophen (TYLENOL) suppository 650 mg  650 mg Rectal Q6H PRN Vinayak Smith MD        gabapentin (NEURONTIN) capsule 100 mg  100 mg Oral TID Vinayak Smith MD   100 mg at 05/25/25 1950    [Held by provider] lisinopril (PRINIVIL;ZESTRIL) tablet 20 mg  20 mg Oral BID Vinayak Smith MD   20 mg at 05/14/25 2030    pantoprazole (PROTONIX) tablet 40 mg  40 mg Oral QAM AC Vinayak Smith MD   40 mg at 05/26/25 0500    ranolazine (RANEXA) extended release tablet 500 mg  500 mg Oral BID Vinayak Smith MD   500 mg at 05/25/25 1950    rosuvastatin (CRESTOR) tablet 40 mg  40 mg Oral Daily Vinayak Smith MD   40 mg at 05/25/25 0924    [Held by provider] isosorbide mononitrate (IMDUR) extended release tablet 30 mg  30 mg Oral Daily Kemi Arroyo DO   30 mg at 05/19/25 0901       PRN Medications  melatonin,  oxyCODONE, sodium chloride flush, sodium chloride, prochlorperazine, nitroGLYCERIN, cyclobenzaprine, ondansetron **OR** ondansetron, acetaminophen **OR** acetaminophen    Objective  Most Recent Recorded Vitals  /73   Pulse 73   Temp 97.6 °F (36.4 °C)   Resp 18   Ht 1.753 m (5' 9.02\")   Wt 94.2 kg (207 lb 9.6 oz)   SpO2 99%   BMI 30.64 kg/m²   I/O last 3 completed shifts:  In: 1550 [P.O.:1540; I.V.:10]  Out: 1800 [Urine:1800]  No intake/output data recorded.    Physical Exam:  General: AAO to person/place/time/purpose, NAD, no labored breathing. In pain  Skin: color/texture/turgor normal, no rashes or lesions  Extremities: atraumatic, no edema    Most Recent Labs  Lab Results   Component Value Date    WBC 17.0 (H) 05/26/2025    HGB 13.6 05/26/2025    HCT 41.7 05/26/2025     05/26/2025     05/26/2025    K 4.1 05/26/2025    CL 99 05/26/2025    CREATININE 1.3 (H) 05/26/2025    BUN 20 05/26/2025    CO2 25 05/26/2025    GLUCOSE 155 (H) 05/26/2025    ALT 7 05/21/2025    AST 14 05/21/2025    INR 1.4 05/21/2025    APTT 32.1 09/18/2013    TSH 3.320 05/19/2023    LABA1C 6.1 (H) 04/23/2025       XR ABDOMEN (KUB) (SINGLE AP VIEW)   Final Result   At the time the present study the stomach is not distended.      The small bowel pattern is nonspecific.      No significant degree of ileus observed.      There is no indication for bowel obstruction.         XR CHEST PORTABLE   Final Result   1. Near complete interval resolution of right pleural effusion, consistent   with provided history of thoracentesis.  Improved aeration of the right lung.   2. Persistent right suprahilar mass.   3. Bilateral hazy opacities may represent persistent atelectasis versus   pulmonary edema.  Superimposed infection cannot be excluded in the   appropriate clinical context.         XR CHEST PORTABLE   Final Result   Combination of moderate to large size right-sided pleural effusion as   demonstrate on CT abdomen of May 21st      Leucocytosis   Probably post obstructive PNA and now likely aspiration event this morning   Blood cx UA ucx lactic acid inflammatory markers urine ags viral pcr   ID consultation    Dizziness   Possibly recrudescence of recent stroke vs area of new stroke   Neurology consultation appreciated   Refusing brain MRI after multiple discussion   CT head repeat stable     Orthostatic hypotension   Hold imdur   Encourage PO intake   Fall precautions     Right Lung mass worsening   Pulmonary on board now     History of CAD s/p CABG    Recent Small acute to sub acute stroke Left Occipital periventricular white matter 4/22/25  Past R occipital stroke noted   On ASA and Plavix already and statin for cardiac reasons per patient - these were held for kypho   ASA and Plavix was held for 2 days for EBUS prior to this last admission   CTA head and neck no significant stenosis at that time last admission   ECHO w bubble no shunting last admission     HTN   BP running on the low side   Holding anti htn agents     GERD  Protonix 40 mg daily    Prior tobacco abuse     PT/OT 16/24   Consults Neurosurgery, Heme/Onc, Palliative, neurology   DVT prophylaxis Protonix  Code Status Full, prognostically at this point I fear he is not going to do well moving forward, palliative care consult is appreciated   Discharge plan: PT/OT eval, possible need for placement    Electronically signed by Vinayak Smith MD on 5/26/2025 at 7:46 AM    I can be reached through [employer].

## 2025-05-27 ENCOUNTER — APPOINTMENT (OUTPATIENT)
Dept: GENERAL RADIOLOGY | Age: 80
DRG: 542 | End: 2025-05-27
Payer: MEDICARE

## 2025-05-27 LAB
ANION GAP SERPL CALCULATED.3IONS-SCNC: 12 MMOL/L (ref 7–16)
BASOPHILS # BLD: 0 K/UL (ref 0–0.2)
BASOPHILS NFR BLD: 0 % (ref 0–2)
BUN SERPL-MCNC: 24 MG/DL (ref 8–23)
CALCIUM SERPL-MCNC: 8.4 MG/DL (ref 8.8–10.2)
CHLORIDE SERPL-SCNC: 99 MMOL/L (ref 98–107)
CO2 SERPL-SCNC: 23 MMOL/L (ref 22–29)
CREAT SERPL-MCNC: 1.3 MG/DL (ref 0.7–1.2)
EOSINOPHIL # BLD: 0.13 K/UL (ref 0.05–0.5)
EOSINOPHILS RELATIVE PERCENT: 1 % (ref 0–6)
ERYTHROCYTE [DISTWIDTH] IN BLOOD BY AUTOMATED COUNT: 15.4 % (ref 11.5–15)
GFR, ESTIMATED: 55 ML/MIN/1.73M2
GLUCOSE SERPL-MCNC: 136 MG/DL (ref 74–99)
HCT VFR BLD AUTO: 39.9 % (ref 37–54)
HGB BLD-MCNC: 13.2 G/DL (ref 12.5–16.5)
INR PPP: 1.7
LYMPHOCYTES NFR BLD: 1.64 K/UL (ref 1.5–4)
LYMPHOCYTES RELATIVE PERCENT: 11 % (ref 20–42)
MCH RBC QN AUTO: 29.7 PG (ref 26–35)
MCHC RBC AUTO-ENTMCNC: 33.1 G/DL (ref 32–34.5)
MCV RBC AUTO: 89.9 FL (ref 80–99.9)
MONOCYTES NFR BLD: 15 % (ref 2–12)
MONOCYTES NFR BLD: 2.14 K/UL (ref 0.1–0.95)
NEUTROPHILS NFR BLD: 73 % (ref 43–80)
NEUTS SEG NFR BLD: 10.7 K/UL (ref 1.8–7.3)
PLATELET # BLD AUTO: 95 K/UL (ref 130–450)
PLATELET CONFIRMATION: NORMAL
PMV BLD AUTO: 11.3 FL (ref 7–12)
POTASSIUM SERPL-SCNC: 4.2 MMOL/L (ref 3.5–5.1)
PROTHROMBIN TIME: 18.4 SEC (ref 9.3–12.4)
RBC # BLD AUTO: 4.44 M/UL (ref 3.8–5.8)
RBC # BLD: ABNORMAL 10*6/UL
SODIUM SERPL-SCNC: 134 MMOL/L (ref 136–145)
WBC OTHER # BLD: 14.8 K/UL (ref 4.5–11.5)

## 2025-05-27 PROCEDURE — 36415 COLL VENOUS BLD VENIPUNCTURE: CPT

## 2025-05-27 PROCEDURE — 97530 THERAPEUTIC ACTIVITIES: CPT

## 2025-05-27 PROCEDURE — 6370000000 HC RX 637 (ALT 250 FOR IP): Performed by: STUDENT IN AN ORGANIZED HEALTH CARE EDUCATION/TRAINING PROGRAM

## 2025-05-27 PROCEDURE — 85025 COMPLETE CBC W/AUTO DIFF WBC: CPT

## 2025-05-27 PROCEDURE — 99232 SBSQ HOSP IP/OBS MODERATE 35: CPT | Performed by: INTERNAL MEDICINE

## 2025-05-27 PROCEDURE — 71045 X-RAY EXAM CHEST 1 VIEW: CPT

## 2025-05-27 PROCEDURE — 99231 SBSQ HOSP IP/OBS SF/LOW 25: CPT | Performed by: NURSE PRACTITIONER

## 2025-05-27 PROCEDURE — 1200000000 HC SEMI PRIVATE

## 2025-05-27 PROCEDURE — 97535 SELF CARE MNGMENT TRAINING: CPT

## 2025-05-27 PROCEDURE — 80048 BASIC METABOLIC PNL TOTAL CA: CPT

## 2025-05-27 PROCEDURE — 2500000003 HC RX 250 WO HCPCS: Performed by: ANESTHESIOLOGY

## 2025-05-27 PROCEDURE — 6370000000 HC RX 637 (ALT 250 FOR IP): Performed by: SPECIALIST

## 2025-05-27 PROCEDURE — 85610 PROTHROMBIN TIME: CPT

## 2025-05-27 RX ADMIN — Medication 3 MG: at 23:02

## 2025-05-27 RX ADMIN — GABAPENTIN 100 MG: 100 CAPSULE ORAL at 20:53

## 2025-05-27 RX ADMIN — ROSUVASTATIN 40 MG: 20 TABLET, FILM COATED ORAL at 09:14

## 2025-05-27 RX ADMIN — STANDARDIZED SENNA CONCENTRATE 17.2 MG: 8.6 TABLET ORAL at 09:14

## 2025-05-27 RX ADMIN — GABAPENTIN 100 MG: 100 CAPSULE ORAL at 14:03

## 2025-05-27 RX ADMIN — AMOXICILLIN AND CLAVULANATE POTASSIUM 1800 MG: 600; 42.9 POWDER, FOR SUSPENSION ORAL at 09:14

## 2025-05-27 RX ADMIN — PANTOPRAZOLE SODIUM 40 MG: 40 TABLET, DELAYED RELEASE ORAL at 06:05

## 2025-05-27 RX ADMIN — RANOLAZINE 500 MG: 500 TABLET, FILM COATED, EXTENDED RELEASE ORAL at 09:15

## 2025-05-27 RX ADMIN — RANOLAZINE 500 MG: 500 TABLET, FILM COATED, EXTENDED RELEASE ORAL at 20:53

## 2025-05-27 RX ADMIN — AMOXICILLIN AND CLAVULANATE POTASSIUM 1800 MG: 600; 42.9 POWDER, FOR SUSPENSION ORAL at 23:02

## 2025-05-27 RX ADMIN — SODIUM CHLORIDE, PRESERVATIVE FREE 10 ML: 5 INJECTION INTRAVENOUS at 09:15

## 2025-05-27 RX ADMIN — CLOPIDOGREL BISULFATE 75 MG: 75 TABLET, FILM COATED ORAL at 09:14

## 2025-05-27 RX ADMIN — ASPIRIN 81 MG CHEWABLE TABLET 81 MG: 81 TABLET CHEWABLE at 09:14

## 2025-05-27 RX ADMIN — GABAPENTIN 100 MG: 100 CAPSULE ORAL at 09:15

## 2025-05-27 ASSESSMENT — PAIN SCALES - GENERAL
PAINLEVEL_OUTOF10: 0
PAINLEVEL_OUTOF10: 0

## 2025-05-27 NOTE — PROGRESS NOTES
Spoke with an RN regarding patients ordered Thoracentesis. Unable to get done today due to patient getting Aspirin and Plavix today. PT/INR needs drawn, patient does not need to be NPO and hold Aspirin and Plavix if okay with ordering physician. Angio department to call bedside RN in the morning.

## 2025-05-27 NOTE — PROGRESS NOTES
Physical Therapy  Treatment Note    Name: Corky Lawler  : 1945  MRN: [id number]      Date of Service: 2025    Re-Evaluating PT:  Denis Bhakta, PT, DPT  [id number]     Room #:  5410/5410-A  Diagnosis:  Intractable back pain [M54.9]  Pathologic lumbar vertebral fracture, initial encounter [M84.48XA]  Pathological fracture of lumbosacral spine [M84.48XA]  PMHx/PSHx:   has a past medical history of AAA (abdominal aortic aneurysm), Aortic regurgitation, COPD (chronic obstructive pulmonary disease) ([facility]), Coronary atherosclerosis of native coronary artery, DJD (degenerative joint disease), HTN (hypertension), Hyperlipidemia, Mitral regurgitation, Occipital stroke ([facility]), and TIA (transient ischemic attack).   Procedure/Surgery:  L1 & L2 kyphoplasty 5/15   Precautions:  Falls, Spinal precautions, TLSO, O2, Dizziness with positional changes, Alarms   Equipment Needs:  TBD    Reason for re-evaluation:  Change in status, transfer to intensive care, new orders   Date of re-evaluation:      SUBJECTIVE:    Pt lives with his wife in a 2 story home with 2 stair(s) and 1 rail(s) to enter. Pt independent with functional mobility PTA. Pt ambulated with no AD prior to admission. Pt owns a WW.     OBJECTIVE:   Re-Evaluation  Date: 25 Treatment Date: 25 Short Term/ Long Term   Goals   AM-PAC 6 Clicks     Was pt agreeable to Re-Eval/treatment? Yes  yes    Does pt have pain? No c/o pain Moderate back pain    Bed Mobility  Rolling: Min A  Supine to sit: Mod A  Sit to supine: NT  Scooting: Min A to EOB  Rolling: Juliano  Supine to sit: Mod A  Sit to supine: NT  Scooting: Min A to EOB Rolling: Supervision    Supine to sit: Supervision    Sit to supine: Supervision    Scooting: Supervision     Transfers Sit to stand: Mod A  Stand to sit: Mod A  Stand pivot: Mod A with FWW Sit to stand: Mod A  Stand to sit: Mod A  Stand pivot: Mod A with FWW Sit to stand: SBA  Stand to sit: SBA  Stand pivot: SBA with FWW

## 2025-05-27 NOTE — PROGRESS NOTES
Palliative Care Department  [phone]  Palliative Care Progress Note  Provider Elsie Pierre, APRN - CNP     Corky Lawler  [id number]  Hospital Day: 16  Date of Initial Consult: 05/12/2025  Referring Provider: Vinayak Smith MD   Palliative Medicine was consulted for assistance with: History of lung cancer; intractable pain with pathologic fracture of spine; goals of care    HPI:   Corky Lawler is a 80 y.o. with a medical history of metastatic lung cancer to the bone undergoing radiation therapy who was admitted on 5/12/2025 from home with a CHIEF COMPLAINT of intractable back pain.  In the ED, CT of spine showing enlarging lytic lesion in the left lateral L2 vertebral body with associated pathological fracture, new subtle lytic lesion of the left lateral aspect of L3 vertebral body.  Patient admitted for further workup and pain control.  Neurosurgery and oncology consulted.  Palliative care consulted for goals of care and symptom management.    5/21: RRT for confusion hypotension and hypoxia. Transferred to MICU on NRB  5/23: Transferred to the floor  ASSESSMENT/PLAN:     Pertinent Hospital Diagnoses     Metastatic lung cancer to the bone  Pathological fracture of L2  Intractable back pain    Palliative Care Encounter / Counseling Regarding Goals of Care  Please see detailed goals of care discussion as below  At this time, Corky Lawler, Does not have capacity for medical decision-making.  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    Thank you for allowing Palliative Medicine to participate in the care of Corky Lawler.

## 2025-05-27 NOTE — CARE COORDINATION
5/27. Received choice for rehab. [facility] referral made. For thoracentesis tomorrow. Maite Pearce RN

## 2025-05-27 NOTE — PROGRESS NOTES
[facility]  Department of Pulmonary, Critical Care and Sleep Medicine  [facility]  Department of Internal Medicine  Progress Note    SUBJECTIVE:    No CP,   SOB improved  States he feels weak still overall, PT/OT to see  Discussed with patient and wife on possible needs of placement, they are understanding    OBJECTIVE:  Vitals:    05/26/25 1537 05/26/25 2000 05/26/25 2120 05/27/25 0751   BP: 128/73 110/63  (!) 95/55   Pulse: 73 91  83   Resp: 18 17  18   Temp: 97.6 °F (36.4 °C) 99.2 °F (37.3 °C)  97.5 °F (36.4 °C)   TempSrc: Temporal Temporal  Temporal   SpO2: 99% (!) 89% 94% 92%   Weight:       Height:         Constitutional: Alert,     EENT: EOMI SAMPSON. MMM. No icterus. No thrush.     Neck: No thyromegaly. No elevated JVP. Trachea was midline.   Respiratory: Symmetrical.  Breath sounds were clear.    Cardiovascular: Regular, No murmur. No rubs.      Pulses:  Equal bilaterally.    Abdomen: Soft without organomegaly. No rebound, rigidity.  No guarding.  Lymphatic: No lymphadenopathy.  Musculoskeletal: Without weakness or gross deficits  Extremities:  No lower extremity edema. Reflexes appear adequate.   Skin:  Warm and dry.  No skin rashes.   Neurological/Psychiatric: No acute psychosis. Cranial nerves are intact.        DATA:  The data collected below information that was obtained, reviewed, analyzed and interpreted today. Imaging test are reviewed with the radiologist during weekly conference rounds. Comparison to previous images are always explored.     Monitor Strips:  My interpretation and reviewed of the cardiac monitor and further discussion with technical team reveals no changes noted and the patient is in sinus rhythm     RADIOLOGY:  My interpretation and review of the current films reveals  lung mass        CBC:   Lab Results   Component Value Date/Time    WBC 14.8 05/27/2025 05:55 AM    RBC 4.44 05/27/2025 05:55 AM    HGB 13.2 05/27/2025  05:55 AM    HCT 39.9 05/27/2025 05:55 AM    MCV 89.9 05/27/2025 05:55 AM    MCH 29.7 05/27/2025 05:55 AM    MCHC 33.1 05/27/2025 05:55 AM    RDW 15.4 05/27/2025 05:55 AM    PLT 95 05/27/2025 05:55 AM    MPV 11.3 05/27/2025 05:55 AM     CMP:    Lab Results   Component Value Date/Time     05/27/2025 05:55 AM    K 4.2 05/27/2025 05:55 AM    K 3.3 05/23/2023 07:42 AM    CL 99 05/27/2025 05:55 AM    CO2 23 05/27/2025 05:55 AM    BUN 24 05/27/2025 05:55 AM    CREATININE 1.3 05/27/2025 05:55 AM    GFRAA >60 04/06/2021 05:18 AM    LABGLOM 55 05/27/2025 05:55 AM    LABGLOM >60 05/23/2023 07:42 AM    GLUCOSE 136 05/27/2025 05:55 AM    GLUCOSE 101 02/20/2011 10:30 AM    CALCIUM 8.4 05/27/2025 05:55 AM    BILITOT 0.6 05/21/2025 01:11 PM    ALKPHOS 57 05/21/2025 01:11 PM    AST 14 05/21/2025 01:11 PM    ALT 7 05/21/2025 01:11 PM       CLINICAL ASSESMENT:  Large right hilar lung mass measuring up to 6 cm with right pleural effusion  Clinical picture suggestive of metastatic primary lung cancer  Postobstructive pneumonia  Former smoker    PLAN: If needed the case was discussed with the care team  Awaiting final cytology from the bronchoscopy   Will get another thoracentesis be ccauseo f recurrent, Discussed with ID  - Currently patient is on room air and asymptomatic at rest  - Continue continues telemetry and flutter valve  - PT OT, out of bed to chair  - Optimize pain contro  Continue with Augmentin ES for 7 days total  Follow-up cultures  Enforce incentive and spirometry  Check final culture      Ermias Pozo DO, MPH, FCCP, MACOI, FACP  Professor of Internal Medicine

## 2025-05-27 NOTE — PROGRESS NOTES
Occupational Therapy  OT BEDSIDE TREATMENT NOTE   [facility]  [address], [place]       Date:2025  Patient Name: Corky Lawler  MRN: [id number]  : 1945  Room: [address] [place] [address]     Per OT Eval:  Evaluating OT: Simran Crenshaw, CHERI, OTR/L  # [id number]  Re-evaluating OT: Elaine Zieglerty OTD, OTR/L, [id number]     Re-evaluation indicated following transfer to ICU with tachycardia, hypotension, and hypoxia.     Referring Provider:  Vinayak Smith MD   Specific Provider Orders:  \"OT Eval and Treat\"  25     Diagnosis: Intractable back pain [M54.9]  Pathologic lumbar vertebral fracture, initial encounter [M84.48XA]  Pathological fracture of lumbosacral spine [M84.48XA]     Pt was admitted w/ w/ severe back pain r/t L2 pathologic fracture r/t mets to spine.       Pertinent Medical History:  Pt has a past medical history of AAA (abdominal aortic aneurysm), Aortic regurgitation, COPD (chronic obstructive pulmonary disease) ([facility]), Coronary atherosclerosis of native coronary artery, DJD (degenerative joint disease), HTN (hypertension), Hyperlipidemia, Mitral regurgitation, Occipital stroke (HCC), and TIA (transient ischemic attack).,  has a past surgical history that includes hernia repair; back surgery; Appendectomy; Diagnostic Cardiac Cath Lab Procedure; Coronary artery bypass graft; Shoulder arthroscopy; Cardiac catheterization (2013); Cholecystectomy; eye surgery (2017); transesophageal echocardiogram (2018); Aorta surgery; Coronary angioplasty with stent (2021); Bladder surgery (Left, 2023); Spine surgery (N/A, 05/15/2025); bronchoscopy (N/A, 2025); bronchoscopy (2025); and bronchoscopy (2025).     Surgeries this admission: 5-15-25:  L1, L2 Kyphoplasty   : bronchoscopy; R thoracentesis        Precautions: Fall Risk  O2  Spinal Precautions  TLSO when HOB > 45*     Assessment of current deficits   [x]  dressing task to don/doff gown sup in bed  TLSO donned sup in  bed/log rolling side to side   Spinal precautions maintained  Min A /Set up      LB Dressing Mod A/set up     To don brief over BLE seated in chair. Limited by dizziness with dynamic movement at EOB.    LB dressing task with total A for donning socks sup in bed   Mod I  Pt would benefit from LB AE TBD       Bathing Max A  simulated     max A  Posterior pericare after incontinence x2 with BM for proper hygiene and skin protection  Min A        Toileting Max A  For posterior hygiene in standing     incontinent with BM multiple times in bed/  Mod I  Pt would benefit from bidet       Bed Mobility  Log-Rolling: Min A  Supine to sit: Mod A  Sit to supine:  NT     Pt ed, Mod VCs for safe/adaptive techs for Log-Rolling/Transfers     Bed mobility tasks with log rolling side to side/ sup to sit/scooting at EOB with mod A +1/ education on proper tech and spinal precautions  Supine to sit: Independent   Sit to supine: Independent       Functional Transfers Sit to stand: Mod A   Stand to sit: Mod A     Mod VCs/Pt ed for safety/hand placement    STS from EOB/transfer training from EOB to chair with mod a +1/ education on proper tech and body mechanics /spinal precautions  Mod I        Functional Mobility Mod A w/ WW     For few steps in room.    Functional mobility in room/functional distance with FWW/ ^ time/use of FWW/ education on upright posture and pacing to improve standing balance for ^ (I) with functional adls.  Mod I        Balance Sitting:     Static:   SBA EOB    Dynamic:   SBA w/ functional ax EOB      Standing:     Static:  Min A w/ WW    Dynamic:  Mod A w/ functional ax/mobility w/ WW     sit   S  Standing   Mod A+1 Sitting:     Static:  Independent     Dynamic:  Independent w/ functional ax     Standing:     Static:  Mod I  w/ AD PRN    Dynamic:  Mod I  w/ functional ax/mobility w/ AD PRN   Activity Tolerance  Fair(-)     Sitting Tolerance w/ light ax

## 2025-05-27 NOTE — PROGRESS NOTES
Internal Medicine Progress Note    Patient's name: Corky Lawler  : 1945  Chief complaints (on day of admission): Back Pain (Pt L1 and L2 pain. Pt has lumbar cancer. Pt received treatment today . Pt unable to tolerate pain. Pt denies numbness to lower extremities denies loss of bowel or bladder. )  Admission date: 2025  Date of service: 2025   Room: [address] IMCU/NEURO  Primary care physician: Aurelio Hamilton MD  Reason for visit: Follow-up for Back pain    Subjective  Corky was seen and examined at bedside       Multiple family members at bedside  All questions addressed  Anticipate DC to [facility] when able  Plan for thoracentesis tomorrow      Wife at bedside  States he feels a bit better  States he feels weak still overall, PT/OT to see  Discussed with patient and wife on possible needs of placement, they are understanding      Feels a bit off  No family at bedside   Did not sleep well   No chest pain or shortness of breath   Room air       Doing well   On room air   NAD  Looks great   Feels good   Wants to go home   Arrangements need to be made and clearance to be had       Doing much improved today which is great to see  Wife Mirlande at bedside   On room air   Breathing NAD  Transferring out to floor   BP a bit low and he is having what I presume to be recrudescence of his stroke symptoms I.e. dizziness   He has declined MRI again today for further evaluation        Doing much much better thankfully   More comfortable   NAD   On NC O2 again   Family at bedside   Answered all of their questions   Goal is to get him home at some point per family   Feels better after thoracentesis       Abdominal pain this morning   Went down for bronch abdominal pain worsened and was having intractable vomiting   Sent for stat CT abdomen pelvis which is still not formally read ? Patient on NRB and NC when I saw him after RRT transferred to MICU. Spoke with family. Spoke with ICU and  palliative care. Palliative will ultimately confirm the patients wishes at this time, very difficult situation for family they are very emotional. Spent a great deal of time with them today.     5/20  Still dizzy particularly with head movement to the R   Refusing MRI brain states he just does not want to go back in the machine explained we will be unable to evaluate for new stroke he voices understanding and commits to his refusal   Otherwise will re check ortho vitals today   Nursing at bedside   Treat constipation   CT head stable   Bronch tomorrow     5/19  Denies numbness tingling weakness to me   Denies fevers chills sweats   Nursing at bedside   Having dizziness today again   BP running on the low side   Recrudescence of prior stroke vs new acute stroke   No way to tell unfortunately I think he needs another MRI, last time he was in the hospital he had dizziness after anti platelet drugs were held for a few days and he ended up having an acute stroke   Spoke with Dr Swain     [facility] Medications  Current Facility-Administered Medications   Medication Dose Route Frequency Provider Last Rate Last Admin    meclizine (ANTIVERT) tablet 12.5 mg  12.5 mg Oral TID PRN Vinayak Smith MD   12.5 mg at 05/26/25 1214    melatonin tablet 3 mg  3 mg Oral Nightly PRN Jesse Mcbride MD   3 mg at 05/26/25 2348    clopidogrel (PLAVIX) tablet 75 mg  75 mg Oral Daily Ernesto Guan MD   75 mg at 05/27/25 0914    oxyCODONE HCl (OXY-IR) immediate release tablet 10 mg  10 mg Oral Q6H PRN Ramya Chris PA-C        amoxicillin-clavulanate (AUGMENTIN-ES) 600-42.9 MG/5ML suspension 1,800 mg  1,800 mg Oral 2 times per day Sav Up MD   1,800 mg at 05/27/25 0914    sodium chloride flush 0.9 % injection 5-40 mL  5-40 mL IntraVENous 2 times per day Herber Platt MD   10 mL at 05/27/25 0915    sodium chloride flush 0.9 % injection 5-40 mL  5-40 mL IntraVENous PRN Herber Platt MD        0.9 % sodium chloride  findings since the study of April 17 is the presence of a   serpiginous structures in the right anterior epicardial space which can be   relate vessels.  Further evaluation with CT chest IV contrast recommended.   This also will evaluate an apparently bulkiness in the right hilar region.      6.  Patient had 2 levels of vertebroplasty in the lumbar spine: L1 and L2   since study April 17.      7.  Presence of an expansile mass lesion measuring 4.4 x 3.7 x 3 cm   approximately, located in the right upper thigh located just anterior and   lateral to the infra torque at the region of the proximal right femur,   interposed between the right tensor fascia rivas/anterior rectus femorals   muscle/vastus lateral muscle as above described.  Could represent a primary   muscle lesion or even a organized hematoma on subacute or ongoing process.   Recommended further evaluation with MRI study of the right thigh/hip area   without and with IV contrast.         XR ABDOMEN (KUB) (SINGLE AP VIEW)   Final Result   Nonobstructive bowel gas pattern.         CT HEAD WO CONTRAST   Final Result   No acute intracranial abnormality.         XR CHEST PORTABLE   Final Result   Some worsening identified of the markings throughout the right perihilar   region with increase seen in size of the right pleural effusion to suggest   possible asymmetric edema versus pneumonia         FLUORO FOR SURGICAL PROCEDURES   Final Result   Intraprocedural fluoroscopic spot images as above.  See separate procedure   report for more information.         XR CHEST PORTABLE   Final Result   Findings suggestive of congestive heart failure with interstitial pulmonary   edema pattern.  Findings of increased since 04/17/2025 comparison         CT LUMBAR SPINE WO CONTRAST   Final Result   1. Enlarging lytic lesion in the left lateral L2 vertebral body, now   associated with a pathological fracture.   2. New subtle lytic lesion in the left lateral aspect of the L3  vertebral   body.   3. The metastatic lesion in the L1 vertebral body, as seen on the previous   MRI, is not evident by CT.         IR Interventional Radiology Procedure Request    (Results Pending)         Assessment   Active Hospital Problems    Diagnosis     Goals of care, counseling/discussion [Z71.89]     Palliative care encounter [Z51.5]     Closed fracture of second lumbar vertebra (HCC) [S32.029A]     Pathological fracture of lumbosacral spine [M84.48XA]      CT Lumbar Spine   Enlarging lytic lesion in the left lateral L2 vertebral body, now associated with a pathological fracture. . New subtle lytic lesion in the left lateral aspect of the L3 vertebral body.    Plan  Lytic lesions of the L1-L2 vertebrae with intractable back pain and with new pathological fracture  Has received 4 rounds of radiation therapy  S/p L1 and L2 kyphoplasty 5/15/2025  NSG recs appreciated   Palliative care on board for pain regimen     CHUCK stage II, resolved  Baseline Cr: 0.8  On presentation Cr: 2.1    Acute Hypoxia with worsening R sided CXR   With findings of possible volume overload on CXR 5/15  Ambulatory pulse ox   Cxr 5/19 worse on R side   Pulm consult, bronch planned 5/21 now on hold   Holding plavix for this     Leucocytosis   Probably post obstructive PNA and now likely aspiration event this morning   Blood cx UA ucx lactic acid inflammatory markers urine ags viral pcr   ID consultation    Dizziness   Possibly recrudescence of recent stroke vs area of new stroke   Neurology consultation appreciated   Refusing brain MRI after multiple discussion   CT head repeat stable     Orthostatic hypotension   Hold imdur   Encourage PO intake   Fall precautions     Right Lung mass worsening with effusion  Pulmonary on board now   Plan for Thoracentesis on 5/28/25    History of CAD s/p CABG    Recent Small acute to sub acute stroke Left Occipital periventricular white matter 4/22/25  Past R occipital stroke noted   On ASA and Plavix

## 2025-05-27 NOTE — PROGRESS NOTES
[facility]  [facility]  Progress Note      Chief Complaint   Patient presents with    Back Pain     Pt L1 and L2 pain. Pt has lumbar cancer. Pt received treatment today . Pt unable to tolerate pain. Pt denies numbness to lower extremities denies loss of bowel or bladder.        SUBJECTIVE:  Patient is tolerating medications. No reported adverse drug reactions.  No nausea, vomiting, diarrhea.  Occasional  dry cough  Awake alert no complaints  Afebrile  Room air  Review of systems:  As stated above in the chief complaint, otherwise negative.    Medications:  Scheduled Meds:   clopidogrel  75 mg Oral Daily    amoxicillin-clavulanate  1,800 mg Oral 2 times per day    sodium chloride flush  5-40 mL IntraVENous 2 times per day    lidocaine   Topical Once    aspirin  81 mg Oral Daily    senna  2 tablet Oral BID    polyethylene glycol  17 g Oral BID    fentaNYL  1 patch TransDERmal Q72H    gabapentin  100 mg Oral TID    [Held by provider] lisinopril  20 mg Oral BID    pantoprazole  40 mg Oral QAM AC    ranolazine  500 mg Oral BID    rosuvastatin  40 mg Oral Daily    [Held by provider] isosorbide mononitrate  30 mg Oral Daily     Continuous Infusions:   sodium chloride 10 mL/hr at 25 1038    norepinephrine       PRN Meds:meclizine, melatonin, oxyCODONE, sodium chloride flush, sodium chloride, prochlorperazine, nitroGLYCERIN, cyclobenzaprine, ondansetron **OR** ondansetron, acetaminophen **OR** acetaminophen    OBJECTIVE:  BP (!) 95/55   Pulse 83   Temp 97.5 °F (36.4 °C) (Temporal)   Resp 18   Ht 1.753 m (5' 9.02\")   Wt 94.2 kg (207 lb 9.6 oz)   SpO2 92%   BMI 30.64 kg/m²   Temp  Av.1 °F (36.7 °C)  Min: 97.5 °F (36.4 °C)  Max: 99.2 °F (37.3 °C)  Constitutional: The patient is awake, alert, and oriented.   Skin: Warm and dry. No rashes were noted.   HEENT: .  Moist mucous membranes.  No ulcerations or thrush.  Neck: Supple to movements.   Chest: No use of accessory muscles to  for: \"WNDABS\"  No results found for: \"RESPSMEAR\"      Component Value Date/Time    MPNEUMO Not Detected 05/21/2025 1330     No results found for: \"CULTRESP\"  No results found for: \"CXCATHTIP\"  No results found for: \"BFCS\"  No results found for: \"CXSURG\"  Urine Culture, Routine   Date Value Ref Range Status   05/22/2023 Growth not present  Final     No results found for: \"MRSAC\"    ASSESSMENT:  Postobstructive pneumonia-follow-up chest x-ray is significantly improved  Ca of the lung with mets  New onset of GI problems; rule out any intra-abdominal catastrophe with known history of aortoiliac stents  Leukocytosis related to all of the above-some improved; incentive inspirometer was near the window and he was not using it  5/2625 radiograph shows increase in fluids and apparently the mass in the right upper lobe-discussed with pulmonary          PLAN:  Continue with Augmentin ES for 7 days total  Follow-up cultures  Enforce incentive and spirometry  Check final culture  Med rec complete  Okay to discharge from ID POV  Monitor labs      Sav Up MD  11:11 AM  5/27/2025

## 2025-05-28 ENCOUNTER — APPOINTMENT (OUTPATIENT)
Dept: GENERAL RADIOLOGY | Age: 80
DRG: 542 | End: 2025-05-28
Payer: MEDICARE

## 2025-05-28 ENCOUNTER — APPOINTMENT (OUTPATIENT)
Dept: INTERVENTIONAL RADIOLOGY/VASCULAR | Age: 80
DRG: 542 | End: 2025-05-28
Payer: MEDICARE

## 2025-05-28 LAB
ANION GAP SERPL CALCULATED.3IONS-SCNC: 13 MMOL/L (ref 7–16)
APPEARANCE FLD: NORMAL
BASOPHILS # BLD: 0 K/UL (ref 0–0.2)
BASOPHILS # BLD: 0.05 K/UL (ref 0–0.2)
BASOPHILS NFR BLD: 0 % (ref 0–2)
BASOPHILS NFR BLD: 0 % (ref 0–2)
BODY FLD TYPE: NORMAL
BUN SERPL-MCNC: 25 MG/DL (ref 8–23)
CALCIUM SERPL-MCNC: 8.5 MG/DL (ref 8.8–10.2)
CHLORIDE SERPL-SCNC: 98 MMOL/L (ref 98–107)
CLOT CHECK: NORMAL
CO2 SERPL-SCNC: 24 MMOL/L (ref 22–29)
COLOR FLD: NORMAL
CREAT SERPL-MCNC: 1.4 MG/DL (ref 0.7–1.2)
EOSINOPHIL # BLD: 0.11 K/UL (ref 0.05–0.5)
EOSINOPHIL # BLD: 0.15 K/UL (ref 0.05–0.5)
EOSINOPHILS RELATIVE PERCENT: 1 % (ref 0–6)
EOSINOPHILS RELATIVE PERCENT: 1 % (ref 0–6)
ERYTHROCYTE [DISTWIDTH] IN BLOOD BY AUTOMATED COUNT: 15.5 % (ref 11.5–15)
ERYTHROCYTE [DISTWIDTH] IN BLOOD BY AUTOMATED COUNT: 15.5 % (ref 11.5–15)
GFR, ESTIMATED: 52 ML/MIN/1.73M2
GLUCOSE SERPL-MCNC: 151 MG/DL (ref 74–99)
HCT VFR BLD AUTO: 39.1 % (ref 37–54)
HCT VFR BLD AUTO: 42.9 % (ref 37–54)
HGB BLD-MCNC: 12.6 G/DL (ref 12.5–16.5)
HGB BLD-MCNC: 13.7 G/DL (ref 12.5–16.5)
IMM GRANULOCYTES # BLD AUTO: 0.18 K/UL (ref 0–0.58)
IMM GRANULOCYTES NFR BLD: 1 % (ref 0–5)
LDH FLD L TO P-CCNC: 777 U/L
LYMPHOCYTES NFR BLD: 0.79 K/UL (ref 1.5–4)
LYMPHOCYTES NFR BLD: 0.89 K/UL (ref 1.5–4)
LYMPHOCYTES RELATIVE PERCENT: 4 % (ref 20–42)
LYMPHOCYTES RELATIVE PERCENT: 5 % (ref 20–42)
MCH RBC QN AUTO: 29.2 PG (ref 26–35)
MCH RBC QN AUTO: 29.3 PG (ref 26–35)
MCHC RBC AUTO-ENTMCNC: 31.9 G/DL (ref 32–34.5)
MCHC RBC AUTO-ENTMCNC: 32.2 G/DL (ref 32–34.5)
MCV RBC AUTO: 90.7 FL (ref 80–99.9)
MCV RBC AUTO: 91.7 FL (ref 80–99.9)
MICROORGANISM SPEC CULT: ABNORMAL
MICROORGANISM/AGENT SPEC: ABNORMAL
MONOCYTES NFR BLD: 0.89 K/UL (ref 0.1–0.95)
MONOCYTES NFR BLD: 1.53 K/UL (ref 0.1–0.95)
MONOCYTES NFR BLD: 5 % (ref 2–12)
MONOCYTES NFR BLD: 8 % (ref 2–12)
MONOCYTES NFR FLD: 72 %
NEUTROPHILS NFR BLD: 86 % (ref 43–80)
NEUTROPHILS NFR BLD: 89 % (ref 43–80)
NEUTROPHILS NFR FLD: 29 %
NEUTS SEG NFR BLD: 15.17 K/UL (ref 1.8–7.3)
NEUTS SEG NFR BLD: 16.28 K/UL (ref 1.8–7.3)
NON-GYN CYTOLOGY REPORT: NORMAL
NON-GYN CYTOLOGY REPORT: NORMAL
PLATELET # BLD AUTO: 28 K/UL (ref 130–450)
PLATELET # BLD AUTO: 69 K/UL (ref 130–450)
PLATELET CONFIRMATION: NORMAL
PLATELET CONFIRMATION: NORMAL
PMV BLD AUTO: 11.9 FL (ref 7–12)
PMV BLD AUTO: 12.5 FL (ref 7–12)
POTASSIUM SERPL-SCNC: 4 MMOL/L (ref 3.5–5.1)
PROT FLD-MCNC: 3.3 G/DL
RBC # BLD AUTO: 4.31 M/UL (ref 3.8–5.8)
RBC # BLD AUTO: 4.68 M/UL (ref 3.8–5.8)
RBC # BLD: ABNORMAL 10*6/UL
RBC # FLD: NORMAL CELLS/UL
SERVICE CMNT-IMP: ABNORMAL
SODIUM SERPL-SCNC: 134 MMOL/L (ref 136–145)
SPECIMEN DESCRIPTION: ABNORMAL
SPECIMEN TYPE: NORMAL
SPECIMEN TYPE: NORMAL
WBC # FLD: 885 CELLS/UL
WBC OTHER # BLD: 17.1 K/UL (ref 4.5–11.5)
WBC OTHER # BLD: 18.9 K/UL (ref 4.5–11.5)

## 2025-05-28 PROCEDURE — 88305 TISSUE EXAM BY PATHOLOGIST: CPT

## 2025-05-28 PROCEDURE — 2500000003 HC RX 250 WO HCPCS: Performed by: ANESTHESIOLOGY

## 2025-05-28 PROCEDURE — 88112 CYTOPATH CELL ENHANCE TECH: CPT

## 2025-05-28 PROCEDURE — 6370000000 HC RX 637 (ALT 250 FOR IP): Performed by: PHYSICIAN ASSISTANT

## 2025-05-28 PROCEDURE — 6370000000 HC RX 637 (ALT 250 FOR IP): Performed by: SPECIALIST

## 2025-05-28 PROCEDURE — 83615 LACTATE (LD) (LDH) ENZYME: CPT

## 2025-05-28 PROCEDURE — 80048 BASIC METABOLIC PNL TOTAL CA: CPT

## 2025-05-28 PROCEDURE — 6370000000 HC RX 637 (ALT 250 FOR IP): Performed by: STUDENT IN AN ORGANIZED HEALTH CARE EDUCATION/TRAINING PROGRAM

## 2025-05-28 PROCEDURE — 99232 SBSQ HOSP IP/OBS MODERATE 35: CPT | Performed by: INTERNAL MEDICINE

## 2025-05-28 PROCEDURE — 84157 ASSAY OF PROTEIN OTHER: CPT

## 2025-05-28 PROCEDURE — 85025 COMPLETE CBC W/AUTO DIFF WBC: CPT

## 2025-05-28 PROCEDURE — 32555 ASPIRATE PLEURA W/ IMAGING: CPT

## 2025-05-28 PROCEDURE — 89051 BODY FLUID CELL COUNT: CPT

## 2025-05-28 PROCEDURE — 1200000000 HC SEMI PRIVATE

## 2025-05-28 PROCEDURE — 87205 SMEAR GRAM STAIN: CPT

## 2025-05-28 PROCEDURE — 36415 COLL VENOUS BLD VENIPUNCTURE: CPT

## 2025-05-28 PROCEDURE — 71045 X-RAY EXAM CHEST 1 VIEW: CPT

## 2025-05-28 PROCEDURE — 6360000002 HC RX W HCPCS: Performed by: PHYSICIAN ASSISTANT

## 2025-05-28 PROCEDURE — 87070 CULTURE OTHR SPECIMN AEROBIC: CPT

## 2025-05-28 PROCEDURE — C1729 CATH, DRAINAGE: HCPCS

## 2025-05-28 PROCEDURE — 83986 ASSAY PH BODY FLUID NOS: CPT

## 2025-05-28 RX ORDER — LIDOCAINE HYDROCHLORIDE 20 MG/ML
INJECTION, SOLUTION INFILTRATION; PERINEURAL PRN
Status: COMPLETED | OUTPATIENT
Start: 2025-05-28 | End: 2025-05-28

## 2025-05-28 RX ADMIN — PANTOPRAZOLE SODIUM 40 MG: 40 TABLET, DELAYED RELEASE ORAL at 05:33

## 2025-05-28 RX ADMIN — AMOXICILLIN AND CLAVULANATE POTASSIUM 1800 MG: 600; 42.9 POWDER, FOR SUSPENSION ORAL at 20:26

## 2025-05-28 RX ADMIN — ROSUVASTATIN 40 MG: 20 TABLET, FILM COATED ORAL at 09:32

## 2025-05-28 RX ADMIN — RANOLAZINE 500 MG: 500 TABLET, FILM COATED, EXTENDED RELEASE ORAL at 20:27

## 2025-05-28 RX ADMIN — GABAPENTIN 100 MG: 100 CAPSULE ORAL at 20:27

## 2025-05-28 RX ADMIN — GABAPENTIN 100 MG: 100 CAPSULE ORAL at 09:32

## 2025-05-28 RX ADMIN — AMOXICILLIN AND CLAVULANATE POTASSIUM 1800 MG: 600; 42.9 POWDER, FOR SUSPENSION ORAL at 09:31

## 2025-05-28 RX ADMIN — LIDOCAINE HYDROCHLORIDE 10 ML: 20 INJECTION, SOLUTION INFILTRATION; PERINEURAL at 08:22

## 2025-05-28 RX ADMIN — RANOLAZINE 500 MG: 500 TABLET, FILM COATED, EXTENDED RELEASE ORAL at 09:32

## 2025-05-28 RX ADMIN — SODIUM CHLORIDE, PRESERVATIVE FREE 10 ML: 5 INJECTION INTRAVENOUS at 09:33

## 2025-05-28 RX ADMIN — GABAPENTIN 100 MG: 100 CAPSULE ORAL at 14:31

## 2025-05-28 RX ADMIN — Medication 3 MG: at 20:27

## 2025-05-28 RX ADMIN — SODIUM CHLORIDE, PRESERVATIVE FREE 10 ML: 5 INJECTION INTRAVENOUS at 20:27

## 2025-05-28 ASSESSMENT — PAIN SCALES - GENERAL
PAINLEVEL_OUTOF10: 0
PAINLEVEL_OUTOF10: 0

## 2025-05-28 NOTE — PROCEDURES
Procedure Note  ____________________________________________________________      IR U/S GUIDED THORACENTESIS  [facility] 5WE ORTHO-TRAUMA    Patient Name: Corky Lawler   YOB: 1945  Medical Record Number: [id number]  Date of Procedure: 5/28/25  Room/Bed: [facility]-A    Preoperative diagnosis: right Pleural Effusion    Postoperative diagnosis: Same    Consent: The patient was counseled regarding the procedure, it's indications, risks, potential complications and alternatives and any questions were answered. Consent was obtained for image guided right thoracentesis for Pleural effusion.     Procedure:  Image Guided right Thoracentesis.    Performed by: CIARA Ly under on-site supervision by Mirza Moreno MD.    Anesthesia: Local    Estimated blood loss: Less than 10 mL    Complications: None    Specimen Obtained: Pleural Fluid    Procedure: Prior to start of procedure, routine scanning of the posterior thorax was performed using real-time ultrasound and revealed sufficient amount of pleural fluid present. Decision was made to proceed with procedure.  After obtaining consent, a \"Time-out\" was called to verify the correct patient, procedure/location, allergies, relevant medications held for procedure and that all equipment is functioning and available. The patient was then situated in a seated upright position and the appropriate landmarks were identified using ultrasound. The site of maximum fluid collection was marked. The skin over the puncture site in the right lower posterior hemithorax region was prepped with surgical skin prep and draped in a sterile fashion. Local anesthesia was administered by infiltration using 2% Lidocaine without epinephrine administered subcutaneously.  A 6 [place] safe-t-centesis catheter was then advanced into the pleural cavity and the needle was withdrawn.  Pleural fluid return was clear cortez colored. The catheter was then withdrawn and a sterile dressing was placed over

## 2025-05-28 NOTE — PROGRESS NOTES
Comprehensive Nutrition Assessment    Type and Reason for Visit:  Reassess    Nutrition Recommendations/Plan:   Continue current diet  Start magic cup once/day and Ensure once/day to promote oral intake & aid in wound healing  Will monitor     Malnutrition Assessment:  Malnutrition Status:  At risk for malnutrition (05/28/25 1502)    Context:  Acute Illness     Findings of the 6 clinical characteristics of malnutrition:  Energy Intake:  75% or less of estimated energy requirements for 7 or more days  Weight Loss:  Unable to assess (d/t lack of recent wt hx on file)     Body Fat Loss:  No body fat loss     Muscle Mass Loss:  No muscle mass loss    Fluid Accumulation:  No fluid accumulation     Strength:  Not Performed    Nutrition Assessment:    Pt adm d/t lower back pain; Pt adm w/ pathological frx of lumbosacral spine; pt w/ lung CA w/ mets to spine s/p kyphoplasty 5/15; pt on CRT for CA- 6 trx left; PMhx: CVA ,COPD, CAD s/p CABG, HTN, TIA, DJD, HLD; dizziness this adm noted; pt s/p Bronch w/ bx 5/22; hospital acquired PNA noted; abd pain w/ N/V noted this adm, appears improved & NGT never placed; s/p RRT 5/21 r/t hypoxia/hypotension/tachycardia; pt s/p thoracentesis 5/28; will modify ONS to further promote oral intake ; will monitor.    Nutrition Related Findings:    hyponatremia; A&Ox4; poor dentition; active  BS; +1 edema; +I/O Wound Type: Surgical Incision       Current Nutrition Intake & Therapies:    Average Meal Intake: 26-50%, 51-75% (ongoing sporadic poor PO intake this adm; hx of N/V noted this adm)  Average Supplements Intake: Unable to assess  ADULT DIET; Regular; Low Fat/Low Chol/High Fiber/2 gm Na  ADULT ORAL NUTRITION SUPPLEMENT; Breakfast; Standard High Calorie/High Protein Oral Supplement  ADULT ORAL NUTRITION SUPPLEMENT; Lunch, Dinner; Frozen Oral Supplement    Anthropometric Measures:  Height: 175.3 cm (5' 9.02\")  Ideal Body Weight (IBW): 160 lbs (73 kg)    Admission Body Weight: 98 kg (216  lb) (actual 5/12)  Current Body Weight: 94.2 kg (207 lb 10.8 oz) (5/23-BS), 129.8 % IBW. Weight Source: Bed scale  Current BMI (kg/m2): 30.7  Usual Body Weight: 98.4 kg (216 lb 14.9 oz) (4/23/25-BS)     % Weight Change (Calculated): -3.8  Weight Adjustment For: No Adjustment                 BMI Categories: Obese Class 1 (BMI 30.0-34.9)    Estimated Daily Nutrient Needs:  Energy Requirements Based On: Formula  Weight Used for Energy Requirements: Current  Energy (kcal/day): MSJ x 1.3 SF= 5616-8602  Weight Used for Protein Requirements: Ideal  Protein (g/day): 1.5-1.8g/tlrSIO=270-940r  Method Used for Fluid Requirements: 1 ml/kcal  Fluid (ml/day): 8784-7632    Nutrition Diagnosis:   Increased nutrient needs related to increase demand for energy/nutrients as evidenced by wounds (surgical)    Nutrition Interventions:   Food and/or Nutrient Delivery: Continue Current Diet, Modify Oral Nutrition Supplement (Ensure once/day & magic cup once/day)  Nutrition Education/Counseling: Education/Counseling initiated (ONS options/benefits)  Coordination of Nutrition Care: Continue to monitor while inpatient       Goals:  Goals: PO intake 75% or greater  Type of Goal: Continue current goal  Previous Goal Met: Progressing toward Goal(s)    Nutrition Monitoring and Evaluation:   Behavioral-Environmental Outcomes: None Identified  Food/Nutrient Intake Outcomes: Food and Nutrient Intake, Supplement Intake  Physical Signs/Symptoms Outcomes: Chewing or Swallowing, Biochemical Data, Nutrition Focused Physical Findings, Skin, Weight, GI Status, Fluid Status or Edema    Discharge Planning:    Too soon to determine     Prema Kearney RD, LD  Contact: 0401

## 2025-05-28 NOTE — CARE COORDINATION
5/28. Discussed discharge plan with the pt and his wife. The pt has decided to discontinue cancer treatment. They would like to speak with someone from hospice. The pt's wife chose [facility]. Referral made. Maite Pearce RN

## 2025-05-28 NOTE — PROGRESS NOTES
Palliative Medicine Social Work     Patient Name: Corky Lawler    Age: 80 y.o.    Marital Status:     [place] Status: yes    Next of Kin: wife Mirlande    Additional Support: son Guillermo    Minor Children: no    Advanced Directives: no    Confirm Code Status: limited no to all    Current Goals of Care: Pt tells me he just spoke with a doctor and is waiting to speak with Dr Nieves, but in summary he does not want chemotherapy to just extend time. He is considering going home but needs to discuss all of this with his wife.    Mental Health History: no    Substance Abuse:no    Indications of Abuse/Neglect: no    Financial Concerns: no    Living Situation: pt lives with his wife, bed and bath on 2nd floor.     Physical Care Needs Met: yes,     Emotional Needs Met: time spent exploring pts thoughts and feelings, providing support through active listening and validation of feelings.    Assessment: 79 yo  male with a history of lung cancer with mets to the spine he was undergoing radiation therapy (fourth round) and on the way there he was having extreme back pain. He is s/p kyphoplasty. He is considering his discharge plan of rehab vs home and wants to talk with his wife further.

## 2025-05-28 NOTE — PLAN OF CARE
Problem: Safety - Adult  Goal: Free from fall injury  Outcome: Progressing     Problem: Chronic Conditions and Co-morbidities  Goal: Patient's chronic conditions and co-morbidity symptoms are monitored and maintained or improved  Outcome: Progressing     Problem: Discharge Planning  Goal: Discharge to home or other facility with appropriate resources  Outcome: Progressing     Problem: Pain  Goal: Verbalizes/displays adequate comfort level or baseline comfort level  Outcome: Progressing     Problem: Skin/Tissue Integrity  Goal: Skin integrity remains intact  Description: 1.  Monitor for areas of redness and/or skin breakdown2.  Assess vascular access sites hourly3.  Every 4-6 hours minimum:  Change oxygen saturation probe site4.  Every 4-6 hours:  If on nasal continuous positive airway pressure, respiratory therapy assess nares and determine need for appliance change or resting period  1.  Monitor for areas of redness and/or skin breakdown2.  Assess vascular access sites hourly3.  Every 4-6 hours minimum:  Change oxygen saturation probe site4.  Every 4-6 hours:  If on nasal continuous positive airway pressure, respiratory therapy assess nares and determine need for appliance change or resting period  Outcome: Progressing     Problem: Neurosensory - Adult  Goal: Achieves stable or improved neurological status  Outcome: Progressing  Goal: Achieves maximal functionality and self care  Outcome: Progressing     Problem: Musculoskeletal - Adult  Goal: Return mobility to safest level of function  Outcome: Progressing  Goal: Return ADL status to a safe level of function  Outcome: Progressing     Problem: ABCDS Injury Assessment  Goal: Absence of physical injury  Outcome: Progressing     Problem: Nutrition Deficit:  Goal: Optimize nutritional status  Outcome: Progressing     Problem: Respiratory - Adult  Goal: Achieves optimal ventilation and oxygenation  Outcome: Progressing     Problem: Gastrointestinal - Adult  Goal:  Maintains or returns to baseline bowel function  Outcome: Progressing  Goal: Maintains adequate nutritional intake  Outcome: Progressing

## 2025-05-28 NOTE — PROGRESS NOTES
[facility]  [facility]  Progress Note      Chief Complaint   Patient presents with    Back Pain     Pt L1 and L2 pain. Pt has lumbar cancer. Pt received treatment today . Pt unable to tolerate pain. Pt denies numbness to lower extremities denies loss of bowel or bladder.        SUBJECTIVE:  Patient is tolerating medications. No reported adverse drug reactions.  No nausea, vomiting, diarrhea.  Occasional  dry cough  Awake alert no complaints  Afebrile  Room air  Patient is on not planning on chemo or radiation and palliative/hospice may be consult  Review of systems:  As stated above in the chief complaint, otherwise negative.    Medications:  Scheduled Meds:   [Held by provider] clopidogrel  75 mg Oral Daily    amoxicillin-clavulanate  1,800 mg Oral 2 times per day    sodium chloride flush  5-40 mL IntraVENous 2 times per day    lidocaine   Topical Once    [Held by provider] aspirin  81 mg Oral Daily    senna  2 tablet Oral BID    polyethylene glycol  17 g Oral BID    fentaNYL  1 patch TransDERmal Q72H    gabapentin  100 mg Oral TID    [Held by provider] lisinopril  20 mg Oral BID    pantoprazole  40 mg Oral QAM AC    ranolazine  500 mg Oral BID    rosuvastatin  40 mg Oral Daily    [Held by provider] isosorbide mononitrate  30 mg Oral Daily     Continuous Infusions:   sodium chloride 10 mL/hr at 25 1038    norepinephrine       PRN Meds:meclizine, melatonin, oxyCODONE, sodium chloride flush, sodium chloride, prochlorperazine, nitroGLYCERIN, cyclobenzaprine, ondansetron **OR** ondansetron, acetaminophen **OR** acetaminophen    OBJECTIVE:  BP (!) 92/58   Pulse 59   Temp 97.5 °F (36.4 °C) (Temporal)   Resp 18   Ht 1.753 m (5' 9.02\")   Wt 94.2 kg (207 lb 9.6 oz)   SpO2 91%   BMI 30.64 kg/m²   Temp  Av.3 °F (36.3 °C)  Min: 96.6 °F (35.9 °C)  Max: 97.8 °F (36.6 °C)  Constitutional: The patient is awake, alert, and oriented.   Skin: Warm and dry. No rashes were noted.   HEENT:  .  Moist mucous membranes.  No ulcerations or thrush.  Neck: Supple to movements.   Chest: No use of accessory muscles to breathe. Symmetrical expansion.  + faint wheezing on the right side which improves with coughing.  No crackles or rhonchi.  Decreased breath sounds on the right  Cardiovascular: S1 and S2 are rhythmic and regular. No murmurs appreciated.   Abdomen: Positive bowel sounds to auscultation. Benign to palpation. No masses felt. No hepatosplenomegaly.  Genitourinary: Male  Extremities: No clubbing, no cyanosis, no edema.  Lines: peripheral    Laboratory and Tests Review:  Lab Results   Component Value Date    WBC 18.9 (H) 05/28/2025    WBC 14.8 (H) 05/27/2025    WBC 17.0 (H) 05/26/2025    HGB 12.6 05/28/2025    HCT 39.1 05/28/2025    MCV 90.7 05/28/2025    PLT 28 (L) 05/28/2025     Lab Results   Component Value Date    NEUTROABS 16.28 (H) 05/28/2025    NEUTROABS 10.70 (H) 05/27/2025    NEUTROABS 14.35 (H) 05/26/2025     No results found for: \"CRPHS\"  Lab Results   Component Value Date    ALT 7 05/21/2025    AST 14 05/21/2025    ALKPHOS 57 05/21/2025    BILITOT 0.6 05/21/2025     Lab Results   Component Value Date/Time     05/28/2025 04:38 AM    K 4.0 05/28/2025 04:38 AM    K 3.3 05/23/2023 07:42 AM    CL 98 05/28/2025 04:38 AM    CO2 24 05/28/2025 04:38 AM    BUN 25 05/28/2025 04:38 AM    CREATININE 1.4 05/28/2025 04:38 AM    CREATININE 1.3 05/27/2025 05:55 AM    CREATININE 1.3 05/26/2025 05:04 AM    GFRAA >60 04/06/2021 05:18 AM    LABGLOM 52 05/28/2025 04:38 AM    LABGLOM >60 05/23/2023 07:42 AM    GLUCOSE 151 05/28/2025 04:38 AM    GLUCOSE 101 02/20/2011 10:30 AM    CALCIUM 8.5 05/28/2025 04:38 AM    BILITOT 0.6 05/21/2025 01:11 PM    ALKPHOS 57 05/21/2025 01:11 PM    AST 14 05/21/2025 01:11 PM    ALT 7 05/21/2025 01:11 PM     Lab Results   Component Value Date    .0 (H) 05/21/2025     Lab Results   Component Value Date    SEDRATE 50 (H) 05/21/2025     Radiology:  Reviewed 5/26/2025

## 2025-05-28 NOTE — PROGRESS NOTES
[facility]  Department of Pulmonary, Critical Care and Sleep Medicine  [facility]  Department of Internal Medicine  Progress Note    SUBJECTIVE:    To home with hospice  Fluid: SUSPICIOUS FOR, BUT NOT DIAGNOSTIC OF MALIGNANCY     OBJECTIVE:  Vitals:    05/27/25 2309 05/28/25 0730 05/28/25 1431 05/28/25 1458   BP:  (!) 92/58     Pulse:  59     Resp: 16 18 16    Temp: 97.8 °F (36.6 °C) 97.5 °F (36.4 °C)     TempSrc: Temporal Temporal     SpO2: 94% 91%     Weight:       Height:    1.753 m (5' 9.02\")     Constitutional: Alert,     EENT: EOMI SAMPSON. MMM. No icterus. No thrush.     Neck: No thyromegaly. No elevated JVP. Trachea was midline.   Respiratory: Symmetrical.  Breath sounds were clear.    Cardiovascular: Regular, No murmur. No rubs.      Pulses:  Equal bilaterally.    Abdomen: Soft without organomegaly. No rebound, rigidity.  No guarding.  Lymphatic: No lymphadenopathy.  Musculoskeletal: Without weakness or gross deficits  Extremities:  + lower extremity edema. Reflexes appear adequate.   Skin:  Warm and dry.  No skin rashes.   Neurological/Psychiatric: No acute psychosis. Cranial nerves are intact.        DATA:  The data collected below information that was obtained, reviewed, analyzed and interpreted today. Imaging test are reviewed with the radiologist during weekly conference rounds. Comparison to previous images are always explored.     Monitor Strips: My interpretation and reviewed of the cardiac monitor and further discussion with technical team reveals no changes noted and the patient is in sinus rhythm     RADIOLOGY:  My interpretation and review of the current films reveals  lung mass        CBC:   Lab Results   Component Value Date/Time    WBC 18.9 05/28/2025 04:38 AM    RBC 4.31 05/28/2025 04:38 AM    HGB 12.6 05/28/2025 04:38 AM    HCT 39.1 05/28/2025 04:38 AM    MCV 90.7 05/28/2025 04:38 AM    MCH 29.2 05/28/2025 04:38 AM    MCHC 32.2

## 2025-05-28 NOTE — OR NURSING
Thoracentesis catheter placed to right side at this time. Belle, clear fluid draining at this time.

## 2025-05-28 NOTE — PROGRESS NOTES
Internal Medicine Progress Note    Patient's name: Corky Lawler  : 1945  Chief complaints (on day of admission): Back Pain (Pt L1 and L2 pain. Pt has lumbar cancer. Pt received treatment today . Pt unable to tolerate pain. Pt denies numbness to lower extremities denies loss of bowel or bladder. )  Admission date: 2025  Date of service: 2025   Room: [address] IMCU/NEURO  Primary care physician: Aurelio Hamilton MD  Reason for visit: Follow-up for Back pain    Subjective  Corky was seen and examined at bedside       Multiple family members at bedside  All questions addressed  No new concerns today  Upon speaking with wife, she mentioned that she was considering home for discharge as she feels that PT/rehab is pointless as his cancer is the root cause of all of this. She states she'd rather him be home and more comfortable than to go through all of that. I discussed with her that it sounds like she may be considering hospice care. She states she will call palliative care and get more information on Hospice      Multiple family members at bedside  All questions addressed  Anticipate DC to [facility] when able  Plan for thoracentesis tomorrow      Wife at bedside  States he feels a bit better  States he feels weak still overall, PT/OT to see  Discussed with patient and wife on possible needs of placement, they are understanding      Feels a bit off  No family at bedside   Did not sleep well   No chest pain or shortness of breath   Room air       Doing well   On room air   NAD  Looks great   Feels good   Wants to go home   Arrangements need to be made and clearance to be had       Doing much improved today which is great to see  Wife Mirlande at bedside   On room air   Breathing NAD  Transferring out to floor   BP a bit low and he is having what I presume to be recrudescence of his stroke symptoms I.e. dizziness   He has declined MRI again today for further evaluation        Doing  much much better thankfully   More comfortable   NAD   On NC O2 again   Family at bedside   Answered all of their questions   Goal is to get him home at some point per family   Feels better after thoracentesis     5/21  Abdominal pain this morning   Went down for bronch abdominal pain worsened and was having intractable vomiting   Sent for stat CT abdomen pelvis which is still not formally read ? Patient on NRB and NC when I saw him after RRT transferred to MICU. Spoke with family. Spoke with ICU and palliative care. Palliative will ultimately confirm the patients wishes at this time, very difficult situation for family they are very emotional. Spent a great deal of time with them today.     5/20  Still dizzy particularly with head movement to the R   Refusing MRI brain states he just does not want to go back in the machine explained we will be unable to evaluate for new stroke he voices understanding and commits to his refusal   Otherwise will re check ortho vitals today   Nursing at bedside   Treat constipation   CT head stable   Bronch tomorrow     5/19  Denies numbness tingling weakness to me   Denies fevers chills sweats   Nursing at bedside   Having dizziness today again   BP running on the low side   Recrudescence of prior stroke vs new acute stroke   No way to tell unfortunately I think he needs another MRI, last time he was in the hospital he had dizziness after anti platelet drugs were held for a few days and he ended up having an acute stroke   Spoke with Dr Swain     [facility] Medications  Current Facility-Administered Medications   Medication Dose Route Frequency Provider Last Rate Last Admin    meclizine (ANTIVERT) tablet 12.5 mg  12.5 mg Oral TID PRN Vinayak Smith MD   12.5 mg at 05/26/25 1214    melatonin tablet 3 mg  3 mg Oral Nightly PRN Jesse Mcbride MD   3 mg at 05/27/25 2302    [Held by provider] clopidogrel (PLAVIX) tablet 75 mg  75 mg Oral Daily Ernesto Guan MD   75 mg at 05/27/25 0914     oxyCODONE HCl (OXY-IR) immediate release tablet 10 mg  10 mg Oral Q6H PRN Ramya Chris PA-C        amoxicillin-clavulanate (AUGMENTIN-ES) 600-42.9 MG/5ML suspension 1,800 mg  1,800 mg Oral 2 times per day Sav Up MD   1,800 mg at 05/28/25 0931    sodium chloride flush 0.9 % injection 5-40 mL  5-40 mL IntraVENous 2 times per day Herber Platt MD   10 mL at 05/28/25 0933    sodium chloride flush 0.9 % injection 5-40 mL  5-40 mL IntraVENous PRN Herber Platt MD        0.9 % sodium chloride infusion   IntraVENous PRN Herber Platt MD 10 mL/hr at 05/23/25 1038 Restarted at 05/23/25 1038    prochlorperazine (COMPAZINE) injection 10 mg  10 mg IntraVENous Q6H PRN Jesse Mcbride MD   10 mg at 05/21/25 0911    lidocaine (XYLOCAINE) 2 % uro-jet   Topical Once Kailee Acosta MD        norepinephrine (LEVOPHED) 16 mg in sodium chloride 0.9 % 250 mL infusion (premix)  1-100 mcg/min IntraVENous Continuous Elroy Vazquez DO        [Held by provider] aspirin chewable tablet 81 mg  81 mg Oral Daily Jesse Mcbride MD   81 mg at 05/27/25 0914    senna (SENOKOT) tablet 17.2 mg  2 tablet Oral BID Vinayak Smith MD   17.2 mg at 05/27/25 0914    polyethylene glycol (GLYCOLAX) packet 17 g  17 g Oral BID Vinayak Smith MD   17 g at 05/25/25 1951    fentaNYL (DURAGESIC) 50 MCG/HR 1 patch  1 patch TransDERmal Q72H Ramya Chris PA-C   1 patch at 05/25/25 1459    nitroGLYCERIN (NITROSTAT) SL tablet 0.4 mg  0.4 mg SubLINGual Q5 Min PRN Ernesto Barba DO   0.4 mg at 05/21/25 0115    cyclobenzaprine (FLEXERIL) tablet 10 mg  10 mg Oral TID PRN Vinayak Smith MD   10 mg at 05/21/25 2047    ondansetron (ZOFRAN-ODT) disintegrating tablet 4 mg  4 mg Oral Q8H PRN Vinayak Smith MD        Or    ondansetron (ZOFRAN) injection 4 mg  4 mg IntraVENous Q6H PRN Vinayak Smith MD   4 mg at 05/21/25 0801    acetaminophen (TYLENOL) tablet 650 mg  650 mg Oral Q6H PRN Vinayak Smith MD   650 mg at 05/26/25 1213    Or     markers urine ags viral pcr   ID consultation    Dizziness   Possibly recrudescence of recent stroke vs area of new stroke   Neurology consultation appreciated   Refusing brain MRI after multiple discussion   CT head repeat stable     Orthostatic hypotension   Hold imdur   Encourage PO intake   Fall precautions     Right Lung mass worsening with effusion  Pulmonary on board now   Plan for Thoracentesis on 5/28/25    History of CAD s/p CABG    Recent Small acute to sub acute stroke Left Occipital periventricular white matter 4/22/25  Past R occipital stroke noted   On ASA and Plavix already and statin for cardiac reasons per patient - these were held for kypho   ASA and Plavix was held for 2 days for EBUS prior to this last admission   CTA head and neck no significant stenosis at that time last admission   ECHO w bubble no shunting last admission     HTN   BP running on the low side   Holding anti htn agents     GERD  Protonix 40 mg daily    Prior tobacco abuse     PT/OT 16/24   Consults Neurosurgery, Heme/Onc, Palliative, neurology   DVT prophylaxis Protonix  Code Status Full, prognostically at this point I fear he is not going to do well moving forward, palliative care consult is appreciated   Discharge plan: PT/OT eval, possible need for placement    Electronically signed by Vinayak Smith MD on 5/28/2025 at 2:23 PM    I can be reached through [employer].

## 2025-05-28 NOTE — PLAN OF CARE
Problem: Safety - Adult  Goal: Free from fall injury  5/28/2025 1038 by Lexi Barbosa RN  Outcome: Progressing  5/28/2025 0307 by Arleen Mcmanus RN  Outcome: Progressing     Problem: Chronic Conditions and Co-morbidities  Goal: Patient's chronic conditions and co-morbidity symptoms are monitored and maintained or improved  5/28/2025 1038 by Lexi Barbosa RN  Outcome: Progressing  5/28/2025 0307 by Arleen Mcmanus RN  Outcome: Progressing     Problem: Discharge Planning  Goal: Discharge to home or other facility with appropriate resources  5/28/2025 1038 by Lexi Barbosa RN  Outcome: Progressing  5/28/2025 0307 by Arleen Mcmanus RN  Outcome: Progressing     Problem: Pain  Goal: Verbalizes/displays adequate comfort level or baseline comfort level  5/28/2025 1038 by Lexi Barbosa RN  Outcome: Progressing  5/28/2025 0307 by Arleen Mcmanus RN  Outcome: Progressing     Problem: Skin/Tissue Integrity  Goal: Skin integrity remains intact  Description: 1.  Monitor for areas of redness and/or skin breakdown2.  Assess vascular access sites hourly3.  Every 4-6 hours minimum:  Change oxygen saturation probe site4.  Every 4-6 hours:  If on nasal continuous positive airway pressure, respiratory therapy assess nares and determine need for appliance change or resting period  1.  Monitor for areas of redness and/or skin breakdown2.  Assess vascular access sites hourly3.  Every 4-6 hours minimum:  Change oxygen saturation probe site4.  Every 4-6 hours:  If on nasal continuous positive airway pressure, respiratory therapy assess nares and determine need for appliance change or resting period  5/28/2025 1038 by Lexi Barbosa RN  Outcome: Progressing  5/28/2025 0307 by Arleen Mcmanus RN  Outcome: Progressing     Problem: Neurosensory - Adult  Goal: Achieves stable or improved neurological status  5/28/2025 1038 by Lexi Barbosa RN  Outcome: Progressing  5/28/2025 0307 by Arleen Mcmanus

## 2025-05-28 NOTE — PROCEDURES
0805 Patient arrived from floor for thoracentesis.  Chart/vs/medications reviewed.  PIV flushed. VSS    0810 Jonathan URBINA at bedside to review procedure and obtain consent.

## 2025-05-28 NOTE — PROGRESS NOTES
[facility]  Hematology/Oncology  Consult      Patient Name: Corky Lawler  YOB: 1945  PCP: Aurelio Hamilton MD   Referring Provider:      Reason for Consultation:   Chief Complaint   Patient presents with    Back Pain     Pt L1 and L2 pain. Pt has lumbar cancer. Pt received treatment today . Pt unable to tolerate pain. Pt denies numbness to lower extremities denies loss of bowel or bladder.         History of Present Illness: This is a 80-year-old male patient who was seen by our service when he was recently hospitalized and found to have bulky right upper lobe mass measuring 6.9 x 5.9 cm with associated hilar and mediastinal lymphadenopathy and an L1, L2 lytic lesion.  He thought to have a lung neoplasm clinically stage IV, [id number].  NM bone scan showed increased radiotracer at L2.  Patient's hospital stay was complicated by acute stroke.  MRI brain without contrast showed small acute to subacute infarct in the left occipital periventricular white matter.  Patient refused to have MRI brain with contrast.  He was started on ASA, Plavix, high intensity statin.  His EBUS has been delayed and is scheduled for 5/21/2025.  Following with radiation oncology for palliative radiation therapy and patient is status post 2 out of 10 fractions receiving 600 cGy with plans for 3000 cGy radiotherapy to L2/L3.  Patient was to follow-up with Dr. Nieves on 5/29/2025 however presented to the ED for evaluation of intractable back pain. CT of spine showing enlarging lytic lesion in the left lateral L2 vertebral body with associated pathological fracture, new subtle lytic lesion of the left lateral aspect of L3 vertebral body. Patient admitted for further workup and pain control.  Palliative has been consulted for pain control. Neurosurgery consulted.  Conservative measures for pain control to be attempted however if not achieved plan for L1 and L2 kyphoplasty.  Blood work is otherwise unremarkable.   AST 14 05/21/2025    ALT 7 05/21/2025    LABGLOM 52 (L) 05/28/2025    GFRAA >60 04/06/2021       No results found for: \"IRON\", \"TIBC\", \"FERRITIN\"        Radiology-    XR CHEST PORTABLE  Result Date: 5/27/2025  EXAMINATION: ONE XRAY VIEW OF THE CHEST 5/27/2025 11:51 am COMPARISON: CTA chest, April 17, 2025. chest radiograph, May 26, 2025 HISTORY: ORDERING SYSTEM PROVIDED HISTORY: sob TECHNOLOGIST PROVIDED HISTORY: Reason for exam:->sob FINDINGS: Median sternotomy.  Cardiopericardial silhouette enlarged and unchanged. Aorta tortuous.  Right parahilar paratracheal mass measuring approximately 10.8 x 5.8 cm, unchanged.  Blunting right costophrenic angle in ill-defined areas of increased opacity again identified right lung.  Left lung clear.     No change, right hilar/right paratracheal mass, right pleural effusion, and right postobstructive pneumonitis versus atelectasis/pneumonia.     XR CHEST PORTABLE  Result Date: 5/26/2025  EXAMINATION: ONE XRAY VIEW OF THE CHEST 5/26/2025 2:20 pm COMPARISON: 05/21/2025 and 04/17/2025 HISTORY: ORDERING SYSTEM PROVIDED HISTORY: [employer] TECHNOLOGIST PROVIDED HISTORY: Reason for exam:->Atelectasis FINDINGS: Interval increase in size of a large right hilar and suprahilar mass measuring approximately 11.8 x 6.7 cm, increased from prior exam of 8.9 x 4.8 cm measured in a similar manner fashion, probably a primary lung malignancy, such as small cell lung cancer, as seen on prior CT chest on 04/17/2025. Patchy airspace opacity at the periphery of the right upper lobe, may represent postobstructive pneumonia versus lymphangitic carcinomatosis. Interval development of small to moderate layering right pleural effusion, most likely malignant. Mild cardiomegaly.  Postsurgical change of coronary bypass. Left lung is clear.     1. Interval increase in size of a large right hilar and suprahilar mass measuring approximately 11.8 x 6.7 cm, increased from prior exam of 8.9 x 4.8 cm measured in a

## 2025-05-28 NOTE — PROGRESS NOTES
Physical Therapy    Medical chart reviewed for PT treatment on 5/28/25. PT Tx was attempted this AM, but pt was off unit at time of attempt. Will re-attempt as able. Thank you.    Monica Sommers, PT, DPT  [id number]

## 2025-05-28 NOTE — OR NURSING
Thoracentesis catheter removed from right side at this time. 1750ml of cortez fluid removed. Vaseline gauze, 4x4 and transparent dressing applied to clean dry site.

## 2025-05-29 ENCOUNTER — TELEPHONE (OUTPATIENT)
Age: 80
End: 2025-05-29

## 2025-05-29 VITALS
TEMPERATURE: 97.7 F | DIASTOLIC BLOOD PRESSURE: 58 MMHG | HEART RATE: 90 BPM | BODY MASS INDEX: 30.75 KG/M2 | WEIGHT: 207.6 LBS | OXYGEN SATURATION: 91 % | SYSTOLIC BLOOD PRESSURE: 111 MMHG | RESPIRATION RATE: 18 BRPM | HEIGHT: 69 IN

## 2025-05-29 LAB
ANION GAP SERPL CALCULATED.3IONS-SCNC: 12 MMOL/L (ref 7–16)
BASOPHILS # BLD: 0.07 K/UL (ref 0–0.2)
BASOPHILS NFR BLD: 0 % (ref 0–2)
BUN SERPL-MCNC: 25 MG/DL (ref 8–23)
CALCIUM SERPL-MCNC: 8.1 MG/DL (ref 8.8–10.2)
CHLORIDE SERPL-SCNC: 98 MMOL/L (ref 98–107)
CO2 SERPL-SCNC: 23 MMOL/L (ref 22–29)
CREAT SERPL-MCNC: 1.4 MG/DL (ref 0.7–1.2)
EOSINOPHIL # BLD: 0.09 K/UL (ref 0.05–0.5)
EOSINOPHILS RELATIVE PERCENT: 1 % (ref 0–6)
ERYTHROCYTE [DISTWIDTH] IN BLOOD BY AUTOMATED COUNT: 15.4 % (ref 11.5–15)
GFR, ESTIMATED: 52 ML/MIN/1.73M2
GLUCOSE SERPL-MCNC: 157 MG/DL (ref 74–99)
HCT VFR BLD AUTO: 38.8 % (ref 37–54)
HGB BLD-MCNC: 12.8 G/DL (ref 12.5–16.5)
IMM GRANULOCYTES # BLD AUTO: 0.21 K/UL (ref 0–0.58)
IMM GRANULOCYTES NFR BLD: 1 % (ref 0–5)
LYMPHOCYTES NFR BLD: 0.87 K/UL (ref 1.5–4)
LYMPHOCYTES RELATIVE PERCENT: 4 % (ref 20–42)
MCH RBC QN AUTO: 29.6 PG (ref 26–35)
MCHC RBC AUTO-ENTMCNC: 33 G/DL (ref 32–34.5)
MCV RBC AUTO: 89.8 FL (ref 80–99.9)
MONOCYTES NFR BLD: 1.23 K/UL (ref 0.1–0.95)
MONOCYTES NFR BLD: 6 % (ref 2–12)
NEUTROPHILS NFR BLD: 87 % (ref 43–80)
NEUTS SEG NFR BLD: 17.48 K/UL (ref 1.8–7.3)
PATH REV BLD -IMP: NORMAL
PLATELET # BLD AUTO: 30 K/UL (ref 130–450)
PLATELET CONFIRMATION: NORMAL
PMV BLD AUTO: 11.9 FL (ref 7–12)
POTASSIUM SERPL-SCNC: 4 MMOL/L (ref 3.5–5.1)
RBC # BLD AUTO: 4.32 M/UL (ref 3.8–5.8)
SODIUM SERPL-SCNC: 133 MMOL/L (ref 136–145)
WBC OTHER # BLD: 20 K/UL (ref 4.5–11.5)

## 2025-05-29 PROCEDURE — 6370000000 HC RX 637 (ALT 250 FOR IP): Performed by: SPECIALIST

## 2025-05-29 PROCEDURE — 99232 SBSQ HOSP IP/OBS MODERATE 35: CPT | Performed by: INTERNAL MEDICINE

## 2025-05-29 PROCEDURE — 2500000003 HC RX 250 WO HCPCS: Performed by: ANESTHESIOLOGY

## 2025-05-29 PROCEDURE — 36415 COLL VENOUS BLD VENIPUNCTURE: CPT

## 2025-05-29 PROCEDURE — 80048 BASIC METABOLIC PNL TOTAL CA: CPT

## 2025-05-29 PROCEDURE — 6370000000 HC RX 637 (ALT 250 FOR IP): Performed by: STUDENT IN AN ORGANIZED HEALTH CARE EDUCATION/TRAINING PROGRAM

## 2025-05-29 PROCEDURE — 85025 COMPLETE CBC W/AUTO DIFF WBC: CPT

## 2025-05-29 RX ORDER — FENTANYL 50 UG/1
1 PATCH TRANSDERMAL
Qty: 2 PATCH | Refills: 0 | Status: SHIPPED | OUTPATIENT
Start: 2025-05-31 | End: 2025-06-06

## 2025-05-29 RX ORDER — OXYCODONE HYDROCHLORIDE 10 MG/1
10 TABLET ORAL EVERY 6 HOURS PRN
Qty: 12 TABLET | Refills: 0 | Status: SHIPPED | OUTPATIENT
Start: 2025-05-29 | End: 2025-06-01

## 2025-05-29 RX ADMIN — GABAPENTIN 100 MG: 100 CAPSULE ORAL at 13:26

## 2025-05-29 RX ADMIN — AMOXICILLIN AND CLAVULANATE POTASSIUM 1800 MG: 600; 42.9 POWDER, FOR SUSPENSION ORAL at 09:12

## 2025-05-29 RX ADMIN — PANTOPRAZOLE SODIUM 40 MG: 40 TABLET, DELAYED RELEASE ORAL at 05:39

## 2025-05-29 RX ADMIN — GABAPENTIN 100 MG: 100 CAPSULE ORAL at 09:12

## 2025-05-29 RX ADMIN — SODIUM CHLORIDE, PRESERVATIVE FREE 10 ML: 5 INJECTION INTRAVENOUS at 09:14

## 2025-05-29 RX ADMIN — ROSUVASTATIN 40 MG: 20 TABLET, FILM COATED ORAL at 09:12

## 2025-05-29 RX ADMIN — RANOLAZINE 500 MG: 500 TABLET, FILM COATED, EXTENDED RELEASE ORAL at 09:11

## 2025-05-29 NOTE — PROGRESS NOTES
DC instructions and meds given to pt @ bedside. Pt verbalized understanding of all instructions given and had no further needs from this nurse. Pt transportation on unit to take pt home with hospice. IV removed without complication.

## 2025-05-29 NOTE — TELEPHONE ENCOUNTER
Patient does not have any restrictions at this point. He may return to normal age related activities

## 2025-05-29 NOTE — PLAN OF CARE
Problem: Safety - Adult  Goal: Free from fall injury  5/29/2025 1156 by Apple Seth RN  Outcome: Progressing  5/29/2025 0139 by Arleen Mcmanus RN  Outcome: Progressing     Problem: Chronic Conditions and Co-morbidities  Goal: Patient's chronic conditions and co-morbidity symptoms are monitored and maintained or improved  5/29/2025 1156 by Apple Seth RN  Outcome: Progressing  5/29/2025 0139 by Arleen Mcmanus RN  Outcome: Progressing     Problem: Discharge Planning  Goal: Discharge to home or other facility with appropriate resources  5/29/2025 1156 by Apple Seth RN  Outcome: Progressing  5/29/2025 0139 by Arleen Mcmanus RN  Outcome: Progressing     Problem: Pain  Goal: Verbalizes/displays adequate comfort level or baseline comfort level  5/29/2025 1156 by Apple Seth RN  Outcome: Progressing  5/29/2025 0139 by Arleen Mcmanus RN  Outcome: Progressing     Problem: Skin/Tissue Integrity  Goal: Skin integrity remains intact  Description: 1.  Monitor for areas of redness and/or skin breakdown2.  Assess vascular access sites hourly3.  Every 4-6 hours minimum:  Change oxygen saturation probe site4.  Every 4-6 hours:  If on nasal continuous positive airway pressure, respiratory therapy assess nares and determine need for appliance change or resting period  1.  Monitor for areas of redness and/or skin breakdown2.  Assess vascular access sites hourly3.  Every 4-6 hours minimum:  Change oxygen saturation probe site4.  Every 4-6 hours:  If on nasal continuous positive airway pressure, respiratory therapy assess nares and determine need for appliance change or resting period  5/29/2025 1156 by Apple Seth RN  Outcome: Progressing  5/29/2025 0139 by Arleen Mcmanus RN  Outcome: Progressing     Problem: Neurosensory - Adult  Goal: Achieves stable or improved neurological status  5/29/2025 1156 by Apple Seth RN  Outcome: Progressing  5/29/2025 0139 by Arleen Mcmanus RN  Outcome:

## 2025-05-29 NOTE — CARE COORDINATION
5/29. Pt and family met with hospice. Equipment to be delivered today. Pt will return home. Set up with [facility] ambulance as will-call. Maite Pearce RN  1300- transportation arranged with [facility] for 1:30. Maite Pearce RN

## 2025-05-29 NOTE — PROGRESS NOTES
[facility]  Hematology/Oncology  Consult      Patient Name: Corky Lawler  YOB: 1945  PCP: Aurelio Hamilton MD   Referring Provider:      Reason for Consultation:   Chief Complaint   Patient presents with    Back Pain     Pt L1 and L2 pain. Pt has lumbar cancer. Pt received treatment today . Pt unable to tolerate pain. Pt denies numbness to lower extremities denies loss of bowel or bladder.         History of Present Illness: This is a 80-year-old male patient who was seen by our service when he was recently hospitalized and found to have bulky right upper lobe mass measuring 6.9 x 5.9 cm with associated hilar and mediastinal lymphadenopathy and an L1, L2 lytic lesion.  He thought to have a lung neoplasm clinically stage IV, [id number].  NM bone scan showed increased radiotracer at L2.  Patient's hospital stay was complicated by acute stroke.  MRI brain without contrast showed small acute to subacute infarct in the left occipital periventricular white matter.  Patient refused to have MRI brain with contrast.  He was started on ASA, Plavix, high intensity statin.  His EBUS has been delayed and is scheduled for 5/21/2025.  Following with radiation oncology for palliative radiation therapy and patient is status post 2 out of 10 fractions receiving 600 cGy with plans for 3000 cGy radiotherapy to L2/L3.  Patient was to follow-up with Dr. Nieves on 5/29/2025 however presented to the ED for evaluation of intractable back pain. CT of spine showing enlarging lytic lesion in the left lateral L2 vertebral body with associated pathological fracture, new subtle lytic lesion of the left lateral aspect of L3 vertebral body. Patient admitted for further workup and pain control.  Palliative has been consulted for pain control. Neurosurgery consulted.  Conservative measures for pain control to be attempted however if not achieved plan for L1 and L2 kyphoplasty.  Blood work is otherwise unremarkable.   is otherwise maintained without evidence of an acute infarct.  There is no evidence of hydrocephalus. Atrophy and periventricular white matter degenerative change. ORBITS: The visualized portion of the orbits demonstrate no acute abnormality. SINUSES: The visualized paranasal sinuses and mastoid air cells demonstrate no acute abnormality. SOFT TISSUES/SKULL:  No acute abnormality of the visualized skull or soft tissues.     No acute intracranial abnormality.     XR CHEST PORTABLE  Result Date: 5/19/2025  EXAMINATION: ONE XRAY VIEW OF THE CHEST 5/19/2025 12:54 pm COMPARISON: 05/15/2025 HISTORY: ORDERING SYSTEM PROVIDED HISTORY: interval CHF TECHNOLOGIST PROVIDED HISTORY: Reason for exam:->interval CHF FINDINGS: Portable chest reveals heart to be enlarged.  There is increased interstitial markings identified throughout the right perihilar region.  There is a moderate size right pleural effusion.  Chronic changes seen on the left. Degenerative changes seen within the spine.     Some worsening identified of the markings throughout the right perihilar region with increase seen in size of the right pleural effusion to suggest possible asymmetric edema versus pneumonia     FLUORO FOR SURGICAL PROCEDURES  Result Date: 5/15/2025  EXAMINATION: SPOT FLUOROSCOPIC IMAGES 5/15/2025 1:34 pm TECHNIQUE: Fluoroscopy was provided by the radiology department for procedure. Radiologist was not present during examination. FLUOROSCOPY DOSE AND TYPE: Radiation Exposure Index: Kerma mGy, 67.84 4 FLUOROSCOPY TIME: 85.8 seconds COMPARISON: None HISTORY: ORDERING SYSTEM PROVIDED HISTORY: L1-L2 kyphoplasty TECHNOLOGIST PROVIDED HISTORY: Reason for exam:->L1-L2 kyphoplasty Intraprocedural imaging. FINDINGS: Fluoroscopic support provided to subspecialty service for assistance with kyphoplasty.  No obvious complication on the images provided. Please see subspecialty report for full details and interpretation of real time imaging.  lateral L2 vertebral body with associated pathological fracture, new subtle lytic lesion of the left lateral aspect of L3 vertebral body.  - Admitted for further workup and pain control.   - Palliative has been consulted for pain control.  - Neurosurgery consulted.  Conservative measures for pain control to be attempted however if not achieved plan for L1 and L2 kyphoplasty.    - Blood work is otherwise unremarkable.      Consultation for known lung cancer with mets to the lumbar spine.    Attending addendum :  Intractable low back pain with an enlarging lytic lesion at L2 with a subtle lytic lesion at L3.  Started already palliative radiation therapy and would recommend ongoing palliative radiation while in house.  Neurosurgery consulted with conservative measures planned and if pain not well-controlled consideration for L1-L2 kyphoplasty will be addressed  Awaiting EBUS for tissue diagnosis then once tissue diagnosis established systemic therapy plan will be addressed.  Consult palliative care for pain management.    5/16/25  - Lung neoplasm clinically stage IV, [id number]. Still awaiting EBUS, scheduled 5/21/25.   - Intractable low back pain with an enlarging lytic lesion at L2 with a subtle lytic lesion at L3.  On palliative radiation therapy and would recommend ongoing palliative radiation while in house.  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   - Awaiting EBUS for tissue diagnosis then once tissue diagnosis established systemic therapy plan will be addressed.  - We will follow.     5/21/25  - Lung neoplasm clinically stage IV, [id number]. EBUS today canccelled because of rrt for hypoxic respiratory failure.  New right pleural effusion status post thoracentesis. Patient DNRCCA.   - Intractable low back pain with an enlarging lytic lesion at L2 with a subtle lytic lesion at L3.  On palliative radiation therapy and would

## 2025-05-29 NOTE — DISCHARGE INSTR - COC
Continuity of Care Form    Patient Name: Corky Lawler   :  1945  MRN:  [id number]    Admit date:  2025  Discharge date:  2025    Code Status Order: DNR-CC   Advance Directives:     Admitting Physician:  Jesse Mcbride MD  PCP: Aurelio Hamilton MD    Discharging Nurse: RACIEL  Discharging Hospital Unit/Room#: 5410/5410-A  Discharging Unit Phone Number: [phone]    Emergency Contact:   Extended Emergency Contact Information  Primary Emergency Contact: Mirlande Lawler  Address: [address] [place] [address]  Home Phone: [phone]  Mobile Phone: [phone]  Relation: Spouse  Preferred language: English   needed? No  Secondary Emergency Contact: Guillermo Lawler  Address: [address] [place] [address]  Home Phone: [phone]  Relation: Child    Past Surgical History:  Past Surgical History:   Procedure Laterality Date    AORTA SURGERY      Aortoiliac stent at [facility] 2016    APPENDECTOMY      BACK SURGERY      BLADDER SURGERY Left 2023    CYSTOSCOPY RETROGRADE PYELOGRAM, LASER LITHOTRIPSY WITH LEFT STENT INSERTION AND GUADARRAMA PLACEMENT performed by Corky Giang MD at [facility] OR    BRONCHOSCOPY N/A 2025    BRONCHOSCOPY ENDOBRONCHIAL ULTRASOUND FINE NEEDLE ASPIRATION performed by Joyce Rae DO at [facility] ENDOSCOPY    BRONCHOSCOPY  2025    BRONCHOSCOPY performed by Joyce Rae DO at [facility] ENDOSCOPY    BRONCHOSCOPY  2025    BRONCHOSCOPY ALVEOLAR LAVAGE performed by Joyce Rae DO at [facility] ENDOSCOPY    CARDIAC CATHETERIZATION  2013    DR Hernandez    CHOLECYSTECTOMY      CORONARY ANGIOPLASTY WITH STENT PLACEMENT  2021    Dr. Del Rio (3) stents...[facility] [place]-- SVG to PDA, SVG to prox.OM, SVG to mid SVG     CORONARY ARTERY BYPASS GRAFT      DIAGNOSTIC CARDIAC CATH LAB PROCEDURE      EYE SURGERY  2017    HERNIA REPAIR      x4    SHOULDER ARTHROSCOPY      SPINE  SURGERY N/A 05/15/2025    L1 & L2 KYPHOPLASTY performed by Valeria Cottrell MD at [facility] OR    TRANSESOPHAGEAL ECHOCARDIOGRAM  03/05/2018    MADHURI with Dr. Hernandez       Immunization History:     There is no immunization history on file for this patient.    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    Trace mitral regurgitation I34.0    Aortic regurgitation I35.1    HTN (hypertension) I10    Acute chest pain R07.9    Chest pain R07.9    Pathologic lumbar vertebral fracture M84.48XA    Lytic lesion of bone on x-ray M89.8X9    Lung mass R91.8    Acute stroke due to ischemia ([facility]) I63.9    Pathological fracture of lumbosacral spine M84.48XA    Closed fracture of second lumbar vertebra ([facility]) S32.029A    Goals of care, counseling/discussion Z71.89    Palliative care encounter Z51.5       Isolation/Infection:   Isolation            No Isolation          Patient Infection Status    None to display              Nurse Assessment:  Last Vital Signs: BP (!) 111/58   Pulse 90   Temp 97.7 °F (36.5 °C) (Temporal)   Resp 18   Ht 1.753 m (5' 9.02\")   Wt 94.2 kg (207 lb 9.6 oz)   SpO2 91%   BMI 30.64 kg/m²     Last documented pain score (0-10 scale): Pain Level: 0  Last Weight:   Wt Readings from Last 1 Encounters:   05/23/25 94.2 kg (207 lb 9.6 oz)     Mental Status:  oriented, alert, coherent, logical, thought processes intact, and able to concentrate and follow conversation    IV Access:  - None    Nursing Mobility/ADLs:  Walking   Assisted  Transfer  Dependent  Bathing  Dependent  Dressing  Dependent  Toileting  Dependent  Feeding  Independent  Med Admin  Assisted  Med Delivery   whole    Wound Care Documentation and Therapy:  Incision 05/15/25 Back Lower;Medial (Active)   Dressing Status Clean;Dry;Intact 05/28/25 2000   Dressing Change Due 05/25/25 05/24/25 1030

## 2025-05-29 NOTE — PLAN OF CARE
Problem: Safety - Adult  Goal: Free from fall injury  Outcome: Progressing     Problem: Chronic Conditions and Co-morbidities  Goal: Patient's chronic conditions and co-morbidity symptoms are monitored and maintained or improved  Outcome: Progressing     Problem: Discharge Planning  Goal: Discharge to home or other facility with appropriate resources  Outcome: Progressing     Problem: Pain  Goal: Verbalizes/displays adequate comfort level or baseline comfort level  Outcome: Progressing     Problem: Skin/Tissue Integrity  Goal: Skin integrity remains intact  Description: 1.  Monitor for areas of redness and/or skin breakdown2.  Assess vascular access sites hourly3.  Every 4-6 hours minimum:  Change oxygen saturation probe site4.  Every 4-6 hours:  If on nasal continuous positive airway pressure, respiratory therapy assess nares and determine need for appliance change or resting period  1.  Monitor for areas of redness and/or skin breakdown2.  Assess vascular access sites hourly3.  Every 4-6 hours minimum:  Change oxygen saturation probe site4.  Every 4-6 hours:  If on nasal continuous positive airway pressure, respiratory therapy assess nares and determine need for appliance change or resting period  Outcome: Progressing     Problem: Neurosensory - Adult  Goal: Achieves stable or improved neurological status  Outcome: Progressing  Goal: Achieves maximal functionality and self care  Outcome: Progressing     Problem: Musculoskeletal - Adult  Goal: Return mobility to safest level of function  Outcome: Progressing  Goal: Return ADL status to a safe level of function  Outcome: Progressing     Problem: ABCDS Injury Assessment  Goal: Absence of physical injury  Outcome: Progressing     Problem: Nutrition Deficit:  Goal: Optimize nutritional status  Outcome: Progressing  Flowsheets (Taken 5/28/2025 3757 by Prema Kearney, RD, LD)  Nutrient intake appropriate for improving, restoring, or maintaining nutritional needs: Assess  nutritional status and recommend course of action     Problem: Respiratory - Adult  Goal: Achieves optimal ventilation and oxygenation  Outcome: Progressing     Problem: Gastrointestinal - Adult  Goal: Maintains or returns to baseline bowel function  Outcome: Progressing  Goal: Maintains adequate nutritional intake  Outcome: Progressing

## 2025-05-29 NOTE — DISCHARGE SUMMARY
Internal Medicine Discharge Summary    NAME: Corky Lawler :  1945  MRN:  [id number] PCP:Aurelio Hamilton MD    ADMITTED: 2025   DISCHARGED: 2025    ADMITTING PHYSICIAN: Vinayak Smith MD    PCP: Aurelio Hamilton MD    CONSULTANT(S):   IP CONSULT TO NEUROSURGERY  IP CONSULT TO INTERNAL MEDICINE  IP CONSULT TO ONCOLOGY  IP CONSULT TO NEUROSURGERY  IP CONSULT TO PALLIATIVE CARE  IP CONSULT TO HOME CARE NEEDS  IP CONSULT TO PULMONOLOGY  IP CONSULT TO INFECTIOUS DISEASES  IP CONSULT TO PHARMACY  IP CONSULT TO GENERAL SURGERY  IP CONSULT TO HOSPICE     ADMITTING DIAGNOSIS:   Intractable back pain [M54.9]  Pathologic lumbar vertebral fracture, initial encounter [M84.48XA]  Pathological fracture of lumbosacral spine [M84.48XA]     Please see H&P for further details    DISCHARGE DIAGNOSES:   Active Hospital Problems    Diagnosis     Goals of care, counseling/discussion [Z71.89]     Palliative care encounter [Z51.5]     Closed fracture of second lumbar vertebra (HCC) [S32.029A]     Pathological fracture of lumbosacral spine [M84.48XA]        BRIEF HISTORY OF PRESENT ILLNESS: Corky Lawler is a 80 y.o. male patient of Aurelio Hamilton MD who  has a past medical history of AAA (abdominal aortic aneurysm), Aortic regurgitation, COPD (chronic obstructive pulmonary disease) (HCC), Coronary atherosclerosis of native coronary artery, DJD (degenerative joint disease), HTN (hypertension), Hyperlipidemia, Mitral regurgitation, Occipital stroke (HCC), and TIA (transient ischemic attack). who originally had concerns including Back Pain (Pt L1 and L2 pain. Pt has lumbar cancer. Pt received treatment today . Pt unable to tolerate pain. Pt denies numbness to lower extremities denies loss of bowel or bladder. ). at presentation on 2025, and was found to have Intractable back pain [M54.9]  Pathologic lumbar vertebral fracture, initial encounter [M84.48XA]  Pathological fracture of lumbosacral spine [M84.48XA] after    5/27  Multiple family members at bedside  All questions addressed  Anticipate DC to [facility] when able  Plan for thoracentesis tomorrow     5/26  Wife at bedside  States he feels a bit better  States he feels weak still overall, PT/OT to see  Discussed with patient and wife on possible needs of placement, they are understanding     5/25  Feels a bit off  No family at bedside   Did not sleep well   No chest pain or shortness of breath   Room air      5/24  Doing well   On room air   NAD  Looks great   Feels good   Wants to go home   Arrangements need to be made and clearance to be had      5/23  Doing much improved today which is great to see  Wife Mirlande at bedside   On room air   Breathing NAD  Transferring out to floor   BP a bit low and he is having what I presume to be recrudescence of his stroke symptoms I.e. dizziness   He has declined MRI again today for further evaluation       5/22  Doing much much better thankfully   More comfortable   NAD   On NC O2 again   Family at bedside   Answered all of their questions   Goal is to get him home at some point per family   Feels better after thoracentesis      5/21  Abdominal pain this morning   Went down for bronch abdominal pain worsened and was having intractable vomiting   Sent for stat CT abdomen pelvis which is still not formally read ? Patient on NRB and NC when I saw him after RRT transferred to MICU. Spoke with family. Spoke with ICU and palliative care. Palliative will ultimately confirm the patients wishes at this time, very difficult situation for family they are very emotional. Spent a great deal of time with them today.      5/20  Still dizzy particularly with head movement to the R   Refusing MRI brain states he just does not want to go back in the machine explained we will be unable to evaluate for new stroke he voices understanding and commits to his refusal   Otherwise will re check ortho vitals today   Nursing at bedside   Treat constipation   CT head  Kidneys are preserved size and cortical thickness.  There is no renal calculus or obstruction.  Perirenal fat planes are preserved. 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  The appearance aneurysm, graft are similar as observed on previous studies back to March 2021.  This is a non IV contrast study. There is no midline retroperitoneal adenopathy. The bladder is moderate distended.  There is a cluster of several low subcentimeter size bladder calculi that were observed on the previous study of April 17.  As mention presently there is no renal calculus. The prostate gland is mild to moderate enlarged, causes some impression in the base of the bladder.  The prostate gland measures 4 x 5.1 x 5.4 cm. Seminal vesicles symmetrical appearance. New development since the previous examination of April 17 is the presence of a moderate large size pleural effusion causes significant compression atelectasis in the right lower lobe with consolidation and air bronchogram, there are patency of the central airways.  There is an additional confluent density in the right parahilar region not covered on this study. Also observed is a serpentine use cluster of tubular like structures in the right anterior epicardial space above the right diaphragma which was not seen on the previous examination.  These could represent abnormal vessels.  Can consider further evaluation with CT scan of the chest with IV contrast. There is no left-sided pleural effusion. Patient had previous mid sternotomy.  Calcifications are seen in the coronary arteries.  Heart is normal size.  There is no pericardial effusion. Patient had previous vertebroplasty for L2 and L1 vertebral bodies which is a new finding since the previous study of April 17.  There is a compression deformity of these 2 vertebral bodies more of L2.  Degenerative changes are seen in the disc space of L2-3 are  not evident by CT. DEGENERATIVE CHANGES: Small disc bulges at multiple levels.  No significant central canal stenosis.  Moderate neural foraminal stenoses at the left L2-3 and bilateral L4-5 and L5-S1 levels. SOFT TISSUES/RETROPERITONEUM: No paraspinal mass is seen. An aorto iliac stent is seen traversing the abdominal aortic aneurysm which measures approximately 4 cm in maximal transverse dimension.     1. Enlarging lytic lesion in the left lateral L2 vertebral body, now associated with a pathological fracture. 2. New subtle lytic lesion in the left lateral aspect of the L3 vertebral body. 3. The metastatic lesion in the L1 vertebral body, as seen on the previous MRI, is not evident by CT.       MICROBIOLOGY:  BLOOD CX #1  No results for input(s): \"BC\" in the last 72 hours.  BLOOD CX #2  No results for input(s): \"BLOODCULT2\" in the last 72 hours.  TIP CULTURE  No results for input(s): \"CXCATHTIP\" in the last 72 hours.   CULTURE, RESPIRATORY   No results for input(s): \"CULTRESP\" in the last 72 hours.  RESPIRATORY SMEAR  No results for input(s): \"RESPSMEAR\" in the last 72 hours.         DISPOSITION:  The patient's condition is stable.   The patient is being discharged to Hospice    DISCHARGE MEDICATIONS:      Medication List        START taking these medications      amoxicillin-clavulanate 600-42.9 MG/5ML suspension  Commonly known as: AUGMENTIN-ES  Take 15 mLs by mouth every 12 hours for 13 doses     fentaNYL 50 MCG/HR  Commonly known as: DURAGESIC  Place 1 patch onto the skin every 72 hours for 6 days. Max Daily Amount: 1 patch  Start taking on: May 31, 2025     oxyCODONE HCl 10 MG immediate release tablet  Commonly known as: OXY-IR  Take 1 tablet by mouth every 6 hours as needed for Pain for up to 3 days. Max Daily Amount: 40 mg            CONTINUE taking these medications      aspirin 81 MG tablet     clopidogrel 75 MG tablet  Commonly known as: PLAVIX  Take 1 tablet by mouth daily     gabapentin 100 MG  capsule  Commonly known as: NEURONTIN  Take 1 capsule by mouth 3 times daily for 60 days.     ICAPS Tabs     nitroGLYCERIN 0.4 MG SL tablet  Commonly known as: NITROSTAT     oxyCODONE-acetaminophen  MG per tablet  Commonly known as: PERCOCET     pantoprazole 40 MG tablet  Commonly known as: PROTONIX  Take 1 tablet by mouth every morning (before breakfast)     ranolazine 500 MG extended release tablet  Commonly known as: RANEXA     rosuvastatin 40 MG tablet  Commonly known as: CRESTOR            STOP taking these medications      isosorbide mononitrate 30 MG extended release tablet  Commonly known as: IMDUR     isosorbide mononitrate 60 MG extended release tablet  Commonly known as: IMDUR     lisinopril 20 MG tablet  Commonly known as: PRINIVIL;ZESTRIL     tamsulosin 0.4 MG capsule  Commonly known as: FLOMAX            ASK your doctor about these medications      polyethylene glycol 17 g packet  Commonly known as: GLYCOLAX  Take 1 packet by mouth daily               Where to Get Your Medications        These medications were sent to [facility] [address] P [phone] - F [phone]  [facility] [place] [id number]      Phone: [phone]   amoxicillin-clavulanate 600-42.9 MG/5ML suspension  fentaNYL 50 MCG/HR  oxyCODONE HCl 10 MG immediate release tablet         Current Discharge Medication List        Current Discharge Medication List        STOP taking these medications       isosorbide mononitrate (IMDUR) 30 MG extended release tablet Comments:   Reason for Stopping:         tamsulosin (FLOMAX) 0.4 MG capsule Comments:   Reason for Stopping:         polyethylene glycol (GLYCOLAX) 17 g packet Comments:   Reason for Stopping:         isosorbide mononitrate (IMDUR) 60 MG extended release tablet Comments:   Reason for Stopping:         lisinopril (PRINIVIL;ZESTRIL) 20 MG tablet Comments:   Reason for Stopping:             Current Discharge

## 2025-05-29 NOTE — PROGRESS NOTES
CLINICAL PHARMACY NOTE: MEDS TO BEDS    Total # of Prescriptions Filled: 2   The following medications were delivered to the patient:  Oxycodone 10mg  Augmentin 600-42.5mg    Additional Documentation:  [employer] MONDRAGON picked up in pharmacy 5-29-25

## 2025-05-29 NOTE — PROGRESS NOTES
[facility]  Department of Pulmonary, Critical Care and Sleep Medicine  [facility]  Department of Internal Medicine  Progress Note    SUBJECTIVE:    To home with hospice  Fluid: SUSPICIOUS FOR, BUT NOT DIAGNOSTIC OF MALIGNANCY     OBJECTIVE:  Vitals:    05/28/25 1431 05/28/25 1458 05/28/25 2015 05/29/25 0856   BP:   91/64 (!) 111/58   Pulse:   81 90   Resp: 16  16 18   Temp:   98.8 °F (37.1 °C) 97.7 °F (36.5 °C)   TempSrc:   Temporal Temporal   SpO2:   94% 91%   Weight:       Height:  1.753 m (5' 9.02\")       Constitutional: Alert,     EENT: EOMI SAMPSON. MMM. No icterus. No thrush.     Neck: No thyromegaly. No elevated JVP. Trachea was midline.   Respiratory: Symmetrical.  Breath sounds were clear.    Cardiovascular: Regular, No murmur. No rubs.      Pulses:  Equal bilaterally.    Abdomen: Soft without organomegaly. No rebound, rigidity.  No guarding.  Lymphatic: No lymphadenopathy.  Musculoskeletal: Without weakness or gross deficits  Extremities:  + lower extremity edema. Reflexes appear adequate.   Skin:  Warm and dry.  No skin rashes.   Neurological/Psychiatric: No acute psychosis. Cranial nerves are intact.        DATA:  The data collected below information that was obtained, reviewed, analyzed and interpreted today. Imaging test are reviewed with the radiologist during weekly conference rounds. Comparison to previous images are always explored.     Monitor Strips: My interpretation and reviewed of the cardiac monitor and further discussion with technical team reveals no changes noted and the patient is in sinus rhythm     RADIOLOGY:  My interpretation and review of the current films reveals  lung mass        CBC:   Lab Results   Component Value Date/Time    WBC 20.0 05/29/2025 04:29 AM    RBC 4.32 05/29/2025 04:29 AM    HGB 12.8 05/29/2025 04:29 AM    HCT 38.8 05/29/2025 04:29 AM    MCV 89.8 05/29/2025 04:29 AM    MCH 29.6 05/29/2025 04:29 AM    MCHC  33.0 05/29/2025 04:29 AM    RDW 15.4 05/29/2025 04:29 AM    PLT 30 05/29/2025 04:29 AM    MPV 11.9 05/29/2025 04:29 AM     CMP:    Lab Results   Component Value Date/Time     05/29/2025 04:29 AM    K 4.0 05/29/2025 04:29 AM    K 3.3 05/23/2023 07:42 AM    CL 98 05/29/2025 04:29 AM    CO2 23 05/29/2025 04:29 AM    BUN 25 05/29/2025 04:29 AM    CREATININE 1.4 05/29/2025 04:29 AM    GFRAA >60 04/06/2021 05:18 AM    LABGLOM 52 05/29/2025 04:29 AM    LABGLOM >60 05/23/2023 07:42 AM    GLUCOSE 157 05/29/2025 04:29 AM    GLUCOSE 101 02/20/2011 10:30 AM    CALCIUM 8.1 05/29/2025 04:29 AM    BILITOT 0.6 05/21/2025 01:11 PM    ALKPHOS 57 05/21/2025 01:11 PM    AST 14 05/21/2025 01:11 PM    ALT 7 05/21/2025 01:11 PM       CLINICAL ASSESMENT:  Large right hilar lung mass measuring up to 6 cm with right pleural effusion  Clinical picture suggestive of metastatic primary lung cancer  Postobstructive pneumonia  Former smoker    PLAN: If needed the case was discussed with the care team  Awaiting final cytology - fluid was inconclusive   Repeat fluid was 1700   Currently patient is on room air and asymptomatic at rest  Continue continues telemetry and flutter valve  PT OT, out of bed to chair  Optimize pain   Follow-up cultures  Enforce incentive and spirometry  Check final culture = negative  Spoke with hospice team would and could get palative pleurx cath placed if needed, call if needed     Ermias Pozo DO, MPH, FCCP, TONY, FACP  Professor of Internal Medicine

## 2025-05-30 LAB
MICROORGANISM SPEC CULT: NO GROWTH
MICROORGANISM/AGENT SPEC: NORMAL
NON-GYN CYTOLOGY REPORT: NORMAL
SERVICE CMNT-IMP: NORMAL
SPECIMEN DESCRIPTION: NORMAL

## 2025-06-05 LAB — REPORT STATUS: NORMAL

## 2025-06-10 LAB — SURGICAL PATHOLOGY REPORT: NORMAL

## 2025-06-10 NOTE — PROGRESS NOTES
Physician Progress Note      PATIENT:               BINA TANG  [facility] #:                  [id number]  :                       1945  ADMIT DATE:       2025 11:54 AM  DISCH DATE:        2025 3:12 PM  RESPONDING  PROVIDER #:        Vinayak Smith MD          QUERY TEXT:    Sepsis was documented in the medical record in pnotes  by Dr. Guan.  The   diagnosis was not noted in subsequent documentation.  Please clarify the   status of this condition.    The clinical indicators include:  -admitted with back pain, had known lumbar cancer (H/P  Dr. Smith)  -experienced an episode of vomiting (RRT notes  Dr. Torres)  -WBC 20.2, , sed rate 50 (Lab )  -Hr , RR 21-31, BP 83/45, 73/43, 87/47 (Vital Sign record )  -pnotes  \"possible post-obstructive pna\" (Critical Care notes  Dr. Guan)  -ID consult  noted \"Reason for consult: aspiration pneumonia/leukocytosis\"   (ID note  Dr. Up)  -Vanco IV, Zyvox IV (Orders )  Options provided:  -- Sepsis (developed after admit) confirmed after study  -- Sepsis (developed after admit) treated and resolved  -- Sepsis ruled out after study  -- Other - I will add my own diagnosis  -- Disagree - Not applicable / Not valid  -- Disagree - Clinically unable to determine / Unknown  -- Refer to Clinical Documentation Reviewer    PROVIDER RESPONSE TEXT:    Sepsis ruled out after study.    Query created by: Jonnie Stratton on 6/10/2025 8:18 AM      Electronically signed by:  Vinayak Smith MD 6/10/2025 1:23 PM

## (undated) DEVICE — SINGLE USE BIOPSY VALVE MAJ-210: Brand: SINGLE USE BIOPSY VALVE (STERILE)

## (undated) DEVICE — BRONCHOSCOPY PACK: Brand: MEDLINE INDUSTRIES, INC.

## (undated) DEVICE — SET EXTN PRIMING 0.7ML L7IN TBNG STD BOR PRSS RATE PUR STRP MP5303-C

## (undated) DEVICE — CATHETER URET 5FR L70CM OPN END SGL LUMN INJ HUB FLEXIMA

## (undated) DEVICE — CATHETER URETH 20FR BLLN 5CC STD LTX HYDRGEL 2 W F BARDX

## (undated) DEVICE — AIR/WATER CLEANING ADAPTER FOR OLYMPUS® GI ENDOSCOPE: Brand: BULLDOG®

## (undated) DEVICE — SET EXTN IV L30IN TBNG DIA0.1IN PRIMING 4ML MACBOR FEM ADPT

## (undated) DEVICE — GOWN,SIRUS,FABRNF,XL,20/CS: Brand: MEDLINE

## (undated) DEVICE — FLEXIVA  PULSE  AND  FLEXIVA  PULSE  TRACTIP  LASER  FIBERS  ARE  HIGH  POWER  SINGLE-USE FIBER: Brand: FLEXIVA PULSE ID

## (undated) DEVICE — Device: Brand: BALLOON

## (undated) DEVICE — CYSTO: Brand: MEDLINE INDUSTRIES, INC.

## (undated) DEVICE — GLOVE ORANGE PI 8   MSG9080

## (undated) DEVICE — SINGLE USE SUCTION VALVE MAJ-209: Brand: SINGLE USE SUCTION VALVE (STERILE)

## (undated) DEVICE — LIQUIBAND RAPID ADHESIVE 36/CS 0.8ML: Brand: MEDLINE

## (undated) DEVICE — ADAPTER TBNG DIA15MM SWVL FBROPT BRONCHSCP TERM 2 AXIS PEEP

## (undated) DEVICE — SOLUTION IRRIG 3000ML STRL H2O USP UROMATIC PLAS CONT

## (undated) DEVICE — SOLUTION IRRIG 1000ML STRL H2O USP PLAS POUR BTL

## (undated) DEVICE — GARMENT,MEDLINE,DVT,INT,CALF,MED, GEN2: Brand: MEDLINE

## (undated) DEVICE — BAG DRAINAGE CONTAINER 15 LT FLUID COLLCTN

## (undated) DEVICE — SYRINGE A08E KIS INFLATION HP: Brand: KYPHON®  INFLATION SYRINGE

## (undated) DEVICE — GLOVE ORANGE PI 7 1/2   MSG9075

## (undated) DEVICE — NEEDLE BIOPSY 22GA ACQUIRE PULMONARY OLYMPUS

## (undated) DEVICE — BONE FILLER DEVICE F04B SIZE 3

## (undated) DEVICE — STOPCOCK IV SM 4 W ROT SWVL M LUERLOCK NVENT BLU CAP HI PRSS

## (undated) DEVICE — JACKSON TABLE POSITIONER KIT: Brand: MEDLINE INDUSTRIES, INC.

## (undated) DEVICE — HYPODERMIC SAFETY NEEDLE: Brand: MAGELLAN

## (undated) DEVICE — BONE CEMENT C01B HV-R WITH MIXER  US: Brand: KYPHON® HV-R® BONE CEMENT AND KYPHON® MIXER PACK

## (undated) DEVICE — 3M™ IOBAN™ 2 ANTIMICROBIAL INCISE DRAPE 6650EZ: Brand: IOBAN™ 2

## (undated) DEVICE — SYRINGE MED 20ML STD CLR PLAS LUERLOCK TIP N CTRL DISP

## (undated) DEVICE — BONE BIOPSY DEVICE F05A BBD SIZE 3: Brand: MEDTRONIC REUSABLE INSTRUMENTS

## (undated) DEVICE — GLOVE SURG 8.5 PF POLYMER WHT STRL SIGN LTX ESSENTIAL LTX

## (undated) DEVICE — GUIDEWIRE ENDO L150CM DIA0.035IN STR TIP (QTY = EACH)

## (undated) DEVICE — MEDIA CONTRAST ISOVUE 300 100ML

## (undated) DEVICE — ADAPTER URO SCP UROLOK LL

## (undated) DEVICE — TAMP K08A XPAN INFLAT BONE  SIZE 20/3-RB: Brand: KYPHON XPANDER™ INFLATABLE BONE TAMP

## (undated) DEVICE — INTRODUCER T15K ONE STEP OID BEVEL: Brand: KYPHON® ONE-STEP™ OSTEO INTRODUCER™ SYSTEM